# Patient Record
Sex: FEMALE | Race: WHITE | NOT HISPANIC OR LATINO | Employment: OTHER | URBAN - METROPOLITAN AREA
[De-identification: names, ages, dates, MRNs, and addresses within clinical notes are randomized per-mention and may not be internally consistent; named-entity substitution may affect disease eponyms.]

---

## 2017-04-28 ENCOUNTER — OFFICE VISIT (OUTPATIENT)
Dept: URGENT CARE | Facility: URGENT CARE | Age: 57
End: 2017-04-28
Payer: COMMERCIAL

## 2017-04-28 VITALS
WEIGHT: 290 LBS | BODY MASS INDEX: 45.52 KG/M2 | SYSTOLIC BLOOD PRESSURE: 141 MMHG | HEIGHT: 67 IN | TEMPERATURE: 98 F | DIASTOLIC BLOOD PRESSURE: 77 MMHG | HEART RATE: 78 BPM | OXYGEN SATURATION: 97 %

## 2017-04-28 DIAGNOSIS — R10.9 FLANK PAIN: ICD-10-CM

## 2017-04-28 DIAGNOSIS — M54.6 ACUTE RIGHT-SIDED THORACIC BACK PAIN: Primary | ICD-10-CM

## 2017-04-28 LAB
ALBUMIN UR-MCNC: NEGATIVE MG/DL
APPEARANCE UR: CLEAR
BILIRUB UR QL STRIP: NEGATIVE
COLOR UR AUTO: YELLOW
GLUCOSE UR STRIP-MCNC: NEGATIVE MG/DL
HGB UR QL STRIP: NEGATIVE
KETONES UR STRIP-MCNC: NEGATIVE MG/DL
LEUKOCYTE ESTERASE UR QL STRIP: ABNORMAL
NITRATE UR QL: NEGATIVE
PH UR STRIP: 5.5 PH (ref 5–7)
RBC #/AREA URNS AUTO: NORMAL /HPF (ref 0–2)
SP GR UR STRIP: <=1.005 (ref 1–1.03)
URN SPEC COLLECT METH UR: ABNORMAL
UROBILINOGEN UR STRIP-ACNC: 0.2 EU/DL (ref 0.2–1)
WBC #/AREA URNS AUTO: NORMAL /HPF (ref 0–2)

## 2017-04-28 PROCEDURE — 99213 OFFICE O/P EST LOW 20 MIN: CPT | Performed by: FAMILY MEDICINE

## 2017-04-28 PROCEDURE — 81001 URINALYSIS AUTO W/SCOPE: CPT | Performed by: FAMILY MEDICINE

## 2017-04-28 RX ORDER — CYCLOBENZAPRINE HCL 10 MG
10 TABLET ORAL 3 TIMES DAILY PRN
Qty: 15 TABLET | Refills: 0 | Status: SHIPPED | OUTPATIENT
Start: 2017-04-28 | End: 2019-07-30

## 2017-04-28 NOTE — MR AVS SNAPSHOT
After Visit Summary   4/28/2017    Azucena Guo    MRN: [id number]           Patient Information     Date Of Birth          1960        Visit Information        Provider Department      4/28/2017 7:30 PM Mar Hayes DO [facility]        Today's Diagnoses     Acute right-sided thoracic back pain    -  1    Flank pain          Care Instructions    Aleve 2 tablets (take with food) every 12 hours over the weekend.  If you take the muscle relaxant, do not drive or take at work as it can be sedating.   Heating pad, avoid picking up/carrying anything over the weekend.     Follow up early next week with your primary provider for a recheck.  Return to care sooner if any new or worsening symptoms develop.        Follow-ups after your visit        Who to contact     If you have questions or need follow up information about today's clinic visit or your schedule please contact [facility] directly at [phone].  Normal or non-critical lab and imaging results will be communicated to you by CallidusCloudhart, letter or phone within 4 business days after the clinic has received the results. If you do not hear from us within 7 days, please contact the clinic through GutCheckt or phone. If you have a critical or abnormal lab result, we will notify you by phone as soon as possible.  Submit refill requests through [employer] or call your pharmacy and they will forward the refill request to us. Please allow 3 business days for your refill to be completed.          Additional Information About Your Visit        CallidusCloudhart Information     [employer] gives you secure access to your electronic health record. If you see a primary care provider, you can also send messages to your care team and make appointments. If you have questions, please call your primary care clinic.  If you do not have a primary care provider, please call [phone] and they will assist you.        Care  "EveryWhere ID     This is your Care EveryWhere ID. This could be used by other organizations to access your [facility] medical records  [id number]        Your Vitals Were     Pulse Temperature Height Pulse Oximetry BMI (Body Mass Index)       78 98  F (36.7  C) (Tympanic) 5' 7\" (1.702 m) 97% 45.42 kg/m2        Blood Pressure from Last 3 Encounters:   04/28/17 141/77   09/16/16 128/84   11/06/15 132/70    Weight from Last 3 Encounters:   04/28/17 290 lb (131.5 kg)   09/16/16 299 lb 12.8 oz (136 kg)   11/06/15 283 lb (128.4 kg)              We Performed the Following     UA reflex to Microscopic     Urine Microscopic          Today's Medication Changes          These changes are accurate as of: 4/28/17  8:39 PM.  If you have any questions, ask your nurse or doctor.               Start taking these medicines.        Dose/Directions    cyclobenzaprine 10 MG tablet   Commonly known as:  FLEXERIL   Used for:  Acute right-sided thoracic back pain   Started by:  Mar Hayes DO        Dose:  10 mg   Take 1 tablet (10 mg) by mouth 3 times daily as needed for muscle spasms   Quantity:  15 tablet   Refills:  0            Where to get your medicines      These medications were sent to [facility] [address] [place] [address] AT [address] [address]    Hours:  24-hours Phone:  [phone]     cyclobenzaprine 10 MG tablet                Primary Care Provider Office Phone # Fax #    Codie Pina -[phone]       [facility] [address]  [place] [address]        Thank you!     Thank you for choosing [facility]  for your care. Our goal is always to provide you with excellent care. Hearing back from our patients is one way we can continue to improve our services. Please take a few minutes to complete the written survey that you may receive in the mail after your visit with us. " Thank you!             Your Updated Medication List - Protect others around you: Learn how to safely use, store and throw away your medicines at www.disposemymeds.org.          This list is accurate as of: 4/28/17  8:39 PM.  Always use your most recent med list.                   Brand Name Dispense Instructions for use    aspirin 81 MG tablet      Take 1 tablet by mouth daily.       blood glucose monitoring test strip    no brand specified    1 Box    by In Vitro route daily. One touch ultra 1-2 x daily as needed       CONTROL NORMAL Soln     1 Bottle    1 Application by In Vitro route as needed Give whatever brand is covered by her insurance now       cyclobenzaprine 10 MG tablet    FLEXERIL    15 tablet    Take 1 tablet (10 mg) by mouth 3 times daily as needed for muscle spasms       MARTINA-Q PO      Take  by mouth as needed.       glimepiride 4 MG tablet    AMARYL    180 tablet    TAKE TWO TABLETS BY MOUTH EVERY MORNING BEFORE BREAKFAST       lisinopril 2.5 MG tablet    PRINIVIL/Zestril    90 tablet    Take 1 tablet (2.5 mg) by mouth daily       metFORMIN 500 MG tablet    GLUCOPHAGE    450 tablet    TAKE 3 TABLETS MORNING AND TAKE 2 TABLETS EVERY EVENING       MULTI-VITAMIN PO      Take  by mouth.       * order for DME     200 each    Test strips for pt's glucometer, brand as covered by insurance Test bid and prn.  Rx number at [facility] is [id number]       * order for DME     1 each    Glucometer, brand as covered by insurance.       * order for DME     200 each    Test strips for pt's glucometer, brand as covered by insurance Test bid and prn.       * order for DME     200 each    Lancets bid and prn.  Fill per patient's insurance.       simvastatin 40 MG tablet    ZOCOR    90 tablet    Take 1 tablet (40 mg) by mouth At Bedtime       vitamin D 1000 UNITS capsule      Take 1 capsule by mouth daily.       * Notice:  This list has 4 medication(s) that are the same as other medications prescribed for you. Read  the directions carefully, and ask your doctor or other care provider to review them with you.

## 2017-04-29 NOTE — PATIENT INSTRUCTIONS
Aleve 2 tablets (take with food) every 12 hours over the weekend.  If you take the muscle relaxant, do not drive or take at work as it can be sedating.   Heating pad, avoid picking up/carrying anything over the weekend.     Follow up early next week with your primary provider for a recheck.  Return to care sooner if any new or worsening symptoms develop.

## 2017-04-29 NOTE — PROGRESS NOTES
"SUBJECTIVE:   Azucena Guo is a 56 year old female presenting with a chief complaint of right sided flank and back pain for the past several weeks.   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  She recalls feeding one and twisting to the right to  the other crying child when she had onset of pain under the right ribs/side and back.  Pain waxes and wanes, but worse this week.  Now some spasms.  Pain is nearly resolved on her weekends off, then returns as soon as she is back to work lifting and carrying the infants.  No fevers.  No joint pains, no vomiting or diarrhea.  No dysuria or symptoms of UTI.   H/o gallbladder surgery.  No other back/abdominal surgery.      ROS:  5-Point Review of Systems Negative-- Except as stated above.    OBJECTIVE  /77 (BP Location: Left arm, Patient Position: Chair, Cuff Size: Adult Large)  Pulse 78  Temp 98  F (36.7  C) (Tympanic)  Ht 5' 7\" (1.702 m)  Wt 290 lb (131.5 kg)  SpO2 97%  BMI 45.42 kg/m2  GENERAL:  Awake, alert and interactive. No acute distress.  HEAD:   NC/AT, EOMI, clear conjunctiva.    CHEST:  Lungs are clear, no rhonchi, wheezing or rales. Normal symmetric air entry throughout both lung fields.   HEART:  S1 and S2 normal, no murmurs, clicks, gallops or rubs. Regular rate and rhythm.  ABD:  Soft, not distended, bowel sounds normal throughout.  No TTP.  BACK: no TTP over midline spine. Generalized tenderness with palpation below scapula and flank.  No swelling, bruising or erythema noted.  Mild CVA TTP both sides.  SKIN:  No rashes noted to back/abdomen    Results for orders placed or performed in visit on 04/28/17   UA reflex to Microscopic   Result Value Ref Range    Color Urine Yellow     Appearance Urine Clear     Glucose Urine Negative NEG mg/dL    Bilirubin Urine Negative NEG    Ketones Urine Negative NEG mg/dL    Specific Gravity Urine <=1.005 1.003 - 1.035    Blood Urine Negative NEG    pH Urine 5.5 5.0 - 7.0 pH    Protein Albumin Urine " Negative NEG mg/dL    Urobilinogen Urine 0.2 0.2 - 1.0 EU/dL    Nitrite Urine Negative NEG    Leukocyte Esterase Urine Trace (A) NEG    Source Midstream Urine    Urine Microscopic   Result Value Ref Range    WBC Urine O - 2 0 - 2 /HPF    RBC Urine O - 2 0 - 2 /HPF       ASSESSMENT/PLAN    ICD-10-CM    1. Acute right-sided thoracic back pain M54.6 UA reflex to Microscopic     cyclobenzaprine (FLEXERIL) 10 MG tablet     Urine Microscopic   2. Flank pain R10.9 UA reflex to Microscopic      We discussed the expected course, medications, and symptomatic cares.      Patient Instructions   Aleve 2 tablets (take with food) every 12 hours over the weekend.  If you take the muscle relaxant, do not drive or take at work as it can be sedating.   Heating pad, avoid picking up/carrying anything over the weekend.     Follow up early next week with your primary provider for a recheck.  Return to care sooner if any new or worsening symptoms develop.

## 2017-04-29 NOTE — NURSING NOTE
"Chief Complaint   Patient presents with     Urgent Care     Back Pain     c/o back pain for 1 day       Initial /77 (BP Location: Left arm, Patient Position: Chair, Cuff Size: Adult Large)  Pulse 78  Temp 98  F (36.7  C) (Tympanic)  Ht 5' 7\" (1.702 m)  Wt 290 lb (131.5 kg)  SpO2 97%  BMI 45.42 kg/m2 Estimated body mass index is 45.42 kg/(m^2) as calculated from the following:    Height as of this encounter: 5' 7\" (1.702 m).    Weight as of this encounter: 290 lb (131.5 kg).  Medication Reconciliation: complete   Evita Novak MA    "

## 2017-08-10 ENCOUNTER — MYC MEDICAL ADVICE (OUTPATIENT)
Dept: FAMILY MEDICINE | Facility: CLINIC | Age: 57
End: 2017-08-10

## 2017-08-15 ENCOUNTER — TRANSFERRED RECORDS (OUTPATIENT)
Dept: HEALTH INFORMATION MANAGEMENT | Facility: CLINIC | Age: 57
End: 2017-08-15

## 2017-09-28 ENCOUNTER — OFFICE VISIT (OUTPATIENT)
Dept: FAMILY MEDICINE | Facility: CLINIC | Age: 57
End: 2017-09-28
Payer: COMMERCIAL

## 2017-09-28 ENCOUNTER — RADIANT APPOINTMENT (OUTPATIENT)
Dept: GENERAL RADIOLOGY | Facility: CLINIC | Age: 57
End: 2017-09-28
Attending: FAMILY MEDICINE
Payer: COMMERCIAL

## 2017-09-28 VITALS
DIASTOLIC BLOOD PRESSURE: 75 MMHG | TEMPERATURE: 97 F | WEIGHT: 269 LBS | HEIGHT: 67 IN | OXYGEN SATURATION: 97 % | HEART RATE: 72 BPM | RESPIRATION RATE: 18 BRPM | SYSTOLIC BLOOD PRESSURE: 114 MMHG | BODY MASS INDEX: 42.22 KG/M2

## 2017-09-28 DIAGNOSIS — Z00.00 ROUTINE GENERAL MEDICAL EXAMINATION AT A HEALTH CARE FACILITY: Primary | ICD-10-CM

## 2017-09-28 DIAGNOSIS — E78.5 HYPERLIPIDEMIA LDL GOAL <100: ICD-10-CM

## 2017-09-28 DIAGNOSIS — M79.644 PAIN OF FINGER OF RIGHT HAND: ICD-10-CM

## 2017-09-28 DIAGNOSIS — E11.8 CONTROLLED TYPE 2 DIABETES MELLITUS WITH COMPLICATION, WITHOUT LONG-TERM CURRENT USE OF INSULIN (H): ICD-10-CM

## 2017-09-28 DIAGNOSIS — Z23 NEED FOR PROPHYLACTIC VACCINATION AND INOCULATION AGAINST INFLUENZA: ICD-10-CM

## 2017-09-28 DIAGNOSIS — E78.2 MIXED HYPERLIPIDEMIA: ICD-10-CM

## 2017-09-28 LAB
ALBUMIN SERPL-MCNC: 3.8 G/DL (ref 3.4–5)
ALP SERPL-CCNC: 51 U/L (ref 40–150)
ALT SERPL W P-5'-P-CCNC: 26 U/L (ref 0–50)
ANION GAP SERPL CALCULATED.3IONS-SCNC: 9 MMOL/L (ref 3–14)
AST SERPL W P-5'-P-CCNC: 20 U/L (ref 0–45)
BILIRUB SERPL-MCNC: 0.6 MG/DL (ref 0.2–1.3)
BUN SERPL-MCNC: 13 MG/DL (ref 7–30)
CALCIUM SERPL-MCNC: 9.3 MG/DL (ref 8.5–10.1)
CHLORIDE SERPL-SCNC: 101 MMOL/L (ref 94–109)
CHOLEST SERPL-MCNC: 167 MG/DL
CO2 SERPL-SCNC: 25 MMOL/L (ref 20–32)
CREAT SERPL-MCNC: 0.4 MG/DL (ref 0.52–1.04)
CREAT UR-MCNC: 110 MG/DL
GFR SERPL CREATININE-BSD FRML MDRD: >90 ML/MIN/1.7M2
GLUCOSE SERPL-MCNC: 161 MG/DL (ref 70–99)
HBA1C MFR BLD: 6.5 % (ref 4.3–6)
HDLC SERPL-MCNC: 51 MG/DL
LDLC SERPL CALC-MCNC: 84 MG/DL
MICROALBUMIN UR-MCNC: 27 MG/L
MICROALBUMIN/CREAT UR: 24.36 MG/G CR (ref 0–25)
NONHDLC SERPL-MCNC: 116 MG/DL
POTASSIUM SERPL-SCNC: 3.9 MMOL/L (ref 3.4–5.3)
PROT SERPL-MCNC: 8.1 G/DL (ref 6.8–8.8)
SODIUM SERPL-SCNC: 135 MMOL/L (ref 133–144)
TRIGL SERPL-MCNC: 161 MG/DL
VIT B12 SERPL-MCNC: 433 PG/ML (ref 193–986)

## 2017-09-28 PROCEDURE — 80061 LIPID PANEL: CPT | Performed by: FAMILY MEDICINE

## 2017-09-28 PROCEDURE — 90686 IIV4 VACC NO PRSV 0.5 ML IM: CPT | Performed by: FAMILY MEDICINE

## 2017-09-28 PROCEDURE — 99207 C FOOT EXAM  NO CHARGE: CPT | Mod: 25 | Performed by: FAMILY MEDICINE

## 2017-09-28 PROCEDURE — 99213 OFFICE O/P EST LOW 20 MIN: CPT | Mod: 25 | Performed by: FAMILY MEDICINE

## 2017-09-28 PROCEDURE — 83036 HEMOGLOBIN GLYCOSYLATED A1C: CPT | Performed by: FAMILY MEDICINE

## 2017-09-28 PROCEDURE — 82607 VITAMIN B-12: CPT | Performed by: FAMILY MEDICINE

## 2017-09-28 PROCEDURE — 80053 COMPREHEN METABOLIC PANEL: CPT | Performed by: FAMILY MEDICINE

## 2017-09-28 PROCEDURE — 82043 UR ALBUMIN QUANTITATIVE: CPT | Performed by: FAMILY MEDICINE

## 2017-09-28 PROCEDURE — 99396 PREV VISIT EST AGE 40-64: CPT | Mod: 25 | Performed by: FAMILY MEDICINE

## 2017-09-28 PROCEDURE — 36415 COLL VENOUS BLD VENIPUNCTURE: CPT | Performed by: FAMILY MEDICINE

## 2017-09-28 PROCEDURE — 90471 IMMUNIZATION ADMIN: CPT | Performed by: FAMILY MEDICINE

## 2017-09-28 PROCEDURE — 73140 X-RAY EXAM OF FINGER(S): CPT | Mod: RT

## 2017-09-28 RX ORDER — LISINOPRIL 2.5 MG/1
2.5 TABLET ORAL DAILY
Qty: 90 TABLET | Refills: 3 | Status: SHIPPED | OUTPATIENT
Start: 2017-09-28 | End: 2018-11-12

## 2017-09-28 RX ORDER — SIMVASTATIN 40 MG
40 TABLET ORAL AT BEDTIME
Qty: 90 TABLET | Refills: 3 | Status: SHIPPED | OUTPATIENT
Start: 2017-09-28 | End: 2018-11-05

## 2017-09-28 RX ORDER — GLIMEPIRIDE 4 MG/1
TABLET ORAL
Qty: 180 TABLET | Refills: 3 | Status: SHIPPED | OUTPATIENT
Start: 2017-09-28 | End: 2018-11-12

## 2017-09-28 NOTE — MR AVS SNAPSHOT
After Visit Summary   9/28/2017    Azucena Guo    MRN: [id number]           Patient Information     Date Of Birth          1960        Visit Information        Provider Department      9/28/2017 7:20 AM Codie Pina MD [facility]        Today's Diagnoses     Routine general medical examination at a health care facility    -  1    Pain of finger of right hand        Mixed hyperlipidemia        Controlled type 2 diabetes mellitus with complication, without long-term current use of insulin (H)        Need for prophylactic vaccination and inoculation against influenza        Uncontrolled type 2 diabetes mellitus with complication, without long-term current use of insulin (H)        Hyperlipidemia LDL goal <100          Care Instructions      Preventive Health Recommendations  Female Ages 50 - 64    Yearly exam: See your health care provider every year in order to  o Review health changes.   o Discuss preventive care.    o Review your medicines if your doctor has prescribed any.      Get a Pap test every three years (unless you have an abnormal result and your provider advises testing more often).    If you get Pap tests with HPV test, you only need to test every 5 years, unless you have an abnormal result.     You do not need a Pap test if your uterus was removed (hysterectomy) and you have not had cancer.    You should be tested each year for STDs (sexually transmitted diseases) if you're at risk.     Have a mammogram every 1 to 2 years.    Have a colonoscopy at age 50, or have a yearly FIT test (stool test). These exams screen for colon cancer.      Have a cholesterol test every 5 years, or more often if advised.    Have a diabetes test (fasting glucose) every three years. If you are at risk for diabetes, you should have this test more often.     If you are at risk for osteoporosis (brittle bone disease), think about having a bone density scan (DEXA).    Shots: Get a  flu shot each year. Get a tetanus shot every 10 years.    Nutrition:     Eat at least 5 servings of fruits and vegetables each day.    Eat whole-grain bread, whole-wheat pasta and brown rice instead of white grains and rice.    Talk to your provider about Calcium and Vitamin D.     Lifestyle    Exercise at least 150 minutes a week (30 minutes a day, 5 days a week). This will help you control your weight and prevent disease.    Limit alcohol to one drink per day.    No smoking.     Wear sunscreen to prevent skin cancer.     See your dentist every six months for an exam and cleaning.    See your eye doctor every 1 to 2 years.            Follow-ups after your visit        Who to contact     If you have questions or need follow up information about today's clinic visit or your schedule please contact [facility] directly at [phone].  Normal or non-critical lab and imaging results will be communicated to you by [employer] Labshart, letter or phone within 4 business days after the clinic has received the results. If you do not hear from us within 7 days, please contact the clinic through Click4Ridet or phone. If you have a critical or abnormal lab result, we will notify you by phone as soon as possible.  Submit refill requests through [employer] or call your pharmacy and they will forward the refill request to us. Please allow 3 business days for your refill to be completed.          Additional Information About Your Visit        [employer] Software Information     [employer] gives you secure access to your electronic health record. If you see a primary care provider, you can also send messages to your care team and make appointments. If you have questions, please call your primary care clinic.  If you do not have a primary care provider, please call [phone] and they will assist you.        Care EveryWhere ID     This is your Care EveryWhere ID. This could be used by other organizations to access your [facility]  "records  [id number]        Your Vitals Were     Pulse Temperature Respirations Height Pulse Oximetry BMI (Body Mass Index)    72 97  F (36.1  C) (Tympanic) 18 5' 7\" (1.702 m) 97% 42.13 kg/m2       Blood Pressure from Last 3 Encounters:   09/28/17 114/75   04/28/17 141/77   09/16/16 128/84    Weight from Last 3 Encounters:   09/28/17 269 lb (122 kg)   04/28/17 290 lb (131.5 kg)   09/16/16 299 lb 12.8 oz (136 kg)              We Performed the Following     Albumin Random Urine Quantitative with Creat Ratio     Comprehensive metabolic panel     FLU VAC, SPLIT VIRUS IM > 3 YO (QUADRIVALENT) [45517]     FOOT EXAM     Hemoglobin A1c     Lipid Profile     OFFICE/OUTPT VISIT,MACY SUE III     Vaccine Administration, Initial [96257]     Vitamin B12          Where to get your medicines      These medications were sent to [facility] [address] [place] [address] AT [address] [address]    Hours:  24-hours Phone:  [phone]     glimepiride 4 MG tablet    lisinopril 2.5 MG tablet    metFORMIN 500 MG tablet    simvastatin 40 MG tablet          Primary Care Provider Office Phone # Fax #    Codie Pina [phone] [address] [place] [address]        Equal Access to Services     RANDELL JEAN AH: Hadii brenden ku hadasho Soomaali, waaxda luqadaha, qaybta kaalmada adeegyada, marilu martinez. So [facility] [phone].    ATENCIÓN: Si habla español, tiene a charles disposición servicios gratuitos de asistencia lingüística. Lljo al [phone].    We comply with applicable federal civil rights laws and [place] laws. We do not discriminate on the basis of race, color, national origin, age, disability sex, sexual orientation or gender identity.            Thank you!     Thank you for choosing [facility]  for your care. Our goal is always to provide you with excellent care. Hearing " back from our patients is one way we can continue to improve our services. Please take a few minutes to complete the written survey that you may receive in the mail after your visit with us. Thank you!             Your Updated Medication List - Protect others around you: Learn how to safely use, store and throw away your medicines at www.disposemymeds.org.          This list is accurate as of: 9/28/17  5:59 PM.  Always use your most recent med list.                   Brand Name Dispense Instructions for use Diagnosis    aspirin 81 MG tablet      Take 1 tablet by mouth daily.        blood glucose monitoring test strip    no brand specified    1 Box    by In Vitro route daily. One touch ultra 1-2 x daily as needed    Type 2 diabetes, HbA1c goal < 7% (H)       CONTROL NORMAL Soln     1 Bottle    1 Application by In Vitro route as needed Give whatever brand is covered by her insurance now    Type 2 diabetes, HbA1c goal < 7% (H)       cyclobenzaprine 10 MG tablet    FLEXERIL    15 tablet    Take 1 tablet (10 mg) by mouth 3 times daily as needed for muscle spasms    Acute right-sided thoracic back pain       MARTINA-Q PO      Take  by mouth as needed.        glimepiride 4 MG tablet    AMARYL    180 tablet    TAKE TWO TABLETS BY MOUTH EVERY MORNING BEFORE BREAKFAST    Controlled type 2 diabetes mellitus with complication, without long-term current use of insulin (H)       lisinopril 2.5 MG tablet    PRINIVIL/Zestril    90 tablet    Take 1 tablet (2.5 mg) by mouth daily    Uncontrolled type 2 diabetes mellitus with complication, without long-term current use of insulin (H)       metFORMIN 500 MG tablet    GLUCOPHAGE    450 tablet    TAKE 3 TABLETS MORNING AND TAKE 2 TABLETS EVERY EVENING    Controlled type 2 diabetes mellitus with complication, without long-term current use of insulin (H)       MULTI-VITAMIN PO      Take  by mouth.        * order for DME     200 each    Test strips for pt's glucometer, brand as covered by  insurance Test bid and prn.  Rx number at [facility] is [id number]    Type 2 diabetes, HbA1c goal < 7% (H)       * order for DME     1 each    Glucometer, brand as covered by insurance.    Type 2 diabetes, HbA1c goal < 7% (H)       * order for DME     200 each    Test strips for pt's glucometer, brand as covered by insurance Test bid and prn.    Type 2 diabetes, HbA1c goal < 7% (H)       * order for DME     200 each    Lancets bid and prn.  Fill per patient's insurance.    Type 2 diabetes, HbA1c goal < 7% (H)       simvastatin 40 MG tablet    ZOCOR    90 tablet    Take 1 tablet (40 mg) by mouth At Bedtime    Hyperlipidemia LDL goal <100       vitamin D 1000 UNITS capsule      Take 1 capsule by mouth daily.        * Notice:  This list has 4 medication(s) that are the same as other medications prescribed for you. Read the directions carefully, and ask your doctor or other care provider to review them with you.

## 2017-09-28 NOTE — PROGRESS NOTES
Injectable Influenza Immunization Documentation    1.  Is the person to be vaccinated sick today?   No    2. Does the person to be vaccinated have an allergy to a component   of the vaccine?   No    3. Has the person to be vaccinated ever had a serious reaction   to influenza vaccine in the past?   No    4. Has the person to be vaccinated ever had Guillain-Barré syndrome?   No    Form completed by patient.      Valentin Rangel MA

## 2017-09-28 NOTE — NURSING NOTE
"Chief Complaint   Patient presents with     Physical       Initial /75 (BP Location: Right arm, Patient Position: Sitting, Cuff Size: Adult Large)  Pulse 72  Temp 97  F (36.1  C) (Tympanic)  Resp 18  Ht 5' 7\" (1.702 m)  Wt 269 lb (122 kg)  SpO2 97%  BMI 42.13 kg/m2 Estimated body mass index is 42.13 kg/(m^2) as calculated from the following:    Height as of this encounter: 5' 7\" (1.702 m).    Weight as of this encounter: 269 lb (122 kg).  Medication Reconciliation: unable or not appropriate to perform         Valentin Rangel MA      "

## 2017-09-28 NOTE — PROGRESS NOTES
SUBJECTIVE:   CC: Azucena Guo is an 56 year old woman who presents for preventive health visit.     Physical   Annual:     Getting at least 3 servings of Calcium per day::  Yes    Bi-annual eye exam::  Yes    Dental care twice a year::  Yes    Sleep apnea or symptoms of sleep apnea::  None    Diet::  Other    Frequency of exercise::  2-3 days/week    Duration of exercise::  30-45 minutes    Taking medications regularly::  Yes    Medication side effects::  None    Additional concerns today::  YES (patches on both thigh and lower legs, right pinky swollen )    DM2:  The patient has not followed up for the past year.  She states that on metformin and glimepiride, she has seen her sugars decrease from 200s to mid 100s.  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  She will need new strips for her meter; has a new meter and will let me know which brand.      Skin lesions: dry brown patches to bilateral legs, seems to be getting more of them.  They are asymptomatic.     Right pinky finger swelling/pain: x 2years.  Two years ago, she fell head-first against a wall and rain barrel; she hurt her neck/shoulder and was mostly focused on that pain, but then she realized that she had also hurt her right pinky finger.  Ever since, it has been swollen, it cannot flex, and if it is bumped or moved, there is burning pain.  She mentioned this to a provider in the past who recommended an x-ray, but then that was never done.  She is right-handed, so has difficulty with many tasks, such as writing, holding a book, typing, gripping.       CV: DM2 as above was uncontrolled last year, will recheck all routine DM labs.  Patient on a statin and ACE-I.   Malignancy: pap up to date.  Due for mammogram.  Has colonoscopy q 5 yrs and is up to date.  Bone health: no risk factors  Immunizations: due for flu shot  Sexual health: postmenopausal, no symptoms of concern.  She is .  Depression screen: neg        Today's PHQ-2 Score:   PHQ-2 ( 1999 Pfizer) 9/28/2017   Q1: Little interest or pleasure in doing things 0   Q2: Feeling down, depressed or hopeless 0   PHQ-2 Score 0   Q1: Little interest or pleasure in doing things Not at all   Q2: Feeling down, depressed or hopeless Not at all   PHQ-2 Score 0       Abuse: Current or Past(Physical, Sexual or Emotional)- No  Do you feel safe in your environment - Yes    Social History   Substance Use Topics     Smoking status: Former Smoker     Quit date: 1/1/1991     Smokeless tobacco: Never Used     Alcohol use Yes      Comment: occasionally     The patient does not drink >3 drinks per day nor >7 drinks per week.    Reviewed orders with patient.  Reviewed health maintenance and updated orders accordingly - Yes  Patient Active Problem List   Diagnosis     Hyperlipidemia     Diabetes mellitus type 2, controlled, with complications (H)     BMI > 35     DDD (degenerative disc disease), cervical     Past Surgical History:   Procedure Laterality Date     CHOLECYSTECTOMY      1994       Social History   Substance Use Topics     Smoking status: Former Smoker     Quit date: 1/1/1991     Smokeless tobacco: Never Used     Alcohol use Yes      Comment: occasionally     Family History   Problem Relation Age of Onset     HEART DISEASE Mother      DIABETES Mother      CANCER Mother      limphoma     Obesity Mother      HEART DISEASE Father      Coronary Artery Disease Father      Hypertension Father      Hyperlipidemia Father      DIABETES Brother      DIABETES Brother      Obesity Brother      Breast Cancer Other      Obesity Sister      Obesity Sister          Current Outpatient Prescriptions   Medication Sig Dispense Refill     cyclobenzaprine (FLEXERIL) 10 MG tablet Take 1 tablet (10 mg) by mouth 3 times daily as needed for muscle spasms 15 tablet 0     glimepiride (AMARYL) 4 MG tablet TAKE TWO TABLETS BY MOUTH EVERY MORNING BEFORE BREAKFAST 180  tablet 3     lisinopril (PRINIVIL,ZESTRIL) 2.5 MG tablet Take 1 tablet (2.5 mg) by mouth daily 90 tablet 3     metFORMIN (GLUCOPHAGE) 500 MG tablet TAKE 3 TABLETS MORNING AND TAKE 2 TABLETS EVERY EVENING 450 tablet 3     simvastatin (ZOCOR) 40 MG tablet Take 1 tablet (40 mg) by mouth At Bedtime 90 tablet 3     Blood Glucose Calibration (CONTROL) NORMAL SOLN 1 Application by In Vitro route as needed Give whatever brand is covered by her insurance now 1 Bottle 11     Probiotic Product (MARTINA-Q PO) Take  by mouth as needed.       Multiple Vitamin (MULTI-VITAMIN PO) Take  by mouth.       glucose blood VI test strips strip by In Vitro route daily. One touch ultra 1-2 x daily as needed 1 Box 12     aspirin 81 MG tablet Take 1 tablet by mouth daily.       Cholecalciferol (VITAMIN D) 1000 UNIT capsule Take 1 capsule by mouth daily.       ORDER FOR DME Glucometer, brand as covered by insurance. (Patient not taking: Reported on 4/28/2017) 1 each 3     ORDER FOR DME Test strips for pt's glucometer, brand as covered by insurance Test bid and prn. (Patient not taking: Reported on 4/28/2017) 200 each 3     ORDER FOR DME Lancets bid and prn.  Fill per patient's insurance. (Patient not taking: Reported on 4/28/2017) 200 each 3     ORDER FOR DME Test strips for pt's glucometer, brand as covered by insurance Test bid and prn.   Rx number at [facility] is [phone] (Patient not taking: Reported on 4/28/2017) 200 each 4     Allergies   Allergen Reactions     Measles Mumps And Rubella Virus Vaccine Live      Redness       Pneumococcal Vaccine      Swollen node         Patient over age 50, mutual decision to screen reflected in health maintenance.      Pertinent mammograms are reviewed under the imaging tab.  History of abnormal Pap smear: NO - age 30-65 PAP every 5 years with negative HPV co-testing recommended    Reviewed and updated as needed this visit by clinical staffTobacco  Allergies  Meds  Med Hx  Surg Hx  Fam Hx  Soc  "Hx        Reviewed and updated as needed this visit by Provider            ROS:  C: NEGATIVE for fever, chills, change in weight  I: NEGATIVE for worrisome rashes, moles or lesions other than brown spots on legs  E: NEGATIVE for vision changes or irritation  ENT: NEGATIVE for ear, mouth and throat problems  R: NEGATIVE for significant cough or SOB  B: NEGATIVE for masses, tenderness or discharge  CV: NEGATIVE for chest pain, palpitations or peripheral edema  GI: NEGATIVE for nausea, abdominal pain, heartburn, or change in bowel habits  : NEGATIVE for unusual urinary or vaginal symptoms. No vaginal bleeding.  M: NEGATIVE for significant arthralgias or myalgia--other than as noted in HPI.  N: NEGATIVE for weakness, dizziness or paresthesias  P: NEGATIVE for changes in mood or affect      OBJECTIVE:   /75 (BP Location: Right arm, Patient Position: Sitting, Cuff Size: Adult Large)  Pulse 72  Temp 97  F (36.1  C) (Tympanic)  Resp 18  Ht 5' 7\" (1.702 m)  Wt 269 lb (122 kg)  SpO2 97%  BMI 42.13 kg/m2  EXAM:  GENERAL APPEARANCE: healthy, alert and no distress  EYES: Eyes grossly normal to inspection, PERRL and conjunctivae and sclerae normal  HENT: ear canals and TM's normal, nose and mouth without ulcers or lesions, oropharynx clear and oral mucous membranes moist  NECK: no adenopathy, no asymmetry, masses, or scars and thyroid normal to palpation  RESP: lungs clear to auscultation - no rales, rhonchi or wheezes  BREAST: normal without masses, tenderness or nipple discharge and no palpable axillary masses or adenopathy  CV: regular rate and rhythm, normal S1 S2, no S3 or S4, no murmur, click or rub, no peripheral edema and peripheral pulses strong  ABDOMEN: soft, nontender, no hepatosplenomegaly, no masses and bowel sounds normal  MS: Right 5th digit with swelling and tenderness mainly over the proximal phalange and the PIP joint; unable to flex at the PIP, some flexion at the MCP and DIP.  Otherwise no " "musculoskeletal defects are noted and gait is age appropriate without ataxia  SKIN: scattered cherry hemangiomas to trunk, a few light brown, scaly, \"stuck-on\" lesions to her anterior thighs c/w SK's no suspicious lesions or rashes  NEURO: Normal strength and tone, sensory exam grossly normal, mentation intact and speech normal.  Sensation to monofilament is present but more dull on the pads of her toes than in the arch of the feet or the dorsal aspects of the feet.    PSYCH: mentation appears normal and affect normal/bright    ASSESSMENT/PLAN:   1. Routine general medical examination at a health care facility  CV: checking routine DM, HL labs continue ASA, statin and ACE-I.  Malignancy: mammogram this year.  Pap due 2020. Colonoscopy due 2021.  Bone health: no risk factors  Immunizations: flu shot      2. Pain of finger of right hand  From trauma two years ago, likely had a fracture that did not heal properly and now perhaps some arthritis.  Will check plain films and send on to orthopedics.    - XR Finger Right G/E 2 Views; Future    3. Mixed hyperlipidemia    - Comprehensive metabolic panel  - Lipid Profile    4. Controlled type 2 diabetes mellitus with complication, without long-term current use of insulin (H)    - Hemoglobin A1c  - Comprehensive metabolic panel  - Albumin Random Urine Quantitative with Creat Ratio  - Vitamin B12  - Lipid Profile  - FOOT EXAM    5. Need for prophylactic vaccination and inoculation against influenza    - FLU VAC, SPLIT VIRUS IM > 3 YO (QUADRIVALENT) [50149]  - Vaccine Administration, Initial [99703]    COUNSELING:  Reviewed preventive health counseling, as reflected in patient instructions       Regular exercise       Healthy diet/nutrition       Vision screening         reports that she quit smoking about 26 years ago. She has never used smokeless tobacco.    Estimated body mass index is 42.13 kg/(m^2) as calculated from the following:    Height as of this encounter: 5' 7\" " (1.702 m).    Weight as of this encounter: 269 lb (122 kg).       Counseling Resources:  ATP IV Guidelines  Pooled Cohorts Equation Calculator  Breast Cancer Risk Calculator  FRAX Risk Assessment  ICSI Preventive Guidelines  Dietary Guidelines for Americans, 2010  USDA's MyPlate  ASA Prophylaxis  Lung CA Screening    Codie Pina MD  [facility]  Answers for HPI/ROS submitted by the patient on 9/28/2017   PHQ-2 Score: 0

## 2017-09-29 ENCOUNTER — MYC MEDICAL ADVICE (OUTPATIENT)
Dept: FAMILY MEDICINE | Facility: CLINIC | Age: 57
End: 2017-09-29

## 2017-09-29 DIAGNOSIS — E11.8 DIABETES MELLITUS TYPE 2, CONTROLLED, WITH COMPLICATIONS (H): Primary | ICD-10-CM

## 2017-12-26 ENCOUNTER — OFFICE VISIT (OUTPATIENT)
Dept: URGENT CARE | Facility: URGENT CARE | Age: 57
End: 2017-12-26
Payer: COMMERCIAL

## 2017-12-26 VITALS
HEIGHT: 67 IN | WEIGHT: 275 LBS | HEART RATE: 80 BPM | DIASTOLIC BLOOD PRESSURE: 84 MMHG | SYSTOLIC BLOOD PRESSURE: 135 MMHG | BODY MASS INDEX: 43.16 KG/M2 | TEMPERATURE: 97.4 F | OXYGEN SATURATION: 100 %

## 2017-12-26 DIAGNOSIS — J20.8 VIRAL BRONCHITIS: Primary | ICD-10-CM

## 2017-12-26 PROCEDURE — 99213 OFFICE O/P EST LOW 20 MIN: CPT | Performed by: INTERNAL MEDICINE

## 2017-12-26 ASSESSMENT — ENCOUNTER SYMPTOMS
WHEEZING: 1
HEADACHES: 1
SORE THROAT: 1
COUGH: 1

## 2017-12-26 NOTE — MR AVS SNAPSHOT
After Visit Summary   12/26/2017    Azucena Guo    MRN: [id number]           Patient Information     Date Of Birth          1960        Visit Information        Provider Department      12/26/2017 5:50 PM Kindra Marlow MD [facility]        Today's Diagnoses     Viral bronchitis    -  1      Care Instructions    Menthol cough drops  Honey  vicks  Fluids  Sleep  Robitussin with codeine    Call or return to clinic if symptoms worsen or fail to improve as anticipated.            Follow-ups after your visit        Who to contact     If you have questions or need follow up information about today's clinic visit or your schedule please contact [facility] directly at [phone].  Normal or non-critical lab and imaging results will be communicated to you by MyChart, letter or phone within 4 business days after the clinic has received the results. If you do not hear from us within 7 days, please contact the clinic through [employer] or phone. If you have a critical or abnormal lab result, we will notify you by phone as soon as possible.  Submit refill requests through [employer] or call your pharmacy and they will forward the refill request to us. Please allow 3 business days for your refill to be completed.          Additional Information About Your Visit        MyChart Information     [employer] gives you secure access to your electronic health record. If you see a primary care provider, you can also send messages to your care team and make appointments. If you have questions, please call your primary care clinic.  If you do not have a primary care provider, please call [phone] and they will assist you.        Care EveryWhere ID     This is your Care EveryWhere ID. This could be used by other organizations to access your [place] medical records  [id number]        Your Vitals Were     Pulse Temperature Height Pulse Oximetry BMI (Body Mass Index)  "      80 97.4  F (36.3  C) (Tympanic) 5' 7\" (1.702 m) 100% 43.07 kg/m2        Blood Pressure from Last 3 Encounters:   12/26/17 135/84   09/28/17 114/75   04/28/17 141/77    Weight from Last 3 Encounters:   12/26/17 275 lb (124.7 kg)   09/28/17 269 lb (122 kg)   04/28/17 290 lb (131.5 kg)              Today, you had the following     No orders found for display       Primary Care Provider Office Phone # Fax #    Codie Pina [phone]       [address] [place] [address]        Equal Access to Services     RANDELL JEAN : Tabby farro Sograce, waaxda luqadaha, qaybta kaalmada adeegyada, marilu grey . [facility] [phone].    ATENCIÓN: Si habla español, tiene a charles disposición servicios gratuitos de asistencia lingüística. [facility] [phone].    We comply with applicable federal civil rights laws and [place] laws. We do not discriminate on the basis of race, color, national origin, age, disability, sex, sexual orientation, or gender identity.            Thank you!     Thank you for choosing [facility]  for your care. Our goal is always to provide you with excellent care. Hearing back from our patients is one way we can continue to improve our services. Please take a few minutes to complete the written survey that you may receive in the mail after your visit with us. Thank you!             Your Updated Medication List - Protect others around you: Learn how to safely use, store and throw away your medicines at www.disposemymeds.org.          This list is accurate as of: 12/26/17  6:32 PM.  Always use your most recent med list.                   Brand Name Dispense Instructions for use Diagnosis    aspirin 81 MG tablet      Take 1 tablet by mouth daily.        * blood glucose monitoring test strip    no brand specified    1 Box    by In Vitro route daily. One touch ultra 1-2 x daily as needed    Type 2 diabetes, HbA1c " goal < 7% (H)       * blood glucose monitoring test strip    CAMI CONTOUR    100 strip    Use to test blood sugar 1-2  times daily or as directed.    Diabetes mellitus type 2, controlled, with complications (H)       CONTROL NORMAL Soln     1 Bottle    1 Application by In Vitro route as needed Give whatever brand is covered by her insurance now    Type 2 diabetes, HbA1c goal < 7% (H)       cyclobenzaprine 10 MG tablet    FLEXERIL    15 tablet    Take 1 tablet (10 mg) by mouth 3 times daily as needed for muscle spasms    Acute right-sided thoracic back pain       MARTINA-Q PO      Take  by mouth as needed.        glimepiride 4 MG tablet    AMARYL    180 tablet    TAKE TWO TABLETS BY MOUTH EVERY MORNING BEFORE BREAKFAST    Controlled type 2 diabetes mellitus with complication, without long-term current use of insulin (H)       lisinopril 2.5 MG tablet    PRINIVIL/Zestril    90 tablet    Take 1 tablet (2.5 mg) by mouth daily    Uncontrolled type 2 diabetes mellitus with complication, without long-term current use of insulin (H)       metFORMIN 500 MG tablet    GLUCOPHAGE    450 tablet    TAKE 3 TABLETS MORNING AND TAKE 2 TABLETS EVERY EVENING    Controlled type 2 diabetes mellitus with complication, without long-term current use of insulin (H)       MULTI-VITAMIN PO      Take  by mouth.        * order for DME     200 each    Test strips for pt's glucometer, brand as covered by insurance Test bid and prn.  Rx number at [facility] is [phone]    Type 2 diabetes, HbA1c goal < 7% (H)       * order for DME     1 each    Glucometer, brand as covered by insurance.    Type 2 diabetes, HbA1c goal < 7% (H)       * order for DME     200 each    Test strips for pt's glucometer, brand as covered by insurance Test bid and prn.    Type 2 diabetes, HbA1c goal < 7% (H)       * order for DME     200 each    Lancets bid and prn.  Fill per patient's insurance.    Type 2 diabetes, HbA1c goal < 7% (H)       simvastatin 40 MG tablet     ZOCOR    90 tablet    Take 1 tablet (40 mg) by mouth At Bedtime    Hyperlipidemia LDL goal <100       vitamin D 1000 UNITS capsule      Take 1 capsule by mouth daily.        * Notice:  This list has 6 medication(s) that are the same as other medications prescribed for you. Read the directions carefully, and ask your doctor or other care provider to review them with you.

## 2017-12-27 NOTE — PROGRESS NOTES
SUBJECTIVE:   Azucena Guo is a 57 year old female presenting with a chief complaint of   Chief Complaint   Patient presents with     Urgent Care     Pt in clinic to have eval for cough, headache, congestion, and eye pain.     Cough     Headache     Nasal Congestion     Eye Problem   .    Onset of symptoms was 5 day(s) ago.    Current and Associated symptoms: cough - non-productive, sore throat and headache  Treatment measures tried include Theraflu  muscinex.  Predisposing factors include ill contact: .  provider    Review of Systems   Constitutional: Positive for malaise/fatigue.   HENT: Positive for congestion and sore throat.    Eyes:        Eyes ache   Respiratory: Positive for cough and wheezing.         Worse ih morning, productive in morning, otherwise irritant  Nasal wheeze with laying down   Neurological: Positive for headaches.     Blood sugars - not checked past few days    Past Medical History:   Diagnosis Date     Arthritis      Diabetes mellitus (H)      Current Outpatient Prescriptions   Medication Sig Dispense Refill     blood glucose monitoring ([employer] CONTOUR) test strip Use to test blood sugar 1-2  times daily or as directed. 100 strip 3     glimepiride (AMARYL) 4 MG tablet TAKE TWO TABLETS BY MOUTH EVERY MORNING BEFORE BREAKFAST 180 tablet 3     lisinopril (PRINIVIL/ZESTRIL) 2.5 MG tablet Take 1 tablet (2.5 mg) by mouth daily 90 tablet 3     metFORMIN (GLUCOPHAGE) 500 MG tablet TAKE 3 TABLETS MORNING AND TAKE 2 TABLETS EVERY EVENING 450 tablet 3     simvastatin (ZOCOR) 40 MG tablet Take 1 tablet (40 mg) by mouth At Bedtime 90 tablet 3     cyclobenzaprine (FLEXERIL) 10 MG tablet Take 1 tablet (10 mg) by mouth 3 times daily as needed for muscle spasms 15 tablet 0     ORDER FOR DME Glucometer, brand as covered by insurance. 1 each 3     ORDER FOR DME Test strips for pt's glucometer, brand as covered by insurance Test bid and prn. 200 each 3     ORDER FOR DME Lancets bid and prn.  Fill per  "patient's insurance. 200 each 3     Blood Glucose Calibration (CONTROL) NORMAL SOLN 1 Application by In Vitro route as needed Give whatever brand is covered by her insurance now 1 Bottle 11     ORDER FOR DME Test strips for pt's glucometer, brand as covered by insurance Test bid and prn.   Rx number at [facility] is [phone] 200 each 4     Probiotic Product (MARTINA-Q PO) Take  by mouth as needed.       Multiple Vitamin (MULTI-VITAMIN PO) Take  by mouth.       glucose blood VI test strips strip by In Vitro route daily. One touch ultra 1-2 x daily as needed 1 Box 12     aspirin 81 MG tablet Take 1 tablet by mouth daily.       Cholecalciferol (VITAMIN D) 1000 UNIT capsule Take 1 capsule by mouth daily.       Social History   Substance Use Topics     Smoking status: Former Smoker     Quit date: 1/1/1991     Smokeless tobacco: Never Used     Alcohol use Yes      Comment: occasionally       OBJECTIVE  /84  Pulse 80  Temp 97.4  F (36.3  C) (Tympanic)  Ht 5' 7\" (1.702 m)  Wt 275 lb (124.7 kg)  SpO2 100%  BMI 43.07 kg/m2    Physical Exam   Constitutional: She is well-developed, well-nourished, and in no distress.   HENT:   Nose: Nose normal.   Mouth/Throat: Oropharynx is clear and moist. No oropharyngeal exudate.   tympanic membrane clear   Cardiovascular: Normal rate, regular rhythm and normal heart sounds.    Pulmonary/Chest: Effort normal and breath sounds normal.       Labs:  No results found for this or any previous visit (from the past 24 hour(s)).  ASSESSMENT:      ICD-10-CM    1. Viral bronchitis J20.8         Medical Decision Making:    Differential Diagnosis:  Sinusitis    Serious Comorbid Conditions:  Diabetes    PLAN:  Patient Instructions   Menthol cough drops  Honey  vicks  Fluids  Sleep  Robitussin with codeine        "

## 2017-12-27 NOTE — PATIENT INSTRUCTIONS
Menthol cough drops  Honey  vicks  Fluids  Sleep  Robitussin with codeine    Call or return to clinic if symptoms worsen or fail to improve as anticipated.

## 2017-12-27 NOTE — NURSING NOTE
"Chief Complaint   Patient presents with     Urgent Care     Pt in clinic to have eval for cough, headache, congestion, and eye pain.     Cough     Headache     Nasal Congestion     Eye Problem       Initial /84  Pulse 80  Temp 97.4  F (36.3  C) (Tympanic)  Ht 5' 7\" (1.702 m)  Wt 275 lb (124.7 kg)  SpO2 100%  BMI 43.07 kg/m2 Estimated body mass index is 43.07 kg/(m^2) as calculated from the following:    Height as of this encounter: 5' 7\" (1.702 m).    Weight as of this encounter: 275 lb (124.7 kg).  Medication Reconciliation: complete   Zena Rose/ MA    "

## 2017-12-29 ENCOUNTER — NURSE TRIAGE (OUTPATIENT)
Dept: NURSING | Facility: CLINIC | Age: 57
End: 2017-12-29

## 2017-12-29 ENCOUNTER — OFFICE VISIT (OUTPATIENT)
Dept: URGENT CARE | Facility: URGENT CARE | Age: 57
End: 2017-12-29
Payer: COMMERCIAL

## 2017-12-29 VITALS
TEMPERATURE: 99.4 F | OXYGEN SATURATION: 99 % | DIASTOLIC BLOOD PRESSURE: 70 MMHG | SYSTOLIC BLOOD PRESSURE: 112 MMHG | RESPIRATION RATE: 16 BRPM | BODY MASS INDEX: 43.16 KG/M2 | HEIGHT: 67 IN | HEART RATE: 74 BPM | WEIGHT: 275 LBS

## 2017-12-29 DIAGNOSIS — J06.9 VIRAL UPPER RESPIRATORY TRACT INFECTION: Primary | ICD-10-CM

## 2017-12-29 PROCEDURE — 99213 OFFICE O/P EST LOW 20 MIN: CPT | Performed by: PHYSICIAN ASSISTANT

## 2017-12-29 NOTE — NURSING NOTE
"Chief Complaint   Patient presents with     Urgent Care     URI     sick since last Friday, glands are very swollen, bad coughing. Was seen in  on Tuesday and told to come back if worse. Sleeping a lot but bad fatigue.        Initial /70  Pulse 74  Temp 99.4  F (37.4  C) (Oral)  Resp 16  Ht 5' 7\" (1.702 m)  Wt 275 lb (124.7 kg)  SpO2 99%  Breastfeeding? No  BMI 43.07 kg/m2 Estimated body mass index is 43.07 kg/(m^2) as calculated from the following:    Height as of this encounter: 5' 7\" (1.702 m).    Weight as of this encounter: 275 lb (124.7 kg).  Medication Reconciliation: complete  "

## 2017-12-29 NOTE — PROGRESS NOTES
SUBJECTIVE:  Azucena Guo is a 57 year old female who presents to the clinic today with a chief complaint of inflamed lymph nodes following visit to  for URI earlier this week.  Symptoms present 1 week.  Still mild presentation without fever, aches.    Her cough is described as productive of clear sputum.      Patient denies significant throat pain, drooling, difficulty opening mouth.      Past Medical History:   Diagnosis Date     Arthritis      Diabetes mellitus (H)        Current Outpatient Prescriptions   Medication Sig Dispense Refill     blood glucose monitoring (CAMI CONTOUR) test strip Use to test blood sugar 1-2  times daily or as directed. 100 strip 3     glimepiride (AMARYL) 4 MG tablet TAKE TWO TABLETS BY MOUTH EVERY MORNING BEFORE BREAKFAST 180 tablet 3     lisinopril (PRINIVIL/ZESTRIL) 2.5 MG tablet Take 1 tablet (2.5 mg) by mouth daily 90 tablet 3     metFORMIN (GLUCOPHAGE) 500 MG tablet TAKE 3 TABLETS MORNING AND TAKE 2 TABLETS EVERY EVENING 450 tablet 3     simvastatin (ZOCOR) 40 MG tablet Take 1 tablet (40 mg) by mouth At Bedtime 90 tablet 3     cyclobenzaprine (FLEXERIL) 10 MG tablet Take 1 tablet (10 mg) by mouth 3 times daily as needed for muscle spasms 15 tablet 0     ORDER FOR DME Glucometer, brand as covered by insurance. 1 each 3     ORDER FOR DME Test strips for pt's glucometer, brand as covered by insurance Test bid and prn. 200 each 3     ORDER FOR DME Lancets bid and prn.  Fill per patient's insurance. 200 each 3     Blood Glucose Calibration (CONTROL) NORMAL SOLN 1 Application by In Vitro route as needed Give whatever brand is covered by her insurance now 1 Bottle 11     ORDER FOR DME Test strips for pt's glucometer, brand as covered by insurance Test bid and prn.   Rx number at [facility] is [id number] 200 each 4     Probiotic Product (MARTINA-Q PO) Take  by mouth as needed.       Multiple Vitamin (MULTI-VITAMIN PO) Take  by mouth.       glucose blood VI test strips strip by In  "Vitro route daily. One touch ultra 1-2 x daily as needed 1 Box 12     aspirin 81 MG tablet Take 1 tablet by mouth daily.       Cholecalciferol (VITAMIN D) 1000 UNIT capsule Take 1 capsule by mouth daily.         Social History   Substance Use Topics     Smoking status: Former Smoker     Quit date: 1/1/1991     Smokeless tobacco: Never Used     Alcohol use Yes      Comment: occasionally       ROS  Review of systems negative except as stated above.    OBJECTIVE:  /70  Pulse 74  Temp 99.4  F (37.4  C) (Oral)  Resp 16  Ht 5' 7\" (1.702 m)  Wt 275 lb (124.7 kg)  SpO2 99%  Breastfeeding? No  BMI 43.07 kg/m2  GENERAL APPEARANCE: healthy, alert and no distress  EYES: EOMI,  PERRL, conjunctiva clear  HENT: TMs bulging, translucent.  ear canals normal.  Nose and mouth without ulcers, erythema or lesions  NECK: bilateral submandibular lymphadenopathy  RESP: lungs clear to auscultation - no rales, rhonchi or wheezes  CV: regular rates and rhythm  ABDOMEN:  soft, nontender, no HSM or masses and bowel sounds normal    ASSESSMENT:    (J06.9,  B97.89) Viral upper respiratory tract infection  (primary encounter diagnosis)  Comment: mild presentation, no worsening of symptoms  Plan:   Patient Instructions         With distilled water 2-3x per day for a minimum 2-3 days  Mucinex, increased fluids, humidifier at night, steaming in the day  Cough drops and hot saltwater gargles as needed    Adult Self-Care for Colds  Colds are caused by viruses. They can't be cured with antibiotics. However, you can ease symptoms and support your body's efforts to heal itself.  No matter which symptoms you have, be sure to:    Drink plenty of fluids (water or clear soup)    Stop smoking and drinking alcohol    Get plenty of rest    Understand a fever    Take your temperature several times a day. If your fever is 100.4 F (38.0 C) for more than a day, call your healthcare provider.    Relax, lie down. Go to bed if you want. Just get off your " feet and rest. Also, drink plenty of fluids to avoid dehydration.    Take acetaminophen or a nonsteroidal anti-inflammatory agent (NSAID), such as ibuprofen.  Treat a troubled nose kindly    Breathe steam or heated humidified air to open blocked nasal passages.  a hot shower or use a vaporizer. Be careful not to get burned by the steam.    Saline nasal sprays and decongestant tablets help open a stuffy nose. Antihistamines can also help, but they can cause side effects such as drowsiness and drying of the eyes, nose, and mouth.  Soothe a sore throat and cough    Gargle every 2 hours with 1/4 teaspoon of salt dissolved in 1/2 cup of warm water. Suck on throat lozenges and cough drops to moisten your throat.    Cough medicines are available but it is unclear how well they actually work.    Take acetaminophen or an NSAID, such as ibuprofen, to ease throat pain  Ease digestive problems    Put fluids back into your body. Take frequent sips of clear liquids such as water or broth. Avoid drinks that have a lot of sugar in them, such as juices and sodas. These can make diarrhea worse. Older children and adults can drink sports drinks.    As your appetite returns, you can resume your normal diet. Ask your healthcare provider if there are any foods you should avoid.  When to seek medical care  When you first notice symptoms, ask your healthcare provider if antiviral medicines are appropriate. Antibiotics should not be taken for colds or flu. Also, call your healthcare provider if you have any of the following symptoms or if you aren't feeling better after 7 days:    Shortness of breath    Pain or pressure in the chest or belly (abdomen)    Worsening symptoms, especially after a period of improvement    Fever of 100.4 F  (38.0 C) or higher, or fever that doesn't go down with medicine    Sudden dizziness or confusion    Severe or continued vomiting    Signs of dehydration, including extreme thirst, dark urine,  infrequent urination, dry mouth    Spotted, red, or very sore throat   Date Last Reviewed: 12/1/2016 2000-2017 [employer]. [address] [place] [address], [place] [address]. All rights reserved. This information is not intended as a substitute for professional medical care. Always follow your healthcare professional's instructions.

## 2017-12-29 NOTE — MR AVS SNAPSHOT
After Visit Summary   12/29/2017    Azucena Guo    MRN: [id number]           Patient Information     Date Of Birth          1960        Visit Information        Provider Department      12/29/2017 5:05 PM Sujit Bolton PA-C [facility]        Care Instructions        With distilled water 2-3x per day for a minimum 2-3 days  Mucinex, increased fluids, humidifier at night, steaming in the day  Cough drops and hot saltwater gargles as needed    Adult Self-Care for Colds  Colds are caused by viruses. They can't be cured with antibiotics. However, you can ease symptoms and support your body's efforts to heal itself.  No matter which symptoms you have, be sure to:    Drink plenty of fluids (water or clear soup)    Stop smoking and drinking alcohol    Get plenty of rest    Understand a fever    Take your temperature several times a day. If your fever is 100.4 F (38.0 C) for more than a day, call your healthcare provider.    Relax, lie down. Go to bed if you want. Just get off your feet and rest. Also, drink plenty of fluids to avoid dehydration.    Take acetaminophen or a nonsteroidal anti-inflammatory agent (NSAID), such as ibuprofen.  Treat a troubled nose kindly    Breathe steam or heated humidified air to open blocked nasal passages.  a hot shower or use a vaporizer. Be careful not to get burned by the steam.    Saline nasal sprays and decongestant tablets help open a stuffy nose. Antihistamines can also help, but they can cause side effects such as drowsiness and drying of the eyes, nose, and mouth.  Soothe a sore throat and cough    Gargle every 2 hours with 1/4 teaspoon of salt dissolved in 1/2 cup of warm water. Suck on throat lozenges and cough drops to moisten your throat.    Cough medicines are available but it is unclear how well they actually work.    Take acetaminophen or an NSAID, such as ibuprofen, to ease throat pain  Ease digestive  problems    Put fluids back into your body. Take frequent sips of clear liquids such as water or broth. Avoid drinks that have a lot of sugar in them, such as juices and sodas. These can make diarrhea worse. Older children and adults can drink sports drinks.    As your appetite returns, you can resume your normal diet. Ask your healthcare provider if there are any foods you should avoid.  When to seek medical care  When you first notice symptoms, ask your healthcare provider if antiviral medicines are appropriate. Antibiotics should not be taken for colds or flu. Also, call your healthcare provider if you have any of the following symptoms or if you aren't feeling better after 7 days:    Shortness of breath    Pain or pressure in the chest or belly (abdomen)    Worsening symptoms, especially after a period of improvement    Fever of 100.4 F  (38.0 C) or higher, or fever that doesn't go down with medicine    Sudden dizziness or confusion    Severe or continued vomiting    Signs of dehydration, including extreme thirst, dark urine, infrequent urination, dry mouth    Spotted, red, or very sore throat   Date Last Reviewed: 12/1/2016 2000-2017 [employer]. [address] [place] [address]. All rights reserved. This information is not intended as a substitute for professional medical care. Always follow your healthcare professional's instructions.              Follow-ups after your visit        Who to contact     If you have questions or need follow up information about today's clinic visit or your schedule please contact [facility] directly at [phone].  Normal or non-critical lab and imaging results will be communicated to you by MyChart, letter or phone within 4 business days after the clinic has received the results. If you do not hear from us within 7 days, please contact the clinic through MyChart or phone. If you have a critical or abnormal lab result, we will  "notify you by phone as soon as possible.  Submit refill requests through [employer] or call your pharmacy and they will forward the refill request to us. Please allow 3 business days for your refill to be completed.          Additional Information About Your Visit        [employer] Information     [employer] gives you secure access to your electronic health record. If you see a primary care provider, you can also send messages to your care team and make appointments. If you have questions, please call your primary care clinic.  If you do not have a primary care provider, please call [phone] and they will assist you.        Care EveryWhere ID     This is your Care EveryWhere ID. This could be used by other organizations to access your [facility] medical records  [id number]        Your Vitals Were     Pulse Temperature Respirations Height Pulse Oximetry Breastfeeding?    74 99.4  F (37.4  C) (Oral) 16 5' 7\" (1.702 m) 99% No    BMI (Body Mass Index)                   43.07 kg/m2            Blood Pressure from Last 3 Encounters:   12/29/17 112/70   12/26/17 135/84   09/28/17 114/75    Weight from Last 3 Encounters:   12/29/17 275 lb (124.7 kg)   12/26/17 275 lb (124.7 kg)   09/28/17 269 lb (122 kg)              Today, you had the following     No orders found for display       Primary Care Provider Office Phone # Fax #    Codie Pina [phone]       [address] [place] [address]        Equal Access to Services     [facility] AH: Hadii aad ku hadasho Soomaali, waaxda luqadaha, qaybta kaalmada adeegyada, waxay anh grey . So [facility] [phone].    ATENCIÓN: Si habla español, tiene a charles disposición servicios gratuitos de asistencia lingüística. Llame al [phone].    We comply with applicable federal civil rights laws and [place] laws. We do not discriminate on the basis of race, color, national origin, age, disability, sex, sexual orientation, or gender " identity.            Thank you!     Thank you for choosing [facility]  for your care. Our goal is always to provide you with excellent care. Hearing back from our patients is one way we can continue to improve our services. Please take a few minutes to complete the written survey that you may receive in the mail after your visit with us. Thank you!             Your Updated Medication List - Protect others around you: Learn how to safely use, store and throw away your medicines at www.disposemymeds.org.          This list is accurate as of: 12/29/17  5:26 PM.  Always use your most recent med list.                   Brand Name Dispense Instructions for use Diagnosis    aspirin 81 MG tablet      Take 1 tablet by mouth daily.        * blood glucose monitoring test strip    no brand specified    1 Box    by In Vitro route daily. One touch ultra 1-2 x daily as needed    Type 2 diabetes, HbA1c goal < 7% (H)       * blood glucose monitoring test strip    CAMI CONTOUR    100 strip    Use to test blood sugar 1-2  times daily or as directed.    Diabetes mellitus type 2, controlled, with complications (H)       CONTROL NORMAL Soln     1 Bottle    1 Application by In Vitro route as needed Give whatever brand is covered by her insurance now    Type 2 diabetes, HbA1c goal < 7% (H)       cyclobenzaprine 10 MG tablet    FLEXERIL    15 tablet    Take 1 tablet (10 mg) by mouth 3 times daily as needed for muscle spasms    Acute right-sided thoracic back pain       MARTINA-Q PO      Take  by mouth as needed.        glimepiride 4 MG tablet    AMARYL    180 tablet    TAKE TWO TABLETS BY MOUTH EVERY MORNING BEFORE BREAKFAST    Controlled type 2 diabetes mellitus with complication, without long-term current use of insulin (H)       lisinopril 2.5 MG tablet    PRINIVIL/Zestril    90 tablet    Take 1 tablet (2.5 mg) by mouth daily    Uncontrolled type 2 diabetes mellitus with complication, without long-term current use  of insulin (H)       metFORMIN 500 MG tablet    GLUCOPHAGE    450 tablet    TAKE 3 TABLETS MORNING AND TAKE 2 TABLETS EVERY EVENING    Controlled type 2 diabetes mellitus with complication, without long-term current use of insulin (H)       MULTI-VITAMIN PO      Take  by mouth.        * order for DME     200 each    Test strips for pt's glucometer, brand as covered by insurance Test bid and prn.  Rx number at [facility] is [id number]    Type 2 diabetes, HbA1c goal < 7% (H)       * order for DME     1 each    Glucometer, brand as covered by insurance.    Type 2 diabetes, HbA1c goal < 7% (H)       * order for DME     200 each    Test strips for pt's glucometer, brand as covered by insurance Test bid and prn.    Type 2 diabetes, HbA1c goal < 7% (H)       * order for DME     200 each    Lancets bid and prn.  Fill per patient's insurance.    Type 2 diabetes, HbA1c goal < 7% (H)       simvastatin 40 MG tablet    ZOCOR    90 tablet    Take 1 tablet (40 mg) by mouth At Bedtime    Hyperlipidemia LDL goal <100       vitamin D 1000 UNITS capsule      Take 1 capsule by mouth daily.        * Notice:  This list has 6 medication(s) that are the same as other medications prescribed for you. Read the directions carefully, and ask your doctor or other care provider to review them with you.

## 2017-12-29 NOTE — TELEPHONE ENCOUNTER
See in urgent care 2 days ago/ determined to be viral/ as of last night abrupt onset of gold ball sized neck lymph nodes on both sides/they are tender to touch but no fever/ cannot sleep on her side because of this/ sent  for an appointment     Ben Catherine RN [phone]  Reason for Disposition    Rapid increase in size of node over several hours    Additional Information    Negative: Severe difficulty breathing (e.g., struggling for each breath, speaks in single words)    Negative: Known hernia is main concern (i.e., soft lump or swelling in the groin that goes away when you push on it)    Negative: Swelling of scrotum is main symptom    Negative: Swelling of face is main symptom    Negative: [1] Swollen node is in the neck AND [2] < 1 inch (2.5 cm) in size AND [3] sore throat is main symptom    Negative: [1] Node is in the neck AND [2] causes difficulty breathing    Negative: [1] Node is in the neck AND [2] can't swallow fluids    Negative: Fever > 103 F (39.4 C)    Negative: [1] Lump or swelling in groin AND [2] pulsating (like heartbeat)    Negative: Patient sounds very sick or weak to the triager    Negative: [1] Single large node AND [2] size > 1 inch (2.5 cm) AND [3] fever    Negative: [1] Overlying skin is red AND [2] fever    Negative: [1] Single large node AND [2] size > 1 inch (2.5 cm) AND [3] no fever    Protocols used: LYMPH NODES SWOLLEN-ADULT-

## 2017-12-29 NOTE — PATIENT INSTRUCTIONS
With distilled water 2-3x per day for a minimum 2-3 days  Mucinex, increased fluids, humidifier at night, steaming in the day  Cough drops and hot saltwater gargles as needed    Adult Self-Care for Colds  Colds are caused by viruses. They can't be cured with antibiotics. However, you can ease symptoms and support your body's efforts to heal itself.  No matter which symptoms you have, be sure to:    Drink plenty of fluids (water or clear soup)    Stop smoking and drinking alcohol    Get plenty of rest    Understand a fever    Take your temperature several times a day. If your fever is 100.4 F (38.0 C) for more than a day, call your healthcare provider.    Relax, lie down. Go to bed if you want. Just get off your feet and rest. Also, drink plenty of fluids to avoid dehydration.    Take acetaminophen or a nonsteroidal anti-inflammatory agent (NSAID), such as ibuprofen.  Treat a troubled nose kindly    Breathe steam or heated humidified air to open blocked nasal passages.  a hot shower or use a vaporizer. Be careful not to get burned by the steam.    Saline nasal sprays and decongestant tablets help open a stuffy nose. Antihistamines can also help, but they can cause side effects such as drowsiness and drying of the eyes, nose, and mouth.  Soothe a sore throat and cough    Gargle every 2 hours with 1/4 teaspoon of salt dissolved in 1/2 cup of warm water. Suck on throat lozenges and cough drops to moisten your throat.    Cough medicines are available but it is unclear how well they actually work.    Take acetaminophen or an NSAID, such as ibuprofen, to ease throat pain  Ease digestive problems    Put fluids back into your body. Take frequent sips of clear liquids such as water or broth. Avoid drinks that have a lot of sugar in them, such as juices and sodas. These can make diarrhea worse. Older children and adults can drink sports drinks.    As your appetite returns, you can resume your normal diet. Ask your  healthcare provider if there are any foods you should avoid.  When to seek medical care  When you first notice symptoms, ask your healthcare provider if antiviral medicines are appropriate. Antibiotics should not be taken for colds or flu. Also, call your healthcare provider if you have any of the following symptoms or if you aren't feeling better after 7 days:    Shortness of breath    Pain or pressure in the chest or belly (abdomen)    Worsening symptoms, especially after a period of improvement    Fever of 100.4 F  (38.0 C) or higher, or fever that doesn't go down with medicine    Sudden dizziness or confusion    Severe or continued vomiting    Signs of dehydration, including extreme thirst, dark urine, infrequent urination, dry mouth    Spotted, red, or very sore throat   Date Last Reviewed: 12/1/2016 2000-2017 [employer]. [address] [place] [address], [place] [address]. All rights reserved. This information is not intended as a substitute for professional medical care. Always follow your healthcare professional's instructions.

## 2018-08-10 ENCOUNTER — OFFICE VISIT (OUTPATIENT)
Dept: URGENT CARE | Facility: URGENT CARE | Age: 58
End: 2018-08-10
Payer: COMMERCIAL

## 2018-08-10 VITALS
HEART RATE: 60 BPM | DIASTOLIC BLOOD PRESSURE: 80 MMHG | RESPIRATION RATE: 15 BRPM | TEMPERATURE: 98.1 F | SYSTOLIC BLOOD PRESSURE: 128 MMHG

## 2018-08-10 DIAGNOSIS — H10.31 ACUTE BACTERIAL CONJUNCTIVITIS OF RIGHT EYE: Primary | ICD-10-CM

## 2018-08-10 PROCEDURE — 99213 OFFICE O/P EST LOW 20 MIN: CPT | Performed by: PHYSICIAN ASSISTANT

## 2018-08-10 RX ORDER — POLYMYXIN B SULFATE AND TRIMETHOPRIM 1; 10000 MG/ML; [USP'U]/ML
1 SOLUTION OPHTHALMIC EVERY 4 HOURS
Qty: 3 ML | Refills: 0 | Status: SHIPPED | OUTPATIENT
Start: 2018-08-10 | End: 2018-08-17

## 2018-08-10 NOTE — MR AVS SNAPSHOT
After Visit Summary   8/10/2018    Azucena Guo    MRN: [id number]           Patient Information     Date Of Birth          1960        Visit Information        Provider Department      8/10/2018 5:50 PM Debby Nicholas PA-C [facility] [place]        Today's Diagnoses     Acute bacterial conjunctivitis of right eye    -  1       Follow-ups after your visit        Who to contact     If you have questions or need follow up information about today's clinic visit or your schedule please contact [facility] directly at [phone].  Normal or non-critical lab and imaging results will be communicated to you by K[employer], letter or phone within 4 business days after the clinic has received the results. If you do not hear from us within 7 days, please contact the clinic through [employer] or phone. If you have a critical or abnormal lab result, we will notify you by phone as soon as possible.  Submit refill requests through [employer] or call your pharmacy and they will forward the refill request to us. Please allow 3 business days for your refill to be completed.          Additional Information About Your Visit        MyChart Information     [employer] gives you secure access to your electronic health record. If you see a primary care provider, you can also send messages to your care team and make appointments. If you have questions, please call your primary care clinic.  If you do not have a primary care provider, please call [phone] and they will assist you.        Care EveryWhere ID     This is your Care EveryWhere ID. This could be used by other organizations to access your [place] medical records  [id number]        Your Vitals Were     Pulse Temperature Respirations Breastfeeding?          60 98.1  F (36.7  C) (Oral) 15 No         Blood Pressure from Last 3 Encounters:   08/10/18 128/80   12/29/17 112/70   12/26/17 135/84    Weight from Last 3 Encounters:    12/29/17 275 lb (124.7 kg)   12/26/17 275 lb (124.7 kg)   09/28/17 269 lb (122 kg)              Today, you had the following     No orders found for display         Today's Medication Changes          These changes are accurate as of 8/10/18  7:04 PM.  If you have any questions, ask your nurse or doctor.               Start taking these medicines.        Dose/Directions    trimethoprim-polymyxin b ophthalmic solution   Commonly known as:  POLYTRIM   Used for:  Acute bacterial conjunctivitis of right eye   Started by:  Debby Nicholas PA-C        Dose:  1 drop   Place 1 drop into the right eye every 4 hours for 7 days - every 4 waking hours   Quantity:  3 mL   Refills:  0            Where to get your medicines      These medications were sent to [facility] [address] [place] [id number]    Hours:  24-hours Phone:  [phone]     trimethoprim-polymyxin b ophthalmic solution                Primary Care Provider Office Phone # Fax #    Codie Pina [phone] [address] [place] [address]        Equal Access to Services     RANDELL JEAN AH: Hadii brenden ku hadasho Soomaali, waaxda luqadaha, qaybta kaalmada adeegyada, waxay jgin hayflorinn patrice martinez. So [facility] [phone].    ATENCIÓN: Si habla español, tiene a charles disposición servicios gratuitos de asistencia lingüística. Llame al [phone].    We comply with applicable federal civil rights laws and [place] laws. We do not discriminate on the basis of race, color, national origin, age, disability, sex, sexual orientation, or gender identity.            Thank you!     Thank you for choosing [facility]  for your care. Our goal is always to provide you with excellent care. Hearing back from our patients is one way we can continue to improve our services. Please take a few minutes to complete  the written survey that you may receive in the mail after your visit with us. Thank you!             Your Updated Medication List - Protect others around you: Learn how to safely use, store and throw away your medicines at www.disposemymeds.org.          This list is accurate as of 8/10/18  7:04 PM.  Always use your most recent med list.                   Brand Name Dispense Instructions for use Diagnosis    aspirin 81 MG tablet      Take 1 tablet by mouth daily.        * blood glucose monitoring test strip    no brand specified    1 Box    by In Vitro route daily. One touch ultra 1-2 x daily as needed    Type 2 diabetes, HbA1c goal < 7% (H)       * blood glucose monitoring test strip    CAMI CONTOUR    100 strip    Use to test blood sugar 1-2  times daily or as directed.    Diabetes mellitus type 2, controlled, with complications (H)       CONTROL Normal Soln     1 Bottle    1 Application by In Vitro route as needed Give whatever brand is covered by her insurance now    Type 2 diabetes, HbA1c goal < 7% (H)       cyclobenzaprine 10 MG tablet    FLEXERIL    15 tablet    Take 1 tablet (10 mg) by mouth 3 times daily as needed for muscle spasms    Acute right-sided thoracic back pain       MARTINA-Q PO      Take  by mouth as needed.        glimepiride 4 MG tablet    AMARYL    180 tablet    TAKE TWO TABLETS BY MOUTH EVERY MORNING BEFORE BREAKFAST    Controlled type 2 diabetes mellitus with complication, without long-term current use of insulin (H)       lisinopril 2.5 MG tablet    PRINIVIL/Zestril    90 tablet    Take 1 tablet (2.5 mg) by mouth daily    Uncontrolled type 2 diabetes mellitus with complication, without long-term current use of insulin (H)       metFORMIN 500 MG tablet    GLUCOPHAGE    450 tablet    TAKE 3 TABLETS MORNING AND TAKE 2 TABLETS EVERY EVENING    Controlled type 2 diabetes mellitus with complication, without long-term current use of insulin (H)       MULTI-VITAMIN PO      Take  by mouth.         order for DME     200 each    Test strips for pt's glucometer, brand as covered by insurance Test bid and prn.  Rx number at [facility] is [phone]    Type 2 diabetes, HbA1c goal < 7% (H)       order for DME     1 each    Glucometer, brand as covered by insurance.    Type 2 diabetes, HbA1c goal < 7% (H)       order for DME     200 each    Test strips for pt's glucometer, brand as covered by insurance Test bid and prn.    Type 2 diabetes, HbA1c goal < 7% (H)       order for DME     200 each    Lancets bid and prn.  Fill per patient's insurance.    Type 2 diabetes, HbA1c goal < 7% (H)       simvastatin 40 MG tablet    ZOCOR    90 tablet    Take 1 tablet (40 mg) by mouth At Bedtime    Hyperlipidemia LDL goal <100       trimethoprim-polymyxin b ophthalmic solution    POLYTRIM    3 mL    Place 1 drop into the right eye every 4 hours for 7 days - every 4 waking hours    Acute bacterial conjunctivitis of right eye       vitamin D 1000 units capsule      Take 1 capsule by mouth daily.        * Notice:  This list has 2 medication(s) that are the same as other medications prescribed for you. Read the directions carefully, and ask your doctor or other care provider to review them with you.

## 2018-08-10 NOTE — PROGRESS NOTES
"HPI:  Azucena is a 56 yo female who presents for right eye irritation and \"goopy\" discharge x yesterday.  Discharge has continued throughout the day.  The discharge is clearish but thick.  Reports some visual blurriness due to discharge in her eye.  Denies foreign body sensation or photosensitivity. Denies cold sx or sore throat.  No fever.  Patient wears glasses but no contacts.  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      ROS:  See HPI      PE:  Vitals & nursing notes reviewed. /80  Pulse 60  Temp 98.1  F (36.7  C) (Oral)  Resp 15  Breastfeeding? No  Constitutional:  Alert, well nourished, well-developed, NAD  Head:  Atraumatic, normocephalic  Eyes:  Perrla, EOMI, Rt conjunctiva:  Mild erythema with mild scleral injection.  There is viscous clear & white discharge appreciated   LT conjunctiva:  Pink   Sclera:  Anicteric  Ears:  Canals clear BL, TM pearly BL  Throat:  No erythema, exudates, or edema to postoropharynx    ASSESSMENT:  (H10.31) Acute bacterial conjunctivitis of right eye  (primary encounter diagnosis)  Plan: trimethoprim-polymyxin b (POLYTRIM) ophthalmic         solution    Warm compress.  Do not wear contacts until resolved. Contagious - wash hands frequently.   F/U with PCP if sx persist or worsen.          "

## 2018-11-05 ENCOUNTER — TELEPHONE (OUTPATIENT)
Dept: FAMILY MEDICINE | Facility: CLINIC | Age: 58
End: 2018-11-05

## 2018-11-05 DIAGNOSIS — E78.5 HYPERLIPIDEMIA LDL GOAL <100: ICD-10-CM

## 2018-11-06 RX ORDER — SIMVASTATIN 40 MG
TABLET ORAL
Qty: 30 TABLET | Refills: 0 | Status: SHIPPED | OUTPATIENT
Start: 2018-11-06 | End: 2018-11-12

## 2018-11-06 NOTE — TELEPHONE ENCOUNTER
"Requested Prescriptions   Pending Prescriptions Disp Refills     simvastatin (ZOCOR) 40 MG tablet [Pharmacy Med Name: SIMVASTATIN 40MG TABLETS]  Last Written Prescription Date:  9/28/2017  Last Fill Quantity: 90 tablet,  # refills: 3   Last Office Visit: 9/28/2017   Future Office Visit:      90 tablet 0     Sig: TAKE 1 TABLET(40 MG) BY MOUTH AT BEDTIME    Statins Protocol Failed    11/5/2018  5:12 PM       Failed - LDL on file in past 12 months    Recent Labs   Lab Test  09/28/17   0800   LDL  84          Passed - No abnormal creatine kinase in past 12 months    No lab results found.          Passed - Recent (12 mo) or future (30 days) visit within the authorizing provider's specialty    Patient had office visit in the last 12 months or has a visit in the next 30 days with authorizing provider or within the authorizing provider's specialty.  See \"Patient Info\" tab in inbasket, or \"Choose Columns\" in Meds & Orders section of the refill encounter.           Passed - Patient is age 18 or older       Passed - No active pregnancy on record       Passed - No positive pregnancy test in past 12 months          "

## 2018-11-06 NOTE — TELEPHONE ENCOUNTER
Medication is being filled for 1 time refill only due to:  Patient needs labs FLP. Patient needs to be seen because it has been more than one year since last visit.     Signed Prescriptions:                        Disp   Refills    simvastatin (ZOCOR) 40 MG tablet           30 tab*0        Sig: TAKE 1 TABLET(40 MG) BY MOUTH AT BEDTIME  Authorizing Provider: KATHYA GARDNER  Ordering User: HELIO BAI      Routing to  Reception - please advise due for annual office visit with fasting labs - one month given    Thank you,  Helio Bai RN

## 2018-11-07 NOTE — TELEPHONE ENCOUNTER
Pt returned clinic phone call.   She is scheduled for future OV for physical and labs.    Informed of 1 month refill.      Kaylah ROA     [facility]

## 2018-11-07 NOTE — TELEPHONE ENCOUNTER
LM to return clinic phone call. Patient is due for annual visit with provider for future refills.         Kaylah ROA     [facility]

## 2018-11-12 ENCOUNTER — OFFICE VISIT (OUTPATIENT)
Dept: FAMILY MEDICINE | Facility: CLINIC | Age: 58
End: 2018-11-12
Payer: COMMERCIAL

## 2018-11-12 VITALS
WEIGHT: 293 LBS | HEART RATE: 84 BPM | SYSTOLIC BLOOD PRESSURE: 133 MMHG | TEMPERATURE: 98.1 F | RESPIRATION RATE: 18 BRPM | BODY MASS INDEX: 46.05 KG/M2 | DIASTOLIC BLOOD PRESSURE: 81 MMHG | OXYGEN SATURATION: 95 %

## 2018-11-12 DIAGNOSIS — Z00.00 WELL ADULT EXAM: Primary | ICD-10-CM

## 2018-11-12 DIAGNOSIS — E11.8 CONTROLLED TYPE 2 DIABETES MELLITUS WITH COMPLICATION, WITHOUT LONG-TERM CURRENT USE OF INSULIN (H): ICD-10-CM

## 2018-11-12 DIAGNOSIS — Z23 NEED FOR PROPHYLACTIC VACCINATION AND INOCULATION AGAINST INFLUENZA: ICD-10-CM

## 2018-11-12 DIAGNOSIS — E11.65 UNCONTROLLED TYPE 2 DIABETES MELLITUS WITH HYPERGLYCEMIA (H): ICD-10-CM

## 2018-11-12 DIAGNOSIS — E78.2 MIXED HYPERLIPIDEMIA: ICD-10-CM

## 2018-11-12 DIAGNOSIS — E78.5 HYPERLIPIDEMIA LDL GOAL <100: ICD-10-CM

## 2018-11-12 LAB
ALBUMIN SERPL-MCNC: 3.7 G/DL (ref 3.4–5)
ALP SERPL-CCNC: 54 U/L (ref 40–150)
ALT SERPL W P-5'-P-CCNC: 35 U/L (ref 0–50)
ANION GAP SERPL CALCULATED.3IONS-SCNC: 11 MMOL/L (ref 3–14)
AST SERPL W P-5'-P-CCNC: 26 U/L (ref 0–45)
BILIRUB SERPL-MCNC: 0.3 MG/DL (ref 0.2–1.3)
BUN SERPL-MCNC: 8 MG/DL (ref 7–30)
CALCIUM SERPL-MCNC: 9.2 MG/DL (ref 8.5–10.1)
CHLORIDE SERPL-SCNC: 98 MMOL/L (ref 94–109)
CHOLEST SERPL-MCNC: 177 MG/DL
CO2 SERPL-SCNC: 23 MMOL/L (ref 20–32)
CREAT SERPL-MCNC: 0.38 MG/DL (ref 0.52–1.04)
ERYTHROCYTE [DISTWIDTH] IN BLOOD BY AUTOMATED COUNT: 12.8 % (ref 10–15)
GFR SERPL CREATININE-BSD FRML MDRD: >90 ML/MIN/1.7M2
GLUCOSE SERPL-MCNC: 264 MG/DL (ref 70–99)
HBA1C MFR BLD: 9.6 % (ref 0–5.6)
HCT VFR BLD AUTO: 39.1 % (ref 35–47)
HDLC SERPL-MCNC: 40 MG/DL
HGB BLD-MCNC: 13.1 G/DL (ref 11.7–15.7)
LDLC SERPL CALC-MCNC: 74 MG/DL
MCH RBC QN AUTO: 30.3 PG (ref 26.5–33)
MCHC RBC AUTO-ENTMCNC: 33.5 G/DL (ref 31.5–36.5)
MCV RBC AUTO: 90 FL (ref 78–100)
NONHDLC SERPL-MCNC: 137 MG/DL
PLATELET # BLD AUTO: 276 10E9/L (ref 150–450)
POTASSIUM SERPL-SCNC: 4 MMOL/L (ref 3.4–5.3)
PROT SERPL-MCNC: 8 G/DL (ref 6.8–8.8)
RBC # BLD AUTO: 4.33 10E12/L (ref 3.8–5.2)
SODIUM SERPL-SCNC: 132 MMOL/L (ref 133–144)
TRIGL SERPL-MCNC: 315 MG/DL
TSH SERPL DL<=0.005 MIU/L-ACNC: 0.75 MU/L (ref 0.4–4)
WBC # BLD AUTO: 5.3 10E9/L (ref 4–11)

## 2018-11-12 PROCEDURE — 99396 PREV VISIT EST AGE 40-64: CPT | Mod: 25 | Performed by: FAMILY MEDICINE

## 2018-11-12 PROCEDURE — 36415 COLL VENOUS BLD VENIPUNCTURE: CPT | Performed by: FAMILY MEDICINE

## 2018-11-12 PROCEDURE — 85027 COMPLETE CBC AUTOMATED: CPT | Performed by: FAMILY MEDICINE

## 2018-11-12 PROCEDURE — 80061 LIPID PANEL: CPT | Performed by: FAMILY MEDICINE

## 2018-11-12 PROCEDURE — 82043 UR ALBUMIN QUANTITATIVE: CPT | Performed by: FAMILY MEDICINE

## 2018-11-12 PROCEDURE — 84443 ASSAY THYROID STIM HORMONE: CPT | Performed by: FAMILY MEDICINE

## 2018-11-12 PROCEDURE — 90682 RIV4 VACC RECOMBINANT DNA IM: CPT | Performed by: FAMILY MEDICINE

## 2018-11-12 PROCEDURE — 83036 HEMOGLOBIN GLYCOSYLATED A1C: CPT | Performed by: FAMILY MEDICINE

## 2018-11-12 PROCEDURE — 90471 IMMUNIZATION ADMIN: CPT | Performed by: FAMILY MEDICINE

## 2018-11-12 PROCEDURE — 80053 COMPREHEN METABOLIC PANEL: CPT | Performed by: FAMILY MEDICINE

## 2018-11-12 RX ORDER — LISINOPRIL 2.5 MG/1
2.5 TABLET ORAL DAILY
Qty: 90 TABLET | Refills: 0 | Status: SHIPPED | OUTPATIENT
Start: 2018-11-12 | End: 2020-03-16

## 2018-11-12 RX ORDER — GLIMEPIRIDE 4 MG/1
TABLET ORAL
Qty: 180 TABLET | Refills: 0 | Status: SHIPPED | OUTPATIENT
Start: 2018-11-12 | End: 2019-12-06

## 2018-11-12 RX ORDER — SIMVASTATIN 40 MG
TABLET ORAL
Qty: 90 TABLET | Refills: 0 | Status: SHIPPED | OUTPATIENT
Start: 2018-11-12 | End: 2019-02-27

## 2018-11-12 ASSESSMENT — ENCOUNTER SYMPTOMS
DIZZINESS: 0
COUGH: 0
DIARRHEA: 0
HEMATOCHEZIA: 0
CHILLS: 0
HEMATURIA: 0
CONSTIPATION: 0
EYE PAIN: 0
ABDOMINAL PAIN: 0

## 2018-11-12 NOTE — MR AVS SNAPSHOT
After Visit Summary   11/12/2018    Azucena Guo    MRN: [id number]           Patient Information     Date Of Birth          1960        Visit Information        Provider Department      11/12/2018 8:20 AM Codie Pina MD [facility]        Today's Diagnoses     Well adult exam    -  1    Controlled type 2 diabetes mellitus with complication, without long-term current use of insulin (H)        Mixed hyperlipidemia        Need for prophylactic vaccination and inoculation against influenza        Uncontrolled type 2 diabetes mellitus with hyperglycemia (H)        Uncontrolled type 2 diabetes mellitus with complication, without long-term current use of insulin (H)        Hyperlipidemia LDL goal <100          Care Instructions      Preventive Health Recommendations  Female Ages 50 - 64    Yearly exam: See your health care provider every year in order to  o Review health changes.   o Discuss preventive care.    o Review your medicines if your doctor has prescribed any.      Get a Pap test every three years (unless you have an abnormal result and your provider advises testing more often).    If you get Pap tests with HPV test, you only need to test every 5 years, unless you have an abnormal result.     You do not need a Pap test if your uterus was removed (hysterectomy) and you have not had cancer.    You should be tested each year for STDs (sexually transmitted diseases) if you're at risk.     Have a mammogram every 1 to 2 years.    Have a colonoscopy at age 50, or have a yearly FIT test (stool test). These exams screen for colon cancer.      Have a cholesterol test every 5 years, or more often if advised.    Have a diabetes test (fasting glucose) every three years. If you are at risk for diabetes, you should have this test more often.     If you are at risk for osteoporosis (brittle bone disease), think about having a bone density scan (DEXA).    Shots: Get a flu shot each  year. Get a tetanus shot every 10 years.    Nutrition:     Eat at least 5 servings of fruits and vegetables each day.    Eat whole-grain bread, whole-wheat pasta and brown rice instead of white grains and rice.    Get adequate Calcium and Vitamin D.     Lifestyle    Exercise at least 150 minutes a week (30 minutes a day, 5 days a week). This will help you control your weight and prevent disease.    Limit alcohol to one drink per day.    No smoking.     Wear sunscreen to prevent skin cancer.     See your dentist every six months for an exam and cleaning.    See your eye doctor every 1 to 2 years.            Follow-ups after your visit        Who to contact     If you have questions or need follow up information about today's clinic visit or your schedule please contact [facility] directly at [phone].  Normal or non-critical lab and imaging results will be communicated to you by MyChart, letter or phone within 4 business days after the clinic has received the results. If you do not hear from us within 7 days, please contact the clinic through [employer] or phone. If you have a critical or abnormal lab result, we will notify you by phone as soon as possible.  Submit refill requests through [employer] or call your pharmacy and they will forward the refill request to us. Please allow 3 business days for your refill to be completed.          Additional Information About Your Visit        [employer] Information     [employer] gives you secure access to your electronic health record. If you see a primary care provider, you can also send messages to your care team and make appointments. If you have questions, please call your primary care clinic.  If you do not have a primary care provider, please call [phone] and they will assist you.        Care EveryWhere ID     This is your Care EveryWhere ID. This could be used by other organizations to access your [place] medical records  [id number]        Your  Vitals Were     Pulse Temperature Respirations Pulse Oximetry BMI (Body Mass Index)       84 98.1  F (36.7  C) (Oral) 18 95% 46.05 kg/m2        Blood Pressure from Last 3 Encounters:   11/12/18 133/81   08/10/18 128/80   12/29/17 112/70    Weight from Last 3 Encounters:   11/12/18 294 lb (133.4 kg)   12/29/17 275 lb (124.7 kg)   12/26/17 275 lb (124.7 kg)              We Performed the Following     Albumin Random Urine Quantitative with Creat Ratio     CBC with platelets     Comprehensive metabolic panel     FLU VACCINE, (RIV4) RECOMBINANT HA  , IM (FluBlok, egg free) [45307]- >18 YRS ([facility] recommended  50-64 YRS)     Hemoglobin A1c     Lipid Profile     TSH with free T4 reflex     Vaccine Administration, Initial [40092]          Where to get your medicines      These medications were sent to [facility] [address] [place] [address] AT [address] [address]     Phone:  [phone]     glimepiride 4 MG tablet    lisinopril 2.5 MG tablet    metFORMIN 500 MG tablet    simvastatin 40 MG tablet          Primary Care Provider Office Phone # Fax #    Codie Pina [phone] [address] [place] [address]        Equal Access to Services     RANDELL JEAN AH: Hadii brenden ku hadasho Soomaali, waaxda luqadaha, qaybta kaalmada adeegyada, marilu grey . So [facility] [phone].    ATENCIÓN: Si habla español, tiene a charles disposición servicios gratuitos de asistencia lingüística. Inocencioame al [phone].    We comply with applicable federal civil rights laws and [place] laws. We do not discriminate on the basis of race, color, national origin, age, disability, sex, sexual orientation, or gender identity.            Thank you!     Thank you for choosing [facility]  for your care. Our goal is always to provide you with excellent care. Hearing back from our patients is one way  we can continue to improve our services. Please take a few minutes to complete the written survey that you may receive in the mail after your visit with us. Thank you!             Your Updated Medication List - Protect others around you: Learn how to safely use, store and throw away your medicines at www.disposemymeds.org.          This list is accurate as of 11/12/18  5:21 PM.  Always use your most recent med list.                   Brand Name Dispense Instructions for use Diagnosis    aspirin 81 MG tablet      Take 1 tablet by mouth daily.        * blood glucose monitoring test strip    no brand specified    1 Box    by In Vitro route daily. One touch ultra 1-2 x daily as needed    Type 2 diabetes, HbA1c goal < 7% (H)       * blood glucose monitoring test strip    CAMI CONTOUR    100 strip    Use to test blood sugar 1-2  times daily or as directed.    Diabetes mellitus type 2, controlled, with complications (H)       CONTROL Normal Soln     1 Bottle    1 Application by In Vitro route as needed Give whatever brand is covered by her insurance now    Type 2 diabetes, HbA1c goal < 7% (H)       cyclobenzaprine 10 MG tablet    FLEXERIL    15 tablet    Take 1 tablet (10 mg) by mouth 3 times daily as needed for muscle spasms    Acute right-sided thoracic back pain       MARTINA-Q PO      Take  by mouth as needed.        glimepiride 4 MG tablet    AMARYL    180 tablet    TAKE TWO TABLETS BY MOUTH EVERY MORNING BEFORE BREAKFAST    Controlled type 2 diabetes mellitus with complication, without long-term current use of insulin (H)       lisinopril 2.5 MG tablet    PRINIVIL/Zestril    90 tablet    Take 1 tablet (2.5 mg) by mouth daily    Uncontrolled type 2 diabetes mellitus with complication, without long-term current use of insulin (H)       metFORMIN 500 MG tablet    GLUCOPHAGE    450 tablet    TAKE 3 TABLETS MORNING AND TAKE 2 TABLETS EVERY EVENING    Controlled type 2 diabetes mellitus with complication, without long-term  current use of insulin (H)       MULTI-VITAMIN PO      Take  by mouth.        order for DME     200 each    Test strips for pt's glucometer, brand as covered by insurance Test bid and prn.  Rx number at [facility] is [phone]    Type 2 diabetes, HbA1c goal < 7% (H)       order for DME     1 each    Glucometer, brand as covered by insurance.    Type 2 diabetes, HbA1c goal < 7% (H)       order for DME     200 each    Test strips for pt's glucometer, brand as covered by insurance Test bid and prn.    Type 2 diabetes, HbA1c goal < 7% (H)       order for DME     200 each    Lancets bid and prn.  Fill per patient's insurance.    Type 2 diabetes, HbA1c goal < 7% (H)       simvastatin 40 MG tablet    ZOCOR    90 tablet    TAKE 1 TABLET(40 MG) BY MOUTH AT BEDTIME    Hyperlipidemia LDL goal <100       vitamin D 1000 units capsule      Take 1 capsule by mouth daily.        * Notice:  This list has 2 medication(s) that are the same as other medications prescribed for you. Read the directions carefully, and ask your doctor or other care provider to review them with you.

## 2018-11-12 NOTE — NURSING NOTE
Prior to injection verified patient identity using patient's name and date of birth.  Due to injection administration, patient instructed to remain in clinic for 15 minutes  afterwards, and to report any adverse reaction to me immediately. Vaccine information supplied.      Injectable Influenza Immunization Documentation    1.  Is the person to be vaccinated sick today?   No    2. Does the person to be vaccinated have an allergy to a component   of the vaccine?   No  Egg Allergy Algorithm Link    3. Has the person to be vaccinated ever had a serious reaction   to influenza vaccine in the past?   No    4. Has the person to be vaccinated ever had Guillain-Barré syndrome?   No    Form completed by Marco Santos

## 2018-11-12 NOTE — PROGRESS NOTES
"   SUBJECTIVE:   CC: Azucena Guo is an 57 year old woman who presents for preventive health visit.     Physical   Annual:     Getting at least 3 servings of Calcium per day:  Yes    Bi-annual eye exam:  Yes    Dental care twice a year:  Yes    Sleep apnea or symptoms of sleep apnea:  None    Diet:  Diabetic    Frequency of exercise:  None    Taking medications regularly:  Yes    Medication side effects:  None    Additional concerns today:  Yes (tongue problem, ridge in left pinky finger nail)    CV: patient with h/o DM2, which was well controlled last year, however, she notes a lot of stressful events this year, including her son being in the ICU with alcohol withdrawal and  struggling with death of his father.  She has been \"stress eating.\"  She notes her blood sugar this morning was above 200.  She is not minding her diet or exercise at this point and recognizes she needs to pay closer attention to her own health.   Malignancy: pap/hpv up to date.  Mammogram due, colonoscopy due 2021.  Bone health: lack of exercise, poor diet  Immunizations: flu shot today.  Shingrix due.  Sexual health: postmenopausal.  No vaginal symptoms.  No LMP recorded. Patient is postmenopausal.   Depression: see below.    Has noticed furrows in tongue and would like to be checked for anemia.     Has noticed a horizontal line in her left pinky fingernail; it is asymptomatic.  She had it earlier this year, and then it grew out, but now has come back and is growing out again.      Today's PHQ-2 Score:   PHQ-2 ( 1999 Pfizer) 11/12/2018   Q1: Little interest or pleasure in doing things 0   Q2: Feeling down, depressed or hopeless 0   PHQ-2 Score 0   Q1: Little interest or pleasure in doing things Not at all   Q2: Feeling down, depressed or hopeless Not at all   PHQ-2 Score 0     Abuse: Current or Past(Physical, Sexual or Emotional)- No  Do you feel safe in your environment - Yes    Social History   Substance Use Topics     Smoking " status: Former Smoker     Quit date: 1/1/1991     Smokeless tobacco: Never Used     Alcohol use Yes      Comment: occasionally     Alcohol Use 11/12/2018   If you drink alcohol do you typically have greater than 3 drinks per day OR greater than 7 drinks per week? No   No flowsheet data found.    Reviewed orders with patient.  Reviewed health maintenance and updated orders accordingly - Yes  BP Readings from Last 3 Encounters:   11/12/18 133/81   08/10/18 128/80   12/29/17 112/70    Wt Readings from Last 3 Encounters:   11/12/18 294 lb (133.4 kg)   12/29/17 275 lb (124.7 kg)   12/26/17 275 lb (124.7 kg)                  Patient Active Problem List   Diagnosis     Hyperlipidemia     Diabetes mellitus type 2, controlled, with complications (H)     BMI > 35     DDD (degenerative disc disease), cervical     Past Surgical History:   Procedure Laterality Date     CHOLECYSTECTOMY      1994       Social History   Substance Use Topics     Smoking status: Former Smoker     Quit date: 1/1/1991     Smokeless tobacco: Never Used     Alcohol use Yes      Comment: occasionally     Family History   Problem Relation Age of Onset     HEART DISEASE Mother      Diabetes Mother      Cancer Mother      limphoma     Obesity Mother      HEART DISEASE Father      Coronary Artery Disease Father      Hypertension Father      Hyperlipidemia Father      Diabetes Brother      Diabetes Brother      Obesity Brother      Breast Cancer Other      Obesity Sister      Obesity Sister          Current Outpatient Prescriptions   Medication Sig Dispense Refill     aspirin 81 MG tablet Take 1 tablet by mouth daily.       Blood Glucose Calibration (CONTROL) NORMAL SOLN 1 Application by In Vitro route as needed Give whatever brand is covered by her insurance now 1 Bottle 11     blood glucose monitoring (CAMI CONTOUR) test strip Use to test blood sugar 1-2  times daily or as directed. 100 strip 3     Cholecalciferol (VITAMIN D) 1000 UNIT capsule Take 1  capsule by mouth daily.       glimepiride (AMARYL) 4 MG tablet TAKE TWO TABLETS BY MOUTH EVERY MORNING BEFORE BREAKFAST 180 tablet 3     glucose blood VI test strips strip by In Vitro route daily. One touch ultra 1-2 x daily as needed 1 Box 12     lisinopril (PRINIVIL/ZESTRIL) 2.5 MG tablet Take 1 tablet (2.5 mg) by mouth daily 90 tablet 3     metFORMIN (GLUCOPHAGE) 500 MG tablet TAKE 3 TABLETS MORNING AND TAKE 2 TABLETS EVERY EVENING 450 tablet 3     Multiple Vitamin (MULTI-VITAMIN PO) Take  by mouth.       ORDER FOR DME Glucometer, brand as covered by insurance. 1 each 3     ORDER FOR DME Test strips for pt's glucometer, brand as covered by insurance Test bid and prn. 200 each 3     ORDER FOR DME Lancets bid and prn.  Fill per patient's insurance. 200 each 3     ORDER FOR DME Test strips for pt's glucometer, brand as covered by insurance Test bid and prn.   Rx number at [facility] is [id number] 200 each 4     Probiotic Product (MARTINA-Q PO) Take  by mouth as needed.       simvastatin (ZOCOR) 40 MG tablet TAKE 1 TABLET(40 MG) BY MOUTH AT BEDTIME 30 tablet 0     cyclobenzaprine (FLEXERIL) 10 MG tablet Take 1 tablet (10 mg) by mouth 3 times daily as needed for muscle spasms (Patient not taking: Reported on 8/10/2018) 15 tablet 0     Allergies   Allergen Reactions     Measles Mumps And Rubella Virus Vaccine Live      Redness       Pneumococcal Vaccine      Swollen node     Recent Labs   Lab Test  11/12/18   0917  09/28/17   0800  09/16/16   0912   11/07/14   0842   A1C  9.6*  6.5*  8.7*   < >  8.7*   LDL  74  84  77   < >  77   HDL  40*  51  44*   < >  44*   TRIG  315*  161*  234*   < >  251*   ALT  35  26   --    --   48   CR  0.38*  0.40*   --    < >  0.70   GFRESTIMATED  >90  >90   --    < >  88   GFRESTBLACK  >90  >90   --    < >  >90   POTASSIUM  4.0  3.9   --    < >  4.1   TSH  0.75   --   0.70   --    --     < > = values in this interval not displayed.        Patient under age 50, mutual decision reflected in  health maintenance.      Pertinent mammograms are reviewed under the imaging tab.  History of abnormal Pap smear: NO - age 30-65 PAP every 5 years with negative HPV co-testing recommended  PAP / HPV Latest Ref Rng & Units 7/24/2015   PAP - NIL   HPV 16 DNA NEG Negative   HPV 18 DNA NEG Negative   OTHER HR HPV NEG Negative     Reviewed and updated as needed this visit by clinical staff  Tobacco  Allergies  Meds  Med Hx  Surg Hx  Fam Hx  Soc Hx        Reviewed and updated as needed this visit by Provider            Review of Systems   Constitutional: Negative for chills.   HENT: Negative for congestion and ear pain.    Eyes: Negative for pain.   Respiratory: Negative for cough.    Cardiovascular: Negative for chest pain.   Gastrointestinal: Negative for abdominal pain, constipation, diarrhea and hematochezia.   Genitourinary: Negative for hematuria.   Neurological: Negative for dizziness.     CONSTITUTIONAL: NEGATIVE for fever, chills, change in weight  INTEGUMENTARU/SKIN: NEGATIVE for worrisome rashes, moles or lesions  EYES: NEGATIVE for vision changes or irritation  ENT: NEGATIVE for ear, mouth and throat problems  RESP: NEGATIVE for significant cough or SOB  BREAST: NEGATIVE for masses, tenderness or discharge  CV: NEGATIVE for chest pain, palpitations or peripheral edema  GI: NEGATIVE for nausea, abdominal pain, heartburn, or change in bowel habits  : NEGATIVE for unusual urinary or vaginal symptoms. Periods are regular.  MUSCULOSKELETAL: NEGATIVE for significant arthralgias or myalgia  NEURO: NEGATIVE for weakness, dizziness or paresthesias  PSYCHIATRIC: NEGATIVE for changes in mood or affect     OBJECTIVE:   /88 (BP Location: Left arm, Patient Position: Sitting, Cuff Size: Adult Large)  Pulse 84  Temp 98.1  F (36.7  C) (Oral)  Resp 18  Wt 294 lb (133.4 kg)  SpO2 95%  BMI 46.05 kg/m2  Physical Exam  GENERAL APPEARANCE: morbidly obese, alert and no distress  EYES: Eyes grossly normal to  inspection, PERRL and conjunctivae and sclerae normal  HENT: ear canals and TM's normal, nose and mouth without ulcers or lesions, oropharynx clear and oral mucous membranes moist, geographic tongue.  NECK: no adenopathy, no asymmetry, masses, or scars and thyroid normal to palpation  RESP: lungs clear to auscultation - no rales, rhonchi or wheezes  BREAST: normal without dominant masses, tenderness or nipple discharge and no palpable axillary masses or adenopathy  CV: regular rate and rhythm, normal S1 S2, no S3 or S4, no murmur, click or rub, no peripheral edema and peripheral pulses strong  ABDOMEN: soft, nontender, no hepatosplenomegaly, no masses and bowel sounds normal  MS: no musculoskeletal defects are noted and gait is age appropriate without ataxia  SKIN: no suspicious lesions or rashes.  There is a horizontal ridge in her left 5th fingernail; no associated pigment.    NEURO: Normal strength and tone, sensory exam grossly normal, mentation intact and speech normal  PSYCH: mentation appears normal and affect normal/bright        ASSESSMENT/PLAN:   1. Well adult exam  CV: see below for diabetes;  Her BP was mildly elevated today but better on recheck, and her weight is up about 20lbs from last year.   Malignancy: recommended mammogram this year.  Bone health; recommended increased activity  Immunizations: flu shot today.  Recommended checking insurance on Shingrix.      2. Controlled type 2 diabetes mellitus with complication, without long-term current use of insulin (H)  Her A1c today is uncontrolled.  I will ask her to return for a f/u diabetes visit.  She is on ACE-I, statin, ASA, has had pneumovax, tdap up to date, flu shot today.    - Hemoglobin A1c  - TSH with free T4 reflex  - Lipid Profile  - CBC with platelets  - Comprehensive metabolic panel  - Albumin Random Urine Quantitative with Creat Ratio    3. Mixed hyperlipidemia    - Lipid Profile    4. Need for prophylactic vaccination and inoculation  "against influenza    - FLU VACCINE, (RIV4) RECOMBINANT HA  , IM (FluBlok, egg free) [22414]- >18 YRS ([facility] recommended  50-64 YRS)  - Vaccine Administration, Initial [94173]    5. Uncontrolled type 2 diabetes mellitus with hyperglycemia (H)        COUNSELING:  Reviewed preventive health counseling, as reflected in patient instructions    BP Readings from Last 1 Encounters:   11/12/18 142/88     Estimated body mass index is 46.05 kg/(m^2) as calculated from the following:    Height as of 12/29/17: 5' 7\" (1.702 m).    Weight as of this encounter: 294 lb (133.4 kg).           reports that she quit smoking about 27 years ago. She has never used smokeless tobacco.      Counseling Resources:  ATP IV Guidelines  Pooled Cohorts Equation Calculator  Breast Cancer Risk Calculator  FRAX Risk Assessment  ICSI Preventive Guidelines  Dietary Guidelines for Americans, 2010  USDA's MyPlate  ASA Prophylaxis  Lung CA Screening    Codie Pina MD  [facility]  Answers for HPI/ROS submitted by the patient on 11/12/2018   PHQ-2 Score: 0    "

## 2018-11-13 LAB
CREAT UR-MCNC: 32 MG/DL
MICROALBUMIN UR-MCNC: 10 MG/L
MICROALBUMIN/CREAT UR: 32.09 MG/G CR (ref 0–25)

## 2018-12-02 DIAGNOSIS — E78.5 HYPERLIPIDEMIA LDL GOAL <100: ICD-10-CM

## 2018-12-02 NOTE — TELEPHONE ENCOUNTER
"Requested Prescriptions   Pending Prescriptions Disp Refills     simvastatin (ZOCOR) 40 MG tablet [Pharmacy Med Name: SIMVASTATIN 40MG TABLETS]    Last Written Prescription Date:  11/12/2018  Last Fill Quantity: 90 tablet     ,  # refills: 0   Last Office Visit: 11/12/2018   Future Office Visit:      30 tablet 0     Sig: TAKE 1 TABLET BY MOUTH EVERY NIGHT AT BEDTIME    Statins Protocol Passed    12/2/2018  5:11 PM       Passed - LDL on file in past 12 months    Recent Labs   Lab Test  11/12/18   0917   LDL  74          Passed - No abnormal creatine kinase in past 12 months    No lab results found.          Passed - Recent (12 mo) or future (30 days) visit within the authorizing provider's specialty    Patient had office visit in the last 12 months or has a visit in the next 30 days with authorizing provider or within the authorizing provider's specialty.  See \"Patient Info\" tab in inbasket, or \"Choose Columns\" in Meds & Orders section of the refill encounter.           Passed - Patient is age 18 or older       Passed - No active pregnancy on record       Passed - No positive pregnancy test in past 12 months          "

## 2018-12-04 ENCOUNTER — MYC MEDICAL ADVICE (OUTPATIENT)
Dept: FAMILY MEDICINE | Facility: CLINIC | Age: 58
End: 2018-12-04

## 2018-12-04 DIAGNOSIS — E11.65 TYPE 2 DIABETES MELLITUS WITH HYPERGLYCEMIA, WITHOUT LONG-TERM CURRENT USE OF INSULIN (H): Primary | ICD-10-CM

## 2018-12-04 RX ORDER — SIMVASTATIN 40 MG
TABLET ORAL
Qty: 0.01 TABLET | Refills: 0 | OUTPATIENT
Start: 2018-12-04

## 2018-12-04 NOTE — TELEPHONE ENCOUNTER
Phone call from patient     Patient is agreeable to MTM visit - appt. scheduled for 12/12/18    Karin Sadler Registered Nurse   [facility] and [facility]

## 2018-12-04 NOTE — TELEPHONE ENCOUNTER
Referral done for MTM, would ideally like her to see Haseeb, if insurance covers.  My understanding is that MTM will check first, but I'm not totally sure .    Otherwise have her come in for a visit with me, please.

## 2018-12-04 NOTE — TELEPHONE ENCOUNTER
Refused Prescriptions:                       Disp   Refills    simvastatin (ZOCOR) 40 MG tablet [Pharmacy*0.01 t*0        Sig: TAKE 1 TABLET BY MOUTH EVERY NIGHT AT BEDTIME  Refused By: HELIO BAI  Reason for Refusal: Patient has requested refill too soon      Closing encounter - no further actions needed at this time    Helio Bai RN

## 2018-12-04 NOTE — TELEPHONE ENCOUNTER
Dr. Codie Pina  Please review home glucose readings - started Januvia on 11/28/18       I began the new medication on 11/28. My am blood sugars.   11/26 - 244 11/27 - 274 11/28 - 241 11/29 - 251 11/30 - 252 12/1 - 240 12/2 - 248   ThankMegan Registered Nurse   [facility] and [facility]

## 2018-12-04 NOTE — TELEPHONE ENCOUNTER
Message left for patient to return call to clinic and ask to speak to available triage nurse     TRIAGE - see message from Dr. Pina below - recommend MTM visit, order placed     Karin Sadler Registered Nurse   [facility] and [facility]

## 2018-12-12 DIAGNOSIS — E11.8 CONTROLLED TYPE 2 DIABETES MELLITUS WITH COMPLICATION, WITHOUT LONG-TERM CURRENT USE OF INSULIN (H): ICD-10-CM

## 2018-12-13 NOTE — TELEPHONE ENCOUNTER
"Requested Prescriptions   Pending Prescriptions Disp Refills     glimepiride (AMARYL) 4 MG tablet [Pharmacy Med Name: GLIMEPIRIDE 4MG TABLETS]  Last Written Prescription Date:  11-12-18  Last Fill Quantity: 180 tab,  # refills: 0   Last office visit: 11/12/2018 with prescribing provider:  Codie Pina   Future Office Visit:    180 tablet 0     Sig: TAKE 2 TABLETS BY MOUTH EVERY MORNING BEFORE BREAKFAST    Sulfonylurea Agents Failed - 12/12/2018 10:41 AM       Failed - Patient has a recent creatinine (normal) within the past 12 mos.    Recent Labs   Lab Test 11/12/18 0917   CR 0.38*          Passed - Blood pressure less than 140/90 in past 6 months    BP Readings from Last 3 Encounters:   11/12/18 133/81   08/10/18 128/80   12/29/17 112/70          Passed - Patient has documented LDL within the past 12 mos.    Recent Labs   Lab Test 11/12/18 0917   LDL 74          Passed - Patient has had a Microalbumin in the past 12 mos.    Recent Labs   Lab Test 11/12/18 0913  07/20/12  1317   [id number]  --   --  5.9   [id number]  --   --  10.5   MICROL 10   < >  --    UMALCR 32.09*   < >  --     < > = values in this interval not displayed.          Passed - Patient has documented A1c within the specified period of time.    If HgbA1C is 8 or greater, it needs to be on file within the past 3 months.  If less than 8, must be on file within the past 6 months.     Recent Labs   Lab Test 11/12/18 0917   A1C 9.6*          Passed - Patient is age 18 or older       Passed - No active pregnancy on record       Passed - Patient has not had a positive pregnancy test within the past 12 mos.       Passed - Recent (6 mo) or future (30 days) visit within the authorizing provider's specialty    Patient had office visit in the last 6 months or has a visit in the next 30 days with authorizing provider or within the authorizing provider's specialty.  See \"Patient Info\" tab in inbasket, or \"Choose Columns\" in Meds & Orders section of the refill " "encounter.         ____________________________________          lisinopril (PRINIVIL/ZESTRIL) 2.5 MG tablet [Pharmacy Med Name: LISINOPRIL 2.5MG TABLETS]  Last Written Prescription Date:  11-12-18  Last Fill Quantity: 90 tab,  # refills: 0   Last office visit: 11/12/2018 with prescribing provider:  Codie Pina    Future Office Visit:    90 tablet 0     Sig: TAKE 1 TABLET(2.5 MG) BY MOUTH DAILY    ACE Inhibitors (Including Combos) Protocol Failed - 12/12/2018 10:41 AM       Failed - Normal serum creatinine on file in past 12 months    Recent Labs   Lab Test 11/12/18 0917   CR 0.38*          Passed - Blood pressure under 140/90 in past 12 months    BP Readings from Last 3 Encounters:   11/12/18 133/81   08/10/18 128/80   12/29/17 112/70          Passed - Recent (12 mo) or future (30 days) visit within the authorizing provider's specialty    Patient had office visit in the last 12 months or has a visit in the next 30 days with authorizing provider or within the authorizing provider's specialty.  See \"Patient Info\" tab in inbasket, or \"Choose Columns\" in Meds & Orders section of the refill encounter.           Passed - Patient is age 18 or older       Passed - No active pregnancy on record       Passed - Normal serum potassium on file in past 12 months    Recent Labs   Lab Test 11/12/18 0917   POTASSIUM 4.0          Passed - No positive pregnancy test in past 12 months          "

## 2018-12-14 RX ORDER — GLIMEPIRIDE 4 MG/1
TABLET ORAL
Qty: 120 TABLET | Refills: 0 | Status: SHIPPED | OUTPATIENT
Start: 2018-12-14 | End: 2019-05-14

## 2018-12-14 RX ORDER — LISINOPRIL 2.5 MG/1
TABLET ORAL
Qty: 60 TABLET | Refills: 0 | Status: SHIPPED | OUTPATIENT
Start: 2018-12-14 | End: 2019-05-14

## 2018-12-14 NOTE — TELEPHONE ENCOUNTER
LOV: 11/12/2018    Prescription approved per [facility] Refill Protocol.    Patient will need updated A1C in Feb 2019  Thanks! Sarita Patton RN

## 2019-01-19 DIAGNOSIS — E11.8 DIABETES MELLITUS TYPE 2, CONTROLLED, WITH COMPLICATIONS (H): ICD-10-CM

## 2019-01-19 NOTE — TELEPHONE ENCOUNTER
"Requested Prescriptions   Pending Prescriptions Disp Refills     CONTOUR NEXT TEST test strip [Pharmacy Med Name: CONTOUR NEXT TEST [id number]]  Last Written Prescription Date:  9/29/2017  Last Fill Quantity: 100 strips,  # refills: 3   Last office visit: 11/12/2018 with prescribing provider:  Silvana   Future Office Visit:     100 strip 0     Sig: USE TO TEST BLOOD SUGAR 1-2 TIMES DAILY OR AS DIRECTED    Diabetic Supplies Protocol Passed - 1/19/2019 10:38 AM       Passed - Medication is active on med list       Passed - Patient is 18 years of age or older       Passed - Recent (6 mo) or future (30 days) visit within the authorizing provider's specialty    Patient had office visit in the last 6 months or has a visit in the next 30 days with authorizing provider.  See \"Patient Info\" tab in inbasket, or \"Choose Columns\" in Meds & Orders section of the refill encounter.              "

## 2019-01-21 NOTE — TELEPHONE ENCOUNTER
Prescription approved per [facility] Refill Protocol.  Codie Guerrero RN  [facility]

## 2019-02-18 DIAGNOSIS — E11.65 UNCONTROLLED TYPE 2 DIABETES MELLITUS WITH HYPERGLYCEMIA (H): ICD-10-CM

## 2019-02-18 NOTE — LETTER
[address] [address]  Phone: [phone]    02/21/19    Azucena Guo  [address]  [facility] [address]      To whom it may concern:       We refilled your JANUVIA. For additional refills please schedule a non-fasting lab only appointment either [facility] or call us at [phone]. Thanks and have a great day!      Your Care Team at [facility] (formerly [facility])

## 2019-02-20 NOTE — TELEPHONE ENCOUNTER
"Requested Prescriptions   Pending Prescriptions Disp Refills     JANUVIA 100 MG tablet [Pharmacy Med Name: JANUVIA 100MG TABLETS]  Last Written Prescription Date:  11-27-18  Last Fill Quantity: 90 tab,  # refills: 0   Last office visit: 11/12/2018 with prescribing provider:  Codie Pina     Future Office Visit:    90 tablet 0     Sig: TAKE 1 TABLET(100 MG) BY MOUTH DAILY    DPP4 Inhibitors Protocol Failed - 2/18/2019  1:23 PM       Failed - HgbA1C in past 3 or 6 months    If HgbA1C is 8 or greater, it needs to be on file within the past 3 months.  If less than 8, must be on file within the past 6 months.     Recent Labs   Lab Test 11/12/18 0917   A1C 9.6*          Failed - Normal serum creatinine in past 12 months    Recent Labs   Lab Test 11/12/18 0917   CR 0.38*          Passed - Blood pressure less than 140/90 in past 6 months    BP Readings from Last 3 Encounters:   11/12/18 133/81   08/10/18 128/80   12/29/17 112/70          Passed - LDL on file in past 12 months    Recent Labs   Lab Test 11/12/18 0917   LDL 74          Passed - Microalbumin on file in past 12 months    Recent Labs   Lab Test 11/12/18  0913  07/20/12  1317   [id number]  --   --  5.9   [id number]  --   --  10.5   MICROL 10   < >  --    UMALCR 32.09*   < >  --     < > = values in this interval not displayed.            Passed - Medication is active on med list       Passed - Patient is age 18 or older       Passed - No active pregnancy on record       Passed - No positive pregnancy test in past 12 months       Passed - Recent (6 mo) or future (30 days) visit within the authorizing provider's specialty    Patient had office visit in the last 6 months or has a visit in the next 30 days with authorizing provider.  See \"Patient Info\" tab in inbasket, or \"Choose Columns\" in Meds & Orders section of the refill encounter.             "

## 2019-02-21 RX ORDER — SITAGLIPTIN 100 MG/1
TABLET, FILM COATED ORAL
Qty: 90 TABLET | Refills: 0 | Status: SHIPPED | OUTPATIENT
Start: 2019-02-21 | End: 2019-06-21

## 2019-02-21 NOTE — TELEPHONE ENCOUNTER
LOV: 11/12/2018            Please call patient--lab only needed for A1C!!    Prescription approved per [facility] Refill Protocol.  Thanks! Sarita Patton RN

## 2019-02-21 NOTE — TELEPHONE ENCOUNTER
Left message notifying patient that rx was approved & that she is due for a non-fasting lab appt.    Karine Albarran

## 2019-02-27 DIAGNOSIS — E78.5 HYPERLIPIDEMIA LDL GOAL <100: ICD-10-CM

## 2019-02-27 NOTE — TELEPHONE ENCOUNTER
"Requested Prescriptions   Pending Prescriptions Disp Refills     simvastatin (ZOCOR) 40 MG tablet [Pharmacy Med Name: SIMVASTATIN 40MG TABLETS]      Last Written Prescription Date:  11/12/2018  Last Fill Quantity: 90 tablet    ,  # refills: 0   Last Office Visit: 11/12/2018   Future Office Visit:      90 tablet 0     Sig: TAKE 1 TABLET(40 MG) BY MOUTH AT BEDTIME    Statins Protocol Passed - 2/27/2019  9:00 AM       Passed - LDL on file in past 12 months    Recent Labs   Lab Test 11/12/18  0917   LDL 74          Passed - No abnormal creatine kinase in past 12 months    No lab results found.          Passed - Recent (12 mo) or future (30 days) visit within the authorizing provider's specialty    Patient had office visit in the last 12 months or has a visit in the next 30 days with authorizing provider or within the authorizing provider's specialty.  See \"Patient Info\" tab in inbasket, or \"Choose Columns\" in Meds & Orders section of the refill encounter.           Passed - Medication is active on med list       Passed - Patient is age 18 or older       Passed - No active pregnancy on record       Passed - No positive pregnancy test in past 12 months                "

## 2019-03-01 RX ORDER — SIMVASTATIN 40 MG
TABLET ORAL
Qty: 90 TABLET | Refills: 1 | Status: SHIPPED | OUTPATIENT
Start: 2019-03-01 | End: 2019-08-14

## 2019-03-13 DIAGNOSIS — E11.8 DIABETES MELLITUS TYPE 2, CONTROLLED, WITH COMPLICATIONS (H): ICD-10-CM

## 2019-03-13 NOTE — TELEPHONE ENCOUNTER
"Requested Prescriptions   Pending Prescriptions Disp Refills     CONTOUR NEXT TEST test strip [Pharmacy Med Name: CONTOUR NEXT TEST [id number]] 100 strip 0     Sig: USE TO TEST BLOOD SUGAR 1-2 TIMES DAILY OR AS DIRECTED    Diabetic Supplies Protocol Passed - 3/13/2019  5:07 PM       Passed - Medication is active on med list       Passed - Patient is 18 years of age or older       Passed - Recent (6 mo) or future (30 days) visit within the authorizing provider's specialty    Patient had office visit in the last 6 months or has a visit in the next 30 days with authorizing provider.  See \"Patient Info\" tab in inbasket, or \"Choose Columns\" in Meds & Orders section of the refill encounter.            Prescription approved per [facility] Refill Protocol.    Signed Prescriptions:                        Disp   Refills    CONTOUR NEXT TEST test strip               200 st*0        Sig: USE TO TEST BLOOD SUGAR 1-2 TIMES DAILY OR AS           DIRECTED  Authorizing Provider: KATHYA GARDNER  Ordering User: HELIO BAI      Closing encounter - no further actions needed at this time    Helio Bai RN    "

## 2019-03-29 ENCOUNTER — TELEPHONE (OUTPATIENT)
Dept: FAMILY MEDICINE | Facility: CLINIC | Age: 59
End: 2019-03-29

## 2019-03-29 NOTE — TELEPHONE ENCOUNTER
Patient due for diabetic follow up with Haseeb. Rozina message sent to patient.       Kaylah ROA     [facility]

## 2019-03-31 ENCOUNTER — OFFICE VISIT (OUTPATIENT)
Dept: URGENT CARE | Facility: URGENT CARE | Age: 59
End: 2019-03-31
Payer: COMMERCIAL

## 2019-03-31 VITALS
BODY MASS INDEX: 46.2 KG/M2 | DIASTOLIC BLOOD PRESSURE: 82 MMHG | OXYGEN SATURATION: 97 % | HEART RATE: 82 BPM | SYSTOLIC BLOOD PRESSURE: 158 MMHG | TEMPERATURE: 98.4 F | WEIGHT: 293 LBS

## 2019-03-31 DIAGNOSIS — J01.00 SUBACUTE MAXILLARY SINUSITIS: Primary | ICD-10-CM

## 2019-03-31 PROCEDURE — 99213 OFFICE O/P EST LOW 20 MIN: CPT | Performed by: FAMILY MEDICINE

## 2019-03-31 RX ORDER — AMOXICILLIN 500 MG/1
500 CAPSULE ORAL 3 TIMES DAILY
Qty: 30 CAPSULE | Refills: 0 | Status: SHIPPED | OUTPATIENT
Start: 2019-03-31 | End: 2019-07-30

## 2019-03-31 RX ORDER — AMOXICILLIN 500 MG/1
500 CAPSULE ORAL 2 TIMES DAILY
Qty: 30 CAPSULE | Refills: 0 | Status: SHIPPED | OUTPATIENT
Start: 2019-03-31 | End: 2019-07-30

## 2019-03-31 NOTE — PROGRESS NOTES
Subjective: Patient does , lots of sick kids, a week and a half ago felt like she was coming down with a cold and it seemed like a regular cold until the last 4 days when she got much more congested mainly on the right side with pain in the sinuses, upper teeth, right ear.  Feeling a lot sicker.  She has had sinus infections before, last one was about 2 years ago.    Objective: ENT with classic pain over the right maxillary sinuses.  Right TM looks fine.  Throat is normal.  Neck is normal.    Assessment and plan: Sinusitis right maxillary sinus.  Amoxicillin for 10 days.

## 2019-04-01 ENCOUNTER — TRANSFERRED RECORDS (OUTPATIENT)
Dept: HEALTH INFORMATION MANAGEMENT | Facility: CLINIC | Age: 59
End: 2019-04-01

## 2019-04-16 DIAGNOSIS — E11.8 CONTROLLED TYPE 2 DIABETES MELLITUS WITH COMPLICATION, WITHOUT LONG-TERM CURRENT USE OF INSULIN (H): ICD-10-CM

## 2019-04-16 NOTE — TELEPHONE ENCOUNTER
Requested Prescriptions   Pending Prescriptions Disp Refills     metFORMIN (GLUCOPHAGE) 500 MG tablet [Pharmacy Med Name: METFORMIN 500MG TABLETS]  Last Written Prescription Date:  11/12/2018  Last Fill Quantity: 450 tablet,  # refills: 0   Last Office Visit: 11/12/2018 Silvana  Future Office Visit:    Next 5 appointments (look out 90 days)    Apr 29, 2019  7:20 AM CDT  [facility] [address] [address] [phone]          450 tablet 0     Sig: TAKE 3 TABLETS BY MOUTH IN THE MORNING AND TAKE 2 TABLETS EVERY EVENING       Biguanide Agents Failed - 4/16/2019 11:10 AM        Failed - Blood pressure less than 140/90 in past 6 months     BP Readings from Last 3 Encounters:   03/31/19 158/82   11/12/18 133/81   08/10/18 128/80                 Failed - Patient has documented A1c within the specified period of time.     If HgbA1C is 8 or greater, it needs to be on file within the past 3 months.  If less than 8, must be on file within the past 6 months.     Recent Labs   Lab Test 11/12/18 0917   A1C 9.6*             Passed - Patient has documented LDL within the past 12 mos.     Recent Labs   Lab Test 11/12/18 0917   LDL 74             Passed - Patient has had a Microalbumin in the past 15 mos.     Recent Labs   Lab Test 11/12/18 0913 07/20/12  1317   [id number]  --   --  5.9   [id number]  --   --  10.5   MICROL 10   < >  --    UMALCR 32.09*   < >  --     < > = values in this interval not displayed.             Passed - Patient is age 10 or older        Passed - Patient's CR is NOT>1.4 OR Patient's EGFR is NOT<45 within past 12 mos.     Recent Labs   Lab Test 11/12/18 0917   GFRESTIMATED >90   GFRESTBLACK >90       Recent Labs   Lab Test 11/12/18 0917   CR 0.38*             Passed - Patient does NOT have a diagnosis of CHF.        Passed - Medication is active on med list        Passed - Patient is not pregnant        " Passed - Patient has not had a positive pregnancy test within the past 12 mos.         Passed - Recent (6 mo) or future (30 days) visit within the authorizing provider's specialty     Patient had office visit in the last 6 months or has a visit in the next 30 days with authorizing provider or within the authorizing provider's specialty.  See \"Patient Info\" tab in inbasket, or \"Choose Columns\" in Meds & Orders section of the refill encounter.            "

## 2019-04-18 NOTE — TELEPHONE ENCOUNTER
She stopped communicating her blood sugars via [employer] and cancelled her MTM appt.  Please ask her to re-schedule that, or else a visit with me.  Thanks.

## 2019-04-23 ENCOUNTER — TRANSFERRED RECORDS (OUTPATIENT)
Dept: HEALTH INFORMATION MANAGEMENT | Facility: CLINIC | Age: 59
End: 2019-04-23

## 2019-05-01 ENCOUNTER — OFFICE VISIT (OUTPATIENT)
Dept: FAMILY MEDICINE | Facility: CLINIC | Age: 59
End: 2019-05-01
Payer: COMMERCIAL

## 2019-05-01 VITALS
SYSTOLIC BLOOD PRESSURE: 141 MMHG | TEMPERATURE: 98.2 F | DIASTOLIC BLOOD PRESSURE: 78 MMHG | WEIGHT: 293 LBS | RESPIRATION RATE: 18 BRPM | HEART RATE: 77 BPM | BODY MASS INDEX: 45.99 KG/M2 | OXYGEN SATURATION: 96 % | HEIGHT: 67 IN

## 2019-05-01 DIAGNOSIS — E11.8 CONTROLLED DIABETES MELLITUS TYPE 2 WITH COMPLICATIONS, UNSPECIFIED WHETHER LONG TERM INSULIN USE (H): Primary | ICD-10-CM

## 2019-05-01 LAB — HBA1C MFR BLD: 9 % (ref 0–5.6)

## 2019-05-01 PROCEDURE — 99213 OFFICE O/P EST LOW 20 MIN: CPT | Performed by: FAMILY MEDICINE

## 2019-05-01 PROCEDURE — 83036 HEMOGLOBIN GLYCOSYLATED A1C: CPT | Performed by: FAMILY MEDICINE

## 2019-05-01 PROCEDURE — 99207 C FOOT EXAM  NO CHARGE: CPT | Performed by: FAMILY MEDICINE

## 2019-05-01 PROCEDURE — 36415 COLL VENOUS BLD VENIPUNCTURE: CPT | Performed by: FAMILY MEDICINE

## 2019-05-01 ASSESSMENT — MIFFLIN-ST. JEOR: SCORE: 1950.17

## 2019-05-01 NOTE — PROGRESS NOTES
"  SUBJECTIVE:   Azucena Guo is a 58 year old female who presents to clinic today for the following   health issues:    Diabetes Follow-up    Patient is checking blood sugars: once daily.  Results are as follows:         am - mid 200    Diabetic concerns: None     Symptoms of hypoglycemia (low blood sugar): none     Paresthesias (numbness or burning in feet) or sores: Yes right foot      Date of last diabetic eye exam: last week     BP Readings from Last 2 Encounters:   03/31/19 158/82   11/12/18 133/81     Hemoglobin A1C (%)   Date Value   11/12/2018 9.6 (H)   09/28/2017 6.5 (H)     LDL Cholesterol Calculated (mg/dL)   Date Value   11/12/2018 74   09/28/2017 84       Diabetes Management Resources    Amount of exercise or physical activity: walk almost everyday with toddler     Problems taking medications regularly: No    Medication side effects: none    Diet: regular (no restrictions)    The patient states that she would like to have one month to work on her diet and exercise.  Recently, her diet has been \"terrible,\" and she hasn't been exercising.  She notes a right knee injury which was keeping her from exercising much, but that is getting better.  Her blood sugars are always in the mid-200 range.  She states that the januvia did not do anything.  She strongly feels that her BP and glucose will improve with better lifestyle, and she now feels ready to pursue this.  Had eye exam last week--cataracts.         Additional history: as documented    Reviewed  and updated as needed this visit by clinical staff  Tobacco  Allergies  Meds  Med Hx  Surg Hx  Fam Hx  Soc Hx        Reviewed and updated as needed this visit by Provider         Patient Active Problem List   Diagnosis     Hyperlipidemia     Diabetes mellitus type 2, controlled, with complications (H)     BMI > 35     DDD (degenerative disc disease), cervical     Past Surgical History:   Procedure Laterality Date     CHOLECYSTECTOMY      1994       Social " History     Tobacco Use     Smoking status: Former Smoker     Last attempt to quit: 1991     Years since quittin.3     Smokeless tobacco: Never Used   Substance Use Topics     Alcohol use: Yes     Comment: occasionally     Family History   Problem Relation Age of Onset     Heart Disease Mother      Diabetes Mother      Cancer Mother         limphoma     Obesity Mother      Heart Disease Father      Coronary Artery Disease Father      Hypertension Father      Hyperlipidemia Father      Diabetes Brother      Diabetes Brother      Obesity Brother      Breast Cancer Other      Obesity Sister      Obesity Sister          Current Outpatient Medications   Medication Sig Dispense Refill     aspirin 81 MG tablet Take 1 tablet by mouth daily.       Blood Glucose Calibration (CONTROL) NORMAL SOLN 1 Application by In Vitro route as needed Give whatever brand is covered by her insurance now 1 Bottle 11     Cholecalciferol (VITAMIN D) 1000 UNIT capsule Take 1 capsule by mouth daily.       CONTOUR NEXT TEST test strip USE TO TEST BLOOD SUGAR 1-2 TIMES DAILY OR AS DIRECTED 200 strip 0     glimepiride (AMARYL) 4 MG tablet TAKE 2 TABLETS BY MOUTH EVERY MORNING BEFORE BREAKFAST 120 tablet 0     glimepiride (AMARYL) 4 MG tablet TAKE TWO TABLETS BY MOUTH EVERY MORNING BEFORE BREAKFAST 180 tablet 0     glucose blood VI test strips strip by In Vitro route daily. One touch ultra 1-2 x daily as needed 1 Box 12     JANUVIA 100 MG tablet TAKE 1 TABLET(100 MG) BY MOUTH DAILY 90 tablet 0     lisinopril (PRINIVIL/ZESTRIL) 2.5 MG tablet TAKE 1 TABLET(2.5 MG) BY MOUTH DAILY 60 tablet 0     lisinopril (PRINIVIL/ZESTRIL) 2.5 MG tablet Take 1 tablet (2.5 mg) by mouth daily 90 tablet 0     metFORMIN (GLUCOPHAGE) 500 MG tablet TAKE 3 TABLETS BY MOUTH IN THE MORNING AND TAKE 2 TABLETS EVERY EVENING 450 tablet 0     Multiple Vitamin (MULTI-VITAMIN PO) Take  by mouth.       ORDER FOR DME Glucometer, brand as covered by insurance. 1 each 3      "ORDER FOR DME Test strips for pt's glucometer, brand as covered by insurance Test bid and prn. 200 each 3     ORDER FOR DME Lancets bid and prn.  Fill per patient's insurance. 200 each 3     ORDER FOR DME Test strips for pt's glucometer, brand as covered by insurance Test bid and prn.   Rx number at [facility] is [phone] 200 each 4     Probiotic Product (MARTINA-Q PO) Take  by mouth as needed.       simvastatin (ZOCOR) 40 MG tablet TAKE 1 TABLET(40 MG) BY MOUTH AT BEDTIME 90 tablet 1     amoxicillin (AMOXIL) 500 MG capsule Take 1 capsule (500 mg) by mouth 2 times daily (Patient not taking: Reported on 5/1/2019) 30 capsule 0     amoxicillin (AMOXIL) 500 MG capsule Take 1 capsule (500 mg) by mouth 3 times daily Please discharge the earlier rx sent with bid sig (Patient not taking: Reported on 5/1/2019) 30 capsule 0     cyclobenzaprine (FLEXERIL) 10 MG tablet Take 1 tablet (10 mg) by mouth 3 times daily as needed for muscle spasms (Patient not taking: Reported on 5/1/2019) 15 tablet 0     Allergies   Allergen Reactions     Measles Mumps And Rubella Virus Vaccine Live      Redness       Pneumococcal Vaccine      Swollen node       ROS:  Constitutional, HEENT, cardiovascular, pulmonary, gi and gu systems are negative, except as otherwise noted.    OBJECTIVE:     /78 (BP Location: Left arm, Patient Position: Sitting, Cuff Size: Adult Large)   Pulse 77   Temp 98.2  F (36.8  C) (Oral)   Resp 18   Ht 1.708 m (5' 7.25\")   Wt 133.4 kg (294 lb)   SpO2 96%   BMI 45.71 kg/m    Body mass index is 45.71 kg/m .  GENERAL APPEARANCE: healthy, alert and no distress  EYES: Eyes grossly normal to inspection, PERRL and conjunctivae and sclerae normal  RESP: lungs clear to auscultation - no rales, rhonchi or wheezes  CV: regular rates and rhythm, normal S1 S2, no S3 or S4 and no murmur, click or rub  DIABETIC FOOT EXAM: normal DP and PT pulses, no trophic changes or ulcerative lesions and normal sensory exam  PSYCH: " mentation appears normal and affect normal/bright        ASSESSMENT/PLAN:             1. Controlled diabetes mellitus type 2 with complications, unspecified whether long term insulin use (H)  Uncontrolled.  She would like to have one month to work on things; if blood sugars are not improving, then she agrees she will come back for a visit with me or MTM.  I talked with her a bit about GLP-1 agonists and about CGM.  In one month, she will send a mychart note with her updates.    - Hemoglobin A1c  - FOOT EXAM        Codie Pina MD  [facility]

## 2019-05-03 ENCOUNTER — MYC MEDICAL ADVICE (OUTPATIENT)
Dept: FAMILY MEDICINE | Facility: CLINIC | Age: 59
End: 2019-05-03

## 2019-05-14 DIAGNOSIS — E11.8 CONTROLLED TYPE 2 DIABETES MELLITUS WITH COMPLICATION, WITHOUT LONG-TERM CURRENT USE OF INSULIN (H): ICD-10-CM

## 2019-05-14 NOTE — TELEPHONE ENCOUNTER
"Requested Prescriptions   Pending Prescriptions Disp Refills     glimepiride (AMARYL) 4 MG tablet [Pharmacy Med Name: GLIMEPIRIDE 4MG TABLETS]  Last Written Prescription Date:  12-14-18  Last Fill Quantity: 120 tab,  # refills: 0   Last office visit: 5/1/2019 with prescribing provider:  Codie Pina    Future Office Visit:    120 tablet 0     Sig: TAKE 2 TABLETS BY MOUTH EVERY MORNING BEFORE BREAKFAST       Sulfonylurea Agents Failed - 5/14/2019  9:40 AM        Failed - Blood pressure less than 140/90 in past 6 months     BP Readings from Last 3 Encounters:   05/01/19 141/78   03/31/19 158/82   11/12/18 133/81           Failed - Patient has a recent creatinine (normal) within the past 12 mos.     Recent Labs   Lab Test 11/12/18  0917   CR 0.38*           Passed - Patient has documented LDL within the past 12 mos.     Recent Labs   Lab Test 11/12/18  0917   LDL 74           Passed - Patient has had a Microalbumin in the past 15 mos.     Recent Labs   Lab Test 11/12/18  0913  07/20/12  1317   [id number]  --   --  5.9   [id number]  --   --  10.5   MICROL 10   < >  --    UMALCR 32.09*   < >  --     < > = values in this interval not displayed.           Passed - Patient has documented A1c within the specified period of time.     If HgbA1C is 8 or greater, it needs to be on file within the past 3 months.  If less than 8, must be on file within the past 6 months.     Recent Labs   Lab Test 05/01/19  0811   A1C 9.0*           Passed - Medication is active on med list        Passed - Patient is age 18 or older        Passed - No active pregnancy on record        Passed - Patient has not had a positive pregnancy test within the past 12 mos.        Passed - Recent (6 mo) or future (30 days) visit within the authorizing provider's specialty     Patient had office visit in the last 6 months or has a visit in the next 30 days with authorizing provider or within the authorizing provider's specialty.  See \"Patient Info\" tab in " "inbasket, or \"Choose Columns\" in Meds & Orders section of the refill encounter.         ____________________________________         lisinopril (PRINIVIL/ZESTRIL) 2.5 MG tablet [Pharmacy Med Name: LISINOPRIL 2.5MG TABLETS]  Last Written Prescription Date:  12-14-18  Last Fill Quantity: 60 tab,  # refills: 0   Last office visit: 5/1/2019 with prescribing provider:  Codie Pina    Future Office Visit:    60 tablet 0     Sig: TAKE 1 TABLET(2.5 MG) BY MOUTH DAILY       ACE Inhibitors (Including Combos) Protocol Failed - 5/14/2019  9:40 AM        Failed - Blood pressure under 140/90 in past 12 months     BP Readings from Last 3 Encounters:   05/01/19 141/78   03/31/19 158/82   11/12/18 133/81           Failed - Normal serum creatinine on file in past 12 months     Recent Labs   Lab Test 11/12/18  0917   CR 0.38*           Passed - Recent (12 mo) or future (30 days) visit within the authorizing provider's specialty     Patient had office visit in the last 12 months or has a visit in the next 30 days with authorizing provider or within the authorizing provider's specialty.  See \"Patient Info\" tab in inbasket, or \"Choose Columns\" in Meds & Orders section of the refill encounter.            Passed - Medication is active on med list        Passed - Patient is age 18 or older        Passed - No active pregnancy on record        Passed - Normal serum potassium on file in past 12 months     Recent Labs   Lab Test 11/12/18  0917   POTASSIUM 4.0           Passed - No positive pregnancy test within past 12 months         "

## 2019-05-15 RX ORDER — GLIMEPIRIDE 4 MG/1
TABLET ORAL
Qty: 120 TABLET | Refills: 0 | Status: SHIPPED | OUTPATIENT
Start: 2019-05-15 | End: 2019-07-18

## 2019-05-15 RX ORDER — LISINOPRIL 2.5 MG/1
TABLET ORAL
Qty: 60 TABLET | Refills: 0 | Status: SHIPPED | OUTPATIENT
Start: 2019-05-15 | End: 2019-07-18

## 2019-06-21 DIAGNOSIS — E11.65 UNCONTROLLED TYPE 2 DIABETES MELLITUS WITH HYPERGLYCEMIA (H): ICD-10-CM

## 2019-06-21 DIAGNOSIS — E11.8 DIABETES MELLITUS TYPE 2, CONTROLLED, WITH COMPLICATIONS (H): ICD-10-CM

## 2019-06-21 NOTE — TELEPHONE ENCOUNTER
"Requested Prescriptions   Pending Prescriptions Disp Refills     CONTOUR NEXT TEST test strip [Pharmacy Med Name: CONTOUR NEXT TEST [id number]] 200 strip 0     Sig: USE TO TEST BLOOD SUGAR 1 TO 2 TIMES DAILY OR AS DIRECTED      Last Written Prescription Date:  3/13/2019  Last Fill Quantity: 200 strip     ,  # refills: 0   Last Office Visit: 5/1/2019   Future Office Visit:            Diabetic Supplies Protocol Passed - 6/21/2019  7:35 AM        Passed - Medication is active on med list        Passed - Patient is 18 years of age or older        Passed - Recent (6 mo) or future (30 days) visit within the authorizing provider's specialty     Patient had office visit in the last 6 months or has a visit in the next 30 days with authorizing provider.  See \"Patient Info\" tab in inbasket, or \"Choose Columns\" in Meds & Orders section of the refill encounter.          JANUVIA 100 MG tablet [Pharmacy Med Name: JANUVIA 100MG TABLETS] 90 tablet 0     Sig: TAKE 1 TABLET(100 MG) BY MOUTH DAILY      Last Written Prescription Date:  2/21/2019  Last Fill Quantity: 90 tablet    ,  # refills: 0   Last Office Visit: 5/1/2019   Future Office Visit:            DPP4 Inhibitors Protocol Failed - 6/21/2019  7:35 AM        Failed - Blood pressure less than 140/90 in past 6 months     BP Readings from Last 3 Encounters:   05/01/19 141/78   03/31/19 158/82   11/12/18 133/81           Failed - Normal serum creatinine in past 12 months     Recent Labs   Lab Test 11/12/18 0917   CR 0.38*           Passed - LDL on file in past 12 months     Recent Labs   Lab Test 11/12/18 0917   LDL 74           Passed - Microalbumin on file in past 12 months     Recent Labs   Lab Test 11/12/18 0913  07/20/12  1317   [id number]  --   --  5.9   [id number]  --   --  10.5   MICROL 10   < >  --    UMALCR 32.09*   < >  --     < > = values in this interval not displayed.           Passed - HgbA1C in past 3 or 6 months     If HgbA1C is 8 or greater, it needs to be on file " "within the past 3 months.  If less than 8, must be on file within the past 6 months.     Recent Labs   Lab Test 05/01/19  0811   A1C 9.0*           Passed - Medication is active on med list        Passed - Patient is age 18 or older        Passed - No active pregnancy on record        Passed - No positive pregnancy test in past 12 months        Passed - Recent (6 mo) or future (30 days) visit within the authorizing provider's specialty     Patient had office visit in the last 6 months or has a visit in the next 30 days with authorizing provider.  See \"Patient Info\" tab in inbasket, or \"Choose Columns\" in Meds & Orders section of the refill encounter.            "

## 2019-06-23 RX ORDER — SITAGLIPTIN 100 MG/1
TABLET, FILM COATED ORAL
Qty: 30 TABLET | Refills: 0 | Status: SHIPPED | OUTPATIENT
Start: 2019-06-23 | End: 2019-07-15

## 2019-06-24 NOTE — TELEPHONE ENCOUNTER
1. Controlled diabetes mellitus type 2 with complications, unspecified whether long term insulin use (H)  Uncontrolled.  She would like to have one month to work on things; if blood sugars are not improving, then she agrees she will come back for a visit with me or MTM.  I talked with her a bit about GLP-1 agonists and about CGM.  In one month, she will send a [employer] note with her updates.    - Hemoglobin A1c  - FOOT EXAM    Medication is being filled for 1 time refill only due to:  patient needs to update provider on diabetes please     Signed Prescriptions:                        Disp   Refills    CONTOUR NEXT TEST test strip               200 st*0        Sig: USE TO TEST BLOOD SUGAR 1 TO 2 TIMES DAILY OR AS           DIRECTED  Authorizing Provider: KATHYA GARDNER  Ordering User: HELIO BAIUVIA 100 MG tablet                      30 tab*0        Sig: TAKE 1 TABLET(100 MG) BY MOUTH DAILY  Authorizing Provider: KATHYA GARDNER  Ordering User: HELIO BAI       Reception - please ask patient to update provider on her diabetes - one month refill

## 2019-06-25 NOTE — TELEPHONE ENCOUNTER
Patient due for diabetes follow up. [employer] message sent to patient.   Patient informed that a medication refill was sent to their pharmacy.         Kaylah ROA     [facility]

## 2019-07-15 DIAGNOSIS — E11.8 CONTROLLED TYPE 2 DIABETES MELLITUS WITH COMPLICATION, WITHOUT LONG-TERM CURRENT USE OF INSULIN (H): ICD-10-CM

## 2019-07-15 DIAGNOSIS — E11.65 UNCONTROLLED TYPE 2 DIABETES MELLITUS WITH HYPERGLYCEMIA (H): ICD-10-CM

## 2019-07-15 NOTE — LETTER
[address] [address]  Phone: [phone]    07/16/19    Azucena Guo  [address]  [facility] [address]      To whom it may concern:     In order to ensure we are providing the best quality care, we have reviewed your chart and see that you are due for a diabetic follow up appointment. Since Dr. Pina is leaving [place], you will have to establish care with a new provider for future refills.    We have also sent your medications to your pharmacy. For future medication refills, please contact your primary care clinic to schedule the above appointment. This can be requested via [employer] or by calling the clinic at [phone].    We greatly appreciate the opportunity to serve you. Thank you for trusting us with your health care.      Sincerely,      Your care team at [facility]

## 2019-07-16 RX ORDER — SITAGLIPTIN 100 MG/1
TABLET, FILM COATED ORAL
Qty: 30 TABLET | Refills: 0 | Status: SHIPPED | OUTPATIENT
Start: 2019-07-16 | End: 2019-08-14

## 2019-07-16 NOTE — TELEPHONE ENCOUNTER
LM advising patient that rx was approved and that she should schedule an appt with a new provider since PCP is leaving [place]. Due for a diabetic follow-up. Letter sent.    Karine Albarran

## 2019-07-16 NOTE — TELEPHONE ENCOUNTER
"Requested Prescriptions   Pending Prescriptions Disp Refills     metFORMIN (GLUCOPHAGE) 500 MG tablet [Pharmacy Med Name: METFORMIN 500MG TABLETS] 450 tablet 0     Sig: TAKE 3 TABLETS BY MOUTH IN THE MORNING AND TAKE 2 TABLETS EVERY EVENING       Biguanide Agents Failed - 7/15/2019  5:07 AM        Failed - Blood pressure less than 140/90 in past 6 months     BP Readings from Last 3 Encounters:   05/01/19 141/78   03/31/19 158/82   11/12/18 133/81                 Passed - Patient has documented LDL within the past 12 mos.     Recent Labs   Lab Test 11/12/18  0917   LDL 74             Passed - Patient has had a Microalbumin in the past 15 mos.     Recent Labs   Lab Test 11/12/18 0913  07/20/12  1317   [id number]  --   --  5.9   [id number]  --   --  10.5   MICROL 10   < >  --    UMALCR 32.09*   < >  --     < > = values in this interval not displayed.             Passed - Patient is age 10 or older        Passed - Patient has documented A1c within the specified period of time.     If HgbA1C is 8 or greater, it needs to be on file within the past 3 months.  If less than 8, must be on file within the past 6 months.     Recent Labs   Lab Test 05/01/19  0811   A1C 9.0*             Passed - Patient's CR is NOT>1.4 OR Patient's EGFR is NOT<45 within past 12 mos.     Recent Labs   Lab Test 11/12/18 0917   GFRESTIMATED >90   GFRESTBLACK >90       Recent Labs   Lab Test 11/12/18 0917   CR 0.38*             Passed - Patient does NOT have a diagnosis of CHF.        Passed - Medication is active on med list        Passed - Patient is not pregnant        Passed - Patient has not had a positive pregnancy test within the past 12 mos.         Passed - Recent (6 mo) or future (30 days) visit within the authorizing provider's specialty     Patient had office visit in the last 6 months or has a visit in the next 30 days with authorizing provider or within the authorizing provider's specialty.  See \"Patient Info\" tab in inbasket, or \"Choose " "Columns\" in Meds & Orders section of the refill encounter.            JANUVIA 100 MG tablet [Pharmacy Med Name: MEGANUVIA 100MG TABLETS] 30 tablet 0     Sig: TAKE 1 TABLET BY MOUTH DAILY       DPP4 Inhibitors Protocol Failed - 7/15/2019  5:07 AM        Failed - Blood pressure less than 140/90 in past 6 months     BP Readings from Last 3 Encounters:   05/01/19 141/78   03/31/19 158/82   11/12/18 133/81                 Failed - Normal serum creatinine in past 12 months     Recent Labs   Lab Test 11/12/18  0917   CR 0.38*             Passed - LDL on file in past 12 months     Recent Labs   Lab Test 11/12/18  0917   LDL 74             Passed - Microalbumin on file in past 12 months     Recent Labs   Lab Test 11/12/18  0913  07/20/12  1317   [id number]  --   --  5.9   [id number]  --   --  10.5   MICROL 10   < >  --    UMALCR 32.09*   < >  --     < > = values in this interval not displayed.             Passed - HgbA1C in past 3 or 6 months     If HgbA1C is 8 or greater, it needs to be on file within the past 3 months.  If less than 8, must be on file within the past 6 months.     Recent Labs   Lab Test 05/01/19  0811   A1C 9.0*             Passed - Medication is active on med list        Passed - Patient is age 18 or older        Passed - No active pregnancy on record        Passed - No positive pregnancy test in past 12 months        Passed - Recent (6 mo) or future (30 days) visit within the authorizing provider's specialty     Patient had office visit in the last 6 months or has a visit in the next 30 days with authorizing provider.  See \"Patient Info\" tab in inbasket, or \"Choose Columns\" in Meds & Orders section of the refill encounter.            Medication is being filled for 1 time refill only due to:  Patient needs to be seen because due for diabetes f/u.     Signed Prescriptions:                        Disp   Refills    metFORMIN (GLUCOPHAGE) 500 MG tablet       150 ta*0        Sig: TAKE 3 TABLETS BY MOUTH IN THE MORNING " AND TAKE 2           TABLETS EVERY EVENING  Authorizing Provider: KATHYA GARDNER  Ordering User: HELIO BAI    JANUVIA 100 MG tablet                      30 tab*0        Sig: TAKE 1 TABLET BY MOUTH DAILY  Authorizing Provider: KATHYA GARDNER  Ordering User: HELIO BAI Reception - one month due for diabetes f/u office visit please

## 2019-07-18 DIAGNOSIS — E11.8 CONTROLLED TYPE 2 DIABETES MELLITUS WITH COMPLICATION, WITHOUT LONG-TERM CURRENT USE OF INSULIN (H): ICD-10-CM

## 2019-07-18 NOTE — TELEPHONE ENCOUNTER
"Requested Prescriptions   Pending Prescriptions Disp Refills     lisinopril (PRINIVIL/ZESTRIL) 2.5 MG tablet [Pharmacy Med Name: LISINOPRIL 2.5MG TABLETS]  Last Written Prescription Date:  5-15-19  Last Fill Quantity: 60 tab,  # refills: 0   Last office visit: 5/1/2019 with prescribing provider:  Codie Pina   Future Office Visit:   Next 5 appointments (look out 90 days)    Jul 30, 2019  7:40 AM CDT  Office Visit with Yesenia Canales MD  [facility] ([facility]) [address] [place] [id number] [phone]       60 tablet 0     Sig: TAKE 1 TABLET(2.5 MG) BY MOUTH DAILY       ACE Inhibitors (Including Combos) Protocol Failed - 7/18/2019  9:48 AM        Failed - Blood pressure under 140/90 in past 12 months     BP Readings from Last 3 Encounters:   05/01/19 141/78   03/31/19 158/82   11/12/18 133/81           Failed - Normal serum creatinine on file in past 12 months     Recent Labs   Lab Test 11/12/18  0917   CR 0.38*           Passed - Recent (12 mo) or future (30 days) visit within the authorizing provider's specialty     Patient had office visit in the last 12 months or has a visit in the next 30 days with authorizing provider or within the authorizing provider's specialty.  See \"Patient Info\" tab in inbasket, or \"Choose Columns\" in Meds & Orders section of the refill encounter.            Passed - Medication is active on med list        Passed - Patient is age 18 or older        Passed - No active pregnancy on record        Passed - Normal serum potassium on file in past 12 months     Recent Labs   Lab Test 11/12/18  0917   POTASSIUM 4.0           Passed - No positive pregnancy test within past 12 months     ____________________________________         glimepiride (AMARYL) 4 MG tablet [Pharmacy Med Name: GLIMEPIRIDE 4MG TABLETS]  Last Written Prescription Date:  5-15-19  Last Fill Quantity: 120 tab,  # refills: 0   Last office visit: " "5/1/2019 with prescribing provider:  Codie Pina    Future Office Visit:   Next 5 appointments (look out 90 days)    Jul 30, 2019  7:40 AM CDT  Office Visit with Yesenia Canales MD  [facility] ([facility]) [address] [facility] [id number]  [phone]       120 tablet 0     Sig: TAKE 2 TABLETS BY MOUTH EVERY MORNING BEFORE BREAKFAST       Sulfonylurea Agents Failed - 7/18/2019  9:48 AM        Failed - Blood pressure less than 140/90 in past 6 months     BP Readings from Last 3 Encounters:   05/01/19 141/78   03/31/19 158/82   11/12/18 133/81           Failed - Patient has a recent creatinine (normal) within the past 12 mos.     Recent Labs   Lab Test 11/12/18  0917   CR 0.38*           Passed - Patient has documented LDL within the past 12 mos.     Recent Labs   Lab Test 11/12/18  0917   LDL 74           Passed - Patient has had a Microalbumin in the past 15 mos.     Recent Labs   Lab Test 11/12/18  0913  07/20/12  1317   [id number]  --   --  5.9   [id number]  --   --  10.5   MICROL 10   < >  --    UMALCR 32.09*   < >  --     < > = values in this interval not displayed.           Passed - Patient has documented A1c within the specified period of time.     If HgbA1C is 8 or greater, it needs to be on file within the past 3 months.  If less than 8, must be on file within the past 6 months.     Recent Labs   Lab Test 05/01/19  0811   A1C 9.0*           Passed - Medication is active on med list        Passed - Patient is age 18 or older        Passed - No active pregnancy on record        Passed - Patient has not had a positive pregnancy test within the past 12 mos.        Passed - Recent (6 mo) or future (30 days) visit within the authorizing provider's specialty     Patient had office visit in the last 6 months or has a visit in the next 30 days with authorizing provider or within the authorizing provider's specialty.  See \"Patient Info\" tab in " "inbasket, or \"Choose Columns\" in Meds & Orders section of the refill encounter.             "

## 2019-07-19 RX ORDER — LISINOPRIL 2.5 MG/1
TABLET ORAL
Qty: 60 TABLET | Refills: 0 | Status: SHIPPED | OUTPATIENT
Start: 2019-07-19 | End: 2019-07-30

## 2019-07-19 RX ORDER — GLIMEPIRIDE 4 MG/1
TABLET ORAL
Qty: 120 TABLET | Refills: 0 | Status: SHIPPED | OUTPATIENT
Start: 2019-07-19 | End: 2019-07-30

## 2019-07-19 NOTE — TELEPHONE ENCOUNTER
HP Reception can you call and help her set up appt for Diabetes check?  Thank you.  Alivia Espino RN

## 2019-07-19 NOTE — TELEPHONE ENCOUNTER
From her [employer] message 6/2, it sounded like things were going better with her blood sugars. She should come in for an OV and labs in August, is my suggestion.  Thanks.

## 2019-07-19 NOTE — TELEPHONE ENCOUNTER
Routing refill request to provider for review/approval because:  BP, should patient f/u in office visit for diabetes?      Controlled diabetes mellitus type 2 with complications, unspecified whether long term insulin use (H)  Uncontrolled.  She would like to have one month to work on things; if blood sugars are not improving, then she agrees she will come back for a visit with me or MTM.  I talked with her a bit about GLP-1 agonists and about CGM.  In one month, she will send a mychart note with her updates.    - Hemoglobin A1c  - FOOT EXAM

## 2019-07-26 ENCOUNTER — HOSPITAL ENCOUNTER (OUTPATIENT)
Dept: MAMMOGRAPHY | Facility: CLINIC | Age: 59
Discharge: HOME | End: 2019-07-26
Attending: FAMILY MEDICINE | Admitting: FAMILY MEDICINE
Payer: COMMERCIAL

## 2019-07-26 DIAGNOSIS — Z80.3 FAMILY HISTORY OF BREAST CANCER: ICD-10-CM

## 2019-07-26 DIAGNOSIS — Z12.31 SCREENING MAMMOGRAM, ENCOUNTER FOR: ICD-10-CM

## 2019-07-26 PROCEDURE — 77063 BREAST TOMOSYNTHESIS BI: CPT

## 2019-07-30 ENCOUNTER — OFFICE VISIT (OUTPATIENT)
Dept: FAMILY MEDICINE | Facility: CLINIC | Age: 59
End: 2019-07-30
Payer: COMMERCIAL

## 2019-07-30 VITALS
DIASTOLIC BLOOD PRESSURE: 83 MMHG | SYSTOLIC BLOOD PRESSURE: 132 MMHG | HEART RATE: 76 BPM | TEMPERATURE: 97.8 F | BODY MASS INDEX: 45.2 KG/M2 | HEIGHT: 67 IN | WEIGHT: 288 LBS | RESPIRATION RATE: 16 BRPM

## 2019-07-30 DIAGNOSIS — I10 BENIGN ESSENTIAL HYPERTENSION: ICD-10-CM

## 2019-07-30 DIAGNOSIS — E78.2 MIXED HYPERLIPIDEMIA: ICD-10-CM

## 2019-07-30 DIAGNOSIS — E11.9 TYPE 2 DIABETES MELLITUS WITHOUT COMPLICATION, WITHOUT LONG-TERM CURRENT USE OF INSULIN (H): Primary | ICD-10-CM

## 2019-07-30 LAB
ALBUMIN SERPL-MCNC: 3.7 G/DL (ref 3.4–5)
ALP SERPL-CCNC: 58 U/L (ref 40–150)
ALT SERPL W P-5'-P-CCNC: 28 U/L (ref 0–50)
ANION GAP SERPL CALCULATED.3IONS-SCNC: 10 MMOL/L (ref 3–14)
AST SERPL W P-5'-P-CCNC: 16 U/L (ref 0–45)
BILIRUB SERPL-MCNC: 0.3 MG/DL (ref 0.2–1.3)
BUN SERPL-MCNC: 8 MG/DL (ref 7–30)
CALCIUM SERPL-MCNC: 9.1 MG/DL (ref 8.5–10.1)
CHLORIDE SERPL-SCNC: 105 MMOL/L (ref 94–109)
CO2 SERPL-SCNC: 21 MMOL/L (ref 20–32)
CREAT SERPL-MCNC: 0.37 MG/DL (ref 0.52–1.04)
GFR SERPL CREATININE-BSD FRML MDRD: >90 ML/MIN/{1.73_M2}
GLUCOSE SERPL-MCNC: 203 MG/DL (ref 70–99)
HBA1C MFR BLD: 7.2 % (ref 0–5.6)
POTASSIUM SERPL-SCNC: 4.5 MMOL/L (ref 3.4–5.3)
PROT SERPL-MCNC: 8.4 G/DL (ref 6.8–8.8)
SODIUM SERPL-SCNC: 136 MMOL/L (ref 133–144)

## 2019-07-30 PROCEDURE — 80053 COMPREHEN METABOLIC PANEL: CPT | Performed by: FAMILY MEDICINE

## 2019-07-30 PROCEDURE — 36415 COLL VENOUS BLD VENIPUNCTURE: CPT | Performed by: FAMILY MEDICINE

## 2019-07-30 PROCEDURE — 83036 HEMOGLOBIN GLYCOSYLATED A1C: CPT | Performed by: FAMILY MEDICINE

## 2019-07-30 PROCEDURE — 99214 OFFICE O/P EST MOD 30 MIN: CPT | Performed by: FAMILY MEDICINE

## 2019-07-30 RX ORDER — FLASH GLUCOSE SENSOR
1 KIT MISCELLANEOUS
Qty: 2 EACH | Refills: 11 | Status: SHIPPED | OUTPATIENT
Start: 2019-07-30 | End: 2019-12-06

## 2019-07-30 RX ORDER — FLASH GLUCOSE SCANNING READER
1 EACH MISCELLANEOUS 4 TIMES DAILY
Qty: 1 DEVICE | Refills: 0 | Status: SHIPPED | OUTPATIENT
Start: 2019-07-30 | End: 2019-12-06

## 2019-07-30 RX ORDER — ASCORBIC ACID 500 MG
TABLET ORAL
COMMUNITY
End: 2021-06-09

## 2019-07-30 ASSESSMENT — MIFFLIN-ST. JEOR: SCORE: 1918.99

## 2019-07-30 NOTE — PROGRESS NOTES
Subjective     Azucena Guo is a 58 year old female who presents to clinic today for the following health issues:    HPI     Here to establish care with me today.    Presents for follow up of her Type 2 diabetes.  Last A1c 9% in May.  Really worked on diet in May and states majority of sugars have been in the 100s.  Then summer started and diet has not been as diligent.  Only checks AM sugars.    Has never seen MTM.  Is open to this if sugars are not improving.    Hx of hyperlipidemia.  On Zocor 40 mg.    Hx of HTN- on lisinopril 2.5 mg once daily.    Works as [employer]/in-home .  Lives with cat.    Patient Active Problem List   Diagnosis     Hyperlipidemia     Diabetes mellitus type 2, controlled, with complications (H)     BMI > 35     DDD (degenerative disc disease), cervical     Past Surgical History:   Procedure Laterality Date     CHOLECYSTECTOMY             Social History     Tobacco Use     Smoking status: Former Smoker     Last attempt to quit: 1991     Years since quittin.5     Smokeless tobacco: Never Used   Substance Use Topics     Alcohol use: Yes     Comment: occasionally     Family History   Problem Relation Age of Onset     Heart Disease Mother      Diabetes Mother      Cancer Mother         limphoma     Obesity Mother      Heart Disease Father      Coronary Artery Disease Father      Hypertension Father      Hyperlipidemia Father      Diabetes Brother      Diabetes Brother      Obesity Brother      Breast Cancer Other      Obesity Sister      Obesity Sister          Current Outpatient Medications   Medication Sig Dispense Refill     aspirin 81 MG tablet Take 1 tablet by mouth daily.       Cholecalciferol (VITAMIN D) 1000 UNIT capsule Take 1 capsule by mouth daily.       glimepiride (AMARYL) 4 MG tablet TAKE TWO TABLETS BY MOUTH EVERY MORNING BEFORE BREAKFAST 180 tablet 0     JANUVIA 100 MG tablet TAKE 1 TABLET BY MOUTH DAILY 30 tablet 0     lisinopril (PRINIVIL/ZESTRIL) 2.5 MG  "tablet Take 1 tablet (2.5 mg) by mouth daily 90 tablet 0     metFORMIN (GLUCOPHAGE) 500 MG tablet TAKE 3 TABLETS BY MOUTH IN THE MORNING AND TAKE 2 TABLETS EVERY EVENING 150 tablet 0     Multiple Vitamin (MULTI-VITAMIN PO) Take  by mouth.       Probiotic Product (MARTINA-Q PO) Take  by mouth as needed.       simvastatin (ZOCOR) 40 MG tablet TAKE 1 TABLET(40 MG) BY MOUTH AT BEDTIME 90 tablet 1     CONTOUR NEXT TEST test strip USE TO TEST BLOOD SUGAR 1 TO 2 TIMES DAILY OR AS DIRECTED 200 strip 0     Omega-3 Fatty Acids (SM FISH OIL) 1000 MG CAPS        Allergies   Allergen Reactions     Measles Mumps And Rubella Virus Vaccine Live      Redness       Pneumococcal Vaccine      Swollen node     Recent Labs   Lab Test 05/01/19  0811 11/12/18  0917 09/28/17  0800 09/16/16  0912  11/07/14  0842   A1C 9.0* 9.6* 6.5* 8.7*   < > 8.7*   LDL  --  74 84 77   < > 77   HDL  --  40* 51 44*   < > 44*   TRIG  --  315* 161* 234*   < > 251*   ALT  --  35 26  --   --  48   CR  --  0.38* 0.40*  --    < > 0.70   GFRESTIMATED  --  >90 >90  --    < > 88   GFRESTBLACK  --  >90 >90  --    < > >90   POTASSIUM  --  4.0 3.9  --    < > 4.1   TSH  --  0.75  --  0.70  --   --     < > = values in this interval not displayed.      BP Readings from Last 3 Encounters:   07/30/19 132/83   05/01/19 141/78   03/31/19 158/82    Wt Readings from Last 3 Encounters:   07/30/19 130.6 kg (288 lb)   05/01/19 133.4 kg (294 lb)   03/31/19 133.8 kg (295 lb)                    Reviewed and updated as needed this visit by Provider         Review of Systems   ROS COMP: Constitutional, HEENT, cardiovascular, pulmonary, GI, , musculoskeletal, neuro, skin, endocrine and psych systems are negative, except as otherwise noted.      Objective    /83   Pulse 76   Temp 97.8  F (36.6  C) (Oral)   Resp 16   Ht 1.702 m (5' 7\")   Wt 130.6 kg (288 lb)   LMP  (LMP Unknown)   Breastfeeding? No   BMI 45.11 kg/m    Body mass index is 45.11 kg/m .  Physical Exam   GENERAL: " healthy, alert and no distress  EYES: Eyes grossly normal to inspection, PERRL and conjunctivae and sclerae normal  NECK: no adenopathy, no asymmetry, masses, or scars and thyroid normal to palpation  RESP: lungs clear to auscultation - no rales, rhonchi or wheezes  CV: regular rate and rhythm, normal S1 S2, no S3 or S4, no murmur, click or rub, no peripheral edema and peripheral pulses strong  ABDOMEN: soft, nontender, no hepatosplenomegaly, no masses and bowel sounds normal  MS: no gross musculoskeletal defects noted, no edema  SKIN: no suspicious lesions or rashes  PSYCH: mentation appears normal, affect normal/bright            Assessment & Plan     1. Type 2 diabetes mellitus without complication, without long-term current use of insulin (H)    - Comprehensive metabolic panel  - Hemoglobin A1c  - Continuous Blood Gluc  (FREESTYLE CARLOS 14 DAY READER) ROXANA; 1 Device 4 times daily  Dispense: 1 Device; Refill: 0  - Continuous Blood Gluc Sensor (FREESTYLE CARLOS 14 DAY SENSOR) MISC; 1 Device every 14 days  Dispense: 2 each; Refill: 11    A1c today.  Is interested in the continuous blood glucose monitor which may help to improve dietary choices.  Follow-up 3 months for recheck with MTM referral prn.    2. Mixed hyperlipidemia    Due for lipids at next visit.  On statin with goal LDL < 100.    3. Benign essential hypertension    Stable on current ACE.    Yesenia Canales MD  [facility]

## 2019-07-30 NOTE — Clinical Note
Please abstract the following data from this visit with this patient into the appropriate field in Epic:Eye exam with ophthalmology on this date: 4/2019- [facility] [employer]

## 2019-08-14 DIAGNOSIS — E11.65 UNCONTROLLED TYPE 2 DIABETES MELLITUS WITH HYPERGLYCEMIA (H): ICD-10-CM

## 2019-08-14 DIAGNOSIS — E78.5 HYPERLIPIDEMIA LDL GOAL <100: ICD-10-CM

## 2019-08-14 DIAGNOSIS — E11.8 CONTROLLED TYPE 2 DIABETES MELLITUS WITH COMPLICATION, WITHOUT LONG-TERM CURRENT USE OF INSULIN (H): ICD-10-CM

## 2019-08-14 RX ORDER — SITAGLIPTIN 100 MG/1
TABLET, FILM COATED ORAL
Qty: 90 TABLET | Refills: 0 | Status: SHIPPED | OUTPATIENT
Start: 2019-08-14 | End: 2019-11-12

## 2019-08-14 RX ORDER — SIMVASTATIN 40 MG
TABLET ORAL
Qty: 90 TABLET | Refills: 0 | Status: SHIPPED | OUTPATIENT
Start: 2019-08-14 | End: 2019-11-11

## 2019-08-14 NOTE — TELEPHONE ENCOUNTER
"Requested Prescriptions   Pending Prescriptions Disp Refills     simvastatin (ZOCOR) 40 MG tablet [Pharmacy Med Name: SIMVASTATIN 40MG TABLETS]  Last Written Prescription Date:  3-1-19  Last Fill Quantity: 90 tab,  # refills: 1   Last office visit: 7/30/2019 with prescribing provider:  Codie Pina Office Visit:    90 tablet 0     Sig: TAKE 1 TABLET(40 MG) BY MOUTH AT BEDTIME       Statins Protocol Passed - 8/14/2019  5:08 AM        Passed - LDL on file in past 12 months     Recent Labs   Lab Test 11/12/18  0917   LDL 74           Passed - No abnormal creatine kinase in past 12 months     No lab results found.         Passed - Recent (12 mo) or future (30 days) visit within the authorizing provider's specialty     Patient had office visit in the last 12 months or has a visit in the next 30 days with authorizing provider or within the authorizing provider's specialty.  See \"Patient Info\" tab in inbasket, or \"Choose Columns\" in Meds & Orders section of the refill encounter.          Passed - Medication is active on med list        Passed - Patient is age 18 or older        Passed - No active pregnancy on record        Passed - No positive pregnancy test in past 12 months     ____________________________________         JANUVIA 100 MG tablet [Pharmacy Med Name: JANUVIA 100MG TABLETS]  Last Written Prescription Date:  7-16-19  Last Fill Quantity: 30 tab,  # refills: 0   Last office visit: 7/30/2019 with prescribing provider:  Codie Pina    Future Office Visit:    30 tablet 0     Sig: TAKE 1 TABLET BY MOUTH DAILY       DPP4 Inhibitors Protocol Failed - 8/14/2019  5:08 AM        Failed - Normal serum creatinine in past 12 months     Recent Labs   Lab Test 07/30/19  0757   CR 0.37*           Passed - Blood pressure less than 140/90 in past 6 months     BP Readings from Last 3 Encounters:   07/30/19 132/83   05/01/19 141/78   03/31/19 158/82           Passed - LDL on file in past 12 months     Recent " "Labs   Lab Test 11/12/18 0917   LDL 74           Passed - Microalbumin on file in past 12 months     Recent Labs   Lab Test 11/12/18 0913 07/20/12  1317   [id number]  --   --  5.9   [id number]  --   --  10.5   MICROL 10   < >  --    UMALCR 32.09*   < >  --     < > = values in this interval not displayed.           Passed - HgbA1C in past 3 or 6 months     If HgbA1C is 8 or greater, it needs to be on file within the past 3 months.  If less than 8, must be on file within the past 6 months.     Recent Labs   Lab Test 07/30/19  0757   A1C 7.2*           Passed - Medication is active on med list        Passed - Patient is age 18 or older        Passed - No active pregnancy on record        Passed - No positive pregnancy test in past 12 months        Passed - Recent (6 mo) or future (30 days) visit within the authorizing provider's specialty     Patient had office visit in the last 6 months or has a visit in the next 30 days with authorizing provider.  See \"Patient Info\" tab in inbasket, or \"Choose Columns\" in Meds & Orders section of the refill encounter.      ____________________________________         metFORMIN (GLUCOPHAGE) 500 MG tablet [Pharmacy Med Name: METFORMIN 500MG TABLETS]  Last Written Prescription Date:  7-16-19  Last Fill Quantity: 150 tab,  # refills: 0   Last office visit: 7/30/2019 with prescribing provider:  Codie Pina    Future Office Visit:    150 tablet 0     Sig: TAKE 3 TABLETS BY MOUTH IN THE MORNING AND TAKE 2 TABLETS IN THE EVENING       Biguanide Agents Passed - 8/14/2019  5:08 AM        Passed - Blood pressure less than 140/90 in past 6 months     BP Readings from Last 3 Encounters:   07/30/19 132/83   05/01/19 141/78   03/31/19 158/82           Passed - Patient has documented LDL within the past 12 mos.     Recent Labs   Lab Test 11/12/18 0917   LDL 74           Passed - Patient has had a Microalbumin in the past 15 mos.     Recent Labs   Lab Test 11/12/18 0913 07/20/12  1317   [id number]  " "--   --  5.9   [id number]  --   --  10.5   MICROL 10   < >  --    UMALCR 32.09*   < >  --     < > = values in this interval not displayed.           Passed - Patient is age 10 or older        Passed - Patient has documented A1c within the specified period of time.     If HgbA1C is 8 or greater, it needs to be on file within the past 3 months.  If less than 8, must be on file within the past 6 months.     Recent Labs   Lab Test 07/30/19 0757   A1C 7.2*           Passed - Patient's CR is NOT>1.4 OR Patient's EGFR is NOT<45 within past 12 mos.     Recent Labs   Lab Test 07/30/19 0757   GFRESTIMATED >90   GFRESTBLACK >90     Recent Labs   Lab Test 07/30/19 0757   CR 0.37*           Passed - Patient does NOT have a diagnosis of CHF.        Passed - Medication is active on med list        Passed - Patient is not pregnant        Passed - Patient has not had a positive pregnancy test within the past 12 mos.         Passed - Recent (6 mo) or future (30 days) visit within the authorizing provider's specialty     Patient had office visit in the last 6 months or has a visit in the next 30 days with authorizing provider or within the authorizing provider's specialty.  See \"Patient Info\" tab in inbasket, or \"Choose Columns\" in Meds & Orders section of the refill encounter.              "

## 2019-08-14 NOTE — TELEPHONE ENCOUNTER
Parker advised 3 month f/u MTM    Routing refill request to provider for review/approval because:  Last provider signing was Silvana - can Klingberg or PABLO sign

## 2019-09-03 ENCOUNTER — OFFICE VISIT (OUTPATIENT)
Dept: URGENT CARE | Facility: URGENT CARE | Age: 59
End: 2019-09-03
Payer: COMMERCIAL

## 2019-09-03 VITALS
SYSTOLIC BLOOD PRESSURE: 142 MMHG | HEART RATE: 101 BPM | BODY MASS INDEX: 45.11 KG/M2 | WEIGHT: 288 LBS | DIASTOLIC BLOOD PRESSURE: 79 MMHG | TEMPERATURE: 102.5 F | OXYGEN SATURATION: 95 %

## 2019-09-03 DIAGNOSIS — R10.32 LLQ ABDOMINAL PAIN: ICD-10-CM

## 2019-09-03 DIAGNOSIS — R30.0 DYSURIA: Primary | ICD-10-CM

## 2019-09-03 LAB
ALBUMIN UR-MCNC: 100 MG/DL
APPEARANCE UR: CLEAR
BASOPHILS # BLD AUTO: 0 10E9/L (ref 0–0.2)
BASOPHILS NFR BLD AUTO: 0.2 %
BILIRUB UR QL STRIP: NEGATIVE
COLOR UR AUTO: ABNORMAL
DIFFERENTIAL METHOD BLD: NORMAL
EOSINOPHIL # BLD AUTO: 0.1 10E9/L (ref 0–0.7)
EOSINOPHIL NFR BLD AUTO: 1.1 %
ERYTHROCYTE [DISTWIDTH] IN BLOOD BY AUTOMATED COUNT: 13.1 % (ref 10–15)
GLUCOSE UR STRIP-MCNC: NEGATIVE MG/DL
HCT VFR BLD AUTO: 36.7 % (ref 35–47)
HGB BLD-MCNC: 12.2 G/DL (ref 11.7–15.7)
HGB UR QL STRIP: ABNORMAL
KETONES UR STRIP-MCNC: 15 MG/DL
LEUKOCYTE ESTERASE UR QL STRIP: ABNORMAL
LYMPHOCYTES # BLD AUTO: 1.2 10E9/L (ref 0.8–5.3)
LYMPHOCYTES NFR BLD AUTO: 14.8 %
MCH RBC QN AUTO: 30.3 PG (ref 26.5–33)
MCHC RBC AUTO-ENTMCNC: 33.2 G/DL (ref 31.5–36.5)
MCV RBC AUTO: 91 FL (ref 78–100)
MONOCYTES # BLD AUTO: 0.5 10E9/L (ref 0–1.3)
MONOCYTES NFR BLD AUTO: 5.7 %
NEUTROPHILS # BLD AUTO: 6.6 10E9/L (ref 1.6–8.3)
NEUTROPHILS NFR BLD AUTO: 78.2 %
NITRATE UR QL: NEGATIVE
PH UR STRIP: 6 PH (ref 5–7)
PLATELET # BLD AUTO: 238 10E9/L (ref 150–450)
RBC # BLD AUTO: 4.03 10E12/L (ref 3.8–5.2)
RBC #/AREA URNS AUTO: ABNORMAL /HPF
SOURCE: ABNORMAL
SP GR UR STRIP: 1.01 (ref 1–1.03)
TRANS CELLS #/AREA URNS HPF: ABNORMAL /HPF
UROBILINOGEN UR STRIP-ACNC: 0.2 EU/DL (ref 0.2–1)
WBC # BLD AUTO: 8.4 10E9/L (ref 4–11)
WBC #/AREA URNS AUTO: ABNORMAL /HPF

## 2019-09-03 PROCEDURE — 36415 COLL VENOUS BLD VENIPUNCTURE: CPT | Performed by: FAMILY MEDICINE

## 2019-09-03 PROCEDURE — 81001 URINALYSIS AUTO W/SCOPE: CPT | Performed by: PREVENTIVE MEDICINE

## 2019-09-03 PROCEDURE — 87088 URINE BACTERIA CULTURE: CPT | Performed by: FAMILY MEDICINE

## 2019-09-03 PROCEDURE — 85025 COMPLETE CBC W/AUTO DIFF WBC: CPT | Performed by: FAMILY MEDICINE

## 2019-09-03 PROCEDURE — 99214 OFFICE O/P EST MOD 30 MIN: CPT | Performed by: FAMILY MEDICINE

## 2019-09-03 PROCEDURE — 87186 SC STD MICRODIL/AGAR DIL: CPT | Performed by: FAMILY MEDICINE

## 2019-09-03 PROCEDURE — 87086 URINE CULTURE/COLONY COUNT: CPT | Performed by: FAMILY MEDICINE

## 2019-09-03 RX ORDER — SULFAMETHOXAZOLE/TRIMETHOPRIM 800-160 MG
1 TABLET ORAL 2 TIMES DAILY
Qty: 14 TABLET | Refills: 0 | Status: SHIPPED | OUTPATIENT
Start: 2019-09-03 | End: 2019-12-06

## 2019-09-03 NOTE — PROGRESS NOTES
SUBJECTIVE:   Azucena Guo is a 58 year old female who  presents today for a possible UTI. Symptoms of urgency and frequency have been going on for 5day(s).  Hematuria - unsure, urine more dark.  gradual onset and worseningand moderate.  Endorsed  fever, chills, nausea.  Endorsed left sided abdominal pain with radiation, started 2-3 days but has improved, had BM - yesterday and was normal.  No history of vaginal discharge, no concerns for STD. This patient does not have a history of urinary tract infections.     No history of kidney stones.  Denies prior history of diverticulitis.  Still has appendix.  Had gallbladder removed already.    No family history of diverticulitis.      Past Medical History:   Diagnosis Date     Arthritis      Diabetes mellitus (H)      Current Outpatient Medications   Medication Sig Dispense Refill     aspirin 81 MG tablet Take 1 tablet by mouth daily.       Cholecalciferol (VITAMIN D) 1000 UNIT capsule Take 1 capsule by mouth daily.       Continuous Blood Gluc  (FREESTYLE CARLOS 14 DAY READER) ROXANA 1 Device 4 times daily 1 Device 0     Continuous Blood Gluc Sensor (FREESTYLE CARLOS 14 DAY SENSOR) MISC 1 Device every 14 days 2 each 11     CONTOUR NEXT TEST test strip USE TO TEST BLOOD SUGAR 1 TO 2 TIMES DAILY OR AS DIRECTED 200 strip 0     glimepiride (AMARYL) 4 MG tablet TAKE TWO TABLETS BY MOUTH EVERY MORNING BEFORE BREAKFAST 180 tablet 0     JANUVIA 100 MG tablet TAKE 1 TABLET BY MOUTH DAILY 90 tablet 0     lisinopril (PRINIVIL/ZESTRIL) 2.5 MG tablet Take 1 tablet (2.5 mg) by mouth daily 90 tablet 0     metFORMIN (GLUCOPHAGE) 500 MG tablet TAKE 3 TABLETS BY MOUTH IN THE MORNING AND TAKE 2 TABLETS IN THE EVENING 450 tablet 0     Multiple Vitamin (MULTI-VITAMIN PO) Take  by mouth.       Omega-3 Fatty Acids (SM FISH OIL) 1000 MG CAPS        Probiotic Product (MARTINA-Q PO) Take  by mouth as needed.       simvastatin (ZOCOR) 40 MG tablet TAKE 1 TABLET(40 MG) BY MOUTH AT BEDTIME 90  tablet 0     Social History     Tobacco Use     Smoking status: Former Smoker     Last attempt to quit: 1991     Years since quittin.6     Smokeless tobacco: Never Used   Substance Use Topics     Alcohol use: Yes     Comment: occasionally       ROS:   Review of systems negative except as stated above.    OBJECTIVE:  BP (!) 142/79   Pulse 101   Temp 102.5  F (39.2  C) (Oral)   Wt 130.6 kg (288 lb)   LMP  (LMP Unknown)   SpO2 95%   Breastfeeding? No   BMI 45.11 kg/m    GENERAL APPEARANCE: healthy, alert and no distress  RESP: lungs clear to auscultation - no rales, rhonchi or wheezes  CV: regular rates and rhythm, normal S1 S2, no murmur noted  ABDOMEN:  soft, mild tenderness on LLQ, bowel sounds normal, no rebound or guarding  BACK: No CVA tenderness  SKIN: no suspicious lesions or rashes  PSYCH: mentation appears normal and affect normal/bright    Results for orders placed or performed in visit on 19   *UA reflex to Microscopic and Culture ([place] and [facility] [place] (except [place] and [place])   Result Value Ref Range    Color Urine Zena     Appearance Urine Clear     Glucose Urine Negative NEG^Negative mg/dL    Bilirubin Urine Negative NEG^Negative    Ketones Urine 15 (A) NEG^Negative mg/dL    Specific Gravity Urine 1.015 1.003 - 1.035    Blood Urine Moderate (A) NEG^Negative    pH Urine 6.0 5.0 - 7.0 pH    Protein Albumin Urine 100 (A) NEG^Negative mg/dL    Urobilinogen Urine 0.2 0.2 - 1.0 EU/dL    Nitrite Urine Negative NEG^Negative    Leukocyte Esterase Urine Trace (A) NEG^Negative    Source Midstream Urine    CBC with platelets and differential   Result Value Ref Range    WBC 8.4 4.0 - 11.0 10e9/L    RBC Count 4.03 3.8 - 5.2 10e12/L    Hemoglobin 12.2 11.7 - 15.7 g/dL    Hematocrit 36.7 35.0 - 47.0 %    MCV 91 78 - 100 fl    MCH 30.3 26.5 - 33.0 pg    MCHC 33.2 31.5 - 36.5 g/dL    RDW 13.1 10.0 - 15.0 %    Platelet Count 238 150 - 450 10e9/L    % Neutrophils 78.2 %    % Lymphocytes  14.8 %    % Monocytes 5.7 %    % Eosinophils 1.1 %    % Basophils 0.2 %    Absolute Neutrophil 6.6 1.6 - 8.3 10e9/L    Absolute Lymphocytes 1.2 0.8 - 5.3 10e9/L    Absolute Monocytes 0.5 0.0 - 1.3 10e9/L    Absolute Eosinophils 0.1 0.0 - 0.7 10e9/L    Absolute Basophils 0.0 0.0 - 0.2 10e9/L    Diff Method Automated Method    Urine Microscopic   Result Value Ref Range    WBC Urine 10-25 (A) OTO5^0 - 5 /HPF    RBC Urine O - 2 OTO2^O - 2 /HPF    Transitional Epi Moderate (A) FEW^Few /HPF       ASSESSMENT/PLAN:   (R30.0) Dysuria  (primary encounter diagnosis)  Comment: UTI  Plan: *UA reflex to Microscopic and Culture ([place]         and [facility] (except [place] and         [place]), Urine Microscopic, Urine Culture         Aerobic Bacterial,         sulfamethoxazole-trimethoprim (BACTRIM         DS/SEPTRA DS) 800-160 MG tablet            (R10.32) LLQ abdominal pain  Plan: CBC with platelets and differential            Reviewed initial labs with patient.  Empiric treatment for presumptive UTI/early pyelonephritis with RX Bactrim DS BID X7 days.  Will follow up on urine culture and adjust antibiotic if needed.  Drink plenty of fluids.  Prevention and treatment of UTI's discussed.Signs and symptoms of pyelonephritis mentioned.  Discussed abdominal pain, reassured that CBC is normal, to ER if any acute worsening of abdominal pain.    Follow up with primary provider if no improvement of symptoms within 1 week    Keegan Martinez MD, MD  September 3, 2019 7:25 PM

## 2019-09-03 NOTE — PATIENT INSTRUCTIONS
"Take full course of antibiotic for bladder infection/early kidney infection.    We will contact you if any changes are needed once the urine culture is finalized.      Patient Education     Bladder Infection, Female (Adult)    Urine is normally doesn't have any bacteria in it. But bacteria can get into the urinary tract from the skin around the rectum. Or they can travel in the blood from elsewhere in the body. Once they are in your urinary tract, they can cause infection in the urethra (urethritis), the bladder (cystitis), or the kidneys (pyelonephritis).  The most common place for an infection is in the bladder. This is called a bladder infection. This is one of the most common infections in women. Most bladder infections are easily treated. They are not serious unless the infection spreads to the kidney.  The phrases \"bladder infection,\" \"UTI,\" and \"cystitis\" are often used to describe the same thing. But they are not always the same. Cystitis is an inflammation of the bladder. The most common cause of cystitis is an infection.  Symptoms  The infection causes inflammation in the urethra and bladder. This causes many of the symptoms. The most common symptoms of a bladder infection are:    Pain or burning when urinating    Having to urinate more often than usual    Urgent need to urinate    Only a small amount of urine comes out    Blood in urine    Abdominal discomfort. This is usually in the lower abdomen above the pubic bone.    Cloudy urine    Strong- or bad-smelling urine    Unable to urinate (urinary retention)    Unable to hold urine in (urinary incontinence)    Fever    Loss of appetite    Confusion (in older adults)  Causes  Bladder infections are not contagious. You can't get one from someone else, from a toilet seat, or from sharing a bath.  The most common cause of bladder infections is bacteria from the bowels. The bacteria get onto the skin around the opening of the urethra. From there, they can get " into the urine and travel up to the bladder, causing inflammation and infection. This usually happens because of:    Wiping improperly after urinating. Always wipe from front to back.    Bowel incontinence    Pregnancy    Procedures such as having a catheter inserted    Older age    Not emptying your bladder. This can allow bacteria a chance to grow in your urine.    Dehydration    Constipation    Sex    Use of a diaphragm for birth control   Treatment  Bladder infections are diagnosed by a urine test. They are treated with antibiotics and usually clear up quickly without complications. Treatment helps prevent a more serious kidney infection.  Medicines  Medicines can help in the treatment of a bladder infection:    Take antibiotics until they are used up, even if you feel better. It is important to finish them to make sure the infection has cleared.    You can use acetaminophen or ibuprofen for pain, fever, or discomfort, unless another medicine was prescribed. If you have chronic liver or kidney disease, talk with your healthcare provider before using these medicines. Also talk with your provider if you've ever had a stomach ulcer or gastrointestinal bleeding, or are taking blood-thinner medicines.    If you are given phenazopydridine to reduce burning with urination, it will cause your urine to become a bright orange color. This can stain clothing.  Care and prevention  These self-care steps can help prevent future infections:    Drink plenty of fluids to prevent dehydration and flush out your bladder. Do this unless you must restrict fluids for other health reasons, or your doctor told you not to.    Proper cleaning after going to the bathroom is important. Wipe from front to back after using the toilet to prevent the spread of bacteria.    Urinate more often. Don't try to hold urine in for a long time.    Wear loose-fitting clothes and cotton underwear. Avoid tight-fitting pants.    Improve your diet and  prevent constipation. Eat more fresh fruit and vegetables, and fiber, and less junk and fatty foods.    Avoid sex until your symptoms are gone.    Avoid caffeine, alcohol, and spicy foods. These can irritate your bladder.    Urinate right after intercourse to flush out your bladder.    If you use birth control pills and have frequent bladder infections, discuss it with your doctor.  Follow-up care  Call your healthcare provider if all symptoms are not gone after 3 days of treatment. This is especially important if you have repeat infections.  If a culture was done, you will be told if your treatment needs to be changed. If directed, you can call to find out the results.  If X-rays were done, you will be told if the results will affect your treatment.  Call 911  Call 911 if any of the following occur:    Trouble breathing    Hard to wake up or confusion    Fainting or loss of consciousness    Rapid heart rate  When to seek medical advice  Call your healthcare provider right away if any of these occur:    Fever of 100.4 F (38.0 C) or higher, or as directed by your healthcare provider    Symptoms are not better by the third day of treatment    Back or belly (abdominal) pain that gets worse    Repeated vomiting, or unable to keep medicine down    Weakness or dizziness    Vaginal discharge    Pain, redness, or swelling in the outer vaginal area (labia)  Date Last Reviewed: 10/1/2016    [id number] [employer]. [address] [place] [address], [place] [address]. All rights reserved. This information is not intended as a substitute for professional medical care. Always follow your healthcare professional's instructions.

## 2019-09-07 ENCOUNTER — OFFICE VISIT (OUTPATIENT)
Dept: URGENT CARE | Facility: URGENT CARE | Age: 59
End: 2019-09-07
Payer: COMMERCIAL

## 2019-09-07 VITALS
SYSTOLIC BLOOD PRESSURE: 132 MMHG | OXYGEN SATURATION: 96 % | HEART RATE: 84 BPM | DIASTOLIC BLOOD PRESSURE: 66 MMHG | TEMPERATURE: 98.9 F

## 2019-09-07 DIAGNOSIS — Z88.1 DRUG ALLERGY, ANTIBIOTIC: Primary | ICD-10-CM

## 2019-09-07 LAB
BACTERIA SPEC CULT: ABNORMAL
SPECIMEN SOURCE: ABNORMAL

## 2019-09-07 PROCEDURE — 99213 OFFICE O/P EST LOW 20 MIN: CPT | Performed by: NURSE PRACTITIONER

## 2019-09-07 RX ORDER — CETIRIZINE HYDROCHLORIDE 10 MG/1
10 TABLET ORAL DAILY
Qty: 10 TABLET | Refills: 0 | COMMUNITY
Start: 2019-09-07 | End: 2019-12-06

## 2019-09-07 ASSESSMENT — ENCOUNTER SYMPTOMS
VOMITING: 0
CARDIOVASCULAR NEGATIVE: 1
HEMATURIA: 0
RESPIRATORY NEGATIVE: 1
CONSTITUTIONAL NEGATIVE: 1
DYSURIA: 0
HEADACHES: 1
DIZZINESS: 1
FREQUENCY: 0
NAUSEA: 0

## 2019-09-08 NOTE — PATIENT INSTRUCTIONS
Patient Education     Reaction to Medicine (Other Type)  You are having a reaction to a medicine you have taken. This may not be the same as an allergic reaction. It is an undesired, unfavorable reaction, or a side effect of a medicine. This can cause a variety of symptoms, including:    Dizziness or headache    Rash    Flushing or hot sensation    Nausea, vomiting, or stomach pain    Diarrhea or constipation    Trouble breathing    High or low blood pressure  A reaction can be an upset stomach from something like aspirin or ibuprofen, feeling faint after taking a blood pressure medicine, feeling anxious, and many other things. Symptoms of a medicine reaction can range from very mild to very severe.  In most cases, the reaction goes away within 1 to 12 hours. But it will probably occur again if you take this same medicine. Your healthcare provider will advise you whether to change how much, when, or how often you take this medicine. He or she may also advise you to discontinue this medicine or switch to another one.   Home care    Another medicine may be recommended to reduce your symptoms until the medicine s effect wears off. Follow your healthcare provider s advice.    When the medicine s effect has worn off, there should be no further problem as long as you don't take the same medicine again.     Ask your healthcare provider if you should also avoid similar medicines. Write down the information so you will remember it.    Make certain the medicine reaction is documented in your medical record.  Follow-up care  Follow up with your healthcare provider, or as advised if your symptoms are not better within 24 hours.  When to seek medical advice  Call your healthcare provider right away if any of these occur.    New symptoms that concern you    Worsening of your current symptoms, including rash or facial swelling    Symptoms that are not relieved by the treatment advised    Fever of 100.4 F (38 C) or higher, or as  advised by your healthcare provider  Call 911  Call 911 if any of these occur:    Trouble breathing or swallowing, or wheezing    Hoarse voice or trouble speaking    Confusion    Extreme drowsiness or trouble awakening    Fainting or loss of consciousness    Rapid heart rate or slow heart rate    Very low or very high blood pressure    Vomiting blood, or large amounts of blood in stool    Seizure  Date Last Reviewed: 4/1/2017 2000-2018 [employer]. [address] [place] [address], [place] [address]. All rights reserved. This information is not intended as a substitute for professional medical care. Always follow your healthcare professional's instructions.

## 2019-09-08 NOTE — PROGRESS NOTES
HPI  Azucena Guo 58 year old presents to urgent care with hives on her abdomen.  She reports that she was started on Septra 5 days ago for a urinary tract infection.  She took her last pill this morning though she was scheduled to do a full 7-day course.  The hives are itchy and she is quite uncomfortable though she states that her urinary symptoms have fully resolved.  She has not attempted anything to do for the allergic reaction.    Past Medical History:   Diagnosis Date     Arthritis      Diabetes mellitus (H)       Patient Active Problem List   Diagnosis     Hyperlipidemia     Diabetes mellitus type 2, controlled, with complications (H)     BMI > 35     DDD (degenerative disc disease), cervical      Past Surgical History:   Procedure Laterality Date     CHOLECYSTECTOMY      1994      Allergies   Allergen Reactions     Measles Mumps And Rubella Virus Vaccine Live      Redness       Pneumococcal Vaccine      Swollen node     Sulfa Drugs Hives     Current Outpatient Medications   Medication     aspirin 81 MG tablet     cetirizine (ZYRTEC) 10 MG tablet     Cholecalciferol (VITAMIN D) 1000 UNIT capsule     Continuous Blood Gluc  (FREESTYLE CARLOS 14 DAY READER) ROXANA     Continuous Blood Gluc Sensor (FREESTYLE CARLOS 14 DAY SENSOR) MISC     CONTOUR NEXT TEST test strip     diphenhydrAMINE (BENADRYL) 25 MG tablet     glimepiride (AMARYL) 4 MG tablet     JANUVIA 100 MG tablet     lisinopril (PRINIVIL/ZESTRIL) 2.5 MG tablet     metFORMIN (GLUCOPHAGE) 500 MG tablet     Multiple Vitamin (MULTI-VITAMIN PO)     Omega-3 Fatty Acids (SM FISH OIL) 1000 MG CAPS     Probiotic Product (MARTINA-Q PO)     ranitidine (ZANTAC) 150 MG tablet     simvastatin (ZOCOR) 40 MG tablet     sulfamethoxazole-trimethoprim (BACTRIM DS/SEPTRA DS) 800-160 MG tablet     No current facility-administered medications for this visit.          Review of Systems   Constitutional: Negative.    HENT:        Herpetic lesions around the mouth    Respiratory: Negative.    Cardiovascular: Negative.    Gastrointestinal: Negative for nausea and vomiting.   Genitourinary: Negative.  Negative for dysuria, frequency, hematuria and urgency.   Skin: Positive for itching and rash.   Neurological: Positive for dizziness and headaches.         Physical Exam   Constitutional: She is oriented to person, place, and time. She appears well-developed and well-nourished. No distress.   /66 (BP Location: Right arm, Patient Position: Sitting, Cuff Size: Adult Large)   Pulse 84   Temp 98.9  F (37.2  C) (Oral)   LMP  (LMP Unknown)   SpO2 96%      HENT:   Head: Normocephalic.       Mouth/Throat: Uvula is midline, oropharynx is clear and moist and mucous membranes are normal. No posterior oropharyngeal edema.   Cardiovascular: Normal rate.   Pulmonary/Chest: Effort normal. No stridor. She has no wheezes.   Musculoskeletal: Normal range of motion.   Neurological: She is alert and oriented to person, place, and time.   Skin: Rash noted.   Large groupings of the hives are noted over the abdomen.  There are none on the extremities back neck or face.   Nursing note and vitals reviewed.    Assessment:  1. Drug allergy, antibiotic        Plan:  Orders Placed This Encounter     diphenhydrAMINE (BENADRYL) 25 MG tablet     cetirizine (ZYRTEC) 10 MG tablet     ranitidine (ZANTAC) 150 MG tablet     STOP SEPTRA. DO NOT EVER TAKE THIS MEDICATION AGAIN SUBSEQUENT REACTIONS CAN BE WORSE   Instructions regarding self-care of patient/child with an allergic reaction to drugs reviewed.   If urinary symptoms return you may need another antibiotic   Written instructions provided in after visit summary and reviewed.  Patient instructed to see primary care provider for new or persistent symptoms.   Red flag symptoms reviewed and patient has been instructed to seek emergent care  Please contact pharmacy for medication questions.  Patient instructed to take medications as directed on package.       Karine Avina, DNP, APRN, CNP

## 2019-09-09 ENCOUNTER — TELEPHONE (OUTPATIENT)
Dept: URGENT CARE | Facility: URGENT CARE | Age: 59
End: 2019-09-09

## 2019-09-09 NOTE — TELEPHONE ENCOUNTER
Left message for pt to call back re:    Notes recorded by Mar Lee PA-C on 9/8/2019 at 11:32 AM CDT  Please call patient and ensure her urinary tract symptoms have resolved, as she had an allergic reaction the Septra and couldn't complete the full course. She will need a new antibiotic if still having symptoms as culture grew E. Coli.    Thanks,  GERA Russell,rusty

## 2019-09-16 DIAGNOSIS — E11.8 CONTROLLED TYPE 2 DIABETES MELLITUS WITH COMPLICATION, WITHOUT LONG-TERM CURRENT USE OF INSULIN (H): ICD-10-CM

## 2019-09-16 RX ORDER — GLIMEPIRIDE 4 MG/1
TABLET ORAL
Qty: 180 TABLET | Refills: 0 | Status: SHIPPED | OUTPATIENT
Start: 2019-09-16 | End: 2020-01-02

## 2019-09-16 RX ORDER — LISINOPRIL 2.5 MG/1
TABLET ORAL
Qty: 180 TABLET | Refills: 0 | Status: SHIPPED | OUTPATIENT
Start: 2019-09-16 | End: 2019-12-06

## 2019-09-17 NOTE — TELEPHONE ENCOUNTER
"Requested Prescriptions   Pending Prescriptions Disp Refills     glimepiride (AMARYL) 4 MG tablet [Pharmacy Med Name: GLIMEPIRIDE 4MG TABLETS] 120 tablet 0     Sig: TAKE 2 TABLETS BY MOUTH EVERY MORNING BEFORE BREAKFAST       Sulfonylurea Agents Failed - 9/16/2019 12:09 PM        Failed - Patient has a recent creatinine (normal) within the past 12 mos.     Recent Labs   Lab Test 07/30/19  0757   CR 0.37*             Passed - Blood pressure less than 140/90 in past 6 months     BP Readings from Last 3 Encounters:   09/07/19 132/66   09/03/19 (!) 142/79   07/30/19 132/83                 Passed - Patient has documented LDL within the past 12 mos.     Recent Labs   Lab Test 11/12/18  0917   LDL 74             Passed - Patient has had a Microalbumin in the past 15 mos.     Recent Labs   Lab Test 11/12/18  0913  07/20/12  1317   [id number]  --   --  5.9   [id number]  --   --  10.5   MICROL 10   < >  --    UMALCR 32.09*   < >  --     < > = values in this interval not displayed.             Passed - Patient has documented A1c within the specified period of time.     If HgbA1C is 8 or greater, it needs to be on file within the past 3 months.  If less than 8, must be on file within the past 6 months.     Recent Labs   Lab Test 07/30/19  0757   A1C 7.2*             Passed - Medication is active on med list        Passed - Patient is age 18 or older        Passed - No active pregnancy on record        Passed - Patient has not had a positive pregnancy test within the past 12 mos.        Passed - Recent (6 mo) or future (30 days) visit within the authorizing provider's specialty     Patient had office visit in the last 6 months or has a visit in the next 30 days with authorizing provider or within the authorizing provider's specialty.  See \"Patient Info\" tab in inbasket, or \"Choose Columns\" in Meds & Orders section of the refill encounter.            lisinopril (PRINIVIL/ZESTRIL) 2.5 MG tablet [Pharmacy Med Name: LISINOPRIL 2.5MG " "TABLETS] 60 tablet 0     Sig: TAKE 1 TABLET(2.5 MG) BY MOUTH DAILY       ACE Inhibitors (Including Combos) Protocol Failed - 9/16/2019 12:09 PM        Failed - Normal serum creatinine on file in past 12 months     Recent Labs   Lab Test 07/30/19  0757   CR 0.37*             Passed - Blood pressure under 140/90 in past 12 months     BP Readings from Last 3 Encounters:   09/07/19 132/66   09/03/19 (!) 142/79   07/30/19 132/83     Provider advised HTN stable            Passed - Recent (12 mo) or future (30 days) visit within the authorizing provider's specialty     Patient had office visit in the last 12 months or has a visit in the next 30 days with authorizing provider or within the authorizing provider's specialty.  See \"Patient Info\" tab in inbasket, or \"Choose Columns\" in Meds & Orders section of the refill encounter.              Passed - Medication is active on med list        Passed - Patient is age 18 or older        Passed - No active pregnancy on record        Passed - Normal serum potassium on file in past 12 months     Recent Labs   Lab Test 07/30/19  0757   POTASSIUM 4.5             Passed - No positive pregnancy test within past 12 months        Last office visit advised 3 month f/u    Signed Prescriptions:                        Disp   Refills    glimepiride (AMARYL) 4 MG tablet           180 ta*0        Sig: TAKE 2 TABLETS BY MOUTH EVERY MORNING BEFORE           BREAKFAST  Authorizing Provider: KATHYA GARDNER  Ordering User: HELIO BAI    lisinopril (PRINIVIL/ZESTRIL) 2.5 MG [id number] ta*0        Sig: TAKE 1 TABLET(2.5 MG) BY MOUTH DAILY  Authorizing Provider: KATHYA GARDNER  Ordering User: HELIO BAI      "

## 2019-09-28 ENCOUNTER — HEALTH MAINTENANCE LETTER (OUTPATIENT)
Age: 59
End: 2019-09-28

## 2019-10-01 DIAGNOSIS — E11.8 DIABETES MELLITUS TYPE 2, CONTROLLED, WITH COMPLICATIONS (H): ICD-10-CM

## 2019-10-01 NOTE — TELEPHONE ENCOUNTER
"Requested Prescriptions   Pending Prescriptions Disp Refills     CONTOUR NEXT TEST test strip [Pharmacy Med Name: CONTOUR NEXT TEST [id number]]  Last Written Prescription Date:  6/23/2019  Last Fill Quantity: 200 strip,  # refills: 0   Last Office Visit: 7/30/2019 Silvana  Future Office Visit:      200 strip 0     Sig: USE TO TEST BLOOD SUGAR 1 TO 2 TIMES DAILY OR AS DIRECTED       Diabetic Supplies Protocol Passed - 10/1/2019 12:55 PM        Passed - Medication is active on med list        Passed - Patient is 18 years of age or older        Passed - Recent (6 mo) or future (30 days) visit within the authorizing provider's specialty     Patient had office visit in the last 6 months or has a visit in the next 30 days with authorizing provider.  See \"Patient Info\" tab in inbasket, or \"Choose Columns\" in Meds & Orders section of the refill encounter.            "

## 2019-11-08 DIAGNOSIS — E78.5 HYPERLIPIDEMIA LDL GOAL <100: ICD-10-CM

## 2019-11-08 NOTE — TELEPHONE ENCOUNTER
"Requested Prescriptions   Pending Prescriptions Disp Refills     simvastatin (ZOCOR) 40 MG tablet  Last Written Prescription Date:  8-14-19  Last Fill Quantity: 90 tab,  # refills: 0   Last office visit: 7/30/2019 with prescribing provider:  KRIS ROBERTS    Future Office Visit:   90 tablet 0     Sig: Take 1 tablet (40 mg) by mouth At Bedtime       Statins Protocol Passed - 11/8/2019  2:11 PM        Passed - LDL on file in past 12 months     Recent Labs   Lab Test 11/12/18  0917   LDL 74           Passed - No abnormal creatine kinase in past 12 months     No lab results found.         Passed - Recent (12 mo) or future (30 days) visit within the authorizing provider's specialty     Patient has had an office visit with the authorizing provider or a provider within the authorizing providers department within the previous 12 mos or has a future within next 30 days. See \"Patient Info\" tab in inbasket, or \"Choose Columns\" in Meds & Orders section of the refill encounter.          Passed - Medication is active on med list        Passed - Patient is age 18 or older        Passed - No active pregnancy on record        Passed - No positive pregnancy test in past 12 months         "

## 2019-11-11 RX ORDER — SIMVASTATIN 40 MG
40 TABLET ORAL AT BEDTIME
Qty: 90 TABLET | Refills: 0 | Status: SHIPPED | OUTPATIENT
Start: 2019-11-11 | End: 2020-02-11

## 2019-11-11 NOTE — TELEPHONE ENCOUNTER
Per 7/30/2019 visit was to follow up in 3 months and is over due for fasting labs. Please call and schedule and send back and I can ok enough until appt.  thanks

## 2019-11-12 DIAGNOSIS — E11.65 UNCONTROLLED TYPE 2 DIABETES MELLITUS WITH HYPERGLYCEMIA (H): ICD-10-CM

## 2019-11-12 DIAGNOSIS — E11.8 CONTROLLED TYPE 2 DIABETES MELLITUS WITH COMPLICATION, WITHOUT LONG-TERM CURRENT USE OF INSULIN (H): ICD-10-CM

## 2019-11-13 NOTE — TELEPHONE ENCOUNTER
Requested Prescriptions   Pending Prescriptions Disp Refills     metFORMIN (GLUCOPHAGE) 500 MG tablet  Last Written Prescription Date:  8/14/19  Last Fill Quantity: 450,  # refills: 0   Last office visit: 7/30/2019 with prescribing provider:     Future Office Visit:   Next 5 appointments (look out 90 days)    Dec 06, 2019  8:20 AM CST  Office Visit with Yesenia Canales MD  [facility] [address] [place] [id number] [phone]        450 tablet 0     Sig: TAKE 3 TABLETS BY MOUTH IN THE MORNING AND TAKE 2 TABLETS IN THE EVENING       Biguanide Agents Passed - 11/12/2019  8:47 PM        Passed - Blood pressure less than 140/90 in past 6 months     BP Readings from Last 3 Encounters:   09/07/19 132/66   09/03/19 (!) 142/79   07/30/19 132/83           Passed - Patient has documented LDL within the past 12 mos.     Recent Labs   Lab Test 11/12/18  0917   LDL 74           Passed - Patient has had a Microalbumin in the past 15 mos.     Recent Labs   Lab Test 11/12/18  0913  07/20/12  1317   [id number]  --   --  5.9   [id number]  --   --  10.5   MICROL 10   < >  --    UMALCR 32.09*   < >  --     < > = values in this interval not displayed.           Passed - Patient is age 10 or older        Passed - Patient has documented A1c within the specified period of time.     If HgbA1C is 8 or greater, it needs to be on file within the past 3 months.  If less than 8, must be on file within the past 6 months.     Recent Labs   Lab Test 07/30/19  0757   A1C 7.2*             Passed - Patient's CR is NOT>1.4 OR Patient's EGFR is NOT<45 within past 12 mos.     Recent Labs   Lab Test 07/30/19 0757   GFRESTIMATED >90   GFRESTBLACK >90     Recent Labs   Lab Test 07/30/19 0757   CR 0.37*           Passed - Patient does NOT have a diagnosis of CHF.        Passed - Medication is active on med list        Passed - Patient is not pregnant        Passed - Patient has  "not had a positive pregnancy test within the past 12 mos.         Passed - Recent (6 mo) or future (30 days) visit within the authorizing provider's specialty     Patient had office visit in the last 6 months or has a visit in the next 30 days with authorizing provider or within the authorizing provider's specialty.  See \"Patient Info\" tab in inbasket, or \"Choose Columns\" in Meds & Orders section of the refill encounter.            Requested Prescriptions   Pending Prescriptions Disp Refills                                       Passed - Patient has documented LDL within the past 12 mos.                          Passed - Patient is age 10 or older        Passed - Patient has documented A1c within the specified period of time.                                                                       sitagliptin (JANUVIA) 100 MG tablet  Last Written Prescription Date:  8/14/19  Last Fill Quantity: 90,  # refills: 0   Last office visit: 7/30/2019 with prescribing provider:     Future Office Visit:   Next 5 appointments (look out 90 days)    Dec 06, 2019  8:20 AM CST  [facility] [address] [place] [id number] [phone]        90 tablet 0     Sig: Take 1 tablet (100 mg) by mouth daily       DPP4 Inhibitors Protocol Failed - 11/12/2019  8:49 PM        Failed - Normal serum creatinine in past 12 months     Recent Labs   Lab Test 07/30/19  0757   CR 0.37*           Passed - Blood pressure less than 140/90 in past 6 months     BP Readings from Last 3 Encounters:   09/07/19 132/66   09/03/19 (!) 142/79   07/30/19 132/83           Passed - LDL on file in past 12 months     Recent Labs   Lab Test 11/12/18  0917   LDL 74           Passed - Microalbumin on file in past 12 months     Recent Labs   Lab Test 11/12/18  0913  07/20/12  1317   [id number]  --   --  5.9   [id number]  --   --  10.5   MICROL 10   < >  --  " "  UMALCR 32.09*   < >  --     < > = values in this interval not displayed.           Passed - HgbA1C in past 3 or 6 months     If HgbA1C is 8 or greater, it needs to be on file within the past 3 months.  If less than 8, must be on file within the past 6 months.     Recent Labs   Lab Test 07/30/19  0757   A1C 7.2*           Passed - Medication is active on med list        Passed - Patient is age 18 or older        Passed - No active pregnancy on record        Passed - No positive pregnancy test in past 12 months        Passed - Recent (6 mo) or future (30 days) visit within the authorizing provider's specialty     Patient had office visit in the last 6 months or has a visit in the next 30 days with authorizing provider.  See \"Patient Info\" tab in inbasket, or \"Choose Columns\" in Meds & Orders section of the refill encounter.                "

## 2019-11-14 NOTE — TELEPHONE ENCOUNTER
Due for LDL and creatinine  Pt has appt 12/6  Refilled for 30 days only.     Yanira Adrian, RN, BSN

## 2019-11-14 NOTE — TELEPHONE ENCOUNTER
Office visit 7/30/19 - Return in about 3 months (around 10/30/2019) for med check, Lab Work.     Routing refill request to provider for review/approval because:  Due for diabetes f/u - previous rx's signed by Silvana - can you sign initial rx please?      [facility] reception - one month refill due for diabetes f/u office visit please - can we clarify on PCP

## 2019-11-14 NOTE — TELEPHONE ENCOUNTER
Patient already had an appt scheduled with Dr. Canales on 12/6.   Pt states she will be out of her meds in a week or so- mostly Januvia she is concerned about.   Routing for possible bridge.   PCP is now Dr. Canales in chart.       Kaylah ROA     [facility]

## 2019-12-06 ENCOUNTER — OFFICE VISIT (OUTPATIENT)
Dept: FAMILY MEDICINE | Facility: CLINIC | Age: 59
End: 2019-12-06
Payer: COMMERCIAL

## 2019-12-06 VITALS
DIASTOLIC BLOOD PRESSURE: 80 MMHG | OXYGEN SATURATION: 96 % | SYSTOLIC BLOOD PRESSURE: 132 MMHG | HEART RATE: 88 BPM | BODY MASS INDEX: 45.73 KG/M2 | TEMPERATURE: 98.5 F | RESPIRATION RATE: 18 BRPM | WEIGHT: 292 LBS

## 2019-12-06 DIAGNOSIS — E11.9 TYPE 2 DIABETES MELLITUS WITHOUT COMPLICATION, WITHOUT LONG-TERM CURRENT USE OF INSULIN (H): Primary | ICD-10-CM

## 2019-12-06 DIAGNOSIS — I10 BENIGN ESSENTIAL HYPERTENSION: ICD-10-CM

## 2019-12-06 DIAGNOSIS — E78.2 MIXED HYPERLIPIDEMIA: ICD-10-CM

## 2019-12-06 LAB
ALBUMIN SERPL-MCNC: 3.5 G/DL (ref 3.4–5)
ALP SERPL-CCNC: 52 U/L (ref 40–150)
ALT SERPL W P-5'-P-CCNC: 33 U/L (ref 0–50)
ANION GAP SERPL CALCULATED.3IONS-SCNC: 8 MMOL/L (ref 3–14)
AST SERPL W P-5'-P-CCNC: 19 U/L (ref 0–45)
BILIRUB SERPL-MCNC: 0.5 MG/DL (ref 0.2–1.3)
BUN SERPL-MCNC: 11 MG/DL (ref 7–30)
CALCIUM SERPL-MCNC: 9 MG/DL (ref 8.5–10.1)
CHLORIDE SERPL-SCNC: 102 MMOL/L (ref 94–109)
CHOLEST SERPL-MCNC: 170 MG/DL
CO2 SERPL-SCNC: 24 MMOL/L (ref 20–32)
CREAT SERPL-MCNC: 0.36 MG/DL (ref 0.52–1.04)
GFR SERPL CREATININE-BSD FRML MDRD: >90 ML/MIN/{1.73_M2}
GLUCOSE SERPL-MCNC: 227 MG/DL (ref 70–99)
HBA1C MFR BLD: 7.9 % (ref 0–5.6)
HDLC SERPL-MCNC: 42 MG/DL
LDLC SERPL CALC-MCNC: 82 MG/DL
NONHDLC SERPL-MCNC: 128 MG/DL
POTASSIUM SERPL-SCNC: 4.1 MMOL/L (ref 3.4–5.3)
PROT SERPL-MCNC: 7.9 G/DL (ref 6.8–8.8)
SODIUM SERPL-SCNC: 134 MMOL/L (ref 133–144)
TRIGL SERPL-MCNC: 229 MG/DL

## 2019-12-06 PROCEDURE — 80061 LIPID PANEL: CPT | Performed by: FAMILY MEDICINE

## 2019-12-06 PROCEDURE — 83036 HEMOGLOBIN GLYCOSYLATED A1C: CPT | Performed by: FAMILY MEDICINE

## 2019-12-06 PROCEDURE — 36415 COLL VENOUS BLD VENIPUNCTURE: CPT | Performed by: FAMILY MEDICINE

## 2019-12-06 PROCEDURE — 99214 OFFICE O/P EST MOD 30 MIN: CPT | Performed by: FAMILY MEDICINE

## 2019-12-06 PROCEDURE — 80053 COMPREHEN METABOLIC PANEL: CPT | Performed by: FAMILY MEDICINE

## 2019-12-06 NOTE — PROGRESS NOTES
Subjective     Azucena Guo is a 59 year old female who presents to clinic today for the following health issues:    HPI   Diabetes Follow-up    How often are you checking your blood sugar? One time daily  What time of day are you checking your blood sugars (select all that apply)?  Before meals  Have you had any blood sugars above 200?  Yes   Have you had any blood sugars below 70?  No    What symptoms do you notice when your blood sugar is low?  None    What concerns do you have today about your diabetes? None     Do you have any of these symptoms? (Select all that apply)  Numbness in feet - Across the top of toes     Have you had a diabetic eye exam in the last 12 months? Yes- Date of last eye exam: 09/2019     Recent illness in September for UTI-- allergic rxn to Bactrim with drug rash.  Did not feel well-- has not been checking sugars as often recently.    Here for recheck of lipids this well.    Blood pressure has remained under good control.  No CP or HA.    BP Readings from Last 2 Encounters:   12/06/19 132/80   09/07/19 132/66     Hemoglobin A1C (%)   Date Value   07/30/2019 7.2 (H)   05/01/2019 9.0 (H)     LDL Cholesterol Calculated (mg/dL)   Date Value   11/12/2018 74   09/28/2017 84       Diabetes Management Resources      How many servings of fruits and vegetables do you eat daily?  4 or more    On average, how many sweetened beverages do you drink each day (Examples: soda, juice, sweet tea, etc.  Do NOT count diet or artificially sweetened beverages)?   0    How many days per week do you miss taking your medication? 0        Patient Active Problem List   Diagnosis     Hyperlipidemia     Diabetes mellitus type 2, controlled, with complications (H)     BMI > 35     DDD (degenerative disc disease), cervical     Past Surgical History:   Procedure Laterality Date     CHOLECYSTECTOMY      1994       Social History     Tobacco Use     Smoking status: Former Smoker     Last attempt to quit: 1/1/1991      Years since quittin.9     Smokeless tobacco: Never Used   Substance Use Topics     Alcohol use: Yes     Comment: occasionally     Family History   Problem Relation Age of Onset     Heart Disease Mother      Diabetes Mother      Cancer Mother         limphoma     Obesity Mother      Heart Disease Father      Coronary Artery Disease Father      Hypertension Father      Hyperlipidemia Father      Diabetes Brother      Diabetes Brother      Obesity Brother      Breast Cancer Other      Obesity Sister      Obesity Sister          Current Outpatient Medications   Medication Sig Dispense Refill     aspirin 81 MG tablet Take 1 tablet by mouth daily.       Cholecalciferol (VITAMIN D) 1000 UNIT capsule Take 1 capsule by mouth daily.       glimepiride (AMARYL) 4 MG tablet TAKE 2 TABLETS BY MOUTH EVERY MORNING BEFORE BREAKFAST 180 tablet 0     lisinopril (PRINIVIL/ZESTRIL) 2.5 MG tablet Take 1 tablet (2.5 mg) by mouth daily 90 tablet 0     metFORMIN (GLUCOPHAGE) 500 MG tablet TAKE 3 TABLETS BY MOUTH IN THE MORNING AND TAKE 2 TABLETS IN THE EVENING 150 tablet 0     Multiple Vitamin (MULTI-VITAMIN PO) Take  by mouth.       Omega-3 Fatty Acids (SM FISH OIL) 1000 MG CAPS        Probiotic Product (MARTINA-Q PO) Take  by mouth as needed.       sitagliptin (JANUVIA) 100 MG tablet Take 1 tablet (100 mg) by mouth daily 30 tablet 0     cetirizine (ZYRTEC) 10 MG tablet Take 1 tablet (10 mg) by mouth daily 10 tablet 0     Continuous Blood Gluc  (FREESTYLE CARLOS 14 DAY READER) ROXANA 1 Device 4 times daily (Patient not taking: Reported on 2019) 1 Device 0     Continuous Blood Gluc Sensor (FREESTYLE CARLOS 14 DAY SENSOR) MISC 1 Device every 14 days (Patient not taking: Reported on 2019) 2 each 11     CONTOUR NEXT TEST test strip USE TO TEST BLOOD SUGAR 1 TO 2 TIMES DAILY OR AS DIRECTED (Patient not taking: Reported on 2019) 200 strip 2     diphenhydrAMINE (BENADRYL) 25 MG tablet Take 2 tablets (50 mg) by mouth every 6  hours as needed 10 tablet 0     glimepiride (AMARYL) 4 MG tablet TAKE TWO TABLETS BY MOUTH EVERY MORNING BEFORE BREAKFAST (Patient not taking: Reported on 12/6/2019) 180 tablet 0     lisinopril (PRINIVIL/ZESTRIL) 2.5 MG tablet TAKE 1 TABLET(2.5 MG) BY MOUTH DAILY (Patient not taking: Reported on 12/6/2019) 180 tablet 0     ranitidine (ZANTAC) 150 MG tablet Take 1 tablet (150 mg) by mouth 2 times daily 10 tablet 0     simvastatin (ZOCOR) 40 MG tablet Take 1 tablet (40 mg) by mouth At Bedtime (Patient not taking: Reported on 12/6/2019) 90 tablet 0     Allergies   Allergen Reactions     Sulfa Drugs Hives     Measles Mumps And Rubella Virus Vaccine Live      Redness       Pneumococcal Vaccine      Swollen node     Recent Labs   Lab Test 07/30/19  0757 05/01/19  0811 11/12/18  0917 09/28/17  0800 09/16/16  0912   A1C 7.2* 9.0* 9.6* 6.5* 8.7*   LDL  --   --  74 84 77   HDL  --   --  40* 51 44*   TRIG  --   --  315* 161* 234*   ALT 28  --  35 26  --    CR 0.37*  --  0.38* 0.40*  --    GFRESTIMATED >90  --  >90 >90  --    GFRESTBLACK >90  --  >90 >90  --    POTASSIUM 4.5  --  4.0 3.9  --    TSH  --   --  0.75  --  0.70      BP Readings from Last 3 Encounters:   12/06/19 132/80   09/07/19 132/66   09/03/19 (!) 142/79    Wt Readings from Last 3 Encounters:   12/06/19 132.5 kg (292 lb)   09/03/19 130.6 kg (288 lb)   07/30/19 130.6 kg (288 lb)                    Reviewed and updated as needed this visit by Provider         Review of Systems   ROS COMP: Constitutional, HEENT, cardiovascular, pulmonary, gi and gu systems are negative, except as otherwise noted.      Objective    /80   Pulse 88   Temp 98.5  F (36.9  C) (Tympanic)   Resp 18   Wt 132.5 kg (292 lb)   LMP  (LMP Unknown)   SpO2 96%   BMI 45.73 kg/m    Body mass index is 45.73 kg/m .  Physical Exam   GENERAL: healthy, alert and no distress  EYES: Eyes grossly normal to inspection, PERRL and conjunctivae and sclerae normal  NECK: no adenopathy, no asymmetry,  masses, or scars and thyroid normal to palpation  RESP: lungs clear to auscultation - no rales, rhonchi or wheezes  CV: regular rate and rhythm, normal S1 S2, no S3 or S4, no murmur, click or rub, no peripheral edema and peripheral pulses strong  ABDOMEN: soft, nontender, no hepatosplenomegaly, no masses and bowel sounds normal  MS: no gross musculoskeletal defects noted, no edema  SKIN: no suspicious lesions or rashes  NEURO: Normal strength and tone, mentation intact and speech normal  PSYCH: mentation appears normal, affect normal/bright    Diagnostic Test Results:  Labs reviewed in Epic    A1c 7.9    Assessment & Plan     1. Type 2 diabetes mellitus without complication, without long-term current use of insulin (H)    - Hemoglobin A1c  - JUST IN CASE  - Comprehensive metabolic panel    Slight increase with last A1c 7.2 in September-- but with recent illness no change in regimen.  Continues with improved diet and exercise and weight management.  Follow-up 3 months.    2. Mixed hyperlipidemia    - Lipid panel reflex to direct LDL Fasting  - Comprehensive metabolic panel    Recheck lipids today- on Zocor.  Goal LDL < 100.    3. Benign essential hypertension    Stable on current regimen.       Yesenia Canales MD  [facility]

## 2019-12-15 ENCOUNTER — OFFICE VISIT (OUTPATIENT)
Dept: URGENT CARE | Facility: URGENT CARE | Age: 59
End: 2019-12-15
Payer: COMMERCIAL

## 2019-12-15 VITALS
SYSTOLIC BLOOD PRESSURE: 110 MMHG | HEART RATE: 70 BPM | WEIGHT: 290 LBS | BODY MASS INDEX: 45.52 KG/M2 | RESPIRATION RATE: 14 BRPM | HEIGHT: 67 IN | TEMPERATURE: 98.1 F | DIASTOLIC BLOOD PRESSURE: 66 MMHG

## 2019-12-15 DIAGNOSIS — Z23 NEED FOR PROPHYLACTIC VACCINATION AND INOCULATION AGAINST INFLUENZA: ICD-10-CM

## 2019-12-15 DIAGNOSIS — H10.9 BACTERIAL CONJUNCTIVITIS OF LEFT EYE: Primary | ICD-10-CM

## 2019-12-15 PROCEDURE — 90471 IMMUNIZATION ADMIN: CPT | Performed by: FAMILY MEDICINE

## 2019-12-15 PROCEDURE — 90682 RIV4 VACC RECOMBINANT DNA IM: CPT | Performed by: FAMILY MEDICINE

## 2019-12-15 PROCEDURE — 99213 OFFICE O/P EST LOW 20 MIN: CPT | Mod: 25 | Performed by: FAMILY MEDICINE

## 2019-12-15 RX ORDER — TOBRAMYCIN 3 MG/ML
1 SOLUTION/ DROPS OPHTHALMIC 3 TIMES DAILY
Qty: 5 ML | Refills: 0 | Status: SHIPPED | OUTPATIENT
Start: 2019-12-15 | End: 2019-12-22

## 2019-12-15 ASSESSMENT — MIFFLIN-ST. JEOR: SCORE: 1923.06

## 2019-12-15 NOTE — PROGRESS NOTES
"SUBJECTIVE:  Chief Complaint:   Chief Complaint   Patient presents with     Urgent Care     Eye Problem     left eye matted shut this morning, was cleaning yesterday, redness and swelling.      History of Present Illness:  Azucena Guo is a 59 year old female who presents complaining of moderate left eye discharge, mattering, redness, eyelid swelling for 1 day(s).  Felt more gritty  Onset/timing: sudden, worsening.    Associated Signs and Symptoms: none  Treatment measures tried include: flushed with water, artifical tears  Contact wearer : No     Patient would like flu vaccination, no prior problems.    Past Medical History:   Diagnosis Date     Arthritis      Benign essential hypertension 12/6/2019     Diabetes mellitus (H)      Current Outpatient Medications   Medication Sig Dispense Refill     aspirin 81 MG tablet Take 1 tablet by mouth daily.       Cholecalciferol (VITAMIN D) 1000 UNIT capsule Take 1 capsule by mouth daily.       glimepiride (AMARYL) 4 MG tablet TAKE 2 TABLETS BY MOUTH EVERY MORNING BEFORE BREAKFAST 180 tablet 0     JANUVIA 100 MG tablet TAKE 1 TABLET BY MOUTH EVERY DAY 90 tablet 3     lisinopril (PRINIVIL/ZESTRIL) 2.5 MG tablet Take 1 tablet (2.5 mg) by mouth daily 90 tablet 0     metFORMIN (GLUCOPHAGE) 500 MG tablet TAKE 3 TABLETS BY MOUTH EVERY MORNING AND 2 TABLETS EVERY EVENING 450 tablet 3     Multiple Vitamin (MULTI-VITAMIN PO) Take  by mouth.       Omega-3 Fatty Acids (SM FISH OIL) 1000 MG CAPS        Probiotic Product (MARTINA-Q PO) Take  by mouth as needed.       simvastatin (ZOCOR) 40 MG tablet Take 1 tablet (40 mg) by mouth At Bedtime (Patient not taking: Reported on 12/6/2019) 90 tablet 0        ROS:  Review of systems negative except as stated above.    OBJECTIVE:  /66   Pulse 70   Temp 98.1  F (36.7  C) (Oral)   Resp 14   Ht 1.702 m (5' 7\")   Wt 131.5 kg (290 lb)   LMP  (LMP Unknown)   BMI 45.42 kg/m    General: no acute distress  Eye exam: right eye normal lid, " conjunctiva, cornea, pupil , left eye abnormal findings: conjunctivitis with erythema, discharge and matting noted, no corneal abrasion on fluorescein dye  Psych: alert, affect bright    ASSESSMENT/PLAN:  (H10.9) Bacterial conjunctivitis of left eye  (primary encounter diagnosis)  Plan: tobramycin (TOBREX) 0.3 % ophthalmic solution            (Z23) Need for prophylactic vaccination and inoculation against influenza  Plan: INFLUENZA QUAD, RECOMBINANT, P-FREE (RIV4)         (FLUBLOCK) [59637], Vaccine Administration,         Initial [50141]            Proparacaine eye drop use with improvement of symptoms.  Fluorescein dye with no corneal abrasion.  reassurance given, reviewed that left eye symptoms most likely due to bacterial etiology, RX tobrex given, good hand washing.    Follow up with primary provider or eye clinic for eye recheck if no improvement in 1 week    Keegan Martinez MD, MD  December 15, 2019 11:59 AM

## 2019-12-15 NOTE — PATIENT INSTRUCTIONS
Patient Education     Bacterial Conjunctivitis    You have an infection in the membranes covering the white part of the eye. This part of the eye is called the conjunctiva. The infection is called conjunctivitis. The most common symptoms of conjunctivitis include a thick, pus-like discharge from the eye, swollen eyelids, redness, eyelids sticking together upon awakening, and a gritty or scratchy feeling in the eye. Your infection was caused by bacteria. It may be treated with medicine. With treatment, the infection takes about 7 to 10 days to resolve.  Home care    Use prescribed antibiotic eye drops or ointment as directed to treat the infection.    Apply a warm compress (towel soaked in warm water) to the affected eye 3 to 4 times a day. Do this just before applying medicine to the eye.    Use a warm, wet cloth to wipe away crusting of the eyelids in the morning. This is caused by mucus drainage during the night. You may also use saline irrigating solution or artificial tears to rinse away mucus in the eye. Do not put a patch over the eye.    Wash your hands before and after touching the infected eye. This is to prevent spreading the infection to the other eye, and to other people. Don't share your towels or washcloths with others.    You may use acetaminophen or ibuprofen to control pain, unless another medicine was prescribed. (Note: If you have chronic liver or kidney disease or have ever had a stomach ulcer or gastrointestinal bleeding, talk with your doctor before using these medicines.)    Don't wear contact lenses until your eyes have healed and all symptoms are gone.  Follow-up care  Follow up with your healthcare provider, or as advised.  When to seek medical advice  Call your healthcare provider right away if any of these occur:    Worsening vision    Increasing pain in the eye    Increasing swelling or redness of the eyelid    Redness spreading around the eye  Date Last Reviewed: 7/1/2017 2000-2018  [employer]. [address] [place] [address], [place] [address]. All rights reserved. This information is not intended as a substitute for professional medical care. Always follow your healthcare professional's instructions.

## 2019-12-31 DIAGNOSIS — E11.8 CONTROLLED TYPE 2 DIABETES MELLITUS WITH COMPLICATION, WITHOUT LONG-TERM CURRENT USE OF INSULIN (H): ICD-10-CM

## 2019-12-31 NOTE — TELEPHONE ENCOUNTER
"Requested Prescriptions   Pending Prescriptions Disp Refills     glimepiride (AMARYL) 4 MG tablet [Pharmacy Med Name: GLIMEPIRIDE 4MG TABLETS]  Last Written Prescription Date:  9-16-19  Last Fill Quantity: 180 tab,  # refills: 0   Last office visit: 12/6/2019 with prescribing provider:  KRIS ROBERTS    Future Office Visit:   180 tablet 0     Sig: TAKE 2 TABLETS BY MOUTH EVERY MORNING BEFORE BREAKFAST       Sulfonylurea Agents Failed - 12/31/2019 12:15 PM        Failed - Patient has a recent creatinine (normal) within the past 12 mos.     Recent Labs   Lab Test 12/06/19  0836   CR 0.36*           Passed - Blood pressure less than 140/90 in past 6 months     BP Readings from Last 3 Encounters:   12/15/19 110/66   12/06/19 132/80   09/07/19 132/66           Passed - Patient has documented LDL within the past 12 mos.     Recent Labs   Lab Test 12/06/19  0836   LDL 82           Passed - Patient has had a Microalbumin in the past 15 mos.     Recent Labs   Lab Test 11/12/18  0913  07/20/12  1317   [id number]  --   --  5.9   [id number]  --   --  10.5   MICROL 10   < >  --    UMALCR 32.09*   < >  --     < > = values in this interval not displayed.           Passed - Patient has documented A1c within the specified period of time.     If HgbA1C is 8 or greater, it needs to be on file within the past 3 months.  If less than 8, must be on file within the past 6 months.     Recent Labs   Lab Test 12/06/19  0836   A1C 7.9*           Passed - Medication is active on med list        Passed - Patient is age 18 or older        Passed - No active pregnancy on record        Passed - Patient has not had a positive pregnancy test within the past 12 mos.        Passed - Recent (6 mo) or future (30 days) visit within the authorizing provider's specialty     Patient had office visit in the last 6 months or has a visit in the next 30 days with authorizing provider or within the authorizing provider's specialty.  See \"Patient Info\" tab in " "inbasket, or \"Choose Columns\" in Meds & Orders section of the refill encounter.             "

## 2020-01-02 RX ORDER — GLIMEPIRIDE 4 MG/1
TABLET ORAL
Qty: 180 TABLET | Refills: 0 | Status: SHIPPED | OUTPATIENT
Start: 2020-01-02 | End: 2020-03-16

## 2020-02-11 DIAGNOSIS — E78.5 HYPERLIPIDEMIA LDL GOAL <100: ICD-10-CM

## 2020-02-11 RX ORDER — SIMVASTATIN 40 MG
40 TABLET ORAL AT BEDTIME
Qty: 90 TABLET | Refills: 3 | Status: SHIPPED | OUTPATIENT
Start: 2020-02-11 | End: 2021-02-05

## 2020-02-11 NOTE — TELEPHONE ENCOUNTER
"Requested Prescriptions   Pending Prescriptions Disp Refills     simvastatin (ZOCOR) 40 MG tablet  Last Written Prescription Date:  11-11-19  Last Fill Quantity: 90 tab,  # refills: 0   Last office visit: 12/6/2019 with prescribing provider:  KRIS ROBERTS    Future Office Visit:   90 tablet 0     Sig: Take 1 tablet (40 mg) by mouth At Bedtime       Statins Protocol Passed - 2/11/2020 10:43 AM        Passed - LDL on file in past 12 months     Recent Labs   Lab Test 12/06/19  0836   LDL 82           Passed - No abnormal creatine kinase in past 12 months     No lab results found.         Passed - Recent (12 mo) or future (30 days) visit within the authorizing provider's specialty     Patient has had an office visit with the authorizing provider or a provider within the authorizing providers department within the previous 12 mos or has a future within next 30 days. See \"Patient Info\" tab in inbasket, or \"Choose Columns\" in Meds & Orders section of the refill encounter.          Passed - Medication is active on med list        Passed - Patient is age 18 or older        Passed - No active pregnancy on record        Passed - No positive pregnancy test in past 12 months         "

## 2020-02-12 NOTE — TELEPHONE ENCOUNTER
Signed Prescriptions:                        Disp   Refills    simvastatin (ZOCOR) 40 MG tablet           90 tab*3        Sig: Take 1 tablet (40 mg) by mouth At Bedtime  Authorizing Provider: KRIS ROBERTS  Ordering User: HELIO BAI

## 2020-03-13 DIAGNOSIS — E11.8 CONTROLLED TYPE 2 DIABETES MELLITUS WITH COMPLICATION, WITHOUT LONG-TERM CURRENT USE OF INSULIN (H): ICD-10-CM

## 2020-03-13 NOTE — TELEPHONE ENCOUNTER
"Requested Prescriptions   Pending Prescriptions Disp Refills     lisinopril (ZESTRIL) 2.5 MG tablet [Pharmacy Med Name: LISINOPRIL 2.5MG TABLETS]  Last Written Prescription Date:  11/12/18  Last Fill Quantity: 90,  # refills: 0   Last office visit: 12/6/2019 with prescribing provider:  Parker   Future Office Visit:     180 tablet      Sig: TAKE 1 TABLET BY MOUTH EVERY DAY       ACE Inhibitors (Including Combos) Protocol Failed - 3/13/2020  5:28 AM        Failed - Normal serum creatinine on file in past 12 months     Recent Labs   Lab Test 12/06/19  0836   CR 0.36*       Ok to refill medication if creatinine is low          Passed - Blood pressure under 140/90 in past 12 months     BP Readings from Last 3 Encounters:   12/15/19 110/66   12/06/19 132/80   09/07/19 132/66                 Passed - Recent (12 mo) or future (30 days) visit within the authorizing provider's specialty     Patient has had an office visit with the authorizing provider or a provider within the authorizing providers department within the previous 12 mos or has a future within next 30 days. See \"Patient Info\" tab in inbasket, or \"Choose Columns\" in Meds & Orders section of the refill encounter.              Passed - Medication is active on med list        Passed - Patient is age 18 or older        Passed - No active pregnancy on record        Passed - Normal serum potassium on file in past 12 months     Recent Labs   Lab Test 12/06/19  0836   POTASSIUM 4.1             Passed - No positive pregnancy test within past 12 months             "

## 2020-03-13 NOTE — TELEPHONE ENCOUNTER
Requested Prescriptions   Pending Prescriptions Disp Refills     glimepiride (AMARYL) 4 MG tablet [Pharmacy Med Name: GLIMEPIRIDE 4MG TABLETS]  Last Written Prescription Date:  1/2/2020  Last Fill Quantity: 180,  # refills: 0   Last office visit: 12/6/2019 with prescribing provider:  Parker   Future Office Visit:     180 tablet 0     Sig: TAKE 2 TABLETS BY MOUTH EVERY MORNING BEFORE BREAKFAST       Sulfonylurea Agents Failed - 3/13/2020  5:28 AM        Failed - Patient has had a Microalbumin in the past 15 mos.     Recent Labs   Lab Test 11/12/18  0913  07/20/12  1317   [id number]  --   --  5.9   [id number]  --   --  10.5   MICROL 10   < >  --    UMALCR 32.09*   < >  --     < > = values in this interval not displayed.             Failed - Patient has a recent creatinine (normal) within the past 12 mos.     Recent Labs   Lab Test 12/06/19  0836   CR 0.36*       Ok to refill medication if creatinine is low          Passed - Blood pressure less than 140/90 in past 6 months     BP Readings from Last 3 Encounters:   12/15/19 110/66   12/06/19 132/80   09/07/19 132/66                 Passed - Patient has documented LDL within the past 12 mos.     Recent Labs   Lab Test 12/06/19  0836   LDL 82             Passed - Patient has documented A1c within the specified period of time.     If HgbA1C is 8 or greater, it needs to be on file within the past 3 months.  If less than 8, must be on file within the past 6 months.     Recent Labs   Lab Test 12/06/19  0836   A1C 7.9*             Passed - Medication is active on med list        Passed - Patient is age 18 or older        Passed - No active pregnancy on record        Passed - Patient has not had a positive pregnancy test within the past 12 mos.        Passed - Recent (6 mo) or future (30 days) visit within the authorizing provider's specialty     Patient had office visit in the last 6 months or has a visit in the next 30 days with authorizing provider or within the authorizing  "provider's specialty.  See \"Patient Info\" tab in inbasket, or \"Choose Columns\" in Meds & Orders section of the refill encounter.                 "

## 2020-03-15 ENCOUNTER — HEALTH MAINTENANCE LETTER (OUTPATIENT)
Age: 60
End: 2020-03-15

## 2020-03-16 RX ORDER — GLIMEPIRIDE 4 MG/1
TABLET ORAL
Qty: 180 TABLET | Refills: 0 | Status: SHIPPED | OUTPATIENT
Start: 2020-03-16 | End: 2020-07-09

## 2020-03-16 RX ORDER — LISINOPRIL 2.5 MG/1
TABLET ORAL
Qty: 90 TABLET | Refills: 0 | Status: SHIPPED | OUTPATIENT
Start: 2020-03-16 | End: 2020-07-09

## 2020-03-17 NOTE — TELEPHONE ENCOUNTER
Medication is being filled for 1 time refill only due to:  Patient needs to be seen because needs recheck.

## 2020-03-17 NOTE — TELEPHONE ENCOUNTER
Medication is being filled for 1 time refill only due to:  Patient needs to be seen because need recheck.   Please schedule-  Return in about 3 months (around 3/6/2020) for Lab Work, follow up chronic conditions.

## 2020-03-19 NOTE — TELEPHONE ENCOUNTER
Patient informed that a medication refill was sent to their pharmacy. Writer advised patient to call back and schedule an appt in the future & was advised of virtual visit if we are not scheduling appointments.    Karine PEDRAZA     [facility]

## 2020-07-06 DIAGNOSIS — E11.8 CONTROLLED TYPE 2 DIABETES MELLITUS WITH COMPLICATION, WITHOUT LONG-TERM CURRENT USE OF INSULIN (H): ICD-10-CM

## 2020-07-09 RX ORDER — GLIMEPIRIDE 4 MG/1
TABLET ORAL
Qty: 180 TABLET | Refills: 0 | Status: SHIPPED | OUTPATIENT
Start: 2020-07-09 | End: 2020-07-23

## 2020-07-09 RX ORDER — LISINOPRIL 2.5 MG/1
TABLET ORAL
Qty: 90 TABLET | Refills: 0 | Status: SHIPPED | OUTPATIENT
Start: 2020-07-09 | End: 2020-09-11

## 2020-07-09 NOTE — TELEPHONE ENCOUNTER
Prescription approved per [facility] Refill Protocol.  July Potts RN

## 2020-07-23 ENCOUNTER — OFFICE VISIT (OUTPATIENT)
Dept: FAMILY MEDICINE | Facility: CLINIC | Age: 60
End: 2020-07-23
Payer: COMMERCIAL

## 2020-07-23 VITALS
WEIGHT: 230 LBS | TEMPERATURE: 98.1 F | DIASTOLIC BLOOD PRESSURE: 68 MMHG | SYSTOLIC BLOOD PRESSURE: 120 MMHG | HEART RATE: 60 BPM | OXYGEN SATURATION: 98 % | RESPIRATION RATE: 18 BRPM | BODY MASS INDEX: 36.02 KG/M2

## 2020-07-23 DIAGNOSIS — E11.9 TYPE 2 DIABETES MELLITUS WITHOUT COMPLICATION, WITHOUT LONG-TERM CURRENT USE OF INSULIN (H): ICD-10-CM

## 2020-07-23 DIAGNOSIS — I10 BENIGN ESSENTIAL HYPERTENSION: Primary | ICD-10-CM

## 2020-07-23 DIAGNOSIS — I83.813 VARICOSE VEINS OF BOTH LOWER EXTREMITIES WITH PAIN: ICD-10-CM

## 2020-07-23 DIAGNOSIS — E78.2 MIXED HYPERLIPIDEMIA: ICD-10-CM

## 2020-07-23 LAB
ALBUMIN SERPL-MCNC: 3.7 G/DL (ref 3.4–5)
ALP SERPL-CCNC: 50 U/L (ref 40–150)
ALT SERPL W P-5'-P-CCNC: 24 U/L (ref 0–50)
ANION GAP SERPL CALCULATED.3IONS-SCNC: 6 MMOL/L (ref 3–14)
AST SERPL W P-5'-P-CCNC: 20 U/L (ref 0–45)
BILIRUB SERPL-MCNC: 0.7 MG/DL (ref 0.2–1.3)
BUN SERPL-MCNC: 7 MG/DL (ref 7–30)
CALCIUM SERPL-MCNC: 9.1 MG/DL (ref 8.5–10.1)
CHLORIDE SERPL-SCNC: 103 MMOL/L (ref 94–109)
CO2 SERPL-SCNC: 26 MMOL/L (ref 20–32)
CREAT SERPL-MCNC: 0.42 MG/DL (ref 0.52–1.04)
CREAT UR-MCNC: 18 MG/DL
GFR SERPL CREATININE-BSD FRML MDRD: >90 ML/MIN/{1.73_M2}
GLUCOSE SERPL-MCNC: 122 MG/DL (ref 70–99)
HBA1C MFR BLD: 5.3 % (ref 0–5.6)
MICROALBUMIN UR-MCNC: <5 MG/L
MICROALBUMIN/CREAT UR: NORMAL MG/G CR (ref 0–25)
POTASSIUM SERPL-SCNC: 3.9 MMOL/L (ref 3.4–5.3)
PROT SERPL-MCNC: 8 G/DL (ref 6.8–8.8)
SODIUM SERPL-SCNC: 135 MMOL/L (ref 133–144)

## 2020-07-23 PROCEDURE — 36415 COLL VENOUS BLD VENIPUNCTURE: CPT | Performed by: FAMILY MEDICINE

## 2020-07-23 PROCEDURE — 99214 OFFICE O/P EST MOD 30 MIN: CPT | Performed by: FAMILY MEDICINE

## 2020-07-23 PROCEDURE — 82043 UR ALBUMIN QUANTITATIVE: CPT | Performed by: FAMILY MEDICINE

## 2020-07-23 PROCEDURE — 80053 COMPREHEN METABOLIC PANEL: CPT | Performed by: FAMILY MEDICINE

## 2020-07-23 PROCEDURE — 83036 HEMOGLOBIN GLYCOSYLATED A1C: CPT | Performed by: FAMILY MEDICINE

## 2020-07-23 NOTE — PROGRESS NOTES
Subjective     Azucena Guo is a 59 year old female who presents to clinic today for the following health issues:    HPI   Presents today to follow up her chronic medical conditions.  She started Noom and is monitoring her foods and caloric intake and continues to lose weight.  She is down 60 pounds in past year.    She stopped Januvia herself in past months.  She has recently noticed some lows.  Due for A1c today.    Blood pressure remains well controlled on current ACE-I.    On statin.  Lipids checked  with LDL < 100.    Has had increased RLE pain and swelling of multiple varicosities.  Her LLE is not as affected with much fewer varicose veins.  She has never tried compression stockings.  Open to trying this for relief.      Patient Active Problem List   Diagnosis     Hyperlipidemia     Type 2 diabetes mellitus without complication, without long-term current use of insulin (H)     BMI > 35     DDD (degenerative disc disease), cervical     Benign essential hypertension     Past Surgical History:   Procedure Laterality Date     CHOLECYSTECTOMY             Social History     Tobacco Use     Smoking status: Former Smoker     Last attempt to quit: 1991     Years since quittin.5     Smokeless tobacco: Never Used   Substance Use Topics     Alcohol use: Yes     Comment: occasionally     Family History   Problem Relation Age of Onset     Heart Disease Mother      Diabetes Mother      Cancer Mother         limphoma     Obesity Mother      Heart Disease Father      Coronary Artery Disease Father      Hypertension Father      Hyperlipidemia Father      Diabetes Brother      Diabetes Brother      Obesity Brother      Breast Cancer Other      Obesity Sister      Obesity Sister          Current Outpatient Medications   Medication Sig Dispense Refill     Cholecalciferol (VITAMIN D) 1000 UNIT capsule Take 1 capsule by mouth daily.       glimepiride (AMARYL) 4 MG tablet TAKE 2 TABLETS BY MOUTH EVERY MORNING  BEFORE BREAKFAST 180 tablet 0     lisinopril (ZESTRIL) 2.5 MG tablet TAKE 1 TABLET BY MOUTH EVERY DAY 90 tablet 0     metFORMIN (GLUCOPHAGE) 500 MG tablet TAKE 3 TABLETS BY MOUTH EVERY MORNING AND 2 TABLETS EVERY EVENING 450 tablet 3     Multiple Vitamin (MULTI-VITAMIN PO) Take  by mouth.       Probiotic Product (MARTINA-Q PO) Take  by mouth as needed.       simvastatin (ZOCOR) 40 MG tablet Take 1 tablet (40 mg) by mouth At Bedtime 90 tablet 3     aspirin 81 MG tablet Take 1 tablet by mouth daily.       JANUVIA 100 MG tablet TAKE 1 TABLET BY MOUTH EVERY DAY (Patient not taking: Reported on 7/23/2020) 90 tablet 3     Omega-3 Fatty Acids (SM FISH OIL) 1000 MG CAPS        Allergies   Allergen Reactions     Sulfa Drugs Hives     Measles Mumps And Rubella Virus Vaccine Live      Redness       Pneumococcal Vaccine      Swollen node     Recent Labs   Lab Test 12/06/19  0836 07/30/19  0757 05/01/19  0811 11/12/18  0917 09/28/17  0800 09/16/16  0912   A1C 7.9* 7.2* 9.0* 9.6* 6.5* 8.7*   LDL 82  --   --  74 84 77   HDL 42*  --   --  40* 51 44*   TRIG 229*  --   --  315* 161* 234*   ALT 33 28  --  35 26  --    CR 0.36* 0.37*  --  0.38* 0.40*  --    GFRESTIMATED >90 >90  --  >90 >90  --    GFRESTBLACK >90 >90  --  >90 >90  --    POTASSIUM 4.1 4.5  --  4.0 3.9  --    TSH  --   --   --  0.75  --  0.70      BP Readings from Last 3 Encounters:   07/23/20 120/68   12/15/19 110/66   12/06/19 132/80    Wt Readings from Last 3 Encounters:   07/23/20 104.3 kg (230 lb)   12/15/19 131.5 kg (290 lb)   12/06/19 132.5 kg (292 lb)                    Reviewed and updated as needed this visit by Provider         Review of Systems   Constitutional, HEENT, cardiovascular, pulmonary, GI, , musculoskeletal, neuro, skin, endocrine and psych systems are negative, except as otherwise noted.      Objective    LMP  (LMP Unknown)   There is no height or weight on file to calculate BMI.   /68 (BP Location: Left arm, Patient Position: Sitting, Cuff  Size: Adult Large)   Pulse 60   Temp 98.1  F (36.7  C) (Oral)   Resp 18   Wt 104.3 kg (230 lb)   LMP  (LMP Unknown)   SpO2 98%   BMI 36.02 kg/m    Physical Exam   GENERAL: healthy, alert and no distress  EYES: Eyes grossly normal to inspection, PERRL and conjunctivae and sclerae normal  NECK: no adenopathy, no asymmetry, masses, or scars and thyroid normal to palpation  RESP: lungs clear to auscultation - no rales, rhonchi or wheezes  CV: regular rate and rhythm, normal S1 S2, no S3 or S4, no murmur, click or rub, no peripheral edema and peripheral pulses strong  ABDOMEN: soft, nontender, no hepatosplenomegaly, no masses and bowel sounds normal  MS: no gross musculoskeletal defects noted, no edema  SKIN: no suspicious lesions or rashes  NEURO: Normal strength and tone, mentation intact and speech normal  PSYCH: mentation appears normal, affect normal/bright  Diabetic foot exam: normal DP and PT pulses, no trophic changes or ulcerative lesions and normal sensory exam    Diagnostic Test Results:  Labs reviewed in Epic  Results for orders placed or performed in visit on 07/23/20 (from the past 24 hour(s))   HEMOGLOBIN A1C   Result Value Ref Range    Hemoglobin A1C 5.3 0 - 5.6 %           Assessment & Plan     1. Type 2 diabetes mellitus without complication, without long-term current use of insulin (H)    - Albumin Random Urine Quantitative with Creat Ratio  - HEMOGLOBIN A1C  - Comprehensive metabolic panel  - Comprehensive metabolic panel; Future  - Hemoglobin A1c; Future    Excellent control in past months!  May discontinue glimepiride today and reduce Metformin to 1000 mg bid.  Continue using NOOM.  Applauded for efforts!!!!!!!!  Recheck A1c in 3 months.    2. Benign essential hypertension    - Albumin Random Urine Quantitative with Creat Ratio  - HEMOGLOBIN A1C  - Comprehensive metabolic panel    Stable on ACE-I.    3. Varicose veins of both lower extremities with pain    - order for DME; Equipment being  ordered: thigh high compression stockings 20-30mmHg  Dispense: 2 Device; Refill: 1    Recommend thigh high compression stockings for treatment.  IF still painful- will refer to vascular to discuss surgical options prn.    4. Mixed hyperlipidemia    - Lipid panel reflex to direct LDL Fasting; Future    Recheck labs in 2 months- If continues to have such great control of A1c- will allow for trial off statin and see if needed after next labs in October.    Yesenia Canales MD  [facility] [place]

## 2020-08-12 ENCOUNTER — TRANSFERRED RECORDS (OUTPATIENT)
Dept: HEALTH INFORMATION MANAGEMENT | Facility: CLINIC | Age: 60
End: 2020-08-12

## 2020-09-09 DIAGNOSIS — E11.8 CONTROLLED TYPE 2 DIABETES MELLITUS WITH COMPLICATION, WITHOUT LONG-TERM CURRENT USE OF INSULIN (H): ICD-10-CM

## 2020-09-11 RX ORDER — GLIMEPIRIDE 4 MG/1
TABLET ORAL
Qty: 180 TABLET | Refills: 0 | OUTPATIENT
Start: 2020-09-11

## 2020-11-24 ENCOUNTER — OFFICE VISIT (OUTPATIENT)
Dept: FAMILY MEDICINE | Facility: CLINIC | Age: 60
End: 2020-11-24
Payer: COMMERCIAL

## 2020-11-24 VITALS
RESPIRATION RATE: 16 BRPM | WEIGHT: 219.8 LBS | TEMPERATURE: 97.1 F | DIASTOLIC BLOOD PRESSURE: 72 MMHG | SYSTOLIC BLOOD PRESSURE: 136 MMHG | HEART RATE: 74 BPM | BODY MASS INDEX: 34.43 KG/M2 | OXYGEN SATURATION: 99 %

## 2020-11-24 DIAGNOSIS — E78.2 MIXED HYPERLIPIDEMIA: ICD-10-CM

## 2020-11-24 DIAGNOSIS — E11.9 TYPE 2 DIABETES MELLITUS WITHOUT COMPLICATION, WITHOUT LONG-TERM CURRENT USE OF INSULIN (H): ICD-10-CM

## 2020-11-24 DIAGNOSIS — Z23 NEED FOR PROPHYLACTIC VACCINATION AND INOCULATION AGAINST INFLUENZA: Primary | ICD-10-CM

## 2020-11-24 DIAGNOSIS — I10 BENIGN ESSENTIAL HYPERTENSION: ICD-10-CM

## 2020-11-24 LAB
ALBUMIN SERPL-MCNC: 3.7 G/DL (ref 3.4–5)
ALP SERPL-CCNC: 47 U/L (ref 40–150)
ALT SERPL W P-5'-P-CCNC: 18 U/L (ref 0–50)
ANION GAP SERPL CALCULATED.3IONS-SCNC: 6 MMOL/L (ref 3–14)
AST SERPL W P-5'-P-CCNC: 16 U/L (ref 0–45)
BILIRUB SERPL-MCNC: 0.5 MG/DL (ref 0.2–1.3)
BUN SERPL-MCNC: 7 MG/DL (ref 7–30)
CALCIUM SERPL-MCNC: 9.3 MG/DL (ref 8.5–10.1)
CHLORIDE SERPL-SCNC: 105 MMOL/L (ref 94–109)
CHOLEST SERPL-MCNC: 153 MG/DL
CO2 SERPL-SCNC: 26 MMOL/L (ref 20–32)
CREAT SERPL-MCNC: 0.44 MG/DL (ref 0.52–1.04)
GFR SERPL CREATININE-BSD FRML MDRD: >90 ML/MIN/{1.73_M2}
GLUCOSE SERPL-MCNC: 124 MG/DL (ref 70–99)
HBA1C MFR BLD: 5.5 % (ref 0–5.6)
HDLC SERPL-MCNC: 50 MG/DL
LDLC SERPL CALC-MCNC: 78 MG/DL
NONHDLC SERPL-MCNC: 103 MG/DL
POTASSIUM SERPL-SCNC: 3.9 MMOL/L (ref 3.4–5.3)
PROT SERPL-MCNC: 7.8 G/DL (ref 6.8–8.8)
SODIUM SERPL-SCNC: 137 MMOL/L (ref 133–144)
TRIGL SERPL-MCNC: 127 MG/DL

## 2020-11-24 PROCEDURE — 80061 LIPID PANEL: CPT | Performed by: FAMILY MEDICINE

## 2020-11-24 PROCEDURE — 90471 IMMUNIZATION ADMIN: CPT | Performed by: FAMILY MEDICINE

## 2020-11-24 PROCEDURE — 36415 COLL VENOUS BLD VENIPUNCTURE: CPT | Performed by: FAMILY MEDICINE

## 2020-11-24 PROCEDURE — 80053 COMPREHEN METABOLIC PANEL: CPT | Performed by: FAMILY MEDICINE

## 2020-11-24 PROCEDURE — 99214 OFFICE O/P EST MOD 30 MIN: CPT | Mod: 25 | Performed by: FAMILY MEDICINE

## 2020-11-24 PROCEDURE — 83036 HEMOGLOBIN GLYCOSYLATED A1C: CPT | Performed by: FAMILY MEDICINE

## 2020-11-24 PROCEDURE — 90682 RIV4 VACC RECOMBINANT DNA IM: CPT | Performed by: FAMILY MEDICINE

## 2020-11-24 NOTE — PROGRESS NOTES
Subjective     Azucena Guo is a 59 year old female who presents to clinic today for the following health issues:    HPI         Diabetes Follow-up    How often are you checking your blood sugar? One time daily  What time of day are you checking your blood sugars (select all that apply)?  Before meals  Have you had any blood sugars above 200?  No  Have you had any blood sugars below 70?  No    What symptoms do you notice when your blood sugar is low?  None    What concerns do you have today about your diabetes? None     Do you have any of these symptoms? (Select all that apply)  No numbness or tingling in feet.  No redness, sores or blisters on feet.  No complaints of excessive thirst.  No reports of blurry vision.  No significant changes to weight.    Continues to work at diet and exercise.  AM sugars at 120 range.  Feels well.    Presents today to follow up her chronic medical conditions.  She did Noom x 8 months and is monitoring her foods and caloric intake and continues to lose weight.  She is down 60+ pounds in past year.      Recent eye exam 8-2020.    She stopped Januvia herself in past months.  Due for A1c today.     Blood pressure remains well controlled on current ACE-I.     On statin.  Lipids checked  with LDL < 100.    Social History  .      BP Readings from Last 2 Encounters:   11/24/20 136/72   07/23/20 120/68     Hemoglobin A1C (%)   Date Value   07/23/2020 5.3   12/06/2019 7.9 (H)     LDL Cholesterol Calculated (mg/dL)   Date Value   12/06/2019 82   11/12/2018 74                 How many servings of fruits and vegetables do you eat daily?  2-3    On average, how many sweetened beverages do you drink each day (Examples: soda, juice, sweet tea, etc.  Do NOT count diet or artificially sweetened beverages)?   1    How many days per week do you exercise enough to make your heart beat faster? 3 or less    How many minutes a day do you exercise enough to make your heart beat faster? 9 or  less    How many days per week do you miss taking your medication? 0        Review of Systems   Constitutional, HEENT, cardiovascular, pulmonary, GI, , musculoskeletal, neuro, skin, endocrine and psych systems are negative, except as otherwise noted.      Objective    /72 (BP Location: Right arm, Patient Position: Sitting, Cuff Size: Adult Regular)   Pulse 74   Temp 97.1  F (36.2  C) (Tympanic)   Resp 16   Wt 99.7 kg (219 lb 12.8 oz)   LMP  (LMP Unknown)   SpO2 99%   BMI 34.43 kg/m    Body mass index is 34.43 kg/m .  Physical Exam   GENERAL: healthy, alert and no distress  EYES: Eyes grossly normal to inspection, PERRL and conjunctivae and sclerae normal  HENT: ear canals and TM's normal, nose and mouth without ulcers or lesions  NECK: no adenopathy, no asymmetry, masses, or scars and thyroid normal to palpation  RESP: lungs clear to auscultation - no rales, rhonchi or wheezes  CV: regular rate and rhythm, normal S1 S2, no S3 or S4, no murmur, click or rub, no peripheral edema and peripheral pulses strong  ABDOMEN: soft, nontender, no hepatosplenomegaly, no masses and bowel sounds normal  MS: no gross musculoskeletal defects noted, no edema  SKIN: no suspicious lesions or rashes  NEURO: Normal strength and tone, mentation intact and speech normal  PSYCH: mentation appears normal, affect normal/bright            Assessment & Plan     Type 2 diabetes mellitus without complication, without long-term current use of insulin (H)    - Hemoglobin A1c  - Comprehensive metabolic panel    A1c today 5.5%!!!  Continue with excellent lifestyle modifications.  Follow-up 3-4 months for recheck.    Benign essential hypertension    Stable on current regimen.  CMP today.    Mixed hyperlipidemia    - Lipid panel reflex to direct LDL Fasting    On statin.  Lipids today- goal LDL < 100.    Need for prophylactic vaccination and inoculation against influenza    - INFLUENZA QUAD, RECOMBINANT, P-FREE (RIV4) (FLUBLOCK)  [76640]  - ADMIN 1st VACCINE    Yesenia Canales MD  [facility]

## 2020-11-24 NOTE — PATIENT INSTRUCTIONS
Lifestyle Medicine Resources  Documentaries to get you started:  Chris[place]  The Gamechangers  What the Health  CODE BLUE    Apps to TRY:  LoseIt! Jay to help with accountability to track the food you eat daily  NOOM Jay for positive support throughout the day as you try to stay on track with healthy choices  MyFitnessPal for food tracking  Lifesum Jay teaches how diet integrates with sleep and exercise to maintain healthy lifestyle  Fooducate Jay for nutrition info  DoFasting Jay to track intermittent fasting  LifeFasting Jay to track intermittent fasting    Websites to browse:  http://[email or web address]/site/plant-based-nutrition/  https://nutritionstVenuemobes.org/  [email or web address]    Books to explore:  How Not to Die: Discover the Foods Scientifically Proven to Prevent and Reverse Disease by Stephane Blanc MD  How Not to Diet: The Groundbreaking Science of Healthy, Permanent Weight Loss by Stephane Blanc MD  Fiber Fueled by Lorne Wallace MD  Undo It! How Simple Lifestyle Changes Can Revers Most Chronic Disease by Glenn Portillo MD  The Lifestyle Medicine Handbook by Josie Rolon  The [place] Study by ORACIO Escalante  The Omnivore's Dilemma by Stephane Carrasco  Why We Sleep by Tj Casas, PhD  The Spectrum by Glenn Portillo MD  Super Immunity by Noel Hudson MD  Kick Diabetes Essentials: The Diet and Lifestyle Guide by Jackie Bush  The Obesity Code by Rome Argueta MD- great discussion of how intermittent fasting works  The Longevity Diet by Kwadwo Zhou  Food Rules: An Eater's Manual by Stephane Carrasco  Cooked: A Natural History of Transformation by Stephane Carrasco  Food Politics by Cynthia Khan    Feel free to follow my Facebook page called Plant Based MD--I will be adding pertinent articles and info regularly in the weeks and months ahead!    And two great quotes I will end with:     He that takes medicine and neglects diet, wastes the time of his doctor.  Ancient Chinese Proverb   The doctor of  the future will no longer treat the human frame with drugs, but rather will cure and prevent disease with nutrition.  Vignesh Larry    I look forward to partnering with you as we all strive to live a healthy lifestyle and be well!  Cheers!  [email or web address]

## 2020-12-10 RX ORDER — LISINOPRIL 2.5 MG/1
TABLET ORAL
Qty: 90 TABLET | Refills: 0 | Status: SHIPPED | OUTPATIENT
Start: 2020-12-10 | End: 2021-03-10

## 2020-12-11 NOTE — TELEPHONE ENCOUNTER
Last Comprehensive Metabolic Panel:  Sodium   Date Value Ref Range Status   11/24/2020 137 133 - 144 mmol/L Final     Potassium   Date Value Ref Range Status   11/24/2020 3.9 3.4 - 5.3 mmol/L Final     Chloride   Date Value Ref Range Status   11/24/2020 105 94 - 109 mmol/L Final     Carbon Dioxide   Date Value Ref Range Status   11/24/2020 26 20 - 32 mmol/L Final     Anion Gap   Date Value Ref Range Status   11/24/2020 6 3 - 14 mmol/L Final     Glucose   Date Value Ref Range Status   11/24/2020 124 (H) 70 - 99 mg/dL Final     Comment:     Fasting specimen     Urea Nitrogen   Date Value Ref Range Status   11/24/2020 7 7 - 30 mg/dL Final     Creatinine   Date Value Ref Range Status   11/24/2020 0.44 (L) 0.52 - 1.04 mg/dL Final     GFR Estimate   Date Value Ref Range Status   11/24/2020 >90 >60 mL/min/[1.73_m2] Final     Comment:     Non  GFR Calc  Starting 12/18/2018, serum creatinine based estimated GFR (eGFR) will be   calculated using the Chronic Kidney Disease Epidemiology Collaboration   (CKD-EPI) equation.       Calcium   Date Value Ref Range Status   11/24/2020 9.3 8.5 - 10.1 mg/dL Final

## 2021-01-10 ENCOUNTER — HEALTH MAINTENANCE LETTER (OUTPATIENT)
Age: 61
End: 2021-01-10

## 2021-02-05 DIAGNOSIS — E78.5 HYPERLIPIDEMIA LDL GOAL <100: ICD-10-CM

## 2021-02-05 RX ORDER — SIMVASTATIN 40 MG
40 TABLET ORAL AT BEDTIME
Qty: 90 TABLET | Refills: 0 | Status: SHIPPED | OUTPATIENT
Start: 2021-02-05 | End: 2021-05-05

## 2021-03-10 RX ORDER — LISINOPRIL 2.5 MG/1
2.5 TABLET ORAL DAILY
Qty: 90 TABLET | Refills: 0 | Status: SHIPPED | OUTPATIENT
Start: 2021-03-10 | End: 2021-06-08

## 2021-03-10 NOTE — TELEPHONE ENCOUNTER
1 refill approved needs follow up visit. Left message on VM to call and schedule a visit.    Jami Bolton RN

## 2021-03-25 ENCOUNTER — IMMUNIZATION (OUTPATIENT)
Dept: NURSING | Facility: CLINIC | Age: 61
End: 2021-03-25
Payer: COMMERCIAL

## 2021-03-25 PROCEDURE — 91300 PR COVID VAC PFIZER DIL RECON 30 MCG/0.3 ML IM: CPT

## 2021-03-25 PROCEDURE — 0001A PR COVID VAC PFIZER DIL RECON 30 MCG/0.3 ML IM: CPT

## 2021-04-15 ENCOUNTER — IMMUNIZATION (OUTPATIENT)
Dept: NURSING | Facility: CLINIC | Age: 61
End: 2021-04-15
Attending: INTERNAL MEDICINE
Payer: COMMERCIAL

## 2021-04-15 PROCEDURE — 91300 PR COVID VAC PFIZER DIL RECON 30 MCG/0.3 ML IM: CPT

## 2021-04-15 PROCEDURE — 0002A PR COVID VAC PFIZER DIL RECON 30 MCG/0.3 ML IM: CPT

## 2021-05-08 ENCOUNTER — HEALTH MAINTENANCE LETTER (OUTPATIENT)
Age: 61
End: 2021-05-08

## 2021-06-08 RX ORDER — LISINOPRIL 2.5 MG/1
2.5 TABLET ORAL DAILY
Qty: 90 TABLET | Refills: 1 | Status: SHIPPED | OUTPATIENT
Start: 2021-06-08 | End: 2021-12-03

## 2021-06-08 NOTE — TELEPHONE ENCOUNTER
---Prescription approved per [facility].       Josie Gold RN BSN     [facility]          --Last visit:  11/24/2020     --Future Visit:   Next 5 appointments (look out 90 days)    Jun 09, 2021  7:00 AM  Office Visit with Maggi Elkins MD  [facility] ([facility] ) [address] [place] [id number] [phone]        BP Readings from Last 6 Encounters:   11/24/20 136/72   07/23/20 120/68   12/15/19 110/66   12/06/19 132/80   09/07/19 132/66   09/03/19 (!) 142/79

## 2021-06-09 ENCOUNTER — OFFICE VISIT (OUTPATIENT)
Dept: FAMILY MEDICINE | Facility: CLINIC | Age: 61
End: 2021-06-09
Payer: COMMERCIAL

## 2021-06-09 VITALS
TEMPERATURE: 97.4 F | HEIGHT: 68 IN | OXYGEN SATURATION: 98 % | HEART RATE: 61 BPM | BODY MASS INDEX: 34.1 KG/M2 | WEIGHT: 225 LBS | DIASTOLIC BLOOD PRESSURE: 67 MMHG | SYSTOLIC BLOOD PRESSURE: 115 MMHG | RESPIRATION RATE: 16 BRPM

## 2021-06-09 DIAGNOSIS — M50.30 DDD (DEGENERATIVE DISC DISEASE), CERVICAL: ICD-10-CM

## 2021-06-09 DIAGNOSIS — D12.6 TUBULAR ADENOMA OF COLON: ICD-10-CM

## 2021-06-09 DIAGNOSIS — E11.9 TYPE 2 DIABETES MELLITUS WITHOUT COMPLICATION, WITHOUT LONG-TERM CURRENT USE OF INSULIN (H): Primary | ICD-10-CM

## 2021-06-09 DIAGNOSIS — Z23 NEED FOR SHINGLES VACCINE: ICD-10-CM

## 2021-06-09 DIAGNOSIS — I83.813 VARICOSE VEINS OF BOTH LOWER EXTREMITIES WITH PAIN: ICD-10-CM

## 2021-06-09 DIAGNOSIS — Z71.89 ADVANCED DIRECTIVES, COUNSELING/DISCUSSION: ICD-10-CM

## 2021-06-09 DIAGNOSIS — E78.2 MIXED HYPERLIPIDEMIA: ICD-10-CM

## 2021-06-09 DIAGNOSIS — L65.9 HAIR THINNING: ICD-10-CM

## 2021-06-09 DIAGNOSIS — Z00.00 HEALTHCARE MAINTENANCE: ICD-10-CM

## 2021-06-09 DIAGNOSIS — Z11.4 SCREENING FOR HIV (HUMAN IMMUNODEFICIENCY VIRUS): ICD-10-CM

## 2021-06-09 DIAGNOSIS — I10 BENIGN ESSENTIAL HYPERTENSION: ICD-10-CM

## 2021-06-09 DIAGNOSIS — Z86.0100 HISTORY OF COLONIC POLYPS: ICD-10-CM

## 2021-06-09 DIAGNOSIS — E66.01 MORBID OBESITY (H): ICD-10-CM

## 2021-06-09 DIAGNOSIS — Z12.31 VISIT FOR SCREENING MAMMOGRAM: ICD-10-CM

## 2021-06-09 LAB
ALBUMIN SERPL-MCNC: 3.9 G/DL (ref 3.4–5)
ALP SERPL-CCNC: 43 U/L (ref 40–150)
ALT SERPL W P-5'-P-CCNC: 21 U/L (ref 0–50)
ANION GAP SERPL CALCULATED.3IONS-SCNC: 11 MMOL/L (ref 3–14)
AST SERPL W P-5'-P-CCNC: 17 U/L (ref 0–45)
BASOPHILS # BLD AUTO: 0 10E9/L (ref 0–0.2)
BASOPHILS NFR BLD AUTO: 0.7 %
BILIRUB SERPL-MCNC: 0.6 MG/DL (ref 0.2–1.3)
BUN SERPL-MCNC: 7 MG/DL (ref 7–30)
CALCIUM SERPL-MCNC: 9.1 MG/DL (ref 8.5–10.1)
CHLORIDE SERPL-SCNC: 103 MMOL/L (ref 94–109)
CHOLEST SERPL-MCNC: 146 MG/DL
CO2 SERPL-SCNC: 23 MMOL/L (ref 20–32)
CREAT SERPL-MCNC: 0.41 MG/DL (ref 0.52–1.04)
CREAT UR-MCNC: 38 MG/DL
DIFFERENTIAL METHOD BLD: NORMAL
EOSINOPHIL # BLD AUTO: 0 10E9/L (ref 0–0.7)
EOSINOPHIL NFR BLD AUTO: 1 %
ERYTHROCYTE [DISTWIDTH] IN BLOOD BY AUTOMATED COUNT: 12.6 % (ref 10–15)
GFR SERPL CREATININE-BSD FRML MDRD: >90 ML/MIN/{1.73_M2}
GLUCOSE SERPL-MCNC: 113 MG/DL (ref 70–99)
HBA1C MFR BLD: 5.5 % (ref 0–5.6)
HCT VFR BLD AUTO: 37.3 % (ref 35–47)
HDLC SERPL-MCNC: 59 MG/DL
HGB BLD-MCNC: 12.9 G/DL (ref 11.7–15.7)
HIV 1+2 AB+HIV1 P24 AG SERPL QL IA: NONREACTIVE
LDLC SERPL CALC-MCNC: 71 MG/DL
LYMPHOCYTES # BLD AUTO: 1.9 10E9/L (ref 0.8–5.3)
LYMPHOCYTES NFR BLD AUTO: 47.7 %
MCH RBC QN AUTO: 30.5 PG (ref 26.5–33)
MCHC RBC AUTO-ENTMCNC: 34.6 G/DL (ref 31.5–36.5)
MCV RBC AUTO: 88 FL (ref 78–100)
MICROALBUMIN UR-MCNC: <5 MG/L
MICROALBUMIN/CREAT UR: NORMAL MG/G CR (ref 0–25)
MONOCYTES # BLD AUTO: 0.3 10E9/L (ref 0–1.3)
MONOCYTES NFR BLD AUTO: 7.7 %
NEUTROPHILS # BLD AUTO: 1.7 10E9/L (ref 1.6–8.3)
NEUTROPHILS NFR BLD AUTO: 42.9 %
NONHDLC SERPL-MCNC: 87 MG/DL
PLATELET # BLD AUTO: 267 10E9/L (ref 150–450)
POTASSIUM SERPL-SCNC: 3.9 MMOL/L (ref 3.4–5.3)
PROT SERPL-MCNC: 7.8 G/DL (ref 6.8–8.8)
RBC # BLD AUTO: 4.23 10E12/L (ref 3.8–5.2)
SODIUM SERPL-SCNC: 137 MMOL/L (ref 133–144)
TRIGL SERPL-MCNC: 82 MG/DL
TSH SERPL DL<=0.005 MIU/L-ACNC: 0.66 MU/L (ref 0.4–4)
WBC # BLD AUTO: 4.1 10E9/L (ref 4–11)

## 2021-06-09 PROCEDURE — 82043 UR ALBUMIN QUANTITATIVE: CPT | Performed by: FAMILY MEDICINE

## 2021-06-09 PROCEDURE — 83036 HEMOGLOBIN GLYCOSYLATED A1C: CPT | Performed by: FAMILY MEDICINE

## 2021-06-09 PROCEDURE — 80050 GENERAL HEALTH PANEL: CPT | Performed by: FAMILY MEDICINE

## 2021-06-09 PROCEDURE — 99207 PR FOOT EXAM NO CHARGE: CPT | Performed by: FAMILY MEDICINE

## 2021-06-09 PROCEDURE — 80061 LIPID PANEL: CPT | Performed by: FAMILY MEDICINE

## 2021-06-09 PROCEDURE — 87389 HIV-1 AG W/HIV-1&-2 AB AG IA: CPT | Performed by: FAMILY MEDICINE

## 2021-06-09 PROCEDURE — 99214 OFFICE O/P EST MOD 30 MIN: CPT | Performed by: FAMILY MEDICINE

## 2021-06-09 PROCEDURE — 36415 COLL VENOUS BLD VENIPUNCTURE: CPT | Performed by: FAMILY MEDICINE

## 2021-06-09 ASSESSMENT — MIFFLIN-ST. JEOR: SCORE: 1631.15

## 2021-06-09 NOTE — PROGRESS NOTES
Assessment & Plan     Type 2 diabetes mellitus without complication, without long-term current use of insulin (H)  Here to establish care and get a DM check  DM well controlled on metformin. Taken off Januvia and glimepiride previously. Also on statin and ACE inhibitor. Is going to schedule her DM eye check. Recommend DM eye check yearly. No longer on asa but encouraged to resume. GI side effects explained. Needs a refill on metformin. Refill sent in for metformin 500 mg 2 bid. . Labs today and will make further recommendations  Dental 6 months. Foot exam done today and check self nightly. Continue lisinopril & statin.    HLD on simvastatin 40 mg,  no side effects. Continue same. Was recently filled  HTN stable on lisinopril 2.5 mg. No side effects.  Continue same. Notes recently filled.  BMI > 34-35: Down from BMI of 45  & 290 lbs in 2019 to 225 lbs and BMI 34 in 2021. Congratulated on weight loss.to continue with diet, exercise and lifestyle changes.     Cervical DDD, discovered incidentally in 2008. Ct showed moderate degenerative disc disease at C5-C6 with broad-based  disc protrusion centrally and on the right significant for impingement upon the exiting right C6 nerve rootlet and for impression upon the right aspect of cervical cord at this level. Lesser degenerative changes at C6-C7 and to a lesser extent C4-C5. There was at C6-C7 appearance of moderately prominent central  disc protrusion. May have symptoms with some neck movements but discomfort not enough to warrant meds or surgical intervention. Reports no radiculopathy, myelopathy or neuropathy. To monitor.     Encouraged to use compression socks for leg achiness secondary to varicose veins right more than left in the heat  If not better can refer to vascular    Hx of colonic polyp a 2 mm sigmoid polyp that was a tubular adenoma removed on colonoscopy in 2011 at [facility]  & colonoscopy in 2016 at [place] [facility] reported as normal and  due again in 2021, GI referral given for a Colonoscopy due this year given hx of prior polyps    Hair has been thinning. No bald spots. Hair thinning/ loss can be due to age, genetics, diabetes, stress, meds etc. May try biotin if want to ( can affect thyroid). To resume a multivitamin. Will see what labs show. Can refer to dermatology if no answer and remains concerned.    Health care maintenance reviewed  Has advance directives at home and will bring us a copy  Discussed  labs and a screening mammogram  & getting shingles vaccine: Shingrex, a series of 2 shots 2 to 6 months apart that can cause couple days of flu like symptoms but is expensive so to check with insurance and get at pharmacy if cheaper there    Will return for a physical and pap another time as apt only made for diabetes today. Advised at apt to return in 6 months for a physical and pap but after the visit reviewed chart and see physical and pap have been over due since 2020 due to the pandemic so will have a nurse contact patient if desires to come in sooner for that.    - Albumin Random Urine Quantitative with Creat Ratio  - CBC with platelets differential  - Comprehensive metabolic panel  - Hemoglobin A1c  - Lipid panel reflex to direct LDL Fasting  - TSH with free T4 reflex  - HIV Antigen Antibody Combo  - FOOT EXAM  - metFORMIN (GLUCOPHAGE) 500 MG tablet; TAKE 2 TABLETS BY MOUTH TWICE DAILY    Mixed hyperlipidemia  HLD on simvastatin 40 mg,  no side effects. Continue same. Was recently filled  - Lipid panel reflex to direct LDL Fasting  - TSH with free T4 reflex    Benign essential hypertension  HTN stable on lisinopril 2.5 mg. No side effects.  Continue same. Notes recently filled.  - Albumin Random Urine Quantitative with Creat Ratio  - Comprehensive metabolic panel  - TSH with free T4 reflex    BMI > 35  BMI > 34-35: Down from BMI of 45  & 290 lbs in 2019 to 225 lbs and BMI 34 in 2021. Congratulated on weight loss.to continue with diet,  exercise and lifestyle changes.   - TSH with free T4 reflex    DDD (degenerative disc disease), cervical  Cervical DDD, discovered incidentally in 2008. Ct showed moderate degenerative disc disease at C5-C6 with broad-based  disc protrusion centrally and on the right significant for impingement upon the exiting right C6 nerve rootlet and for impression upon the right aspect of cervical cord at this level. Lesser degenerative changes at C6-C7 and to a lesser extent C4-C5. There was at C6-C7 appearance of moderately prominent central  disc protrusion. May have symptoms with some neck movements but discomfort not enough to warrant meds or surgical intervention. Reports no radiculopathy, myelopathy or neuropathy. To monitor.  - CBC with platelets differential    Varicose veins of both lower extremities with pain  Encouraged to use compression socks for leg achiness secondary to varicose veins right more than left in the heat  If not better can refer to vascular    History of colonic polyps  Tubular adenoma of colon  Hx of colonic polyp a 2 mm sigmoid polyp that was a tubular adenoma removed on colonoscopy in 2011 at [facility]  & colonoscopy in 2016 at [place] [facility] reported as normal and due again in 2021, GI referral given for a Colonoscopy due this year given hx of prior polyps  - GASTROENTEROLOGY ADULT REF PROCEDURE ONLY; Future    Hair thinning  Hair has been thinning. No bald spots. Hair thinning/ loss can be due to age, genetics, diabetes, stress, meds etc. May try biotin if want to ( can affect thyroid). To resume a multivitamin. Will see what labs show. Can refer to dermatology if no answer and remains concerned.    Healthcare maintenance  - REVIEW OF HEALTH MAINTENANCE PROTOCOL ORDERS    Advanced directives, counseling/discussion  Has advance directives at home and will bring us a copy    Need for shingles vaccine  Discussed Shingrex, a series of 2 shots 2 to 6 months apart that can cause  "couple days of flu like symptoms but is expensive so to check with insurance and get at pharmacy if cheaper there    Screening for HIV (human immunodeficiency virus)  - HIV Antigen Antibody Combo    Visit for screening mammogram  - MA Screening Digital Bilateral; Future    Review of the result(s) of each unique test - HBA1c  Ordering of each unique test  Prescription drug management  30 minutes spent on the date of the encounter doing chart review, history and exam, documentation and further activities per the note     BMI:   Estimated body mass index is 34.72 kg/m  as calculated from the following:    Height as of this encounter: 1.715 m (5' 7.5\").    Weight as of this encounter: 102.1 kg (225 lb).   Weight management plan: Discussed healthy diet and exercise guidelines    Work on weight loss  Regular exercise  See Patient Instructions    Return in about 6 months (around 12/9/2021) for Follow up, with me, Routine preventive, in person.    Maggi Elkins MD  [facility]    Ayah Guzman is a 60 year old who presents for the following health issues     HPI   Diabetes Follow-up  How often are you checking your blood sugar? One time daily  What time of day are you checking your blood sugars (select all that apply)?  Before meals  Have you had any blood sugars above 200?  No  Have you had any blood sugars below 70?  No    What symptoms do you notice when your blood sugar is low?  None and Not applicable    What concerns do you have today about your diabetes? None     Do you have any of these symptoms? (Select all that apply)  No numbness or tingling in feet.  No redness, sores or blisters on feet.  No complaints of excessive thirst.  No reports of blurry vision.  No significant changes to weight.  BP Readings from Last 2 Encounters:   06/09/21 115/67   11/24/20 136/72     Hemoglobin A1C (%)   Date Value   11/24/2020 5.5   07/23/2020 5.3     LDL Cholesterol Calculated (mg/dL)   Date Value "   11/24/2020 78   12/06/2019 82   How many servings of fruits and vegetables do you eat daily?  4 or more    On average, how many sweetened beverages do you drink each day (Examples: soda, juice, sweet tea, etc.  Do NOT count diet or artificially sweetened beverages)?   0    How many days per week do you exercise enough to make your heart beat faster? 5    How many minutes a day do you exercise enough to make your heart beat faster? 20 - 29    How many days per week do you miss taking your medication? 0    BMI >34 ( down from BMI of 45  & 290 lbs in 2019 to 225 lbs and BMI 34 in 2021), with DM on ASA, metformin, HLD on simvastatin, HTN on lisinopril, hx of cervical DDD & radiculopathy in the past, on vit d, MV, & probiotics, hx of varicose veins, on compression socks, prior yanci, hx of colonic polyp 2 mm sigmoid  that was a tubular adenoma noted on colonoscopy in 2011 at [facility]  & colonoscopy in 2016 at [place] [facility] reported as normal and due again in 2021, hx of postmenopausal bleeding x 2 in the past S/p EMB & u/S that was reported normal, no symptoms since 2006, allergic to sulfa, with side effect of redness from MMR and swollen LN from pneumococcal vaccine, previously under care of PCP Dr Canales,  Seen by PCP nov 2020 for DM when noted HBA1c was 5.5, taken off glimiperide and Januvia and continued on metformin and given the flu shot.    Here to establish care and get a DM check. New to this provider.    DM well controlled on metformin. Taken off Januvia and glimepiride previously. Also on statin and ACE inhibitor. Is going to schedule her DM eye check. No longer on asa. Needs a refill on metformin. Checks sugars at home. No difference in values since came off those other meds  HLD on simvastatin, no side effects.   HTN stable on lisinopril. No side effects.   BMI > 34-35: Down from BMI of 45  & 290 lbs in 2019 to 225 lbs and BMI 34 in 2021.    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 No radiation down arms, no tingling or numbness., not dropping things. Last time had Ct C spine was in 2008. Ct showed moderate degenerative disc disease at C5-C6 with broad-based  disc protrusion centrally and on the right at this level which may be   significant for impingement upon the exiting right C6 nerve rootlet and for impression upon the right aspect of cervical cord at this level. Lesser degenerative changes at C6-C7 and to a lesser extent C4-C5. There is at C6-C7 appearance of moderately prominent central  disc protrusion. Artifact from shoulder soft tissues degrades image quality at this level    When weather gets hot her leg ache. Noted was told before had varicose veins & advised to use compression socks but never tried them. Symptoms only seem to occur in 90 degree weather and then may feel too hot to wear compression socks but will give it a try.     Hx of colonic polyps. Due for a scope this year.     Hair has been thinning. No bald spots. Wonders if from age, her diabetes or from meds or something else. wondering about supplements.    Health care maintenance reviewed  Has advance directives at home and will bring us a copy  Discussed Shingrex vaccine, labs and a screening mammogram  Will return for a physical and pap another time as apt only made for diabetes today.     Other than a mild headache she had yesterday now resolved she feels well and reports No fever or chills, no headache or dizziness, no double or blurry vision, no facial pain, earache, sore throat, runny nose, post nasal drip, no trouble hearing, smelling, tasting or swallowing, no cough, no chest pain, trouble breathing or palpitations, No abdominal pain, heart burn, reflux, nausea or vomiting or diarrhea or constipation, no blood in stools or black stools, no weight loss  "or night sweats. No dysuria, hematuria, frequency, urgency, hesitancy, incontinence, No pelvic complaints. No leg swelling or joint pain. No rash.    Review of Systems   Constitutional, HEENT, cardiovascular, pulmonary, GI, , musculoskeletal, neuro, skin, endocrine and psych systems are negative, except as otherwise noted.      Objective    /67   Pulse 61   Temp 97.4  F (36.3  C) (Tympanic)   Resp 16   Ht 1.715 m (5' 7.5\")   Wt 102.1 kg (225 lb)   LMP  (LMP Unknown)   SpO2 98%   Breastfeeding No   BMI 34.72 kg/m    Body mass index is 34.72 kg/m .  Physical Exam   GENERAL: healthy, alert, no distress and obese  EYES: Eyes grossly normal to inspection, PERRL and conjunctivae and sclerae normal  HENT: ear canals and TM's normal, nose and mouth without ulcers or lesions  NECK: no adenopathy, no asymmetry, masses, or scars and thyroid normal to palpation  RESP: lungs clear to auscultation - no rales, rhonchi or wheezes  CV: regular rate and rhythm, normal S1 S2, no S3 or S4, no murmur, click or rub, no peripheral edema and peripheral pulses strong  ABDOMEN: soft, non tender, no hepatosplenomegaly, no masses and bowel sounds normal  MS: no gross musculoskeletal defects noted, no edema, normal muscle tone, normal range of motion, no cyanosis, clubbing, or edema, has varicose veins bilaterally, peripheral pulses normal and gait normal, no ataxia  SKIN: no suspicious lesions or rashes  NEURO: Normal strength and tone, mentation intact and speech normal  PSYCH: mentation appears normal, affect normal/bright  Diabetic foot exam: normal DP and PT pulses, no trophic changes or ulcerative lesions, normal sensory exam, normal monofilament exam and nail exam normal nails without lesions    Results for orders placed or performed in visit on 06/09/21   Albumin Random Urine Quantitative with Creat Ratio     Status: None   Result Value Ref Range    Creatinine Urine 38 mg/dL    Albumin Urine mg/L <5 mg/L    Albumin " Urine mg/g Cr Unable to calculate due to low value 0 - 25 mg/g Cr   CBC with platelets differential     Status: None   Result Value Ref Range    WBC 4.1 4.0 - 11.0 10e9/L    RBC Count 4.23 3.8 - 5.2 10e12/L    Hemoglobin 12.9 11.7 - 15.7 g/dL    Hematocrit 37.3 35.0 - 47.0 %    MCV 88 78 - 100 fl    MCH 30.5 26.5 - 33.0 pg    MCHC 34.6 31.5 - 36.5 g/dL    RDW 12.6 10.0 - 15.0 %    Platelet Count 267 150 - 450 10e9/L    Diff Method Automated Method     % Neutrophils 42.9 %    % Lymphocytes 47.7 %    % Monocytes 7.7 %    % Eosinophils 1.0 %    % Basophils 0.7 %    Absolute Neutrophil 1.7 1.6 - 8.3 10e9/L    Absolute Lymphocytes 1.9 0.8 - 5.3 10e9/L    Absolute Monocytes 0.3 0.0 - 1.3 10e9/L    Absolute Eosinophils 0.0 0.0 - 0.7 10e9/L    Absolute Basophils 0.0 0.0 - 0.2 10e9/L   Comprehensive metabolic panel     Status: Abnormal   Result Value Ref Range    Sodium 137 133 - 144 mmol/L    Potassium 3.9 3.4 - 5.3 mmol/L    Chloride 103 94 - 109 mmol/L    Carbon Dioxide 23 20 - 32 mmol/L    Anion Gap 11 3 - 14 mmol/L    Glucose 113 (H) 70 - 99 mg/dL    Urea Nitrogen 7 7 - 30 mg/dL    Creatinine 0.41 (L) 0.52 - 1.04 mg/dL    GFR Estimate >90 >60 mL/min/[1.73_m2]    GFR Estimate If Black >90 >60 mL/min/[1.73_m2]    Calcium 9.1 8.5 - 10.1 mg/dL    Bilirubin Total 0.6 0.2 - 1.3 mg/dL    Albumin 3.9 3.4 - 5.0 g/dL    Protein Total 7.8 6.8 - 8.8 g/dL    Alkaline Phosphatase 43 40 - 150 U/L    ALT 21 0 - 50 U/L    AST 17 0 - 45 U/L   Hemoglobin A1c     Status: None   Result Value Ref Range    Hemoglobin A1C 5.5 0 - 5.6 %   Lipid panel reflex to direct LDL Fasting     Status: None   Result Value Ref Range    Cholesterol 146 <200 mg/dL    Triglycerides 82 <150 mg/dL    HDL Cholesterol 59 >49 mg/dL    LDL Cholesterol Calculated 71 <100 mg/dL    Non HDL Cholesterol 87 <130 mg/dL   TSH with free T4 reflex     Status: None   Result Value Ref Range    TSH 0.66 0.40 - 4.00 mU/L   HIV Antigen Antibody Combo     Status: None   Result  Value Ref Range    HIV Antigen Antibody Combo Nonreactive NR^Nonreactive

## 2021-06-09 NOTE — PATIENT INSTRUCTIONS
Diabetes check today   Labs today and will make further recommendations  Dm eye check yearly   Dental 6 months  Foot exam done today and check self nightly  Health care maintenace reviewed  Bring in copy of advance directives  Consider shingrex: shingles vaccines (series of 2 shots 2 to 6 months apart that can cause couple days of flu like symptoms but is expensive so check with insurance and get at pharmacy where cheaper).  Continue lisinopril and asa  Refilled metformin  Colonoscopy due this year given hx of prior polyps  Monitor for neck worsening  Use compression socks for leg achiness secondary to varicose veins right more than left in the heat  If not better can refer to vascular  Resume multivitamin daily   May try bioitn if want to ( can affect thyroid)   Will see what labs show   Hair thinning/ los scan be due to age, genetics, diabetes, stress, meds etc  Can refer to dermatology if no answer and remain concerned  Follow up in 6 months for a preventive physical & PAP

## 2021-06-09 NOTE — RESULT ENCOUNTER NOTE
Marcy Guo,  Some of your results came back and are within acceptable limits. -Microalbumin (urine protein) test is normal.  ADVISE: rechecking this annually.. If you have any further concerns please do not hesitate to contact us by message, phone or making an appointment.  Have a good day   Dr Severo QUINTERO

## 2021-06-09 NOTE — Clinical Note
Advised at apt to return in 6 months for a physical and pap but after the visit reviewed chart and see physical and pap have been over due since 2020 due to the pandemic so would she like to come in sooner for that.

## 2021-06-09 NOTE — RESULT ENCOUNTER NOTE
Marcy Guo,  Some of your results came back and are within acceptable limits. -Normal red blood cell (hgb) levels, normal white blood cell count and normal platelet levels.. If you have any further concerns please do not hesitate to contact us by message, phone or making an appointment.  Have a good day   Dr Severo QUINTERO

## 2021-06-10 NOTE — RESULT ENCOUNTER NOTE
Marcy Ms. Guo,  Your results came back and are within acceptable limits. -Cholesterol levels are at your goal levels.  ADVISE: continuing your medication, a regular exercise program with at least 150 minutes of aerobic exercise per week, and eating a low saturated fat/low carbohydrate diet.  Also, you should recheck this fasting cholesterol panel in 12 months.  -Liver and gallbladder tests (ALT,AST, Alk phos,bilirubin) are normal.  -Kidney function (GFR) is normal.  -Sodium is normal.  -Potassium is normal.  -Calcium is normal.  -Glucose is elevated due to your diabetes.  -A1C (test of diabetes control the last 2-3 months) is at your goal. Please continue with your current plan. Also, you should make an appointment to see me and recheck your A1C test in 6 months.   -TSH (thyroid stimulating hormone) level is normal which indicates normal thyroid function.  -HIV test is normal.   If you have any further concerns please do not hesitate to contact us by message, phone or making an appointment.  Have a good day   Dr Severo QUINTERO

## 2021-06-12 PROBLEM — D12.6 TUBULAR ADENOMA OF COLON: Status: ACTIVE | Noted: 2021-06-09

## 2021-06-14 ENCOUNTER — TELEPHONE (OUTPATIENT)
Dept: FAMILY MEDICINE | Facility: CLINIC | Age: 61
End: 2021-06-14

## 2021-06-14 NOTE — TELEPHONE ENCOUNTER
Advised at apt to return in 6 months for a physical and pap but after the visit reviewed chart and see physical and pap have been over due since 2020 due to the pandemic so would she like to come in sooner for that.     Reviewed this message with pt. She will make the physical /pap appt when she is able. Appreciated the f/u.  AKSHAT Ohara

## 2021-06-21 ENCOUNTER — OFFICE VISIT (OUTPATIENT)
Dept: URGENT CARE | Facility: URGENT CARE | Age: 61
End: 2021-06-21
Payer: COMMERCIAL

## 2021-06-21 ENCOUNTER — ANCILLARY PROCEDURE (OUTPATIENT)
Dept: GENERAL RADIOLOGY | Facility: CLINIC | Age: 61
End: 2021-06-21
Attending: PHYSICIAN ASSISTANT
Payer: COMMERCIAL

## 2021-06-21 VITALS
WEIGHT: 225 LBS | TEMPERATURE: 97.4 F | DIASTOLIC BLOOD PRESSURE: 73 MMHG | BODY MASS INDEX: 34.72 KG/M2 | OXYGEN SATURATION: 97 % | SYSTOLIC BLOOD PRESSURE: 143 MMHG | HEART RATE: 69 BPM

## 2021-06-21 DIAGNOSIS — S16.1XXA STRAIN OF NECK MUSCLE, INITIAL ENCOUNTER: Primary | ICD-10-CM

## 2021-06-21 DIAGNOSIS — M47.812 OSTEOARTHRITIS OF CERVICAL SPINE, UNSPECIFIED SPINAL OSTEOARTHRITIS COMPLICATION STATUS: ICD-10-CM

## 2021-06-21 PROCEDURE — 99213 OFFICE O/P EST LOW 20 MIN: CPT | Performed by: PHYSICIAN ASSISTANT

## 2021-06-21 PROCEDURE — 72040 X-RAY EXAM NECK SPINE 2-3 VW: CPT | Performed by: RADIOLOGY

## 2021-06-21 ASSESSMENT — ENCOUNTER SYMPTOMS
MYALGIAS: 1
FEVER: 0
NECK PAIN: 1

## 2021-06-22 NOTE — PATIENT INSTRUCTIONS
Patient Education     Understanding Cervical Strain    There are 7 bones (vertebrae) in the neck that are part of the spine. These are called the cervical spine. Cervical strain is a medical term for neck pain. The neck has several layers of muscles. These are connected with tendons to the cervical spine and other bones. Neck pain is often the result of injury to these muscles and tendons.   Causes of cervical strain  Different types of stress on the neck can damage muscles and tendons (soft tissues) and cause cervical strain. Cervical tissues can be damaged by:     The neck being forced past its normal range of motion, such as in a car accident or sports injury    Constant, low-level stress, such as from poor posture or a poorly set-up workspace  Symptoms of cervical strain  These may include:    Neck pain or stiffness    Pain in the shoulders or upper back    Muscle spasms    Headache, often starting at the base of the neck    Irritability, trouble concentrating, or sleeplessness    Numbness in the arm or hand    Tingling or weakness in the arm  Treatment for cervical strain  This problem often gets better on its own. Treatments aim to reduce pain and inflammation and increase the range of motion of the neck. Possible treatments include:     Over-the-counter or prescription medicine. These help relieve pain and inflammation.    Muscle relaxant can help with muscle spasms.    Stretching exercises to decrease neck stiffness.    Massage to decrease neck stiffness.    Cold or heat pack. These help reduce pain and swelling.  Call 911  Call 911 right away if you have any of these:     Face drooping or numbness    Numbness or weakness, especially in the arms or on one side    Slurred speech or difficulty speaking    Blurred vision  When to call your healthcare provider  Call your healthcare provider right away if you have any of these:    Fever of 100.4 F (38 C) or higher, or as directed by your  provider    Chills    Pain or stiffness that gets worse    Symptoms that don t get better, or get worse    Numbness, tingling, weakness or shooting pains into the arms or legs    New symptoms  Chi last reviewed this educational content on 6/1/2019 2000-2021 [employer]. All rights reserved. This information is not intended as a substitute for professional medical care. Always follow your healthcare professional's instructions.

## 2021-06-22 NOTE — PROGRESS NOTES
SUBJECTIVE:   Azucena Guo is a 60 year old female presenting with a chief complaint of   Chief Complaint   Patient presents with     Urgent Care     Neck Pain     c/o neck pain for 2 days, no injury       She is an established patient of [facility].  Patient presents with complaints of left side neck pain x 2 days.  No trauma.  Noticed during the day and progressed.  No pain down arms at all - hx of C6 pinched nerve.      Treatment:  Cool and warm compresses.  Taking about 5 ibuprofen daily.      Review of Systems   Constitutional: Negative for fever.   Musculoskeletal: Positive for myalgias and neck pain.   All other systems reviewed and are negative.      Past Medical History:   Diagnosis Date     Arthritis      Benign essential hypertension 2019     Diabetes mellitus (H)      Family History   Problem Relation Age of Onset     Heart Disease Mother      Diabetes Mother              Cancer Mother         limphoma     Obesity Mother      Coronary Artery Disease Mother      Heart Disease Father      Coronary Artery Disease Father      Hypertension Father      Hyperlipidemia Father      Diabetes Brother      Diabetes Brother              Obesity Brother      Breast Cancer Other      Obesity Sister      Diabetes Sister      Obesity Sister      Depression Sister      Depression Sister      Substance Abuse Son      Current Outpatient Medications   Medication Sig Dispense Refill     aspirin 81 MG tablet Take 1 tablet by mouth daily.       Cholecalciferol (VITAMIN D) 1000 UNIT capsule Take 1 capsule by mouth daily.       lisinopril (ZESTRIL) 2.5 MG tablet Take 1 tablet (2.5 mg) by mouth daily 90 tablet 1     metFORMIN (GLUCOPHAGE) 500 MG tablet TAKE 2 TABLETS BY MOUTH TWICE DAILY 180 tablet 0     Multiple Vitamin (MULTI-VITAMIN PO) Take  by mouth.       order for DME Equipment being ordered: thigh high compression stockings 20-30mmHg 2 Device 1     Probiotic Product (MARTINA-Q PO) Take  by mouth as  needed.       simvastatin (ZOCOR) 40 MG tablet TAKE 1 TABLET(40 MG) BY MOUTH AT BEDTIME 90 tablet 0     tiZANidine (ZANAFLEX) 4 MG tablet Take 1 tablet (4 mg) by mouth 3 times daily as needed for muscle spasms 30 tablet 0     Social History     Tobacco Use     Smoking status: Former Smoker     Packs/day: 0.00     Years: 0.00     Pack years: 0.00     Quit date: 1991     Years since quittin.4     Smokeless tobacco: Never Used   Substance Use Topics     Alcohol use: Yes     Comment: occasionally       OBJECTIVE  BP (!) 143/73   Pulse 69   Temp 97.4  F (36.3  C) (Tympanic)   Wt 102.1 kg (225 lb)   LMP  (LMP Unknown)   SpO2 97%   BMI 34.72 kg/m      Physical Exam  Vitals signs and nursing note reviewed.   Constitutional:       General: She is in acute distress.      Appearance: Normal appearance. She is obese.   Eyes:      Extraocular Movements: Extraocular movements intact.      Conjunctiva/sclera: Conjunctivae normal.   Cardiovascular:      Rate and Rhythm: Normal rate.   Musculoskeletal:      Comments: Cervical ROM significantly reduced with extension and left lateral flexion.  Strength in UE normal.  Tenderness to palpation of left side of neck.  No bony spine tenderness. Full ROM at shoulders.     Skin:     General: Skin is warm and dry.      Capillary Refill: Capillary refill takes less than 2 seconds.      Comments: Neck skin normal.   Neurological:      General: No focal deficit present.      Mental Status: She is alert.   Psychiatric:         Mood and Affect: Mood normal.         Labs:  No results found for this or any previous visit (from the past 24 hour(s)).    X-Ray was done - [facility]    2008 CT of cervical spine:  1. No evidence of fracture or subluxation.   2. Degenerative disc disease most notable at C5-C6 with lesser changes   at C6-C7 and significant disc protrusion at C5-C6 and C6-C7 as   described above.      ASSESSMENT:      ICD-10-CM    1. Strain of neck muscle, initial encounter   S16.1XXA XR Cervical Spine 2/3 Views     Orthopedic  Referral     tiZANidine (ZANAFLEX) 4 MG tablet   2. Osteoarthritis of cervical spine, unspecified spinal osteoarthritis complication status  M47.812         Medical Decision Making:    Differential Diagnosis:  Cervical strain, djd    Serious Comorbid Conditions:  Adult:  None    PLAN:  Rx for zanaflex, Ortho referral.  Recommended ibuprofen 800 TID.  Drink extra water.  Patient education.      Followup:    If not improving or if condition worsens, follow up with your Primary Care Provider, If not improving or if conditions worsens over the next 12-24 hours, go to the Emergency Department    Patient Instructions     Patient Education     Understanding Cervical Strain    There are 7 bones (vertebrae) in the neck that are part of the spine. These are called the cervical spine. Cervical strain is a medical term for neck pain. The neck has several layers of muscles. These are connected with tendons to the cervical spine and other bones. Neck pain is often the result of injury to these muscles and tendons.   Causes of cervical strain  Different types of stress on the neck can damage muscles and tendons (soft tissues) and cause cervical strain. Cervical tissues can be damaged by:     The neck being forced past its normal range of motion, such as in a car accident or sports injury    Constant, low-level stress, such as from poor posture or a poorly set-up workspace  Symptoms of cervical strain  These may include:    Neck pain or stiffness    Pain in the shoulders or upper back    Muscle spasms    Headache, often starting at the base of the neck    Irritability, trouble concentrating, or sleeplessness    Numbness in the arm or hand    Tingling or weakness in the arm  Treatment for cervical strain  This problem often gets better on its own. Treatments aim to reduce pain and inflammation and increase the range of motion of the neck. Possible treatments include:      Over-the-counter or prescription medicine. These help relieve pain and inflammation.    Muscle relaxant can help with muscle spasms.    Stretching exercises to decrease neck stiffness.    Massage to decrease neck stiffness.    Cold or heat pack. These help reduce pain and swelling.  Call 911  Call 911 right away if you have any of these:     Face drooping or numbness    Numbness or weakness, especially in the arms or on one side    Slurred speech or difficulty speaking    Blurred vision  When to call your healthcare provider  Call your healthcare provider right away if you have any of these:    Fever of 100.4 F (38 C) or higher, or as directed by your provider    Chills    Pain or stiffness that gets worse    Symptoms that don t get better, or get worse    Numbness, tingling, weakness or shooting pains into the arms or legs    New symptoms  Chi last reviewed this educational content on 6/1/2019 2000-2021 [employer]. All rights reserved. This information is not intended as a substitute for professional medical care. Always follow your healthcare professional's instructions.

## 2021-07-07 ENCOUNTER — TELEPHONE (OUTPATIENT)
Dept: GASTROENTEROLOGY | Facility: CLINIC | Age: 61
End: 2021-07-07

## 2021-07-07 DIAGNOSIS — Z11.59 ENCOUNTER FOR SCREENING FOR OTHER VIRAL DISEASES: Primary | ICD-10-CM

## 2021-07-07 NOTE — TELEPHONE ENCOUNTER
Screening Questions  1. Are you active on mychart? Yes    2. What insurance is in the chart? [facility]    2.  Ordering/Referring Provider: Maggi Elkins MD in  FAMILY PRAC/IMPEDS    3. BMI 35.2    4. Are you on daily home oxygen? no    5. Do you have a history of difficult airway? no    6. Have you had a heart, lung, or liver transplant? no    7. Are you currently on dialysis? no    8. Have you had a stroke or Transient ischemic atttack (TIA) within 6 months? no    9. In the past 6 months, have you had any heart related issues including cardiomyopathy or heart attack?         If yes, did it require cardiac stenting or other implantable device?no    10. Do you have any implantable devices in your body (pacemaker, defib, LVAD)? no    11. Do you take nitroglycerin? If yes, how often? no    12. Are you currently taking any blood thinners?no, low dose aspirin     13. Are you a diabetic? Yes, type 2 controlled with metformin    14. (Females) Are you currently pregnant? no  If yes, how many weeks?    15. Have you had a procedure in the past that was difficult to tolerate with conscious sedation? Any allergies to Fentanyl or Versed no    16. Are you taking any scheduled prescription narcotics more than once daily? no    17. Do you have any chemical dependencies such as alcohol, street drugs, or methadone? no    18. Do you have any history of post-traumatic stress syndrome or mental health issues? no    19. Do you transfer independently? yes    20.  Do you have any issues with constipation? no    21. Preferred Pharmacy for Pre Prescription On chart    Scheduling Details    Procedure Scheduled: Colonoscopy  Provider/Surgeon: Chandan  Date of Procedure: 7/21/21  Location: [facility]  Caller (Please ask for phone number if not scheduled by patient): Megan      Sedation Type: CS  Conscious Sedation- Needs  for 6 hours after the procedure  MAC/General-Needs  for 24 hours after procedure    Pre-op Required  at [facility], [facility], and OR for MAC sedation:   (if yes advise patient they will need a pre-op prior to procedure)      Is patient on blood thinners? -no (If yes- inform patient to follow up with PCP or provider for follow up instructions)     Informed patient they will need an adult  yes  Cannot take any type of public or medical transportation alone    Informed Patient of COVID Test Requirement yes    Confirmed Nurse will call to complete assessment yes    Additional comments: n/a

## 2021-07-08 ENCOUNTER — TELEPHONE (OUTPATIENT)
Dept: GASTROENTEROLOGY | Facility: CLINIC | Age: 61
End: 2021-07-08

## 2021-07-08 DIAGNOSIS — Z86.0100 HISTORY OF COLONIC POLYPS: Primary | ICD-10-CM

## 2021-07-08 RX ORDER — BISACODYL 5 MG
TABLET, DELAYED RELEASE (ENTERIC COATED) ORAL
Qty: 4 TABLET | Refills: 0 | Status: SHIPPED | OUTPATIENT
Start: 2021-07-08 | End: 2022-03-13

## 2021-07-08 NOTE — TELEPHONE ENCOUNTER
Is patient taking blood thinners/antiplatelet medications? No    Heart Disease? No    Lung Disease? No    Diabetic? Yes    Kidney Disease? No    Electronic Implantable Devices? No    Writer reviewed pre-assessment questions with patient prior to upcoming colonoscopy on 7.21.2021.  COVID test scheduled for 7.17.2021.    Reviewed golytely prep instructions with patient.  Noting no nuts, seeds, or popcorn 7 days prior to procedure.     Golytely prep sent to [facility] #[id number] [place] [id number] AT [address].  Prep instructions sent via [facility].    Designated  policy reviewed with patient.     Patient verbalized understanding.  No further questions or concerns.    Almaz Odell RN

## 2021-07-08 NOTE — TELEPHONE ENCOUNTER
Indication for Procedure: hx adenomatous polyps    Referring Provider: Maggi Elkins MD    Arrival Time: 0730    Almaz Odell RN

## 2021-07-17 ENCOUNTER — LAB (OUTPATIENT)
Dept: LAB | Facility: CLINIC | Age: 61
End: 2021-07-17
Attending: SURGERY

## 2021-07-17 DIAGNOSIS — Z11.59 ENCOUNTER FOR SCREENING FOR OTHER VIRAL DISEASES: ICD-10-CM

## 2021-07-17 LAB — SARS-COV-2 RNA RESP QL NAA+PROBE: NEGATIVE

## 2021-07-17 PROCEDURE — U0003 INFECTIOUS AGENT DETECTION BY NUCLEIC ACID (DNA OR RNA); SEVERE ACUTE RESPIRATORY SYNDROME CORONAVIRUS 2 (SARS-COV-2) (CORONAVIRUS DISEASE [COVID-19]), AMPLIFIED PROBE TECHNIQUE, MAKING USE OF HIGH THROUGHPUT TECHNOLOGIES AS DESCRIBED BY CMS-2020-01-R: HCPCS | Mod: 90 | Performed by: PATHOLOGY

## 2021-07-17 PROCEDURE — U0005 INFEC AGEN DETEC AMPLI PROBE: HCPCS | Mod: 90 | Performed by: PATHOLOGY

## 2021-07-21 ENCOUNTER — DOCUMENTATION ONLY (OUTPATIENT)
Dept: GASTROENTEROLOGY | Facility: OUTPATIENT CENTER | Age: 61
End: 2021-07-21
Attending: FAMILY MEDICINE
Payer: COMMERCIAL

## 2021-07-21 ENCOUNTER — TRANSFERRED RECORDS (OUTPATIENT)
Dept: HEALTH INFORMATION MANAGEMENT | Facility: CLINIC | Age: 61
End: 2021-07-21

## 2021-07-21 DIAGNOSIS — Z86.0100 HISTORY OF COLONIC POLYPS: ICD-10-CM

## 2021-07-21 DIAGNOSIS — D12.6 TUBULAR ADENOMA OF COLON: ICD-10-CM

## 2021-07-22 ENCOUNTER — MYC MEDICAL ADVICE (OUTPATIENT)
Dept: FAMILY MEDICINE | Facility: CLINIC | Age: 61
End: 2021-07-22

## 2021-08-03 DIAGNOSIS — E78.5 HYPERLIPIDEMIA LDL GOAL <100: ICD-10-CM

## 2021-08-05 RX ORDER — SIMVASTATIN 40 MG
TABLET ORAL
Qty: 90 TABLET | Refills: 1 | Status: SHIPPED | OUTPATIENT
Start: 2021-08-05 | End: 2022-02-01

## 2021-09-02 ENCOUNTER — MYC MEDICAL ADVICE (OUTPATIENT)
Dept: FAMILY MEDICINE | Facility: CLINIC | Age: 61
End: 2021-09-02

## 2021-09-02 DIAGNOSIS — E11.9 TYPE 2 DIABETES MELLITUS WITHOUT COMPLICATION, WITHOUT LONG-TERM CURRENT USE OF INSULIN (H): Primary | ICD-10-CM

## 2021-09-08 NOTE — TELEPHONE ENCOUNTER
Routing refill request to provider for review/approval because:  Drug not active on patient's medication list      Ordered per patient report of testing once daily.  Valerie Hernandez RN  [facility]

## 2021-10-21 DIAGNOSIS — E11.9 TYPE 2 DIABETES MELLITUS WITHOUT COMPLICATION, WITHOUT LONG-TERM CURRENT USE OF INSULIN (H): ICD-10-CM

## 2021-10-22 NOTE — TELEPHONE ENCOUNTER
Biguanide Agents [id number]/21/2021 10:22 AM   Patient is age 10 or older Protocol Details    Patient has documented A1c within the specified period of time.     Patient's CR is NOT>1.4 OR Patient's EGFR is NOT<45 within past 12 mos.     Patient does NOT have a diagnosis of CHF.     Medication is active on med list     Patient is not pregnant     Patient has not had a positive pregnancy test within the past 12 mos.      Recent (6 mo) or future (30 days) visit within the authorizing provider's specialty

## 2021-10-23 ENCOUNTER — HEALTH MAINTENANCE LETTER (OUTPATIENT)
Age: 61
End: 2021-10-23

## 2021-12-03 RX ORDER — LISINOPRIL 2.5 MG/1
TABLET ORAL
Qty: 90 TABLET | Refills: 0 | Status: SHIPPED | OUTPATIENT
Start: 2021-12-03 | End: 2022-03-03

## 2021-12-03 NOTE — TELEPHONE ENCOUNTER
Routing refill request to provider for review/approval because:  Labs not current:     Blood pressure under 140/90 in past 12 months    Normal serum creatinine on file in past 12 months     BP Readings from Last 3 Encounters:   06/21/21 (!) 143/73   06/09/21 115/67   11/24/20 136/72     Creatinine   Date Value Ref Range Status   06/09/2021 0.41 (L) 0.52 - 1.04 mg/dL Final     Last Written Prescription Date:  6/8/21  Last Fill Quantity: 90,  # refills: 1   Last office visit: 6/9/2021 with prescribing provider:  Severo Youssef Office Visit:      Cristiana Hudson RN  [facility]

## 2021-12-08 ENCOUNTER — HOSPITAL ENCOUNTER (OUTPATIENT)
Dept: MAMMOGRAPHY | Facility: CLINIC | Age: 61
Discharge: HOME | End: 2021-12-08
Attending: FAMILY MEDICINE | Admitting: FAMILY MEDICINE
Payer: COMMERCIAL

## 2021-12-08 DIAGNOSIS — Z12.31 VISIT FOR SCREENING MAMMOGRAM: ICD-10-CM

## 2021-12-08 PROCEDURE — 77063 BREAST TOMOSYNTHESIS BI: CPT

## 2021-12-18 ENCOUNTER — HEALTH MAINTENANCE LETTER (OUTPATIENT)
Age: 61
End: 2021-12-18

## 2022-01-30 DIAGNOSIS — E78.5 HYPERLIPIDEMIA LDL GOAL <100: ICD-10-CM

## 2022-02-01 RX ORDER — SIMVASTATIN 40 MG
TABLET ORAL
Qty: 90 TABLET | Refills: 0 | Status: SHIPPED | OUTPATIENT
Start: 2022-02-01 | End: 2022-03-14

## 2022-02-01 NOTE — TELEPHONE ENCOUNTER
1 refill approved has a visit 3/14/2022.    Statins Protocol Passed 01/30/2022 05:51 AM   Protocol Details  LDL on file in past 12 months    No abnormal creatine kinase in past 12 months    Recent (12 mo) or future (30 days) visit within the authorizing provider's specialty    Medication is active on med list    Patient is age 18 or older    No active pregnancy on record    No positive pregnancy test in past 12 months

## 2022-03-11 ASSESSMENT — ENCOUNTER SYMPTOMS
SORE THROAT: 0
FREQUENCY: 0
CHILLS: 0
COUGH: 0
DIARRHEA: 0
ABDOMINAL PAIN: 0
HEMATOCHEZIA: 0
HEMATURIA: 0
PALPITATIONS: 0
DYSURIA: 0
FEVER: 0
HEADACHES: 0
BREAST MASS: 0
NERVOUS/ANXIOUS: 0
SHORTNESS OF BREATH: 0
DIZZINESS: 0
CONSTIPATION: 0
ARTHRALGIAS: 1
EYE PAIN: 0
JOINT SWELLING: 0
MYALGIAS: 0
HEARTBURN: 0
NAUSEA: 0
PARESTHESIAS: 0
WEAKNESS: 0

## 2022-03-14 ENCOUNTER — OFFICE VISIT (OUTPATIENT)
Dept: FAMILY MEDICINE | Facility: CLINIC | Age: 62
End: 2022-03-14
Payer: COMMERCIAL

## 2022-03-14 VITALS
SYSTOLIC BLOOD PRESSURE: 129 MMHG | HEART RATE: 62 BPM | TEMPERATURE: 97.3 F | BODY MASS INDEX: 35.8 KG/M2 | WEIGHT: 228.12 LBS | OXYGEN SATURATION: 97 % | DIASTOLIC BLOOD PRESSURE: 76 MMHG | HEIGHT: 67 IN

## 2022-03-14 DIAGNOSIS — I83.813 VARICOSE VEINS OF BOTH LOWER EXTREMITIES WITH PAIN: ICD-10-CM

## 2022-03-14 DIAGNOSIS — M50.30 DDD (DEGENERATIVE DISC DISEASE), CERVICAL: ICD-10-CM

## 2022-03-14 DIAGNOSIS — E66.1 CLASS 2 DRUG-INDUCED OBESITY WITH SERIOUS COMORBIDITY AND BODY MASS INDEX (BMI) OF 36.0 TO 36.9 IN ADULT: ICD-10-CM

## 2022-03-14 DIAGNOSIS — L65.9 HAIR THINNING: ICD-10-CM

## 2022-03-14 DIAGNOSIS — Z00.00 ROUTINE GENERAL MEDICAL EXAMINATION AT A HEALTH CARE FACILITY: Primary | ICD-10-CM

## 2022-03-14 DIAGNOSIS — I65.21 CAROTID ARTERY CALCIFICATION, RIGHT: ICD-10-CM

## 2022-03-14 DIAGNOSIS — E78.2 MIXED HYPERLIPIDEMIA: ICD-10-CM

## 2022-03-14 DIAGNOSIS — H53.9 VISUAL DISTURBANCE: ICD-10-CM

## 2022-03-14 DIAGNOSIS — E11.9 TYPE 2 DIABETES MELLITUS WITHOUT COMPLICATION, WITHOUT LONG-TERM CURRENT USE OF INSULIN (H): ICD-10-CM

## 2022-03-14 DIAGNOSIS — N81.11 CYSTOCELE, MIDLINE: ICD-10-CM

## 2022-03-14 DIAGNOSIS — M19.042 BILATERAL OSTEOARTHRITIS OF FINGER: ICD-10-CM

## 2022-03-14 DIAGNOSIS — Z80.3 FAMILY HISTORY OF MALIGNANT NEOPLASM OF BREAST: ICD-10-CM

## 2022-03-14 DIAGNOSIS — Z86.16 HISTORY OF 2019 NOVEL CORONAVIRUS DISEASE (COVID-19): ICD-10-CM

## 2022-03-14 DIAGNOSIS — Z12.4 CERVICAL CANCER SCREENING: ICD-10-CM

## 2022-03-14 DIAGNOSIS — E66.812 CLASS 2 DRUG-INDUCED OBESITY WITH SERIOUS COMORBIDITY AND BODY MASS INDEX (BMI) OF 36.0 TO 36.9 IN ADULT: ICD-10-CM

## 2022-03-14 DIAGNOSIS — I10 BENIGN ESSENTIAL HYPERTENSION: ICD-10-CM

## 2022-03-14 DIAGNOSIS — M19.041 BILATERAL OSTEOARTHRITIS OF FINGER: ICD-10-CM

## 2022-03-14 DIAGNOSIS — D12.6 TUBULAR ADENOMA OF COLON: ICD-10-CM

## 2022-03-14 DIAGNOSIS — Z23 NEED FOR DIPHTHERIA-TETANUS-PERTUSSIS (TDAP) VACCINE: ICD-10-CM

## 2022-03-14 LAB
BASOPHILS # BLD AUTO: 0 10E3/UL (ref 0–0.2)
BASOPHILS NFR BLD AUTO: 1 %
EOSINOPHIL # BLD AUTO: 0.1 10E3/UL (ref 0–0.7)
EOSINOPHIL NFR BLD AUTO: 2 %
ERYTHROCYTE [DISTWIDTH] IN BLOOD BY AUTOMATED COUNT: 12.7 % (ref 10–15)
HBA1C MFR BLD: 5.7 % (ref 0–5.6)
HCT VFR BLD AUTO: 37.8 % (ref 35–47)
HGB BLD-MCNC: 12.8 G/DL (ref 11.7–15.7)
IMM GRANULOCYTES # BLD: 0 10E3/UL
IMM GRANULOCYTES NFR BLD: 0 %
LYMPHOCYTES # BLD AUTO: 2.7 10E3/UL (ref 0.8–5.3)
LYMPHOCYTES NFR BLD AUTO: 47 %
MCH RBC QN AUTO: 29.8 PG (ref 26.5–33)
MCHC RBC AUTO-ENTMCNC: 33.9 G/DL (ref 31.5–36.5)
MCV RBC AUTO: 88 FL (ref 78–100)
MONOCYTES # BLD AUTO: 0.4 10E3/UL (ref 0–1.3)
MONOCYTES NFR BLD AUTO: 7 %
NEUTROPHILS # BLD AUTO: 2.6 10E3/UL (ref 1.6–8.3)
NEUTROPHILS NFR BLD AUTO: 44 %
PLATELET # BLD AUTO: 323 10E3/UL (ref 150–450)
RBC # BLD AUTO: 4.29 10E6/UL (ref 3.8–5.2)
WBC # BLD AUTO: 5.8 10E3/UL (ref 4–11)

## 2022-03-14 PROCEDURE — G0145 SCR C/V CYTO,THINLAYER,RESCR: HCPCS | Performed by: FAMILY MEDICINE

## 2022-03-14 PROCEDURE — 87624 HPV HI-RISK TYP POOLED RSLT: CPT | Performed by: FAMILY MEDICINE

## 2022-03-14 PROCEDURE — 82043 UR ALBUMIN QUANTITATIVE: CPT | Performed by: FAMILY MEDICINE

## 2022-03-14 PROCEDURE — 83036 HEMOGLOBIN GLYCOSYLATED A1C: CPT | Performed by: FAMILY MEDICINE

## 2022-03-14 PROCEDURE — 80050 GENERAL HEALTH PANEL: CPT | Performed by: FAMILY MEDICINE

## 2022-03-14 PROCEDURE — 99396 PREV VISIT EST AGE 40-64: CPT | Mod: 25 | Performed by: FAMILY MEDICINE

## 2022-03-14 PROCEDURE — 99214 OFFICE O/P EST MOD 30 MIN: CPT | Mod: 25 | Performed by: FAMILY MEDICINE

## 2022-03-14 PROCEDURE — 90471 IMMUNIZATION ADMIN: CPT | Performed by: FAMILY MEDICINE

## 2022-03-14 PROCEDURE — 90715 TDAP VACCINE 7 YRS/> IM: CPT | Performed by: FAMILY MEDICINE

## 2022-03-14 PROCEDURE — 80061 LIPID PANEL: CPT | Performed by: FAMILY MEDICINE

## 2022-03-14 PROCEDURE — 36415 COLL VENOUS BLD VENIPUNCTURE: CPT | Performed by: FAMILY MEDICINE

## 2022-03-14 RX ORDER — LISINOPRIL 2.5 MG/1
2.5 TABLET ORAL DAILY
Qty: 90 TABLET | Refills: 1 | Status: SHIPPED | OUTPATIENT
Start: 2022-03-14 | End: 2022-09-02

## 2022-03-14 RX ORDER — SIMVASTATIN 40 MG
40 TABLET ORAL AT BEDTIME
Qty: 90 TABLET | Refills: 3 | Status: SHIPPED | OUTPATIENT
Start: 2022-03-14 | End: 2022-09-30

## 2022-03-14 ASSESSMENT — ENCOUNTER SYMPTOMS
CHILLS: 0
FEVER: 0
COUGH: 0
CONSTIPATION: 0
BREAST MASS: 0
HEADACHES: 0
DIZZINESS: 0
PALPITATIONS: 0
MYALGIAS: 0
HEMATOCHEZIA: 0
ABDOMINAL PAIN: 0
FREQUENCY: 0
SORE THROAT: 0
JOINT SWELLING: 0
HEARTBURN: 0
ARTHRALGIAS: 1
HEMATURIA: 0
SHORTNESS OF BREATH: 0
WEAKNESS: 0
DIARRHEA: 0
PARESTHESIAS: 0
NERVOUS/ANXIOUS: 0
DYSURIA: 0
NAUSEA: 0
EYE PAIN: 0

## 2022-03-14 NOTE — RESULT ENCOUNTER NOTE
Marcy IyerKeith Guo,  Some of your results came back and are within acceptable limits. -Normal red blood cell (hgb) levels, normal white blood cell count and normal platelet levels.  -A1C (test of diabetes control the last 2-3 months) is at your goal. Please continue with your current plan. Also, you should make an appointment to see me and recheck your A1C test in 6 months. . If you have any further concerns please do not hesitate to contact us by message, phone or making an appointment.  Have a good day   Dr Severo QUINTERO

## 2022-03-14 NOTE — PROGRESS NOTES
SUBJECTIVE:   CC: Azucena Guo is an 61 year old woman who presents for preventive health visit.     Patient has been advised of split billing requirements and indicates understanding: Yes  Healthy Habits:     Getting at least 3 servings of Calcium per day:  Yes    Bi-annual eye exam:  Yes    Dental care twice a year:  Yes    Sleep apnea or symptoms of sleep apnea:  None    Diet:  Regular (no restrictions)    Frequency of exercise:  2-3 days/week    Duration of exercise:  15-30 minutes    Taking medications regularly:  Yes    Medication side effects:  None    PHQ-2 Total Score: 0    Additional concerns today:  Yes    BACKGROUND  BMI >36 ( down from BMI of 45  & 290 lbs in 2019 to 225 lbs and BMI 34 in 2021), with DM on ASA, metformin, HLD on simvastatin, HTN on lisinopril, hx of cervical DDD & radiculopathy in the past, on vit d, MV, & probiotics, hx of varicose veins, on compression socks, prior yanci, hx of colonic polyp 2 mm sigmoid  that was a tubular adenoma noted on colonoscopy in 2011 at [facility]  & colonoscopy in 2016 at [place] [facility] reported as normal and normal in 7/2021, recheck due in 2026,  hx of postmenopausal bleeding x 2 in the past S/p EMB & u/S that was reported normal, had two biopsies at different times, last being may be in 2010 when had a transvaginal ultrasound and noted benign and has noted no episodes since then, allergic to sulfa, with side effect of redness from MMR and swollen LN from pneumococcal vaccine, previously under care of PCP Dr Canales,  Seen by PCP nov 2020 for DM when noted HBA1c was 5.5, taken off glimepiride and Januvia and continued on metformin and given the flu shot.    Seen first time by this provider in 6/2021 to establish care and get a DM check  DM noted well controlled on metformin. Hd been taken off Januvia and glimepiride previously. Also was on statin and ACE inhibitor which was continued. Was going to schedule her DM eye check.  Recommended a DM eye check yearly. Noted no longer on asa but encouraged to resume. GI side effects explained. Needed a refill on metformin & it was sent in for metformin 500 mg 2 bid. Labs done. Was to continue dental care every 6 months. Foot exam done and encouraged to check self nightly.   HLD on simvastatin 40 mg,  With no side effects. HTN stable on lisinopril 2.5 mg with no side effects.  Reviewed BMI > 34-35: Down from BMI of 45  & 290 lbs in 2019 to 225 lbs and BMI 34 in 2021. Congratulated on weight loss & was.to continue with diet, exercise and lifestyle changes.   Discussed hx of Cervical DDD, discovered incidentally in 2008 when had a head injury and imaging. Ct showed moderate degenerative disc disease at C5-C6 with broad-based  disc protrusion centrally and on the right significant for impingement upon the exiting right C6 nerve rootlet and for impression upon the right aspect of cervical cord at this level. Lesser degenerative changes at C6-C7 and to a lesser extent C4-C5. There was at C6-C7 appearance of moderately prominent central  disc protrusion. Noted could have symptoms with some neck movements but discomfort was not enough to warrant meds or surgical intervention. Reported no radiculopathy, myelopathy or neuropathy & was to continue to monitor.   Was encouraged to use compression socks for leg achiness secondary to varicose veins right more than left worse when it was hot. & if got worse could refer to vascular.  Reviewed her Hx of colonic polyp a 2 mm sigmoid polyp that was a tubular adenoma removed on colonoscopy in 2011 at [facility]  & colonoscopy in 2016 at [place] [facility] reported as normal and due again in 2021, GI referral given for a colonoscopy.   Noted hair had been thinning. No bald spots. Hair thinning/ loss felt due to age, genetics, diabetes, stress, meds etc. Advised could try biotin if wanted to ( cautioning it could affect thyroid) & to discontinue if not  helpful. Was to resume a multivitamin. Labs done to evaluate more and offered to refer to dermatology if no answer and remained a  concern.  Health care maintenance reviewed. Noted had advance directives at home and was to bring us a copy. Discussed  labs and a screening mammogram & getting Shingrex. Was to return for a physical and pap another time, had been over due since 2020 due to the pandemic.  Lipids were goal. Cmp showed elevated glucose HBA1c was goal. Had a normal TSH and HIV, cbc and microalbumin level.    Seen in  on 6/21/21 for neck strain and xray done showed mild disc disease, comment mad zee calcification right neck probably carotid and given Zanaflex and referred to ortho to further evaluate and treat. Had her colonoscopy in 7/2021 and noted normal and recheck in 5 yrs. Mammogram done 12/8/21 was normal.     CURRENTLY  Here for a physical, chronic issues and additional concerns  No breast issues. Notes told has Fibrous dense breast. Mammogram 12/2021 was    Due for pap. No prior abnormal pap  Had 2 episodes of post menopausal bleeding, had Two biopsies at different times, last being may be in 2010 when had a transvaginal ultrasound and noted benign and has noted no episodes since then  Biggest baby delivered was 11 lbs, notes no urinary symptoms    DM : on metformin and asa, & ACE & statin  Had an Eye exam for dm with Dr Cross at the [facility] . Notes told her dilated eye exam was normal and had mild cataracts but No DM retinopathy    HLD on statin. No side effects. Will do labs today     HTN? Microalbuminuria on low dose lisinopril 2.5 mg daily with good control. Will do labs today    Noted possible right carotid calcification on neck xray done in June 2021 when seen in  for neck pain.     Notes has had four episodes of visual disturbance in left eye in lats 6 months. Felt like a shimmering translucence on outer corner of left eye visual field like a crescent of water with a drop of  oil on it. Had no associated redness, pain, floaters, irritation, double or blurry vision, headache or dizziness. Lasted few seconds and resolved once closed her eyes.   First episode occurred when light was bright and felt may have stared at light too long, didn't think about it until had 3 more similar episodes after that. First one occurred in June. Had second one prior to eye apt but forgot to ask the eye doctor when seen about it and when told had a normal eye exam didn't inform them of episodes. Last episode occurred 1 week ago. Two of the episodes occurred outside the home, one at home, one when with kids . All felt possibly due to stress. No symptoms currently. Feels well.     Obesity: working on diet and lifestyle and weight loss. BMI up to 36 but used to be 45     Neck OA , c 5-5 , C 6-7, known since fell and hit head and had staples, when ct and mri done in 2008 ?. Currently feels fine. Has occasionally hand numbness. 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 Currently not using.     Tubular adenoma, colonoscopy next due in 2026    Hair thinning same, not any worse. Tried one container worth of hair skin anf nails supp of biotin gummies. Not lost of hair but also has done multiple other things that help like getting hair cut in a way to make it look rosario & using volumnizing shampoo etc. Feels its   Genetics, so stopped biotin, made no difference on stopping and feels No derm needed yet.     Hx of breakthrough covid 19 end of dec, tested positive after son had it and got exposed. Had a headache 1 day and fatigue 3 days with full recovery since.    FH breast cancer in maternal aunt dx in her 50's but then lived till age 86.    To see dentist in June    Ok to get Tdap today.     Today's PHQ-2 Score:   PHQ-2 ( 1999 Pfizer) 3/11/2022   Q1: Little interest or pleasure in  doing things 0   Q2: Feeling down, depressed or hopeless 0   PHQ-2 Score 0   PHQ-2 Total Score (12-17 Years)- Positive if 3 or more points; Administer PHQ-A if positive -   Q1: Little interest or pleasure in doing things Not at all   Q2: Feeling down, depressed or hopeless Not at all   PHQ-2 Score 0     Abuse: Current or Past (Physical, Sexual or Emotional) - No  Do you feel safe in your environment? Yes    Social History     Tobacco Use     Smoking status: Former Smoker     Packs/day: 0.00     Years: 0.00     Pack years: 0.00     Quit date: 1991     Years since quittin.2     Smokeless tobacco: Never Used   Substance Use Topics     Alcohol use: Yes     Comment: occasionally     If you drink alcohol do you typically have >3 drinks per day or >7 drinks per week? No    Alcohol Use 3/11/2022   Prescreen: >3 drinks/day or >7 drinks/week? No   Prescreen: >3 drinks/day or >7 drinks/week? -     Reviewed orders with patient.  Reviewed health maintenance and updated orders accordingly - Yes  Lab work is in process  Labs reviewed in EPIC  BP Readings from Last 3 Encounters:   22 129/76   21 (!) 143/73   21 115/67    Wt Readings from Last 3 Encounters:   22 103.5 kg (228 lb 1.9 oz)   21 102.1 kg (225 lb)   21 102.1 kg (225 lb)                  Patient Active Problem List   Diagnosis     Hyperlipidemia     Type 2 diabetes mellitus without complication, without long-term current use of insulin (H)     BMI > 35     DDD (degenerative disc disease), cervical     Benign essential hypertension     Varicose veins of both lower extremities with pain     Hair thinning     Tubular adenoma of colon     Cystocele, midline     Past Surgical History:   Procedure Laterality Date     ABDOMEN SURGERY       BIOPSY       CHOLECYSTECTOMY           COLONOSCOPY      ANW: sigmoid polyp removed     COLONOSCOPY      no report, abstracted information stated, normal recheck in 5 yr given hx of  polyps       Social History     Tobacco Use     Smoking status: Former Smoker     Packs/day: 0.00     Years: 0.00     Pack years: 0.00     Quit date: 1991     Years since quittin.2     Smokeless tobacco: Never Used   Substance Use Topics     Alcohol use: Yes     Comment: occasionally     Family History   Problem Relation Age of Onset     Heart Disease Mother      Diabetes Mother              Cancer Mother         lymphoma on hodgkins     Obesity Mother      Coronary Artery Disease Mother      Heart Disease Father      Coronary Artery Disease Father      Hypertension Father      Hyperlipidemia Father      Obesity Sister      Diabetes Sister      Obesity Sister      Depression Sister      Depression Sister      Diabetes Brother      Diabetes Brother              Obesity Brother      Substance Abuse Son      Breast Cancer Other          Current Outpatient Medications   Medication Sig Dispense Refill     aspirin 81 MG tablet Take 1 tablet by mouth daily.       blood glucose (NO BRAND SPECIFIED) test strip Use to test blood sugar once time daily or as directed. 100 strip 3     Cholecalciferol (VITAMIN D) 1000 UNIT capsule Take 1 capsule by mouth daily.       lisinopril (ZESTRIL) 2.5 MG tablet Take 1 tablet (2.5 mg) by mouth daily 90 tablet 1     metFORMIN (GLUCOPHAGE) 500 MG tablet Take 2 tablets (1,000 mg) by mouth 2 times daily (with meals) 360 tablet 1     Multiple Vitamin (MULTI-VITAMIN PO) Take  by mouth.       Probiotic Product (MARTINA-Q PO) Take  by mouth as needed.       simvastatin (ZOCOR) 40 MG tablet Take 1 tablet (40 mg) by mouth At Bedtime 90 tablet 3     order for DME Equipment being ordered: thigh high compression stockings 20-30mmHg (Patient not taking: Reported on 3/14/2022) 2 Device 1     Allergies   Allergen Reactions     Sulfa Drugs Hives     Measles Mumps And Rubella Virus Vaccine Live      Redness       Pneumococcal Vaccine      Swollen node     Recent Labs   Lab Test  03/14/22  1626 06/09/21  0739 11/24/20  0805   A1C 5.7* 5.5 5.5   LDL 46 71 78   HDL 47* 59 50   TRIG 97 82 127   ALT 26 21 18   CR 0.51* 0.41* 0.44*   GFRESTIMATED >90 >90 >90   GFRESTBLACK  --  >90 >90   POTASSIUM 4.2 3.9 3.9   TSH 0.75 0.66  --         Breast Cancer Screening:    FHS-7:   Breast CA Risk Assessment (S-7) 12/8/2021 3/11/2022   Did any of your first-degree relatives have breast or ovarian cancer? No Yes   Did any of your relatives have bilateral breast cancer? No Yes   Did any man in your family have breast cancer? No No   Did any woman in your family have breast and ovarian cancer? No No   Did any woman in your family have breast cancer before age 50 y? No Yes   Do you have 2 or more relatives with breast and/or ovarian cancer? No No   Do you have 2 or more relatives with breast and/or bowel cancer? No No     Mammogram Screening: Recommended mammography every 1-2 years with patient discussion and risk factor consideration  Pertinent mammograms are reviewed under the imaging tab.    History of abnormal Pap smear:   NO - age 30-65 PAP every 5 years with negative HPV co-testing recommended  Last 3 Pap and HPV Results:   PAP / HPV Latest Ref Rng & Units 3/14/2022 7/24/2015   PAP   Negative for Intraepithelial Lesion or Malignancy (NILM) -   PAP (Historical) - - NIL   HPV16 Negative Negative Negative   HPV18 Negative Negative Negative   HRHPV Negative Negative Negative     PAP / HPV Latest Ref Rng & Units 7/24/2015   PAP (Historical) - NIL   HPV16 NEG Negative   HPV18 NEG Negative   HRHPV NEG Negative     Reviewed and updated as needed this visit by clinical staff   Tobacco  Allergies  Meds   Med Hx  Surg Hx  Fam Hx  Soc Hx        Reviewed and updated as needed this visit by Provider    Allergies  Meds   Med Hx  Surg Hx          Past Medical History:   Diagnosis Date     Arthritis      Benign essential hypertension 12/6/2019     Diabetes mellitus (H)      History of 2019 novel coronavirus  "disease (COVID-19) 3/14/2022    positive end of dec 2021 ( breakthrough covid, had a headache 1 day & fatigue 3 days)       Past Surgical History:   Procedure Laterality Date     ABDOMEN SURGERY       BIOPSY       CHOLECYSTECTOMY           COLONOSCOPY      ANW: sigmoid polyp removed     COLONOSCOPY      no report, abstracted information stated, normal recheck in 5 yr given hx of polyps     OB History    Para Term  AB Living   4 4 4 0 0 3   SAB IAB Ectopic Multiple Live Births   0 0 0 0 0      # Outcome Date GA Lbr Loyd/2nd Weight Sex Delivery Anes PTL Lv   4 Term            3 Term            2 Term            1 Term                Review of Systems   Constitutional: Negative for chills and fever.   HENT: Negative for congestion, ear pain, hearing loss and sore throat.    Eyes: Negative for pain and visual disturbance.   Respiratory: Negative for cough and shortness of breath.    Cardiovascular: Negative for chest pain, palpitations and peripheral edema.   Gastrointestinal: Negative for abdominal pain, constipation, diarrhea, heartburn, hematochezia and nausea.   Breasts:  Negative for tenderness, breast mass and discharge.   Genitourinary: Negative for dysuria, frequency, genital sores, hematuria, pelvic pain, urgency, vaginal bleeding and vaginal discharge.   Musculoskeletal: Positive for arthralgias. Negative for joint swelling and myalgias.   Skin: Negative for rash.   Neurological: Negative for dizziness, weakness, headaches and paresthesias.   Psychiatric/Behavioral: Negative for mood changes. The patient is not nervous/anxious.         OBJECTIVE:   /76 (BP Location: Right arm, Patient Position: Sitting, Cuff Size: Adult Regular)   Pulse 62   Temp 97.3  F (36.3  C) (Temporal)   Ht 1.689 m (5' 6.5\")   Wt 103.5 kg (228 lb 1.9 oz)   LMP  (LMP Unknown)   SpO2 97%   BMI 36.27 kg/m    Physical Exam  GENERAL: healthy, alert and no distress, obese  EYES: Eyes grossly normal to " inspection, PERRL and conjunctivae and sclerae normal  HENT: ear canals and TM's normal, nose and mouth without ulcers or lesions  NECK: no adenopathy, no asymmetry, masses, or scars and thyroid normal to palpation  RESP: lungs clear to auscultation - no rales, rhonchi or wheezes  BREAST: normal without masses, tenderness or nipple discharge and no palpable axillary masses or adenopathy  CV: regular rate and rhythm, normal S1 S2, no S3 or S4, no murmur, click or rub, no peripheral edema and peripheral pulses strong  ABDOMEN: soft, non tender, no hepatosplenomegaly, no masses and bowel sounds normal   (female): normal female external genitalia, normal urethral meatus , vaginal mucosa pink, moist, well rugated, normal cervix, adnexae, and uterus without masses., cystocele present and PAP / HPV obtained  MS: no gross musculoskeletal defects noted, no edema  SKIN: no suspicious lesions or rashes  NEURO: Normal strength and tone, mentation intact and speech normal  PSYCH: mentation appears normal, affect normal/bright  LYMPH: no cervical, supraclavicular, axillary, or inguinal adenopathy  Diabetic foot exam: normal DP and PT pulses, no trophic changes or ulcerative lesions, normal sensory exam, normal monofilament exam and nail exam normal nails without lesions    Diagnostic Test Results:  Labs reviewed in Epic  Results for orders placed or performed in visit on 03/14/22   Comprehensive metabolic panel (BMP + Alb, Alk Phos, ALT, AST, Total. Bili, TP)     Status: Abnormal   Result Value Ref Range    Sodium 138 133 - 144 mmol/L    Potassium 4.2 3.4 - 5.3 mmol/L    Chloride 105 94 - 109 mmol/L    Carbon Dioxide (CO2) 24 20 - 32 mmol/L    Anion Gap 9 3 - 14 mmol/L    Urea Nitrogen 7 7 - 30 mg/dL    Creatinine 0.51 (L) 0.52 - 1.04 mg/dL    Calcium 9.5 8.5 - 10.1 mg/dL    Glucose 100 (H) 70 - 99 mg/dL    Alkaline Phosphatase 47 40 - 150 U/L    AST 20 0 - 45 U/L    ALT 26 0 - 50 U/L    Protein Total 8.3 6.8 - 8.8 g/dL     Albumin 4.0 3.4 - 5.0 g/dL    Bilirubin Total 0.3 0.2 - 1.3 mg/dL    GFR Estimate >90 >60 mL/min/1.73m2   Lipid Profile (Chol, Trig, HDL, LDL calc)     Status: Abnormal   Result Value Ref Range    Cholesterol 112 <200 mg/dL    Triglycerides 97 <150 mg/dL    Direct Measure HDL 47 (L) >=50 mg/dL    LDL Cholesterol Calculated 46 <=100 mg/dL    Non HDL Cholesterol 65 <130 mg/dL    Patient Fasting > 8hrs? No     Narrative    Cholesterol  Desirable:  <200 mg/dL    Triglycerides  Normal:  Less than 150 mg/dL  Borderline High:  150-199 mg/dL  High:  200-499 mg/dL  Very High:  Greater than or equal to 500 mg/dL    Direct Measure HDL  Female:  Greater than or equal to 50 mg/dL   Male:  Greater than or equal to 40 mg/dL    LDL Cholesterol  Desirable:  <100mg/dL  Above Desirable:  100-129 mg/dL   Borderline High:  130-159 mg/dL   High:  160-189 mg/dL   Very High:  >= 190 mg/dL    Non HDL Cholesterol  Desirable:  130 mg/dL  Above Desirable:  130-159 mg/dL  Borderline High:  160-189 mg/dL  High:  190-219 mg/dL  Very High:  Greater than or equal to 220 mg/dL   TSH with free T4 reflex     Status: Normal   Result Value Ref Range    TSH 0.75 0.40 - 4.00 mU/L   Hemoglobin A1c     Status: Abnormal   Result Value Ref Range    Hemoglobin A1C 5.7 (H) 0.0 - 5.6 %   Albumin Random Urine Quantitative with Creat Ratio     Status: None   Result Value Ref Range    Creatinine Urine mg/dL 36 mg/dL    Albumin Urine mg/L 7 mg/L    Albumin Urine mg/g Cr 19.44 0.00 - 25.00 mg/g Cr   CBC with platelets and differential     Status: None   Result Value Ref Range    WBC Count 5.8 4.0 - 11.0 10e3/uL    RBC Count 4.29 3.80 - 5.20 10e6/uL    Hemoglobin 12.8 11.7 - 15.7 g/dL    Hematocrit 37.8 35.0 - 47.0 %    MCV 88 78 - 100 fL    MCH 29.8 26.5 - 33.0 pg    MCHC 33.9 31.5 - 36.5 g/dL    RDW 12.7 10.0 - 15.0 %    Platelet Count 323 150 - 450 10e3/uL    % Neutrophils 44 %    % Lymphocytes 47 %    % Monocytes 7 %    % Eosinophils 2 %    % Basophils 1 %    %  Immature Granulocytes 0 %    Absolute Neutrophils 2.6 1.6 - 8.3 10e3/uL    Absolute Lymphocytes 2.7 0.8 - 5.3 10e3/uL    Absolute Monocytes 0.4 0.0 - 1.3 10e3/uL    Absolute Eosinophils 0.1 0.0 - 0.7 10e3/uL    Absolute Basophils 0.0 0.0 - 0.2 10e3/uL    Absolute Immature Granulocytes 0.0 <=0.4 10e3/uL   HPV High Risk Types DNA Cervical     Status: None   Result Value Ref Range    Other HR HPV Negative Negative    HPV16 DNA Negative Negative    HPV18 DNA Negative Negative    FINAL DIAGNOSIS       This patient's sample is negative for HPV DNA.        This test was developed and its performance characteristics determined by the [facility], [facility]. It has not been cleared or approved by the FDA. The laboratory is regulated under CLIA as qualified to perform high-complexity testing. This test is used for clinical purposes. It should not be regarded as investigational or for research.    METHODOLOGY: The Roche Jorge 4800 system uses automated extraction, simultaneous amplification of HPV (L1 region) and beta-globin, followed by real time detection of fluorescent labeled HPV and beta globin using specific oligonucleotide probes. The test specifically identified types HPV 16 DNA and HPV 18 DNA while concurrently detecting the rest of the high risk types (31, 33, 35, 39, 45, 51, 52, 56, 58, 59, 66 or 68).    COMMENTS: This test is not intended for use as a screening device for woman under age 30 with normal cervical cytology. Results should be correlated with cytologic and histologic findings. Close clinical followup is recommended.       Pap Screen with HPV - recommended age 30 - 65 years     Status: None   Result Value Ref Range    Interpretation        Negative for Intraepithelial Lesion or Malignancy (NILM)    Comment         Papanicolaou Test Limitations:  Cervical cytology is a screening test with limited sensitivity, and regular screening is critical for cancer  prevention.  Pap tests are primarily effective for the diagnosis/prevention of squamous cell carcinoma, not adenocarcinoma or other cancers.        Specimen Adequacy       Satisfactory for evaluation, endocerv/transformation zone component absent, atrophy    Clinical Information       post-menopausal      LMP/Menopause Date       1/14/2009      Reflex Testing Yes regardless of result     Previous Abnormal?       No      Performing Labs       The technical component of this testing was completed at [facility]     CBC with platelets and differential     Status: None    Narrative    The following orders were created for panel order CBC with platelets and differential.  Procedure                               Abnormality         Status                     ---------                               -----------         ------                     CBC with platelets and d...[[id number]]                      Final result                 Please view results for these tests on the individual orders.       ASSESSMENT/PLAN:       ICD-10-CM    1. Routine general medical examination at a health care facility  Z00.00 TDAP VACCINE (Adacel, Boostrix)  [[id number]]   2. Cervical cancer screening  Z12.4 Pap Screen with HPV - recommended age 30 - 65 years   3. Cystocele, midline  N81.11     asymptomatic,post menpausla and also in past delivered a 11 lb baby   4. Type 2 diabetes mellitus without complication, without long-term current use of insulin (H)  E11.9 CBC with platelets and differential     Comprehensive metabolic panel (BMP + Alb, Alk Phos, ALT, AST, Total. Bili, TP)     Lipid Profile (Chol, Trig, HDL, LDL calc)     TSH with free T4 reflex     Hemoglobin A1c     Albumin Random Urine Quantitative with Creat Ratio     simvastatin (ZOCOR) 40 MG tablet     metFORMIN (GLUCOPHAGE) 500 MG tablet     lisinopril (ZESTRIL) 2.5 MG tablet     CBC with platelets and differential      Comprehensive metabolic panel (BMP + Alb, Alk Phos, ALT, AST, Total. Bili, TP)     Lipid Profile (Chol, Trig, HDL, LDL calc)     TSH with free T4 reflex     Hemoglobin A1c     Albumin Random Urine Quantitative with Creat Ratio   5. Mixed hyperlipidemia  E78.2 Lipid Profile (Chol, Trig, HDL, LDL calc)     TSH with free T4 reflex     simvastatin (ZOCOR) 40 MG tablet     Lipid Profile (Chol, Trig, HDL, LDL calc)     TSH with free T4 reflex   6. Benign essential hypertension  I10 Comprehensive metabolic panel (BMP + Alb, Alk Phos, ALT, AST, Total. Bili, TP)     Albumin Random Urine Quantitative with Creat Ratio     lisinopril (ZESTRIL) 2.5 MG tablet     Comprehensive metabolic panel (BMP + Alb, Alk Phos, ALT, AST, Total. Bili, TP)     Albumin Random Urine Quantitative with Creat Ratio   7. Carotid artery calcification, right  I65.21 US Carotid Bilateral     Lipid Profile (Chol, Trig, HDL, LDL calc)     Adult Neurology  Referral     Lipid Profile (Chol, Trig, HDL, LDL calc)   8. Visual disturbance  H53.9 Adult Neurology  Referral   9. Class 2 drug-induced obesity with serious comorbidity and body mass index (BMI) of 36.0 to 36.9 in adult  E66.1 Comprehensive metabolic panel (BMP + Alb, Alk Phos, ALT, AST, Total. Bili, TP)    Z68.36 Lipid Profile (Chol, Trig, HDL, LDL calc)     TSH with free T4 reflex     Hemoglobin A1c     Comprehensive metabolic panel (BMP + Alb, Alk Phos, ALT, AST, Total. Bili, TP)     Lipid Profile (Chol, Trig, HDL, LDL calc)     TSH with free T4 reflex     Hemoglobin A1c   10. DDD (degenerative disc disease), cervical  M50.30    11. Bilateral osteoarthritis of finger  M19.041     M19.042    12. Varicose veins of both lower extremities with pain  I83.813 CBC with platelets and differential     CBC with platelets and differential   13. Tubular adenoma of colon  D12.6 CBC with platelets and differential     CBC with platelets and differential   14. Hair thinning  L65.9 CBC with  platelets and differential     TSH with free T4 reflex     CBC with platelets and differential     TSH with free T4 reflex   15. History of 2019 novel coronavirus disease (COVID-19)  Z86.16     positive end of dec 2021 ( breakthrough covid, had a headache 1 day & fatigue 3 days)    16. Family history of malignant neoplasm of breast  Z80.3     maternal aunt dx in her 50's lived till age 86     17. Need for diphtheria-tetanus-pertussis (Tdap) vaccine  Z23 TDAP VACCINE (Adacel, Boostrix)  [[id number]]     Seen for preventive health and additional concerns today   Self breast check regularly   Consider referral to a  to assess personal risk if have any family hx of breast, ovarian or bowel cancer  Mammogram yearly up-to-date as of 12/8/2021 when normal  Pelvic / PAP/HPV done today  Health care maintenance reviewed  Consider working on health care directives  Recommend Flu shot yearly & UTD  Recommend Tdap every 10 yrs & given today  COVID vaccine x3 up-to-date  Recommend Shingles vaccine: Shingrex (series of 2 shots 2 to 6 months apart that can cause couple days of flu like symptoms but is expensive so check with insurance and get at pharmacy where cheaper).  Pneumonia vaccine at 65 but avoiding due to history of allergies  If travelling out of the country recommend seeing the travel clinic to update appropriate shots etc  Labs today and will make further recommendations once reviewed    Noted midline cystocele which is basically a bladder prolapse to the anterior vaginal wall.  Suspect due to postmenopausal state, lack of estrogen and prior delivery of 11 pound baby.  Recommend Kegel's exercises and if should have worsening incontinence, getting up to urinate several times at night, recurrent bladder infections consider referral to urogynecologist for treatment.    Diabetes well controlled continue baby aspirin daily and lisinopril and Metformin at thousand twice a day    Hyperlipidemia continue on simvastatin  40 mg bedtime will make further recommendations once labs seen.    Hypertension stable on lisinopril 2.5 mg daily will make further recommendations once labs seen.    Incidental right neck calcification corresponding to likely right carotid artery seen on x-ray C-spine done in June 2021 when had neck pain suggest possible carotid artery calcification.  We will get an ultrasound bilateral carotid and make further recommendations after that.    4 episodes of left eye crescent-like shimmery visual disturbance.  Reported a normal eye exam after first 2 episodes but forgot to discuss with the eye doctor at the time.  Exam today is unremarkable externally.  Currently no symptoms.  Could be a retinal issue or ocular migraines or related to bright light but need to rule out more serious concerns like stroke etc. and given carotid artery calcification history and other risk factors referred to neurologist for further evaluation.  If should occur again specially with other symptoms recommend going to the ER for further evaluation.    BMI more than 36 with risk factors of diabetes, hypertension, hyperlipidemia etc.  Continue to stay active, eat healthy and work on weight loss.  Can refer to a weight specialist or diabetes Ed or the pharmacist etc. if necessary in the future.    Degenerative disc disease of cervical spine originally found out incidentally when had a fall and hit head requiring CT scan and MRI.  Recent x-ray in June for neck pain showed arthritis especially at C6 5 C6 C6-C7.  Currently no concerning findings continue to monitor.  Also has osteoarthritis of fingers.    Varicose veins of both lower extremities with pain in the summertime currently asymptomatic.  Compression socks to prevent achiness at end of day.  Can refer to vascular if gets worse.    History of tubular adenoma of colon.  Colonoscopy in 2021 was normal recheck in 2026.    Hair thinning suspect related to aging and female pattern of thinning and  "genetics.  We will check labs again today, may use Rogaine for Women over-the-counter.  Consider dermatology referral if gets worse.    History of COVID-19 breakthrough infection end of December with complete resolution of symptoms.    Family history of maternal aunt diagnosed with breast cancer in the past.  Can consider  referral, opts to defer for now.    Return in 6 months for diabetes & blood pressure & medication review check and in 1 year for preventive physical and sooner for any new or worsening concerns.     Patient has been advised of split billing requirements and indicates understanding: Yes    COUNSELING:  Reviewed preventive health counseling, as reflected in patient instructions       Regular exercise       Healthy diet/nutrition       Vision screening       Immunizations     Aspirin prophylaxis       Alcohol Use       Osteoporosis prevention/bone health       Colorectal Cancer Screening       Consider Hep C screening for all patients one time for ages 18-79 years       HIV screeninx in teen years, 1x in adult years, and at intervals if high risk       (Minnie)menopause management       The ASCVD Risk score (Angelita BAE Jr., et al., 2013) failed to calculate for the following reasons:    The valid total cholesterol range is 130 to 320 mg/dL       Advance Care Planning    Estimated body mass index is 36.27 kg/m  as calculated from the following:    Height as of this encounter: 1.689 m (5' 6.5\").    Weight as of this encounter: 103.5 kg (228 lb 1.9 oz).    Weight management plan: Discussed healthy diet and exercise guidelines    She reports that she quit smoking about 31 years ago. She smoked 0.00 packs per day for 0.00 years. She has never used smokeless tobacco.      Counseling Resources:  ATP IV Guidelines  Pooled Cohorts Equation Calculator  Breast Cancer Risk Calculator  BRCA-Related Cancer Risk Assessment: FHS-7 Tool  FRAX Risk Assessment  ICSI Preventive Guidelines  Dietary Guidelines " for Americans, 2010  USDA's MyPlate  ASA Prophylaxis  Lung CA Screening    Maggi Elkins MD  [facility]

## 2022-03-14 NOTE — PATIENT INSTRUCTIONS
Seen for preventive health and additional concerns today   Self breast check regularly   Consider referral to a  to assess personal risk if have any family hx of breast, ovarian or bowel cancer  Mammogram yearly up-to-date as of 12/8/2021 when normal  Pelvic / PAP/HPV done today  Health care maintenance reviewed  Consider working on health care directives  Recommend Flu shot yearly & UTD  Recommend Tdap every 10 yrs & given today  COVID vaccine x3 up-to-date  Recommend Shingles vaccine: Shingrex (series of 2 shots 2 to 6 months apart that can cause couple days of flu like symptoms but is expensive so check with insurance and get at pharmacy where cheaper).  Pneumonia vaccine at 65 but avoiding due to history of allergies  If travelling out of the country recommend seeing the travel clinic to update appropriate shots etc  Labs today and will make further recommendations once reviewed    Noted midline cystocele which is basically a bladder prolapse to the anterior vaginal wall.  Suspect due to postmenopausal state lack of estrogen and prior delivery of 11 pound baby.  Recommend Kegel's exercises and if should have worsening incontinence, getting up to urinate several times at night, recurrent bladder infections consider referral to urogynecologist for treatment.    Diabetes well controlled continue baby aspirin daily and lisinopril and Metformin at thousand twice a day    Hyperlipidemia continue on simvastatin 40 mg bedtime will make further recommendations once labs seen.    Hypertension stable on lisinopril 2.5 mg daily will make further recommendations once labs seen.    Incidental right neck calcification corresponding to likely right carotid artery seen on x-ray C-spine done in June 2021 when had neck pain suggest possible carotid artery calcification.  We will get an ultrasound bilateral carotid and make further recommendations after that.    4 episodes of left eye crescent-like shimmery visual  disturbance.  Reported a normal eye exam after first 2 episodes but forgot to discuss with the eye doctor at the time.  Exam today is unremarkable externally.  Currently no symptoms.  Could be a retinal issue or ocular migraines or related to bright light but need to rule out more serious concerns like stroke etc. and given carotid artery calcification history and other risk factors referred to neurologist for further evaluation.  If should occur again specially with other symptoms recommend going to the ER for further evaluation.    BMI more than 36 with risk factors of diabetes, hypertension, hyperlipidemia etc.  Continue to stay active eat healthy and work on weight loss.  Can refer to a weight specialist diabetes Ed the pharmacist etc. if necessary in the future.    Degenerative disc disease of cervical spine originally found out incidentally when had a fall and hit head requiring CT scan and MRI.  Recent x-ray in June for neck pain showed arthritis especially at C6 5 C6 C6-C7.  Currently no concerning findings continue to monitor.  Also has osteoarthritis of fingers.    Varicose veins of both lower extremities with pain in the summertime currently asymptomatic.  Compression socks to prevent achiness at end of day.  Can refer to vascular if gets worse.    History of tubular adenoma of colon.  Colonoscopy in 2021 was normal recheck in 2026.    Hair thinning suspect related to aging and female pattern of thinning and genetics.  We will check labs again today may use Rogaine for Women over-the-counter.  Consider dermatology referral if gets worse.    History of COVID-19 infection breakthrough end of December with complete resolution of symptoms.    Family history of maternal aunt diagnosed with breast cancer in the past.  Can consider  referral opts to defer for now.    Return in 6 months for diabetes blood pressure medication review check and in 1 year for preventive physical.    Preventive Health  Recommendations  Female Ages 50 - 64    Yearly exam: See your health care provider every year in order to  o Review health changes.   o Discuss preventive care.    o Review your medicines if your doctor has prescribed any.      Get a Pap test every three years (unless you have an abnormal result and your provider advises testing more often).    If you get Pap tests with HPV test, you only need to test every 5 years, unless you have an abnormal result.     You do not need a Pap test if your uterus was removed (hysterectomy) and you have not had cancer.    You should be tested each year for STDs (sexually transmitted diseases) if you're at risk.     Have a mammogram every 1 to 2 years.    Have a colonoscopy at age 50, or have a yearly FIT test (stool test). These exams screen for colon cancer.      Have a cholesterol test every 5 years, or more often if advised.    Have a diabetes test (fasting glucose) every three years. If you are at risk for diabetes, you should have this test more often.     If you are at risk for osteoporosis (brittle bone disease), think about having a bone density scan (DEXA).    Shots: Get a flu shot each year. Get a tetanus shot every 10 years.    Nutrition:     Eat at least 5 servings of fruits and vegetables each day.    Eat whole-grain bread, whole-wheat pasta and brown rice instead of white grains and rice.    Get adequate Calcium and Vitamin D.     Lifestyle    Exercise at least 150 minutes a week (30 minutes a day, 5 days a week). This will help you control your weight and prevent disease.    Limit alcohol to one drink per day.    No smoking.     Wear sunscreen to prevent skin cancer.     See your dentist every six months for an exam and cleaning.    See your eye doctor every 1 to 2 years.    Patient Education     Understanding Cystocele (Prolapsed Bladder)  A cystocele is when a woman s bladder sags down into the vagina. It does this when the wall of tissue between the bladder and the  vagina gets weak. It s also called a prolapsed bladder. The sagging bladder can stretch the opening of the urethra. This is the tube that carries urine out of the body. This can cause urine to leak when you cough, sneeze, or lift something heavy. A cystocele can also cause discomfort in the pelvis and make it hard to fully empty your bladder.  The risk of cystocele is greater for women who have had vaginal deliveries.   Causes of a cystocele  A cystocele may be caused by:    Heavy lifting    Straining muscles during childbirth    Chronic constipation    Repeated straining during bowel movements or with coughing    Weak muscles around the vagina caused by lack of estrogen after menopause    Obesity    Aging    Previous pelvic surgery  Symptoms of a cystocele  Symptoms of a cystocele include:    Leakage of urine when you cough, sneeze, or lift something heavy    Heavy, achy, or full feeling in the pelvis    Pelvic pressure that gets worse with standing, lifting, or coughing    A bulge in the vagina that you can feel    Lower back pain    Sexual difficulties    Problems with inserting tampons    Frequent urination or the urge to pass urine    Incomplete emptying of the bladder    Trouble starting a stream of urine  Diagnosis of a cystocele  Your healthcare provider will ask about your health history and give you a physical exam. Diagnosis may include looking in your bladder with a camera (cystoscopy), bladder function testing (urodynamics), X-rays, ultrasound, and MRI.   A cystocele is graded during diagnosis. Grade 1 means the bladder sags only a short way into the top of the vagina. Grade 2 means the bladder sags down to the lower opening of the vagina. Grade 3 means the bladder sags out of the lower opening of the vagina.   Treatment of a cystocele  Treatment depends on the grade of your cystocele and other factors. Your choices may include:     Change of activity. You may need to avoid certain activities, such as  heavy lifting or straining, that can cause your cystocele to get worse.    Pessary. This is a device put in the vagina to hold the bladder in place.    Surgery. A procedure can be done to move the bladder back into a more normal position and hold it in place.    Estrogen replacement therapy. This may help to strengthen the muscles around the vagina and bladder. Talk with your healthcare provider about the risks and benefits of hormone therapy based on your medical history.  [employer] last reviewed this educational content on 7/1/2020 2000-2021 [employer]. All rights reserved. This information is not intended as a substitute for professional medical care. Always follow your healthcare professional's instructions.

## 2022-03-15 LAB
ALBUMIN SERPL-MCNC: 4 G/DL (ref 3.4–5)
ALP SERPL-CCNC: 47 U/L (ref 40–150)
ALT SERPL W P-5'-P-CCNC: 26 U/L (ref 0–50)
ANION GAP SERPL CALCULATED.3IONS-SCNC: 9 MMOL/L (ref 3–14)
AST SERPL W P-5'-P-CCNC: 20 U/L (ref 0–45)
BILIRUB SERPL-MCNC: 0.3 MG/DL (ref 0.2–1.3)
BUN SERPL-MCNC: 7 MG/DL (ref 7–30)
CALCIUM SERPL-MCNC: 9.5 MG/DL (ref 8.5–10.1)
CHLORIDE BLD-SCNC: 105 MMOL/L (ref 94–109)
CHOLEST SERPL-MCNC: 112 MG/DL
CO2 SERPL-SCNC: 24 MMOL/L (ref 20–32)
CREAT SERPL-MCNC: 0.51 MG/DL (ref 0.52–1.04)
CREAT UR-MCNC: 36 MG/DL
FASTING STATUS PATIENT QL REPORTED: NO
GFR SERPL CREATININE-BSD FRML MDRD: >90 ML/MIN/1.73M2
GLUCOSE BLD-MCNC: 100 MG/DL (ref 70–99)
HDLC SERPL-MCNC: 47 MG/DL
LDLC SERPL CALC-MCNC: 46 MG/DL
MICROALBUMIN UR-MCNC: 7 MG/L
MICROALBUMIN/CREAT UR: 19.44 MG/G CR (ref 0–25)
NONHDLC SERPL-MCNC: 65 MG/DL
POTASSIUM BLD-SCNC: 4.2 MMOL/L (ref 3.4–5.3)
PROT SERPL-MCNC: 8.3 G/DL (ref 6.8–8.8)
SODIUM SERPL-SCNC: 138 MMOL/L (ref 133–144)
TRIGL SERPL-MCNC: 97 MG/DL
TSH SERPL DL<=0.005 MIU/L-ACNC: 0.75 MU/L (ref 0.4–4)

## 2022-03-15 NOTE — RESULT ENCOUNTER NOTE
Marcy Ms. Guo,  Your results came back and are within acceptable limits.   -Cholesterol levels are at your goal levels.  ADVISE: continuing your medication, a regular exercise program with at least 150 minutes of aerobic exercise per week, and eating a low saturated fat/low carbohydrate diet.  Also, you should recheck this fasting cholesterol panel in 6 months.  -Liver and gallbladder tests (ALT,AST, Alk phos,bilirubin) are normal.  -Kidney function (GFR) is normal.  -Sodium is normal.  -Potassium is normal.  -Calcium is normal.  -Glucose is elevated due to your diabetes.  -TSH (thyroid stimulating hormone) level is normal which indicates normal thyroid function.  . If you have any further concerns please do not hesitate to contact us by message, phone or making an appointment.  Have a good day   Dr Severo QUINTERO

## 2022-03-16 LAB
BKR LAB AP GYN ADEQUACY: NORMAL
BKR LAB AP GYN INTERPRETATION: NORMAL
BKR LAB AP HPV REFLEX: NORMAL
BKR LAB AP LMP: NORMAL
BKR LAB AP PREVIOUS ABNORMAL: NORMAL
PATH REPORT.COMMENTS IMP SPEC: NORMAL
PATH REPORT.COMMENTS IMP SPEC: NORMAL
PATH REPORT.RELEVANT HX SPEC: NORMAL

## 2022-03-18 ENCOUNTER — ANCILLARY PROCEDURE (OUTPATIENT)
Dept: ULTRASOUND IMAGING | Facility: CLINIC | Age: 62
End: 2022-03-18
Attending: FAMILY MEDICINE
Payer: COMMERCIAL

## 2022-03-18 DIAGNOSIS — I65.21 CAROTID ARTERY CALCIFICATION, RIGHT: ICD-10-CM

## 2022-03-18 LAB
HUMAN PAPILLOMA VIRUS 16 DNA: NEGATIVE
HUMAN PAPILLOMA VIRUS 18 DNA: NEGATIVE
HUMAN PAPILLOMA VIRUS FINAL DIAGNOSIS: NORMAL
HUMAN PAPILLOMA VIRUS OTHER HR: NEGATIVE

## 2022-03-18 PROCEDURE — 93880 EXTRACRANIAL BILAT STUDY: CPT | Mod: GC | Performed by: RADIOLOGY

## 2022-03-21 DIAGNOSIS — I65.21 CAROTID ARTERY CALCIFICATION, RIGHT: ICD-10-CM

## 2022-03-21 DIAGNOSIS — H53.9 VISUAL DISTURBANCE: ICD-10-CM

## 2022-03-21 DIAGNOSIS — I65.21 CAROTID STENOSIS, RIGHT: Primary | ICD-10-CM

## 2022-03-21 NOTE — RESULT ENCOUNTER NOTE
Marcy Ms. uGo,  Your results came back and carotid ultrasound does show right carotid artery stenosis about 50 to 69 % with plaque more than 50 %. The left side is ;ess than 50 % I. Reccomned vascularc onsult & a referral has been placed. Please schedule with them soon  f you have any further concerns please do not hesitate to contact us by message, phone or making an appointment.  Have a good day   Dr Severo QUINTERO

## 2022-03-23 ENCOUNTER — TELEPHONE (OUTPATIENT)
Dept: VASCULAR SURGERY | Facility: CLINIC | Age: 62
End: 2022-03-23
Payer: COMMERCIAL

## 2022-03-23 NOTE — TELEPHONE ENCOUNTER
"New vascular referral   Referral Type: Vascular Surgery   Preferred Location: [facility]   Scheduling Instructions: Please call to schedule your appointment   Reason for Referral: right carotid artery stenosis & plaqye & left visual distrubance     Referring provider: Maggi Elkins MD     Most recent imaging?   - 3/18: Bilateral carotid US:  1. Right side:      Degree of stenosis of the internal carotid artery: 50 to 69%.  Plaque estimate is greater than 50%.  2. Left side:       Degree of stenosis of the internal carotid artery: Less than 50 %.    Additional info: 60 y/o female with hx DM, HLD on simvastatin, HTN on lisonopril, & varicose veins. Pt has had 4 episodes of visual disturbance in L eye in the last 6 months. Visual disturbance was described as \"shimmering translucence\" in the outer corner of her visual field that resolved with closing her eyes. Carotid duplex completed at recent annual physical with PCP and pt was referred to vascular for further workup.    Documentation routed to MD for review.    MARIA L Mims, RN  [facility]  Ph: [phone]  Fax: [phone]      "

## 2022-03-31 NOTE — TELEPHONE ENCOUNTER
The pt is scheduled on 7/1 with Ramya at the [facility].  The pt was given the contact information to call SD and MW to possibly be seen sooner.  Dale Tirado on 3/31/2022 at 8:55 AM

## 2022-04-30 DIAGNOSIS — E78.2 MIXED HYPERLIPIDEMIA: ICD-10-CM

## 2022-04-30 DIAGNOSIS — E11.9 TYPE 2 DIABETES MELLITUS WITHOUT COMPLICATION, WITHOUT LONG-TERM CURRENT USE OF INSULIN (H): ICD-10-CM

## 2022-05-02 RX ORDER — SIMVASTATIN 40 MG
TABLET ORAL
Qty: 90 TABLET | Refills: 3 | OUTPATIENT
Start: 2022-05-02

## 2022-05-02 NOTE — TELEPHONE ENCOUNTER
Refills available at requesting pharmacy.    Med refused.  LORETO GarrisonN, RN  [facility]

## 2022-05-05 ENCOUNTER — TELEPHONE (OUTPATIENT)
Dept: OTHER | Facility: CLINIC | Age: 62
End: 2022-05-05
Payer: COMMERCIAL

## 2022-05-05 NOTE — TELEPHONE ENCOUNTER
[facility]    Who is the name of the provider?:  New      What is the location you see this provider at?: [place]    Reason for call:  Scheduled 7/1 with Dr Bui at  Vascular.  Requesting sooner appointment for carotid artery calcification right.     Can we leave a detailed message on this number?  YES

## 2022-05-05 NOTE — TELEPHONE ENCOUNTER
Okay to schedule with Vascular Surgery at next available new patient consult.    Appt Note:  Referred by Dr. Elkins for right carotid stenosis & plaque & left visual disturbance; bilateral carotid US 3/18/22 in Epic.

## 2022-05-06 ENCOUNTER — TELEPHONE (OUTPATIENT)
Dept: OTHER | Facility: CLINIC | Age: 62
End: 2022-05-06
Payer: COMMERCIAL

## 2022-05-06 ENCOUNTER — TELEPHONE (OUTPATIENT)
Dept: FAMILY MEDICINE | Facility: CLINIC | Age: 62
End: 2022-05-06
Payer: COMMERCIAL

## 2022-05-06 NOTE — TELEPHONE ENCOUNTER
Reviewed this with pt.  What vascular clinic sent you this message? PT is asking.    She got this result a month ago.  PT is asking- you do mean ophthalmology?  NOT neurology?    She has the new vascular appt on 5/9/22. She will cancel this if she should see ophthalmology before seeing vascular.    She just doesn't want to be making the wrong appts.    Please get a response to pt today.  AKSHAT Ohara

## 2022-05-06 NOTE — TELEPHONE ENCOUNTER
Reviewed below with patient.  Pt will schedule with the ophthalmologist with whom she is already established before seeing vascular.    JULIEN Hudson RN  [facility]

## 2022-05-06 NOTE — TELEPHONE ENCOUNTER
Got a note from vascular stating her carotid ultrasound  ( results as noted below ) were not significant and did not likely need surgical management  They did ask to confirm if has visited with ophthalmology first about this before seeing them  If she can confirm if seen ophthalmology for the issu ein her left eye yet and if not to schedule with them prior to seeing vascular per vascular recommendation    carotid  1. Right side:       Degree of stenosis of the internal carotid artery: 50 to 69%. Plaque estimate is greater than 50%.     2. Left side:        Degree of stenosis of the internal carotid artery: Less than 50 %.

## 2022-05-06 NOTE — TELEPHONE ENCOUNTER
I got a staff message today about this from Dr Hughes, Braeden Banks MD the vascular person she has an apt with on 5/9  He recommended she see an eye doctor for left eye symptoms first . If no answer there I think then they would see her   She should clarify this if desired directly with their office  The neurology apt was placed in march but no apt yet in epic for that . Can hold on until do this I guess for now  I can put in an eye referral if desired

## 2022-05-06 NOTE — TELEPHONE ENCOUNTER
Called patient to reminder her of her appointment on 5/9/22. Patient states primary provider viewed imaging and said she doesn't need a vascular surgeon. Patient would like to cancel her appointment on 5/9/22.

## 2022-05-06 NOTE — TELEPHONE ENCOUNTER
Left message on answering machine for patient to call clinic triage.  We need to review Dr. Elkins's response with you for these speciality appts.  AKSHAT Ohara

## 2022-05-09 ENCOUNTER — TELEPHONE (OUTPATIENT)
Dept: FAMILY MEDICINE | Facility: CLINIC | Age: 62
End: 2022-05-09
Payer: COMMERCIAL

## 2022-05-09 NOTE — TELEPHONE ENCOUNTER
From staff message-  Message from Maggi Elkins MD sent at 5/9/2022  9:28 AM CDT -----  Regarding: RE: carotid  I told her your'll wanted to see eye first and to confirm with your office about her apt , looks like apt got cancelled. Sorry if I confused things . Maybe can revisit after she sees eye again ?  Maggi      Looks like pt is seeing her ophthalmology before seeing vascular as planned.  AKSHAT Ohara

## 2022-05-09 NOTE — TELEPHONE ENCOUNTER
----- Message from Maggi Elkins MD sent at 5/9/2022  9:28 AM CDT -----  Regarding: RE: carotid  I told her your'll wanted to see eye first and to confirm with your office about her apt , looks like apt got cancelled. Sorry if I confused things . Maybe can revisit after she sees eye again ?  Maggi  ----- Message -----  From: Braeden Hughes MD  Sent: 5/9/2022   8:40 AM CDT  To: Maggi Elkins MD, Marleny Lozano, RN  Subject: RE: carotid                                      Thank you.  We will see her just to go over the findings of the ultrasound and to impress on the need for surveillance.  James   ----- Message -----  From: Maggi Elkins MD  Sent: 5/6/2022  11:29 AM CDT  To: Braeden Hughes MD  Subject: RE: carotid                                      She said she had seen her eye doctor and I had told her to return to them for an eval. I will ask triage to remind her about that. Glad to know the carotids dont need any intervention  Maggi  ----- Message -----  From: Braeden Hughes MD  Sent: 5/6/2022  10:38 AM CDT  To: Maggi Elkins MD, Marleny Lozano, RN  Subject: carotid                                          Carotid ultrasound pretty unimpressive. Has she been by ophthalmologist for the visual disturbance in the left eye?  If not, then let's do that first.  Thank you,  James [email or web address].s.I don't feel the carotids will need any intervention.

## 2022-05-09 NOTE — TELEPHONE ENCOUNTER
Appointment canceled for Azucena Guo ([id number])  Visit Type: NEW  Date        Time      Length    Provider                  Department  5/9/2022     8:30 AM  30 mins.  Braeden Hughes MD  [facility]     Reason for Cancellation: Other     Patient Comments: Provider advised me to see ophthalmologist

## 2022-05-09 NOTE — TELEPHONE ENCOUNTER
----- Message from Braeden Hughes MD sent at 5/9/2022  8:39 AM CDT -----  Regarding: RE: carotid  Thank you.  We will see her just to go over the findings of the ultrasound and to impress on the need for surveillance.  James     Routing to  to coordinate new pt in person OV with Dr. Hughes.     Appt note: Ref by Dr. Elkins for right carotid stenosis & plaque & left visual disturbance; bilateral carotid US 3/18/22. Discuss imaging findings and follow up plan. Pt went to ophthalmologist as recommended already.      MARIA L Culver, RN  [facility]  Office:  [phone] Fax: [phone]

## 2022-05-09 NOTE — TELEPHONE ENCOUNTER
Per Dr Elkins, Patient is to see Ophthalmology and then schedule with Vascular.  Will inform patient.  Valerie Hernandez RN  [facility]  ----- Message -----  From: Valerie Hernandez RN  Sent: 5/9/2022   3:59 PM CDT  To: Maggi Elkins MD  Subject: RE: carotid                                      Dr Elkins - Is there any action needed by triage?  Valerie Hernandez RN  [facility]    ----- Message -----  From: Maggi Elkisn MD  Sent: 5/9/2022   3:31 PM CDT  To:  Triage  Subject: FW: carotid                                        ----- Message -----  From: Braeden Hughes MD  Sent: 5/9/2022   3:26 PM CDT  To: Maggi Elkins MD, Marleny Lozano, AKSHAT,  Triage  Subject: RE: carotid                                      No worries. We will get her back into the clinic after she sees the eye doc.  ----- Message -----  From: Maggi Elkins MD  Sent: 5/9/2022   9:29 AM CDT  To: Braeden Hughes MD,  Triage  Subject: RE: carotid                                      I told her your'll wanted to see eye first and to confirm with your office about her apt , looks like apt got cancelled. Sorry if I confused things . Maybe can revisit after she sees eye again ?  Maggi  ----- Message -----  From: Braeden Hughes MD  Sent: 5/9/2022   8:40 AM CDT  To: Maggi Elkins MD, Marleny Lozano, RN  Subject: RE: carotid                                      Thank you.  We will see her just to go over the findings of the ultrasound and to impress on the need for surveillance.  James   ----- Message -----  From: Maggi Elkins MD  Sent: 5/6/2022  11:29 AM CDT  To: Braeden Hughes MD  Subject: RE: carotid                                      She said she had seen her eye doctor and I had told her to return to them for an eval. I will ask triage to remind her about that. Glad to know the carotids dont need any intervention  Maggi  ----- Message -----  From: Braeden Hughes MD  Sent:  5/6/2022  10:38 AM CDT  To: Maggi Elkins MD, Marleny Lozano RN  Subject: carotid                                          Carotid ultrasound pretty unimpressive. Has she been by ophthalmologist for the visual disturbance in the left eye?  If not, then let's do that first.  Thank you,  James [email or web address] don't feel the carotids will need any intervention.

## 2022-05-09 NOTE — TELEPHONE ENCOUNTER
Patient has made the appointment with the ophthalmologist  Yes and would call our clinic once that has been scheduled.

## 2022-05-09 NOTE — TELEPHONE ENCOUNTER
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  As soon as she has the Eye Dr Appointment scheduled she will call to schedule with Vascular.  Valerie Hernandez RN  [facility]

## 2022-05-10 NOTE — TELEPHONE ENCOUNTER
----- Message from Braeden Hughes MD sent at 5/9/2022  3:25 PM CDT -----  Regarding: RE: carotid  No worries. We will get her back into the clinic after she sees the eye doc.    ----- Message -----  From: Maggi Elkins MD  Sent: 5/9/2022   9:29 AM CDT  To: Braeden Hughes MD,  Triage  Subject: RE: carotid                                    I told her your'll wanted to see eye first and to confirm with your office about her apt , looks like apt got cancelled. Sorry if I confused things . Maybe can revisit after she sees eye again ?  MARIA L Main, RN  [facility]  Office:  [phone] Fax: [phone]

## 2022-05-27 ENCOUNTER — OFFICE VISIT (OUTPATIENT)
Dept: OPHTHALMOLOGY | Facility: CLINIC | Age: 62
End: 2022-05-27
Attending: OPHTHALMOLOGY
Payer: COMMERCIAL

## 2022-05-27 DIAGNOSIS — G43.109 OCULAR MIGRAINE: Primary | ICD-10-CM

## 2022-05-27 DIAGNOSIS — H52.203 HYPEROPIA OF BOTH EYES WITH ASTIGMATISM AND PRESBYOPIA: ICD-10-CM

## 2022-05-27 DIAGNOSIS — E11.9 TYPE 2 DIABETES MELLITUS WITHOUT RETINOPATHY (H): ICD-10-CM

## 2022-05-27 DIAGNOSIS — H04.123 DRY EYE SYNDROME OF BOTH EYES: ICD-10-CM

## 2022-05-27 DIAGNOSIS — H25.813 COMBINED FORMS OF AGE-RELATED CATARACT OF BOTH EYES: ICD-10-CM

## 2022-05-27 DIAGNOSIS — H52.4 HYPEROPIA OF BOTH EYES WITH ASTIGMATISM AND PRESBYOPIA: ICD-10-CM

## 2022-05-27 DIAGNOSIS — H52.03 HYPEROPIA OF BOTH EYES WITH ASTIGMATISM AND PRESBYOPIA: ICD-10-CM

## 2022-05-27 PROCEDURE — G0463 HOSPITAL OUTPT CLINIC VISIT: HCPCS

## 2022-05-27 PROCEDURE — 92004 COMPRE OPH EXAM NEW PT 1/>: CPT | Performed by: OPHTHALMOLOGY

## 2022-05-27 ASSESSMENT — CUP TO DISC RATIO
OD_RATIO: 0.25
OS_RATIO: 0.25

## 2022-05-27 ASSESSMENT — VISUAL ACUITY
OS_CC+: -1
CORRECTION_TYPE: GLASSES
OS_CC: 20/20
OD_CC: 20/20
METHOD: SNELLEN - LINEAR
OD_CC+: -2

## 2022-05-27 ASSESSMENT — SLIT LAMP EXAM - LIDS
COMMENTS: NORMAL
COMMENTS: NORMAL

## 2022-05-27 ASSESSMENT — REFRACTION_WEARINGRX
OD_CYLINDER: +2.50
OD_AXIS: 174
OS_SPHERE: -0.75
OS_AXIS: 003
OD_SPHERE: PLANO
OS_CYLINDER: +2.75
OS_ADD: +2.50
OD_ADD: +2.50

## 2022-05-27 ASSESSMENT — TONOMETRY
IOP_METHOD: TONOPEN
OD_IOP_MMHG: 17
OS_IOP_MMHG: 17

## 2022-05-27 ASSESSMENT — CONF VISUAL FIELD
OS_NORMAL: 1
METHOD: COUNTING FINGERS
OD_NORMAL: 1

## 2022-05-27 NOTE — LETTER
5/27/2022       RE: Azucena Guo  [address]  [place] [address]     Dear Dr. Elkins,    Thank you for referring your patient, Azucena Guo, to the [facility] [place] at [facility]. Please see a copy of my visit note below.    Note to Maggi Elkins  HPI     Vision Changes Os     In left eye.  Presenting in peripheral vision.  Charactertized as  distorted vision.  Occurring intermittently.  Associated symptoms include floaters.  Negative for double vision, eye pain, trauma, headache, photophobia and flashes.  Treatments tried include no treatments.              Comments     62yo female presents to clinic for visual disturbances in the left eye. For about 9 months, patient has been noticing intermittent distortion in her superotemporal visual field that lasts about 30seconds to 1 minute. Her central VA is doing well otherwise. No ocular pain, HA, photophobia, diplopia or recent head trauma. Right eye is doing well with no issues. No gtts.    Last BS: 125 this morning  Lab Results       Component                Value               Date                       A1C                      5.7                 03/14/2022                 A1C                      5.5                 06/09/2021                 A1C                      5.5                 11/24/2020                 A1C                      5.3                 07/23/2020                 A1C                      7.9                 12/06/2019                 A1C                      7.2                 07/30/2019                Juliocesar Dow COT 2:20 PM May 27, 2022     Denies personal or family history of migraine.   Denies following headache.  Occurred once since November, 3 times in the months preceding  Denies prior ocular history  Denies family eye history  Sees Dr. Karin Cross OD annually, last seen in 11/2021    Carotid Doppler 3/18/22  Impression:  1. Right side: Degree of stenosis  of the internal carotid artery: 50 to 69%.  Plaque estimate is greater than 50%.  2. Left side: Degree of stenosis of the internal carotid artery: Less than 50 %.          Last edited by Stefano Vargas MD on 5/29/2022 12:26 PM. (History)         Review of systems for the eyes was negative other than the pertinent positives/negatives listed in the HPI.      Assessment & Plan    HPI:  Azucena Guo is a 61 year old female with history of hyperopia with astigmatism presents with four episodes of visual phenomena in the last nine months. She describes transients episodes lasting 30-90 seconds of blur and photopsias. She believes these are in her left eye only. She denies subsequent headache. Frequency of evens is decreasing, with this occurring once since November. She believes they have occurred while in bright light.    POHx: hyperopia with astigmatism, cataract  PMHx: HTN, HLD, T2DM,   Current Medications: aspirin 81 MG tablet, Take 1 tablet by mouth daily.  blood glucose (NO BRAND SPECIFIED) test strip, Use to test blood sugar once time daily or as directed.  Cholecalciferol (VITAMIN D) 1000 UNIT capsule, Take 1 capsule by mouth daily.  lisinopril (ZESTRIL) 2.5 MG tablet, Take 1 tablet (2.5 mg) by mouth daily  metFORMIN (GLUCOPHAGE) 500 MG tablet, Take 2 tablets (1,000 mg) by mouth 2 times daily (with meals)  Multiple Vitamin (MULTI-VITAMIN PO), Take  by mouth.  Probiotic Product (MARTINA-Q PO), Take  by mouth as needed.  simvastatin (ZOCOR) 40 MG tablet, Take 1 tablet (40 mg) by mouth At Bedtime  order for DME, Equipment being ordered: thigh high compression stockings 20-30mmHg    No current facility-administered medications on file prior to visit.    FHx: denies family history of ocular conditions   PSHx: denies history of ocular surgeries       Current Eye Medications:      Assessment & Plan:  (G43.109) Ocular migraine  (primary encounter diagnosis)  Normal eye exam without signs of ocular ischemic  syndrome or abnormal fundoscopic exam  Discussed that the transient nature of her vision change and decreased frequency are reassuring and would not recommend additional neuroimaging at this time. Carotid dopplers show increased stenosis on the contralateral side of affected vision, which is also reassuring. Recommend observation for now, return to clinic as needed     Hyperopia with astigmatism and presbyopia  Doing well in current MRx    Dry Eye Syndrome  Recommend artificial tears as needed     Combined forms of age related cataract  Mild cataracts are present. No treatment currently recommended. The patient will monitor for vision changes and contact us with any decrease in vision. Recheck in one year.     Type 2 diabetes mellitus without retinopathy  Most recent HgBA1c 5.7 on 3/14/22  No background diabetic retinopathy or neovascularization noted on today's exam.     Return if symptoms worsen or fail to improve.      Stefano Vargas MD     Attending Physician Attestation:  Complete documentation of historical and exam elements from today's encounter can be found in the full encounter summary report (not reduplicated in this progress note).  I personally obtained the chief complaint(s) and history of present illness.  I confirmed and edited as necessary the review of systems, past medical/surgical history, family history, social history, and examination findings as documented by others; and I examined the patient myself.  I personally reviewed the relevant tests, images, and reports as documented above.  I formulated and edited as necessary the assessment and plan and discussed the findings and management plan with the patient and family. - Stefano Vargas MD             Again, thank you for allowing me to participate in the care of your patient.      Sincerely,    Stefano Vargas MD

## 2022-05-27 NOTE — PROGRESS NOTES
Note to Maggi ESPINOSA     Vision Changes Os     In left eye.  Presenting in peripheral vision.  Charactertized as  distorted vision.  Occurring intermittently.  Associated symptoms include floaters.  Negative for double vision, eye pain, trauma, headache, photophobia and flashes.  Treatments tried include no treatments.              Comments     62yo female presents to clinic for visual disturbances in the left eye. For about 9 months, patient has been noticing intermittent distortion in her superotemporal visual field that lasts about 30seconds to 1 minute. Her central VA is doing well otherwise. No ocular pain, HA, photophobia, diplopia or recent head trauma. Right eye is doing well with no issues. No gtts.    Last BS: 125 this morning  Lab Results       Component                Value               Date                       A1C                      5.7                 03/14/2022                 A1C                      5.5                 06/09/2021                 A1C                      5.5                 11/24/2020                 A1C                      5.3                 07/23/2020                 A1C                      7.9                 12/06/2019                 A1C                      7.2                 07/30/2019                Juliocesar Dow COT 2:20 PM May 27, 2022     Denies personal or family history of migraine.   Denies following headache.  Occurred once since November, 3 times in the months preceding  Denies prior ocular history  Denies family eye history  Sees Dr. Karin Cross OD annually, last seen in 11/2021    Carotid Doppler 3/18/22  Impression:  1. Right side: Degree of stenosis of the internal carotid artery: 50 to 69%.  Plaque estimate is greater than 50%.  2. Left side: Degree of stenosis of the internal carotid artery: Less than 50 %.          Last edited by Stefano Vargas MD on 5/29/2022 12:26 PM. (History)         Review of systems for the eyes was negative other  than the pertinent positives/negatives listed in the HPI.      Assessment & Plan    HPI:  Azucena Guo is a 61 year old female with history of hyperopia with astigmatism presents with four episodes of visual phenomena in the last nine months. She describes transients episodes lasting 30-90 seconds of blur and photopsias. She believes these are in her left eye only. She denies subsequent headache. Frequency of evens is decreasing, with this occurring once since November. She believes they have occurred while in bright light.    POHx: hyperopia with astigmatism, cataract  PMHx: HTN, HLD, T2DM,   Current Medications: aspirin 81 MG tablet, Take 1 tablet by mouth daily.  blood glucose (NO BRAND SPECIFIED) test strip, Use to test blood sugar once time daily or as directed.  Cholecalciferol (VITAMIN D) 1000 UNIT capsule, Take 1 capsule by mouth daily.  lisinopril (ZESTRIL) 2.5 MG tablet, Take 1 tablet (2.5 mg) by mouth daily  metFORMIN (GLUCOPHAGE) 500 MG tablet, Take 2 tablets (1,000 mg) by mouth 2 times daily (with meals)  Multiple Vitamin (MULTI-VITAMIN PO), Take  by mouth.  Probiotic Product (MARTINA-Q PO), Take  by mouth as needed.  simvastatin (ZOCOR) 40 MG tablet, Take 1 tablet (40 mg) by mouth At Bedtime  order for DME, Equipment being ordered: thigh high compression stockings 20-30mmHg    No current facility-administered medications on file prior to visit.    FHx: denies family history of ocular conditions   PSHx: denies history of ocular surgeries       Current Eye Medications:      Assessment & Plan:  (G43.109) Ocular migraine  (primary encounter diagnosis)  Normal eye exam without signs of ocular ischemic syndrome or abnormal fundoscopic exam  Discussed that the transient nature of her vision change and decreased frequency are reassuring and would not recommend additional neuroimaging at this time. Carotid dopplers show increased stenosis on the contralateral side of affected vision, which is also reassuring.  Recommend observation for now, return to clinic as needed     Hyperopia with astigmatism and presbyopia  Doing well in current MRx    Dry Eye Syndrome  Recommend artificial tears as needed     Combined forms of age related cataract  Mild cataracts are present. No treatment currently recommended. The patient will monitor for vision changes and contact us with any decrease in vision. Recheck in one year.     Type 2 diabetes mellitus without retinopathy  Most recent HgBA1c 5.7 on 3/14/22  No background diabetic retinopathy or neovascularization noted on today's exam.     Return if symptoms worsen or fail to improve.      Stefano Vargas MD     Attending Physician Attestation:  Complete documentation of historical and exam elements from today's encounter can be found in the full encounter summary report (not reduplicated in this progress note).  I personally obtained the chief complaint(s) and history of present illness.  I confirmed and edited as necessary the review of systems, past medical/surgical history, family history, social history, and examination findings as documented by others; and I examined the patient myself.  I personally reviewed the relevant tests, images, and reports as documented above.  I formulated and edited as necessary the assessment and plan and discussed the findings and management plan with the patient and family. - Stefano Vargas MD

## 2022-05-27 NOTE — NURSING NOTE
Chief Complaints and History of Present Illnesses   Patient presents with     Vision Changes Os     Chief Complaint(s) and History of Present Illness(es)     Vision Changes Os     Laterality: left eye    Location: peripheral vision    Quality: distorted vision    Frequency: intermittently    Associated symptoms: floaters.  Negative for double vision, eye pain, trauma, headache, photophobia and flashes    Treatments tried: no treatments              Comments     62yo female presents to clinic for visual disturbances in the left eye. For about 9 months, patient has been noticing intermittent distortion in her superotemporal visual field that lasts about 30seconds to 1 minute. Her central VA is doing well otherwise. No ocular pain, HA, photophobia, diplopia or recent head trauma. Right eye is doing well with no issues. No gtts.    Last BS: 125 this morning  Lab Results       Component                Value               Date                       A1C                      5.7                 03/14/2022                 A1C                      5.5                 06/09/2021                 A1C                      5.5                 11/24/2020                 A1C                      5.3                 07/23/2020                 A1C                      7.9                 12/06/2019                 A1C                      7.2                 07/30/2019                Juliocesar Dow COT 2:20 PM May 27, 2022

## 2022-08-21 DIAGNOSIS — E11.9 TYPE 2 DIABETES MELLITUS WITHOUT COMPLICATION, WITHOUT LONG-TERM CURRENT USE OF INSULIN (H): ICD-10-CM

## 2022-08-25 NOTE — TELEPHONE ENCOUNTER
Prescription approved per [facility] Refill Protocol.  JULIEN Hudson RN  [facility]

## 2022-09-29 ENCOUNTER — MYC MEDICAL ADVICE (OUTPATIENT)
Dept: FAMILY MEDICINE | Facility: CLINIC | Age: 62
End: 2022-09-29

## 2022-09-29 DIAGNOSIS — I10 BENIGN ESSENTIAL HYPERTENSION: ICD-10-CM

## 2022-09-29 DIAGNOSIS — E78.2 MIXED HYPERLIPIDEMIA: ICD-10-CM

## 2022-09-29 DIAGNOSIS — E11.9 TYPE 2 DIABETES MELLITUS WITHOUT COMPLICATION, WITHOUT LONG-TERM CURRENT USE OF INSULIN (H): ICD-10-CM

## 2022-09-29 NOTE — TELEPHONE ENCOUNTER
Writer responded via [employer].    LORETO GarrisonN, RN-BC  [facility]

## 2022-09-30 RX ORDER — LISINOPRIL 2.5 MG/1
2.5 TABLET ORAL DAILY
Qty: 90 TABLET | Refills: 0 | Status: SHIPPED | OUTPATIENT
Start: 2022-09-30 | End: 2022-12-05

## 2022-09-30 RX ORDER — SIMVASTATIN 40 MG
40 TABLET ORAL AT BEDTIME
Qty: 90 TABLET | Refills: 0 | Status: SHIPPED | OUTPATIENT
Start: 2022-09-30 | End: 2022-12-05

## 2022-10-10 ENCOUNTER — HEALTH MAINTENANCE LETTER (OUTPATIENT)
Age: 62
End: 2022-10-10

## 2022-11-14 ENCOUNTER — OFFICE VISIT (OUTPATIENT)
Dept: FAMILY MEDICINE | Facility: CLINIC | Age: 62
End: 2022-11-14
Payer: COMMERCIAL

## 2022-11-14 VITALS — BODY MASS INDEX: 39.32 KG/M2 | SYSTOLIC BLOOD PRESSURE: 132 MMHG | DIASTOLIC BLOOD PRESSURE: 68 MMHG | WEIGHT: 247.3 LBS

## 2022-11-14 DIAGNOSIS — M25.511 ACUTE PAIN OF RIGHT SHOULDER DUE TO TRAUMA: ICD-10-CM

## 2022-11-14 DIAGNOSIS — M79.645 THUMB PAIN, LEFT: Primary | ICD-10-CM

## 2022-11-14 DIAGNOSIS — Z91.81 PERSONAL HISTORY OF FALL: ICD-10-CM

## 2022-11-14 DIAGNOSIS — G89.11 ACUTE PAIN OF RIGHT SHOULDER DUE TO TRAUMA: ICD-10-CM

## 2022-11-14 PROCEDURE — 99213 OFFICE O/P EST LOW 20 MIN: CPT | Performed by: FAMILY MEDICINE

## 2022-11-14 RX ORDER — FLUTICASONE PROPIONATE 50 MCG
SPRAY, SUSPENSION (ML) NASAL
COMMUNITY
Start: 2022-06-16 | End: 2024-03-18

## 2022-11-14 RX ORDER — NAPROXEN 500 MG/1
500 TABLET ORAL 2 TIMES DAILY WITH MEALS
Qty: 30 TABLET | Refills: 0 | Status: SHIPPED | OUTPATIENT
Start: 2022-11-14 | End: 2022-12-05

## 2022-11-14 NOTE — PATIENT INSTRUCTIONS
- Heat, ice  - Tylenol 1000mg every 8 hours as needed  - Naproxen 500mg twice daily (every 12 hours)

## 2022-11-14 NOTE — PROGRESS NOTES
"  Assessment & Plan     S/p mechanical fall down the stairs with   Pain of left thumb  Acute pain of right shoulder due to trauma  No significant head trauma (small abrasion) with no LOC. NO prodromal sx/signs to suggest any need for additional cardiac or neurologic work up which I reviewed in detail with pt and her daughter today.   At this point has sustained likely soft tissue injury to the left thumb and right shoulder with ecchymoses developing; no red flag features. 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    Weight as of this encounter: 112.2 kg (247 lb 4.8 oz).   Weight management plan: Discussed healthy diet and exercise guidelines, increasing fluids for soft tissue injury/healing      Return in about 2 weeks (around 11/28/2022) for or sooner if symptoms not improving.    Bina Lane MD  [facility] TABITHA Guzman is a 61 year old accompanied by her daughter, presenting for the following health issues:  Fall (Left hand swollen by thumb, right shoulder, left knee. Hit right side of head. No LOC, iced after and stayed awake with concussion procedure )      HPI     Chief Complaint   Patient presents with     Fall     Left hand swollen by thumb, right shoulder, left knee. Hit right side of head. No LOC, iced after and stayed awake with concussion procedure      RECENT FALL: At her daughter's house 7:30pm on 11/13 (yesterday); " went down the stairs to say goodbye with a full load in her arms and step height slightly different; and tripped down the steps. She suspects she fell approx 2/3 down the full flight of stairs. Alert the entire time. No LOC.   No LH/dizziness, numbness/tingling, chest pain or any other sx prior to her fall.    Tried to grasp something and instead caught her left hand with the wall to catch herself on a metal ledge  As she was falling she also hit the right side of her head slightly and also the right top of her shoulder.   She remained alert and watched a movie with her daughter for some time after that and stayed with family that evening just in case.     She woke this AM with increased stiffness in both her left thumb and right shoulder; movement improved with taking 800mg Ibuprofen this AM.      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     Right shoulder pain, pain on her right side of her head  Left thumb pain    Review of Systems   Constitutional, HEENT, cardiovascular, pulmonary, gi and gu systems are negative, except as otherwise noted.      Objective    /68 (BP Location: Left arm, Patient Position: Sitting, Cuff Size: Adult Regular)   Wt 112.2 kg (247 lb 4.8 oz)   LMP  (LMP Unknown)   BMI 39.32 kg/m    Body mass index is 39.32 kg/m .  Physical Exam   GENERAL: healthy, alert and no distress  EYES: Eyes grossly normal to inspection, PERRL and conjunctivae and sclerae normal  NECK: no adenopathy, no asymmetry, masses, or scars and thyroid normal to palpation  RESP: lungs clear to auscultation - no rales, rhonchi or wheezes  CV: regular rate and rhythm, normal S1 S2, no S3 or S4, no murmur, click or rub, no peripheral edema and peripheral pulses strong  ABDOMEN: soft, nontender, no hepatosplenomegaly, no masses and bowel sounds normal  MS: Right upper extremity: shoulder with reduced flexion/extension slightly compared to the left shoulder; there is also eccymoses and TTP over that area  at the top of right shoulder; no clavicular pain or crepitus. THe left thumb is swollen at the thenar eminence with TTp in that area; however no joint tenderness specifically no TTP over scaphoid area; normal flexion/extension of the wrist with mostly full ROM of the thumb joint  NEURO: Normal strength and tone, mentation intact and speech normal

## 2022-12-05 ENCOUNTER — OFFICE VISIT (OUTPATIENT)
Dept: FAMILY MEDICINE | Facility: CLINIC | Age: 62
End: 2022-12-05
Payer: COMMERCIAL

## 2022-12-05 VITALS
BODY MASS INDEX: 39.24 KG/M2 | SYSTOLIC BLOOD PRESSURE: 110 MMHG | RESPIRATION RATE: 20 BRPM | TEMPERATURE: 97.4 F | WEIGHT: 250 LBS | HEART RATE: 70 BPM | OXYGEN SATURATION: 97 % | HEIGHT: 67 IN | DIASTOLIC BLOOD PRESSURE: 70 MMHG

## 2022-12-05 DIAGNOSIS — M19.042 BILATERAL OSTEOARTHRITIS OF FINGER: ICD-10-CM

## 2022-12-05 DIAGNOSIS — M79.645 THUMB PAIN, LEFT: ICD-10-CM

## 2022-12-05 DIAGNOSIS — Z23 NEED FOR SHINGLES VACCINE: ICD-10-CM

## 2022-12-05 DIAGNOSIS — I65.21 CAROTID STENOSIS, RIGHT: ICD-10-CM

## 2022-12-05 DIAGNOSIS — N81.11 CYSTOCELE, MIDLINE: ICD-10-CM

## 2022-12-05 DIAGNOSIS — Z80.3 FAMILY HISTORY OF MALIGNANT NEOPLASM OF BREAST: ICD-10-CM

## 2022-12-05 DIAGNOSIS — E78.2 MIXED HYPERLIPIDEMIA: ICD-10-CM

## 2022-12-05 DIAGNOSIS — D12.6 TUBULAR ADENOMA OF COLON: ICD-10-CM

## 2022-12-05 DIAGNOSIS — Z91.81 PERSONAL HISTORY OF FALL: ICD-10-CM

## 2022-12-05 DIAGNOSIS — Z00.00 HEALTH CARE MAINTENANCE: ICD-10-CM

## 2022-12-05 DIAGNOSIS — Z23 NEED FOR PNEUMOCOCCAL VACCINATION: ICD-10-CM

## 2022-12-05 DIAGNOSIS — E66.812 CLASS 2 DRUG-INDUCED OBESITY WITH SERIOUS COMORBIDITY AND BODY MASS INDEX (BMI) OF 36.0 TO 36.9 IN ADULT: ICD-10-CM

## 2022-12-05 DIAGNOSIS — R12 HEART BURN: ICD-10-CM

## 2022-12-05 DIAGNOSIS — E11.9 TYPE 2 DIABETES MELLITUS WITHOUT COMPLICATION, WITHOUT LONG-TERM CURRENT USE OF INSULIN (H): Primary | ICD-10-CM

## 2022-12-05 DIAGNOSIS — I10 BENIGN ESSENTIAL HYPERTENSION: ICD-10-CM

## 2022-12-05 DIAGNOSIS — I83.813 VARICOSE VEINS OF BOTH LOWER EXTREMITIES WITH PAIN: ICD-10-CM

## 2022-12-05 DIAGNOSIS — G43.109 OCULAR MIGRAINE: ICD-10-CM

## 2022-12-05 DIAGNOSIS — L65.9 HAIR THINNING: ICD-10-CM

## 2022-12-05 DIAGNOSIS — M50.30 DDD (DEGENERATIVE DISC DISEASE), CERVICAL: ICD-10-CM

## 2022-12-05 DIAGNOSIS — M19.041 BILATERAL OSTEOARTHRITIS OF FINGER: ICD-10-CM

## 2022-12-05 DIAGNOSIS — Z12.83 SKIN CANCER SCREENING: ICD-10-CM

## 2022-12-05 DIAGNOSIS — E66.1 CLASS 2 DRUG-INDUCED OBESITY WITH SERIOUS COMORBIDITY AND BODY MASS INDEX (BMI) OF 36.0 TO 36.9 IN ADULT: ICD-10-CM

## 2022-12-05 LAB
ALBUMIN SERPL BCG-MCNC: 4.5 G/DL (ref 3.5–5.2)
ALP SERPL-CCNC: 50 U/L (ref 35–104)
ALT SERPL W P-5'-P-CCNC: 15 U/L (ref 10–35)
ANION GAP SERPL CALCULATED.3IONS-SCNC: 12 MMOL/L (ref 7–15)
AST SERPL W P-5'-P-CCNC: 20 U/L (ref 10–35)
BILIRUB SERPL-MCNC: 0.3 MG/DL
BUN SERPL-MCNC: 10.8 MG/DL (ref 8–23)
CALCIUM SERPL-MCNC: 9 MG/DL (ref 8.8–10.2)
CHLORIDE SERPL-SCNC: 100 MMOL/L (ref 98–107)
CREAT SERPL-MCNC: 0.38 MG/DL (ref 0.51–0.95)
DEPRECATED HCO3 PLAS-SCNC: 23 MMOL/L (ref 22–29)
GFR SERPL CREATININE-BSD FRML MDRD: >90 ML/MIN/1.73M2
GLUCOSE SERPL-MCNC: 138 MG/DL (ref 70–99)
HBA1C MFR BLD: 6.2 % (ref 0–5.6)
POTASSIUM SERPL-SCNC: 4.2 MMOL/L (ref 3.4–5.3)
PROT SERPL-MCNC: 7.7 G/DL (ref 6.4–8.3)
SODIUM SERPL-SCNC: 135 MMOL/L (ref 136–145)

## 2022-12-05 PROCEDURE — 80053 COMPREHEN METABOLIC PANEL: CPT | Performed by: FAMILY MEDICINE

## 2022-12-05 PROCEDURE — 90677 PCV20 VACCINE IM: CPT | Performed by: FAMILY MEDICINE

## 2022-12-05 PROCEDURE — 99215 OFFICE O/P EST HI 40 MIN: CPT | Mod: 25 | Performed by: FAMILY MEDICINE

## 2022-12-05 PROCEDURE — 99207 PR FOOT EXAM NO CHARGE: CPT | Performed by: FAMILY MEDICINE

## 2022-12-05 PROCEDURE — 83036 HEMOGLOBIN GLYCOSYLATED A1C: CPT | Performed by: FAMILY MEDICINE

## 2022-12-05 PROCEDURE — 36415 COLL VENOUS BLD VENIPUNCTURE: CPT | Performed by: FAMILY MEDICINE

## 2022-12-05 PROCEDURE — 90471 IMMUNIZATION ADMIN: CPT | Performed by: FAMILY MEDICINE

## 2022-12-05 RX ORDER — LISINOPRIL 2.5 MG/1
2.5 TABLET ORAL DAILY
Qty: 90 TABLET | Refills: 1 | Status: SHIPPED | OUTPATIENT
Start: 2022-12-05 | End: 2023-06-02

## 2022-12-05 RX ORDER — SIMVASTATIN 40 MG
40 TABLET ORAL AT BEDTIME
Qty: 90 TABLET | Refills: 1 | Status: SHIPPED | OUTPATIENT
Start: 2022-12-05 | End: 2023-06-02

## 2022-12-05 NOTE — PROGRESS NOTES
Assessment & Plan     Type 2 diabetes mellitus without complication, without long-term current use of insulin (H)  Continue meds metformin, asa, lisinopril, statin for diabetes.  Foot exam done today.  Eye check up-to-date.  Will check labs today and adjust as needed  Consider an injectable to help with dm and weight loss in the future.  Currently opts no injectables or referral to weight specialist.  Continue simvastatin for cholesterol  BP stable on lisinopril, continue same  Medical risk factor modification for carotid artery stenosis, on asa & statin. Vascular said no surgery and vascular and eye felt ocular migraine not related to carotid findings. Recheck u/s carotid in 2024  Continue care with eye for cataracts, dry eyes and hyperopia and floaters  Ocular migraines not recurred in a while  Try famotidine 20 mg twice a day for 2 weeks see if helps upper abdominal pain/ heart burn  Det, exercise, weight loss will also help, avoid trigger foods, do freq small meals, do not eat 3 hrs before bedtime  If not better can do imaging and eval, currently does not sound cardiac  Has had prior lap cholecystectomy  Mild cystocele asymptomatic  Arthritis neck stable  Varicose veins not bothering in winter. May use compression socks in summer and refer to vascular if gets more symptomatic despite that.   Colonoscopy due 2026  See derm for skin check and thinning hair  Recent mechanical fall recovering aches and pains can take up to 6 weeks or more to resolved  Health maintenance reviewed  Prevnar 20 today , no real allergy  Consider Shingrex  See back mid march for a physical   - Hemoglobin A1c; Future  - Comprehensive metabolic panel (BMP + Alb, Alk Phos, ALT, AST, Total. Bili, TP); Future  - PNEUMOCOCCAL 20 VALENT CONJUGATE (PREVNAR 20)  - PRIMARY CARE FOLLOW-UP SCHEDULING; Future  - lisinopril (ZESTRIL) 2.5 MG tablet; Take 1 tablet (2.5 mg) by mouth daily  - metFORMIN (GLUCOPHAGE) 500 MG tablet; Take 2 tablets (1,000  mg) by mouth 2 times daily (with meals)  - simvastatin (ZOCOR) 40 MG tablet; Take 1 tablet (40 mg) by mouth At Bedtime    Mixed hyperlipidemia  Continue simvastatin for cholesterol  - simvastatin (ZOCOR) 40 MG tablet; Take 1 tablet (40 mg) by mouth At Bedtime    Benign essential hypertension  BP stable on lisinopril, continue same  - lisinopril (ZESTRIL) 2.5 MG tablet; Take 1 tablet (2.5 mg) by mouth daily    Carotid stenosis, right  Medical risk factor modification for carotid artery stenosis, on asa & statin. Vascular said no surgery and vascular and eye felt ocular migraine not related to carotid findings. Recheck u/s carotid in 2024    Class 2 drug-induced obesity with serious comorbidity and body mass index (BMI) of 36.0 to 36.9 in adult  Diet, exercise, weight loss recommended.  Will defer referral to a weight specialist for now.    Ocular migraine  Continue care with eye for cataracts, dry eyes and hyperopia and floaters  Ocular migraines not recurred in a while    Heart burn  Try famotidine 20 mg twice a day for 2 weeks see if helps upper abdominal pain/ heart burn  Diet, exercise, weight loss will also help, avoid trigger foods, do freq small meals, do not eat 3 hrs before bedtime  If not better can do imaging and eval, currently does not sound cardiac  Has had prior lap cholecystectomy    Cystocele, midline  Mild cystocele asymptomatic    DDD (degenerative disc disease), cervical  Arthritis neck stable    Bilateral osteoarthritis of finger  Managing, recent falls caused more aches getting better.    Varicose veins of both lower extremities with pain  Varicose veins not bothering in winter.     Tubular adenoma of colon  Colonoscopy due 2026    Hair thinning  Skin cancer screening  See derm for skin check and thinning hair  - Adult Dermatology Referral; Future    Family history of malignant neoplasm of breast  Declines referral to a  for now    Personal history of fall  Thumb pain, left  Recent  mechanical fall recovering aches and pains can take up to 6 weeks or more to resolved. Symptoms slowly improving. Does childcare so difficult to rest.    Health care maintenance  Health maintenance reviewed  Prevnar 20 today , no real allergy  Consider Shingrex  See back mid march for a physical    Need for pneumococcal vaccination  - PNEUMOCOCCAL 20 VALENT CONJUGATE (PREVNAR 20)    Need for shingles vaccine  Consider Shigrex a series of 2 shots 2 to 6 months apart that can cause couple days of flu like symptoms but is expensive so check with insurance and get at pharmacy if cheaper.    Review of the result(s) of each unique test - labs since 3/2022  Ordering of each unique test  Prescription drug management  40 minutes spent on the date of the encounter doing chart review, history and exam, documentation and further activities per the note     Work on weight loss  Regular exercise  See Patient Instructions    Return in about 3 months (around 3/5/2023) for Routine preventive, in person, with me.   Follow-up Visit   Expected date:  Mar 05, 2023 (Approximate)      Follow Up Appointment Details:     Follow-up with whom?: Me    Follow-Up for what?: Adult Preventive    Any Additional Chronic Condition Management?:  Hypertension  Hyperlipidemia  Diabetes       How?: In Person                    Maggi Elkins MD  [facility]    Ayah Guzman is a 62 year old accompanied by her alonw, presenting for the following health issues:  Diabetes (Follow up/)      History of Present Illness       Diabetes:   She presents for follow up of diabetes.  She is checking home blood glucose one time daily. She checks blood glucose before meals.  Blood glucose is never over 200 and never under 70. When her blood glucose is low, the patient is asymptomatic for confusion, blurred vision, lethargy and reports not feeling dizzy, shaky, or weak.  She has no concerns regarding her diabetes at this time.  She is not  experiencing numbness or burning in feet, excessive thirst, blurry vision, weight changes or redness, sores or blisters on feet.         She eats 4 or more servings of fruits and vegetables daily.She consumes 0 sweetened beverage(s) daily.She exercises with enough effort to increase her heart rate 20 to 29 minutes per day.  She exercises with enough effort to increase her heart rate 5 days per week. She is missing 1 dose(s) of medications per week.  She is not taking prescribed medications regularly due to remembering to take.       BACKGROUND  BMI >36 ( down from BMI of 45  & 290 lbs in 2019 to 225 lbs and BMI 34 in 2021), with DM on ASA, metformin, HLD on simvastatin, HTN on lisinopril, hx of cervical DDD & radiculopathy in the past, on vit d, MV, & probiotics, hx of varicose veins, on compression socks, prior yanci, hx of colonic polyp 2 mm sigmoid  that was a tubular adenoma noted on colonoscopy in 2011 at [facility]  & colonoscopy in 2016 at [place] [facility] reported as normal and normal in 7/2021, recheck due in 2026,  hx of postmenopausal bleeding x 2 in the past S/p EMB & u/S that was reported normal, had two biopsies at different times, last being may be in 2010 when had a transvaginal ultrasound and noted benign and has noted no episodes since then, allergic to sulfa, with side effect of redness from MMR and swollen LN from pneumococcal vaccine, previously under care of PCP Dr Canales,  Seen by PCP nov 2020 for DM when noted HBA1c was 5.5, taken off glimepiride and Januvia and continued on metformin and given the flu shot.     Seen first time by this provider in 6/2021 to establish care and get a DM check  DM noted well controlled on metformin. Hd been taken off Januvia and glimepiride previously. Also was on statin and ACE inhibitor which was continued. Was going to schedule her DM eye check. Recommended a DM eye check yearly. Noted no longer on asa but encouraged to resume. GI side effects  explained. Needed a refill on metformin & it was sent in for metformin 500 mg 2 bid. Labs done. Was to continue dental care every 6 months. Foot exam done and encouraged to check self nightly.   HLD on simvastatin 40 mg,  With no side effects. HTN stable on lisinopril 2.5 mg with no side effects.  Reviewed BMI > 34-35: Down from BMI of 45  & 290 lbs in 2019 to 225 lbs and BMI 34 in 2021. Congratulated on weight loss & was.to continue with diet, exercise and lifestyle changes.   Discussed hx of Cervical DDD, discovered incidentally in 2008 when had a head injury and imaging. Ct showed moderate degenerative disc disease at C5-C6 with broad-based  disc protrusion centrally and on the right significant for impingement upon the exiting right C6 nerve rootlet and for impression upon the right aspect of cervical cord at this level. Lesser degenerative changes at C6-C7 and to a lesser extent C4-C5. There was at C6-C7 appearance of moderately prominent central  disc protrusion. Noted could have symptoms with some neck movements but discomfort was not enough to warrant meds or surgical intervention. Reported no radiculopathy, myelopathy or neuropathy & was to continue to monitor.   Was encouraged to use compression socks for leg achiness secondary to varicose veins right more than left worse when it was hot. & if got worse could refer to vascular.  Reviewed her Hx of colonic polyp a 2 mm sigmoid polyp that was a tubular adenoma removed on colonoscopy in 2011 at [facility]  & colonoscopy in 2016 at [place] [facility] reported as normal and due again in 2021, GI referral given for a colonoscopy.   Noted hair had been thinning. No bald spots. Hair thinning/ loss felt due to age, genetics, diabetes, stress, meds etc. Advised could try biotin if wanted to ( cautioning it could affect thyroid) & to discontinue if not helpful. Was to resume a multivitamin. Labs done to evaluate more and offered to refer to dermatology if  no answer and remained a  concern.  Health care maintenance reviewed. Noted had advance directives at home and was to bring us a copy. Discussed  labs and a screening mammogram & getting Shingrex. Was to return for a physical and pap another time, had been over due since 2020 due to the pandemic.  Lipids were goal. Cmp showed elevated glucose HBA1c was goal. Had a normal TSH and HIV, cbc and microalbumin level.     Seen in  on 6/21/21 for neck strain and xray done showed mild disc disease, comment mad zee calcification right neck probably carotid and given Zanaflex and referred to ortho to further evaluate and treat. Had her colonoscopy in 7/2021 and noted normal and recheck in 5 yrs. Mammogram done 12/8/21 was normal.    Seen 3/14/22 for a physical Pap was done was normal.  Given Tdap.  Declined genetic consult for family history. Noted midline cystocele Suspected due to postmenopausal state, lack of estrogen and prior delivery of 11 pound baby.  Recommended Kegel's exercises and if should have worsening incontinence, getting up to urinate several times at night, recurrent bladder infections consider referral to urogynecologist for treatment. Diabetes well controlled continue baby aspirin daily and lisinopril and Metformin at thousand twice a day. Hyperlipidemia continued on simvastatin 40 mg bedtime & to make further recommendations once labs seen. Hypertension stable on lisinopril 2.5 mg daily. Incidental right neck calcification corresponding to likely right carotid artery seen on x-ray C-spine done in June 2021 when had neck pain suggest possible carotid artery calcification.  To get an ultrasound bilateral carotid and make further recommendations after that. Noted had had 4 episodes of left eye crescent-like shimmery visual disturbance.  Reported a normal eye exam after first 2 episodes but forgot to discuss with the eye doctor at the time.  Exam was unremarkable externally.  Had no symptoms at visit.  Could  be a retinal issue or ocular migraines or related to bright light but need to rule out more serious concerns like stroke etc. and given carotid artery calcification history and other risk factors referred to neurologist for further evaluation.  If should occur again specially with other symptoms recommend going to the ER for further evaluation. BMI more than 36 with risk factors of diabetes, hypertension, hyperlipidemia etc.  Continue to stay active, eat healthy and work on weight loss.  Offered to refer to a weight specialist or diabetes Ed or the pharmacist etc. if necessary in the future. Degenerative disc disease of cervical spine originally found out incidentally when had a fall and hit head requiring CT scan and MRI.  Recent x-ray in June for neck pain showed arthritis especially at C6 5 C6 C6-C7.  Currently no concerning findings continue to monitor. Also had osteoarthritis of fingers. Varicose veins of both lower extremities with pain in the summertime currently asymptomatic.  Compression socks to prevent achiness at end of day.  Could refer to vascular if got worse. History of tubular adenoma of colon.  Colonoscopy in 2021 was normal recheck in 2026. Hair thinning suspect related to aging and female pattern of thinning and genetics.  To check labs again today, may use Rogaine for Women over-the-counter.  Consider dermatology referral if got worse. Noted history of COVID-19 breakthrough infection end of December with complete resolution of symptoms. Family history of maternal aunt diagnosed with breast cancer in the past.  Could consider  referral, opted to defer for now. Was to return in 6 months for diabetes & blood pressure & medication review check and in 1 year for preventive physical and sooner for any new or worsening concerns.   Labs showed normal lipids, CMP with elevated glucose, normal TSH microalbumin CBC and hemoglobin A1c and no med changes made.  Carotid ultrasound on 3/15/2022  showed right carotid artery stenosis 50 to 69% with more than 50% plaque and left less than 50%.  Refer to vascular they reviewed advised no surgical intervention and to see GI doctor first for ocular symptoms.  Seen by the eye doctor 5/27/2022 noted normal exam diagnosed with ocular migraine and hyperopia dry eye syndrome and age-related cataracts.  On 11/14/2022 seen in urgent care for mechanical fall with thumb pain and shoulder pain and advised supportive care and given naproxen.    CURRENTLY  Here for follow up of diabetes 9 months later     Diabetes well controlled continue baby aspirin daily and lisinopril and Metformin at thousand twice a day sugars higher. FS in 120' to 180 . Declines meds for weight loss/ dm injectables  Hyperlipidemia continued on simvastatin 40 mg bedtime  Hypertension stable on lisinopril 2.5 mg daily.  Incidental right neck calcification corresponding to likely right carotid artery seen on x-ray u/s showed right 50 to 69 % stenosis, > 50 % plaque, left < 50 %  Vascular said no surgery needed, eye jen not cause of ocular migraine  Ocular migraine seen by eye in may, not occurred in a while  Upper abdominal symptoms since sept , no radiation, no chest pain, no shortness of breath, burping. Couple times antacid helped, couple time not helped. Last had sat after ome;et with chorizo, not currently     Mild cystocele, no sig incontinence except cough r sneeze hard     Degenerative disc disease of cervical spine originally found out incidentally when had a fall and hit head requiring CT scan and MRI.  Recent x-ray in June for neck pain showed arthritis especially at C6 5 C6 C6-C7.  Aggravated since fall improving. Also had osteoarthritis of fingers.     Varicose veins of both lower extremities with pain in the summertime currently asymptomatic.  Compression socks to prevent achiness at end of day.  Could refer to vascular if got worse.     History of tubular adenoma of colon.  Colonoscopy in  "2021 was normal recheck in 2026.     Hair thinning suspect related to aging and female pattern of thinning and genetics.  Labs normal. Consider dermatology referral if got worse.     Recent fall , carrying thins downstairs, left knee catch/ give and got too ahead and fell down, hit first 3 to 4 stairs and body was right while side and left wrist   Naproxen 2 days, not since then   Seen in , no broken bones, no bleeding, scratches resolved, aches and pains still there getting better     reviewed   Flu and Covid utd    Due for pneumonia vaccine, no true allergy, swollen arm/ lymph node in past when had in 2014  Discussed shingles vaccine     Review of Systems   Constitutional, HEENT, cardiovascular, pulmonary, GI, , musculoskeletal, neuro, skin, endocrine and psych systems are negative, except as otherwise noted.      Objective    /70 (BP Location: Left arm, Patient Position: Sitting, Cuff Size: Adult Large)   Pulse 70   Temp 97.4  F (36.3  C) (Temporal)   Resp 20   Ht 1.702 m (5' 7\")   Wt 113.4 kg (250 lb)   LMP  (LMP Unknown)   SpO2 97%   BMI 39.16 kg/m    Body mass index is 39.16 kg/m .  Physical Exam   GENERAL: healthy, alert, no distress and obese  EYES: Eyes grossly normal to inspection, PERRL and conjunctivae and sclerae normal, glasses  HENT: ear canals and TM's normal, nose and mouth without ulcers or lesions  NECK: no adenopathy, no asymmetry, masses, or scars and thyroid normal to palpation  RESP: lungs clear to auscultation - no rales, rhonchi or wheezes  CV: regular rate and rhythm, normal S1 S2, no S3 or S4, no murmur, click or rub, no peripheral edema and peripheral pulses strong  ABDOMEN: soft, non tender, no hepatosplenomegaly, no masses and bowel sounds normal  MS: no gross musculoskeletal defects noted, no edema  SKIN: no suspicious lesions or rashes, cherry angiomas, age spots, thinning hair  NEURO: Normal strength and tone, mentation intact and speech normal  PSYCH: mentation " appears normal, affect normal/bright    No results found for any visits on 12/05/22.  No results found for this or any previous visit (from the past 24 hour(s)).

## 2022-12-05 NOTE — RESULT ENCOUNTER NOTE
Marcy Salvador Brant,  Your results came back and are within acceptable limits. -A1C (test of diabetes control the last 2-3 months) is at your goal.though up form before  Please continue with your current plan. Also, you should make an appointment to see me and recheck your A1C test in 3months. . If you have any further concerns please do not hesitate to contact us by message, phone or making an appointment.  Have a good day   Dr Severo QUINTERO

## 2022-12-05 NOTE — RESULT ENCOUNTER NOTE
Marcy Ms. Guo,  Some of your results came back showing -Liver and gallbladder tests (ALT,AST, Alk phos,bilirubin) are normal.  -Kidney function (GFR) is normal.  -Sodium is minimally decreased.  ADVISE: This is still acceptable do not increase salt in diet  -Potassium is normal.  -Calcium is normal.  -Glucose is elevated due to your diabetes.  . If you have any further concerns please do not hesitate to contact us by message, phone or making an appointment.  Have a good day   Dr Severo QUINTERO

## 2022-12-05 NOTE — PATIENT INSTRUCTIONS
Continue meds metformin, asa, lisinopril, statin for diabetes  Will check labs today and adjust as needed  Consider an injectable t help with dm and weight loss in the future   Continue simvastatin for cholesterol  BP stable on lisinopril, continue same  Medical risk factor modification for carotid artery stenosis  Continue care with eye for cataracts, dry eyes and hyperopia and floaters  Ocular migraines not recurred in a while  Tr famotidine 20 mg twice a day for 2 weeks see if helps upper abdominal pain/ heart burn  Det, exercise, weight loss will also help, avoid trigger food,s freq small meals, do not eat 3 hr sbefore bedtime  If not better can do imaging and eval, currently mann snot sound cardiac  Has had prior lap cholecystectomy  Mild cystocele asymptomatic  Arthritis neck stable  Varicose veins not bothering in winter  Colonoscopy due 2026  See derm for skin check and thinning hair  Recent mechanical fall recovering ache sand pains can take p to 6 weeks or more to resolved  Health maintenance reviewed  Prevnar 20 today , no real allergy  Consider Shingrex  See you back mid march for a physical          She has lost weight.  She is controlling her risk factors much better.

## 2022-12-19 ENCOUNTER — HOSPITAL ENCOUNTER (OUTPATIENT)
Dept: MAMMOGRAPHY | Facility: CLINIC | Age: 62
Discharge: HOME | End: 2022-12-19
Attending: FAMILY MEDICINE | Admitting: FAMILY MEDICINE
Payer: COMMERCIAL

## 2022-12-19 DIAGNOSIS — Z12.31 VISIT FOR SCREENING MAMMOGRAM: ICD-10-CM

## 2022-12-19 PROCEDURE — 77067 SCR MAMMO BI INCL CAD: CPT

## 2022-12-20 NOTE — RESULT ENCOUNTER NOTE
Marcy Guo,  Your results came back and are within acceptable limits. -Mammogram was normal.  ADVISE: rechecking in 1 year.. If you have any further concerns please do not hesitate to contact us by message, phone or making an appointment.  Have a good day   Dr Severo QUINTERO

## 2023-03-08 DIAGNOSIS — E11.9 TYPE 2 DIABETES MELLITUS WITHOUT COMPLICATION, WITHOUT LONG-TERM CURRENT USE OF INSULIN (H): ICD-10-CM

## 2023-03-15 ASSESSMENT — ENCOUNTER SYMPTOMS
ABDOMINAL PAIN: 0
JOINT SWELLING: 0
SHORTNESS OF BREATH: 0
PALPITATIONS: 0
WEAKNESS: 0
CHILLS: 0
MYALGIAS: 0
BREAST MASS: 0
PARESTHESIAS: 0
EYE PAIN: 0
COUGH: 0
DYSURIA: 0
HEMATOCHEZIA: 0
HEARTBURN: 0
HEADACHES: 1
FREQUENCY: 0
CONSTIPATION: 0
NAUSEA: 0
HEMATURIA: 0
FEVER: 0
DIARRHEA: 0
NERVOUS/ANXIOUS: 0
SORE THROAT: 0
DIZZINESS: 0
ARTHRALGIAS: 0

## 2023-03-16 ENCOUNTER — ANCILLARY PROCEDURE (OUTPATIENT)
Dept: GENERAL RADIOLOGY | Facility: CLINIC | Age: 63
End: 2023-03-16
Attending: FAMILY MEDICINE
Payer: COMMERCIAL

## 2023-03-16 ENCOUNTER — OFFICE VISIT (OUTPATIENT)
Dept: FAMILY MEDICINE | Facility: CLINIC | Age: 63
End: 2023-03-16
Attending: FAMILY MEDICINE
Payer: COMMERCIAL

## 2023-03-16 VITALS
SYSTOLIC BLOOD PRESSURE: 128 MMHG | DIASTOLIC BLOOD PRESSURE: 79 MMHG | WEIGHT: 254 LBS | HEIGHT: 67 IN | RESPIRATION RATE: 20 BRPM | TEMPERATURE: 97.2 F | OXYGEN SATURATION: 96 % | BODY MASS INDEX: 39.87 KG/M2 | HEART RATE: 62 BPM

## 2023-03-16 DIAGNOSIS — L60.3 NAIL DYSTROPHY: ICD-10-CM

## 2023-03-16 DIAGNOSIS — I10 BENIGN ESSENTIAL HYPERTENSION: ICD-10-CM

## 2023-03-16 DIAGNOSIS — M50.30 DDD (DEGENERATIVE DISC DISEASE), CERVICAL: ICD-10-CM

## 2023-03-16 DIAGNOSIS — I65.21 CAROTID STENOSIS, RIGHT: ICD-10-CM

## 2023-03-16 DIAGNOSIS — L81.9 CHANGE IN PIGMENTED SKIN LESION OF FACE: ICD-10-CM

## 2023-03-16 DIAGNOSIS — Z00.00 ROUTINE HISTORY AND PHYSICAL EXAMINATION OF ADULT: Primary | ICD-10-CM

## 2023-03-16 DIAGNOSIS — N81.11 CYSTOCELE, MIDLINE: ICD-10-CM

## 2023-03-16 DIAGNOSIS — M25.511 ACUTE PAIN OF RIGHT SHOULDER: ICD-10-CM

## 2023-03-16 DIAGNOSIS — D12.6 TUBULAR ADENOMA OF COLON: ICD-10-CM

## 2023-03-16 DIAGNOSIS — Z80.3 FAMILY HISTORY OF MALIGNANT NEOPLASM OF BREAST: ICD-10-CM

## 2023-03-16 DIAGNOSIS — E78.2 MIXED HYPERLIPIDEMIA: ICD-10-CM

## 2023-03-16 DIAGNOSIS — Z71.89 ADVANCED DIRECTIVES, COUNSELING/DISCUSSION: ICD-10-CM

## 2023-03-16 DIAGNOSIS — M19.042 BILATERAL OSTEOARTHRITIS OF FINGER: ICD-10-CM

## 2023-03-16 DIAGNOSIS — Z23 NEED FOR SHINGLES VACCINE: ICD-10-CM

## 2023-03-16 DIAGNOSIS — L60.8 NAIL DISCOLORATION: ICD-10-CM

## 2023-03-16 DIAGNOSIS — E66.01 CLASS 2 SEVERE OBESITY WITH SERIOUS COMORBIDITY AND BODY MASS INDEX (BMI) OF 35.0 TO 35.9 IN ADULT, UNSPECIFIED OBESITY TYPE (H): ICD-10-CM

## 2023-03-16 DIAGNOSIS — E11.9 TYPE 2 DIABETES MELLITUS WITHOUT COMPLICATION, WITHOUT LONG-TERM CURRENT USE OF INSULIN (H): ICD-10-CM

## 2023-03-16 DIAGNOSIS — G44.219 EPISODIC TENSION-TYPE HEADACHE, NOT INTRACTABLE: ICD-10-CM

## 2023-03-16 DIAGNOSIS — E66.812 CLASS 2 SEVERE OBESITY WITH SERIOUS COMORBIDITY AND BODY MASS INDEX (BMI) OF 35.0 TO 35.9 IN ADULT, UNSPECIFIED OBESITY TYPE (H): ICD-10-CM

## 2023-03-16 DIAGNOSIS — M19.041 BILATERAL OSTEOARTHRITIS OF FINGER: ICD-10-CM

## 2023-03-16 DIAGNOSIS — G43.109 OCULAR MIGRAINE: ICD-10-CM

## 2023-03-16 DIAGNOSIS — Z00.00 HEALTH CARE MAINTENANCE: ICD-10-CM

## 2023-03-16 DIAGNOSIS — H92.01 RIGHT EAR PAIN: ICD-10-CM

## 2023-03-16 DIAGNOSIS — I83.813 VARICOSE VEINS OF BOTH LOWER EXTREMITIES WITH PAIN: ICD-10-CM

## 2023-03-16 DIAGNOSIS — L65.9 HAIR THINNING: ICD-10-CM

## 2023-03-16 LAB
ALBUMIN SERPL BCG-MCNC: 4.7 G/DL (ref 3.5–5.2)
ALP SERPL-CCNC: 51 U/L (ref 35–104)
ALT SERPL W P-5'-P-CCNC: 16 U/L (ref 10–35)
ANION GAP SERPL CALCULATED.3IONS-SCNC: 13 MMOL/L (ref 7–15)
AST SERPL W P-5'-P-CCNC: 23 U/L (ref 10–35)
BASOPHILS # BLD AUTO: 0 10E3/UL (ref 0–0.2)
BASOPHILS NFR BLD AUTO: 1 %
BILIRUB SERPL-MCNC: 0.4 MG/DL
BUN SERPL-MCNC: 8.5 MG/DL (ref 8–23)
CALCIUM SERPL-MCNC: 10 MG/DL (ref 8.8–10.2)
CHLORIDE SERPL-SCNC: 101 MMOL/L (ref 98–107)
CHOLEST SERPL-MCNC: 151 MG/DL
CREAT SERPL-MCNC: 0.41 MG/DL (ref 0.51–0.95)
CREAT UR-MCNC: 14.9 MG/DL
DEPRECATED HCO3 PLAS-SCNC: 24 MMOL/L (ref 22–29)
EOSINOPHIL # BLD AUTO: 0.1 10E3/UL (ref 0–0.7)
EOSINOPHIL NFR BLD AUTO: 1 %
ERYTHROCYTE [DISTWIDTH] IN BLOOD BY AUTOMATED COUNT: 12.4 % (ref 10–15)
GFR SERPL CREATININE-BSD FRML MDRD: >90 ML/MIN/1.73M2
GLUCOSE SERPL-MCNC: 141 MG/DL (ref 70–99)
HBA1C MFR BLD: 6.4 % (ref 0–5.6)
HCT VFR BLD AUTO: 39.6 % (ref 35–47)
HDLC SERPL-MCNC: 55 MG/DL
HGB BLD-MCNC: 13.3 G/DL (ref 11.7–15.7)
IMM GRANULOCYTES # BLD: 0 10E3/UL
IMM GRANULOCYTES NFR BLD: 0 %
LDLC SERPL CALC-MCNC: 73 MG/DL
LYMPHOCYTES # BLD AUTO: 2.6 10E3/UL (ref 0.8–5.3)
LYMPHOCYTES NFR BLD AUTO: 47 %
MCH RBC QN AUTO: 29.8 PG (ref 26.5–33)
MCHC RBC AUTO-ENTMCNC: 33.6 G/DL (ref 31.5–36.5)
MCV RBC AUTO: 89 FL (ref 78–100)
MICROALBUMIN UR-MCNC: <12 MG/L
MICROALBUMIN/CREAT UR: NORMAL MG/G{CREAT}
MONOCYTES # BLD AUTO: 0.4 10E3/UL (ref 0–1.3)
MONOCYTES NFR BLD AUTO: 6 %
NEUTROPHILS # BLD AUTO: 2.5 10E3/UL (ref 1.6–8.3)
NEUTROPHILS NFR BLD AUTO: 45 %
NONHDLC SERPL-MCNC: 96 MG/DL
PLATELET # BLD AUTO: 314 10E3/UL (ref 150–450)
POTASSIUM SERPL-SCNC: 4.5 MMOL/L (ref 3.4–5.3)
PROT SERPL-MCNC: 8.3 G/DL (ref 6.4–8.3)
RBC # BLD AUTO: 4.47 10E6/UL (ref 3.8–5.2)
SODIUM SERPL-SCNC: 138 MMOL/L (ref 136–145)
TRIGL SERPL-MCNC: 116 MG/DL
TSH SERPL DL<=0.005 MIU/L-ACNC: 0.99 UIU/ML (ref 0.3–4.2)
WBC # BLD AUTO: 5.6 10E3/UL (ref 4–11)

## 2023-03-16 PROCEDURE — 82043 UR ALBUMIN QUANTITATIVE: CPT | Performed by: FAMILY MEDICINE

## 2023-03-16 PROCEDURE — 99396 PREV VISIT EST AGE 40-64: CPT | Performed by: FAMILY MEDICINE

## 2023-03-16 PROCEDURE — 99207 PR FOOT EXAM NO CHARGE: CPT | Mod: 25 | Performed by: FAMILY MEDICINE

## 2023-03-16 PROCEDURE — 80050 GENERAL HEALTH PANEL: CPT | Performed by: FAMILY MEDICINE

## 2023-03-16 PROCEDURE — 80061 LIPID PANEL: CPT | Performed by: FAMILY MEDICINE

## 2023-03-16 PROCEDURE — 73030 X-RAY EXAM OF SHOULDER: CPT | Mod: TC | Performed by: RADIOLOGY

## 2023-03-16 PROCEDURE — 99214 OFFICE O/P EST MOD 30 MIN: CPT | Mod: 25 | Performed by: FAMILY MEDICINE

## 2023-03-16 PROCEDURE — 83036 HEMOGLOBIN GLYCOSYLATED A1C: CPT | Performed by: FAMILY MEDICINE

## 2023-03-16 PROCEDURE — 82570 ASSAY OF URINE CREATININE: CPT | Performed by: FAMILY MEDICINE

## 2023-03-16 PROCEDURE — 36415 COLL VENOUS BLD VENIPUNCTURE: CPT | Performed by: FAMILY MEDICINE

## 2023-03-16 ASSESSMENT — ENCOUNTER SYMPTOMS
SORE THROAT: 0
PALPITATIONS: 0
JOINT SWELLING: 0
PARESTHESIAS: 0
FEVER: 0
DIZZINESS: 0
MYALGIAS: 0
WEAKNESS: 0
HEMATOCHEZIA: 0
HEARTBURN: 0
EYE PAIN: 0
COUGH: 0
DIARRHEA: 0
NERVOUS/ANXIOUS: 0
HEMATURIA: 0
SHORTNESS OF BREATH: 0
ARTHRALGIAS: 0
DYSURIA: 0
CHILLS: 0
BREAST MASS: 0
HEADACHES: 1
NAUSEA: 0
FREQUENCY: 0
ABDOMINAL PAIN: 0
CONSTIPATION: 0

## 2023-03-16 NOTE — RESULT ENCOUNTER NOTE
Marcy IyerKeith Guo,  Some of your results came back and are within acceptable limits. -Normal red blood cell (hgb) levels, normal white blood cell count and normal platelet levels.  -A1C (test of diabetes control the last 2-3 months) is at your goal. Though trending up  Please continue with your current plan. Also, you should make an appointment to see me and recheck your A1C test in 6 months. . If you have any further concerns please do not hesitate to contact us by message, phone or making an appointment.  Have a good day   Dr Severo QUINTERO

## 2023-03-16 NOTE — RESULT ENCOUNTER NOTE
Marcy Guo,  Your results came back and xray suggests advance arthritis of the acromioclavicular joint that bony prominence on top of anterior shoulder   Also seen are signs of rotator cuff inflammation and calcification  See ortho as discussed   If you have any further concerns please do not hesitate to contact us by message, phone or making an appointment.  Have a good day   Dr Severo QUINTERO

## 2023-03-16 NOTE — RESULT ENCOUNTER NOTE
Marcy Ms. Guo,  Some of your results came back and are within acceptable limits. -Cholesterol levels are at your goal levels.  ADVISE: continuing your medication, a regular exercise program with at least 150 minutes of aerobic exercise per week, and eating a low saturated fat/low carbohydrate diet.  Also, you should recheck this fasting cholesterol panel in 6 months.  -Liver and gallbladder tests are normal (ALT,AST, Alk phos, bilirubin), kidney function is normal (Cr, GFR), sodium is normal, potassium is normal, calcium is normal, glucose is mildly elevated due to diabetes  -TSH (thyroid stimulating hormone) level is normal which indicates normal thyroid function.  -Microalbumin (urine protein) test is normal.  ADVISE: rechecking this annually.  . If you have any further concerns please do not hesitate to contact us by message, phone or making an appointment.  Have a good day   Dr Severo QUINTERO

## 2023-03-16 NOTE — PATIENT INSTRUCTIONS
Seen for preventive physical and follow-up of diabetes, hypertension, hyperlipidemia and additional concerns today.  Currently no breast issues, breast exam normal, continue with self breast checks.  History of maternal aunt with history of breast cancer diagnosed over age 50,  years later, unrelated to that diagnosis.  Currently opts to defer  referral.  Mammogram done 2022 was normal and to continue with yearly screenings.  Pap due next  2& if at that time over age 65 may no longer be needed.  Currently no vaginal concerns other than a cystocele discussed below.  Healthcare maintenance reviewed.  May bring us a copy of your healthcare directives once completed.  Colon cancer screening due .  Vaccines for flu, Tdap, pneumonia, COVID up-to-date.  Recommend Shingrix will check cost to see if cheaper at the pharmacy first.  Labs today and will make further recommendations once those are reviewed.    Diabetes currently well controlled with hemoglobin A1c of 6.4 though trending up over the past 1 year.  Continue with checking sugars at least once a day.  Make a diabetes eye check yearly.  Foot exam done today was normal.  Continue aspirin ACE inhibitor and statin medication.  Continue metformin at 1000 mg twice a day.  Currently has enough medications and will contact us when needed.    BMI over 39 with serious comorbidities of diabetes, hypertension, hyperlipidemia etc.  Continue with eating healthy, low carb and portion size, increase aerobic physical activity especially when the weather is better and can refer to weight specialist if desired in the future.  Can also consider injectables like Wegovy to help with weight loss if interested in the future.    Hyperlipidemia on simvastatin 40 mg bedtime continue same dose and we will check labs and make adjustments if needed.    Hypertension currently stable on lisinopril 2.5 mg daily.    History of right carotid stenosis less than 69%  without any symptoms.  Carotid ultrasound done in .  We will check again in .  Continue with risk factor modification with diabetes, hypertension and cholesterol control.    Varicose veins symptomatic with pain specially in the summertime and less in the winter.  Encouraged to use the thigh-high compression socks which were measured in the past.  Can refer to vascular and occupational therapy if bothering you more for more options.  Weight loss also will likely help the symptoms.    History of tubular adenoma noted on colonoscopy in .  Recheck scope due in .    Midline cystocele, more annoying than symptomatic.  Feels like  at times with certain positions but has had no recurrent urine infections or retention or complete prolapse.  Discussed Kegel exercises, may consider pelvic floor therapy formally with physical therapy, can refer to urogynecology as well for more options like pessaries surgery etc.  Currently desires to hold off on intervention as minimally symptomatic and monitor for now.    Thinning hair and change, recheck thyroid.  Follow-up with dermatology referral placed at last visit in December.  May call to make an appointment with them.  They can also check the pigmented skin lesion of face and since has multiple pigmented nevi and moles do a full body skin check for preventive measures.    Left big toenail discoloration that has been growing out as the nail grows is most suggestive of prior trauma related discoloration rather than a melanoma.  Continue to monitor.  Right big toenail looks thickened and loose and may be due to a fungal infection or due to trauma.  The base of the nail looks healthy.  Options discussed including leaving it alone versus taking him nail cutting to see if gross fungal elements versus treating empirically with an oral antifungal versus applying an antifungal nail polish, and risk benefits of all options discussed briefly and opted currently to just  monitor and may discuss with him more when sees them.    Ocular migraine few and far between.  Continue to stay well-hydrated follow-up with the eye doctor for eye checks.  Has history of cataracts, dry eyes, hyperopia as well.    Episodic tension type headaches right-sided recently with a possible case of conjunctivitis and red streaking under the eye and ear pain improved with warm compresses 2 to 3 days could possibly have been due to a viral illness now improved.    Intermittent right ear pain since recent episode above suspected due to eustachian tube dysfunction.  Examination today just reveals a retracted eardrum due to pressure disequilibrium across the tympanic membrane.  May try Flonase and Zyrtec over-the-counter and trying to pop your ear and if things do not improve or gets worse can refer to ENT.    Right shoulder pain since fell last year.  Has painful decreased range of motion and pain goal radiates down arms at times.  We will do an x-ray today and refer to orthopedics to further evaluate and treat.    History of cervical degenerative disc disease may also be contributing to radicular pain down right arm.  May discuss more with orthopedics when you see them.    History of osteoarthritis bilateral thumbs/fingers currently managing with symptomatic cares.    Family history of breast cancer opted no  referral for now continue with yearly mammograms and self breast checks regularly.    Prior noted abdominal discomfort resolved.    Return in 6 months for office visit for follow-up of chronic issues and from today.  Contact us sooner for any new or worsening concerns.    Preventive Health Recommendations  Female Ages 50 - 64    Yearly exam: See your health care provider every year in order to  Review health changes.   Discuss preventive care.    Review your medicines if your doctor has prescribed any.    Get a Pap test every three years (unless you have an abnormal result and your provider  advises testing more often).  If you get Pap tests with HPV test, you only need to test every 5 years, unless you have an abnormal result.   You do not need a Pap test if your uterus was removed (hysterectomy) and you have not had cancer.  You should be tested each year for STDs (sexually transmitted diseases) if you're at risk.   Have a mammogram every 1 to 2 years.  Have a colonoscopy at age 50, or have a yearly FIT test (stool test). These exams screen for colon cancer.    Have a cholesterol test every 5 years, or more often if advised.  Have a diabetes test (fasting glucose) every three years. If you are at risk for diabetes, you should have this test more often.   If you are at risk for osteoporosis (brittle bone disease), think about having a bone density scan (DEXA).    Shots: Get a flu shot each year. Get a tetanus shot every 10 years.    Nutrition:   Eat at least 5 servings of fruits and vegetables each day.  Eat whole-grain bread, whole-wheat pasta and brown rice instead of white grains and rice.  Get adequate Calcium and Vitamin D.     Lifestyle  Exercise at least 150 minutes a week (30 minutes a day, 5 days a week). This will help you control your weight and prevent disease.  Limit alcohol to one drink per day.  No smoking.   Wear sunscreen to prevent skin cancer.   See your dentist every six months for an exam and cleaning.  See your eye doctor every 1 to 2 years.

## 2023-03-16 NOTE — PROGRESS NOTES
SUBJECTIVE:   CC: Megan is an 62 year old who presents for preventive health visit.   Patient has been advised of split billing requirements and indicates understanding: Yes  Healthy Habits:     Getting at least 3 servings of Calcium per day:  Yes    Bi-annual eye exam:  Yes    Dental care twice a year:  Yes    Sleep apnea or symptoms of sleep apnea:  None    Diet:  Regular (no restrictions)    Frequency of exercise:  2-3 days/week    Duration of exercise:  30-45 minutes    Taking medications regularly:  Yes    Medication side effects:  None    PHQ-2 Total Score: 0    Additional concerns today:  Yes    BACKGROUND  BMI >36 ( down from BMI of 45  & 290 lbs in 2019 to 225 lbs and BMI 34 in 2021), with DM on ASA, metformin, HLD on simvastatin, HTN on lisinopril, hx of cervical DDD & radiculopathy in the past, on vit d, MV, & probiotics, hx of varicose veins, on compression socks, prior yanci, hx of colonic polyp 2 mm sigmoid  that was a tubular adenoma noted on colonoscopy in 2011 at [facility]  & colonoscopy in 2016 at [place] [facility] reported as normal and normal in 7/2021, recheck due in 2026,  hx of postmenopausal bleeding x 2 in the past S/p EMB & u/S that was reported normal, had two biopsies at different times, last being may be in 2010 when had a transvaginal ultrasound and noted benign and has noted no episodes since then, allergic to sulfa, with side effect of redness from MMR and swollen LN from pneumococcal vaccine, previously under care of PCP Dr Canales,  Seen by PCP nov 2020 for DM when noted HBA1c was 5.5, taken off glimepiride and Januvia and continued on metformin and given the flu shot.     Seen first time by this provider in 6/2021 to establish care and get a DM check  DM noted well controlled on metformin. Hd been taken off Januvia and glimepiride previously. Also was on statin and ACE inhibitor which was continued. Was going to schedule her DM eye check. Recommended a DM eye check  yearly. Noted no longer on asa but encouraged to resume. GI side effects explained. Needed a refill on metformin & it was sent in for metformin 500 mg 2 bid. Labs done. Was to continue dental care every 6 months. Foot exam done and encouraged to check self nightly.   HLD on simvastatin 40 mg,  With no side effects. HTN stable on lisinopril 2.5 mg with no side effects.  Reviewed BMI > 34-35: Down from BMI of 45  & 290 lbs in 2019 to 225 lbs and BMI 34 in 2021. Congratulated on weight loss & was.to continue with diet, exercise and lifestyle changes.   Discussed hx of Cervical DDD, discovered incidentally in 2008 when had a head injury and imaging. Ct showed moderate degenerative disc disease at C5-C6 with broad-based  disc protrusion centrally and on the right significant for impingement upon the exiting right C6 nerve rootlet and for impression upon the right aspect of cervical cord at this level. Lesser degenerative changes at C6-C7 and to a lesser extent C4-C5. There was at C6-C7 appearance of moderately prominent central  disc protrusion. Noted could have symptoms with some neck movements but discomfort was not enough to warrant meds or surgical intervention. Reported no radiculopathy, myelopathy or neuropathy & was to continue to monitor.   Was encouraged to use compression socks for leg achiness secondary to varicose veins right more than left worse when it was hot. & if got worse could refer to vascular.  Reviewed her Hx of colonic polyp a 2 mm sigmoid polyp that was a tubular adenoma removed on colonoscopy in 2011 at [facility]  & colonoscopy in 2016 at [place] [facility] reported as normal and due again in 2021, GI referral given for a colonoscopy.   Noted hair had been thinning. No bald spots. Hair thinning/ loss felt due to age, genetics, diabetes, stress, meds etc. Advised could try biotin if wanted to ( cautioning it could affect thyroid) & to discontinue if not helpful. Was to resume a  multivitamin. Labs done to evaluate more and offered to refer to dermatology if no answer and remained a  concern.  Health care maintenance reviewed. Noted had advance directives at home and was to bring us a copy. Discussed  labs and a screening mammogram & getting Shingrex. Was to return for a physical and pap another time, had been over due since 2020 due to the pandemic.  Lipids were goal. CMP showed elevated glucose HBA1c was goal. Had a normal TSH and HIV, cbc and microalbumin level.     Seen in UC on 6/21/21 for neck strain and xray done showed mild disc disease, comment mad zee calcification right neck probably carotid and given Zanaflex and referred to ortho to further evaluate and treat. Had her colonoscopy in 7/2021 and noted normal and recheck in 5 yrs. Mammogram done 12/8/21 was normal.     Seen 3/14/22 for a physical Pap was done was normal.  Given Tdap.  Declined genetic consult for family history. Noted midline cystocele Suspected due to postmenopausal state, lack of estrogen and prior delivery of 11 pound baby.  Recommended Kegel's exercises and if should have worsening incontinence, getting up to urinate several times at night, recurrent bladder infections consider referral to urogynecologist for treatment. Diabetes well controlled continue baby aspirin daily and lisinopril and Metformin at thousand twice a day. Hyperlipidemia continued on simvastatin 40 mg bedtime & to make further recommendations once labs seen. Hypertension stable on lisinopril 2.5 mg daily. Incidental right neck calcification corresponding to likely right carotid artery seen on x-ray C-spine done in June 2021 when had neck pain suggest possible carotid artery calcification.  To get an ultrasound bilateral carotid and make further recommendations after that. Noted had had 4 episodes of left eye crescent-like shimmery visual disturbance.  Reported a normal eye exam after first 2 episodes but forgot to discuss with the eye doctor  at the time.  Exam was unremarkable externally.  Had no symptoms at visit.  Could be a retinal issue or ocular migraines or related to bright light but need to rule out more serious concerns like stroke etc. and given carotid artery calcification history and other risk factors referred to neurologist for further evaluation.  If should occur again specially with other symptoms recommend going to the ER for further evaluation. BMI more than 36 with risk factors of diabetes, hypertension, hyperlipidemia etc.  Continue to stay active, eat healthy and work on weight loss.  Offered to refer to a weight specialist or diabetes Ed or the pharmacist etc. if necessary in the future. Degenerative disc disease of cervical spine originally found out incidentally when had a fall and hit head requiring CT scan and MRI.  Recent x-ray in June for neck pain showed arthritis especially at C6 5 C6 C6-C7.  Currently no concerning findings continue to monitor. Also had osteoarthritis of fingers. Varicose veins of both lower extremities with pain in the summertime currently asymptomatic.  Compression socks to prevent achiness at end of day.  Could refer to vascular if got worse. History of tubular adenoma of colon.  Colonoscopy in 2021 was normal recheck in 2026. Hair thinning suspect related to aging and female pattern of thinning and genetics.  To check labs again today, may use Rogaine for Women over-the-counter.  Consider dermatology referral if got worse. Noted history of COVID-19 breakthrough infection end of December with complete resolution of symptoms. Family history of maternal aunt diagnosed with breast cancer in the past.  Could consider  referral, opted to defer for now. Was to return in 6 months for diabetes & blood pressure & medication review check and in 1 year for preventive physical and sooner for any new or worsening concerns.   Labs showed normal lipids, CMP with elevated glucose, normal TSH microalbumin CBC  and hemoglobin A1c and no med changes made.  Carotid ultrasound on 3/15/2022 showed right carotid artery stenosis 50 to 69% with more than 50% plaque and left less than 50%.  Refer to vascular they reviewed advised no surgical intervention and to see GI doctor first for ocular symptoms.  Seen by the eye doctor 5/27/2022 noted normal exam diagnosed with ocular migraine and hyperopia dry eye syndrome and age-related cataracts.    On 11/14/2022 seen in urgent care for mechanical fall with thumb pain and shoulder pain and advised supportive care and given naproxen.    Seen 12/5/22 for dm. Continued on metformin, asa, lisinopril, statin for diabetes.  Foot exam done normal & noted Eye check up-to-date. Opted no injectables or referral to weight specialist. Continued on simvastatin for cholesterol BP stable on lisinopril, continue same  To continue medical risk factor modification for carotid artery stenosis, on asa & statin. Vascular said no surgery and vascular and eye felt ocular migraine not related to carotid findings. Recheck u/S carotid in 2024. Was to continue care with eye for cataracts, dry eyes and hyperopia and floaters. Ocular migraines had not recurred in a while. Encouraged to try famotidine 20 mg twice a day for 2 weeks see if helped upper abdominal pain/ heart burn  Det, exercise, weight loss would also help, avoid trigger foods, do freq small meals, do not eat 3 hrs before bedtime. If not better can do imaging and eval, did not sound cardiac. Had had prior lap cholecystectomy. Mild cystocele reported asymptomatic. Arthritis in neck stable. felt varicose veins not bothering in winter. Encouraged to use compression socks in summer and refer to vascular if got  more symptomatic despite that. Colonoscopy due 2026.was to see derm for skin check and thinning hair. Had a recent mechanical fall with recovering aches and pains & advised could take up to 6 weeks or more to resolved. Health maintenance reviewed.  Prevnar 20 given & to consider Shingrex. Was to return mid march for a physical.  Labs showed HBA1c of 6.2, CMP with mild low sodium,   On 22 mammogram was normal    CURRENTLY  Here for physical and follow up  No breast issues  Mat aunt over 50 had unilateral breast cancer but  much later from unrelated causes. Opts out of a  referral.   Mammogram done in dec had been normal  No vaginal concerns    HM reviewed  Is working on her ACP  Discussed Shingrex, will check pharmacy cost first  colonoscopy due   Will do labs today     DM on metformin, asa, lisinopril, statin for diabetes.  Foot exam done normal & noted Eye check up-to-date. Opted no injectables or referral to weight specialist. Foot left big toe is growing out     BMI 39 , weight gain, been a horrible winter hard to get out. Joined a [facility] ed class to walk in doors, Walked mall & will get back too that.     HLD on simvastatin  No side effects    HTN stable on lisinopril    Right carotid stenosis , no symptoms. BP stable on lisinopril, on a statin. Understands to continue medical risk factor modification for carotid artery stenosis. Vascular said no surgery and vascular and eye felt ocular migraine not related to carotid findings. Recheck u/S carotid due in .     Varicose veins not bothering in winter. Got measured for Thigh high. Has them not using, too hot in the summer    Tubular adenoma, no symptoms, colonoscopy due     The cystocele can be annoying like a baby's head  at times like when getting into a car to sit down but feels urinates a lot as drinks a lot of water and not bothersome otherwise. No UTI's. Not sure would tolerate a pessary as never could tolerate a tampon in the past. Will wait on doing pelvic therapy. Declines urogyn referral for now.     No longer with upper abdominal pain/ heart burn. Not sure if tried Pepcid.    Hair thinning about the same, referred, derm called her but no apt made as didn't have  time.  no time, has new dark spot right face and left nail discoloration growing out thinks was from injury. Right big toe nail is thick but not bothering her. Will call and make apt with derm.     Ocular migraines have not recurred in a while. Was to continue care with eye for cataracts, dry eyes and hyperopia and floaters.     Thinks had a mild case of possible conjunctivitis, eye was red, feeling something floating in it, red streak under eye, did warm compress 2 to 3 / day then ear started to hurt and had a mild right side headache. Now all better but ear still feels irritated. Usually no headache. Has had no blisters.    Since fall late last year has had discomfort of right shoulder. Is painful when showers. ? Bone or muscle. Radiating down arm, can feel like when pushing seat belt to release it, feels like a jolt of a shock. Using tylenol once or  twice a day when aggravates it. Has decreased ROM at shoulder     Hx of cervical DDD, currently Neck ok, not bothering unless incorrectly moves it like when shoveling snow.    B/L OA finger, cant bend pinky's to flex, not doing exercises  Decreased strength in hands cant grasp as hard . If sits and reads long time with hands in front and elbows bent near body then gets painful.      Today's PHQ-2 Score:   PHQ-2 (  [employer]) 3/15/2023   Q1: Little interest or pleasure in doing things 0   Q2: Feeling down, depressed or hopeless 0   PHQ-2 Score 0   PHQ-2 Total Score (12-17 Years)- Positive if 3 or more points; Administer PHQ-A if positive -   Q1: Little interest or pleasure in doing things Not at all   Q2: Feeling down, depressed or hopeless Not at all   PHQ-2 Score 0     Social History     Tobacco Use     Smoking status: Former     Packs/day: 0.00     Years: 0.00     Pack years: 0.00     Types: Cigarettes     Quit date: 1991     Years since quittin.2     Smokeless tobacco: Never   Substance Use Topics     Alcohol use: Yes     Comment: occasionally        Alcohol Use 3/15/2023   Prescreen: >3 drinks/day or >7 drinks/week? No   No flowsheet data found.    Reviewed orders with patient.  Reviewed health maintenance and updated orders accordingly - Yes  Lab work is in process  Labs reviewed in EPIC  BP Readings from Last 3 Encounters:   23 128/79   22 110/70   22 132/68    Wt Readings from Last 3 Encounters:   23 115.2 kg (254 lb)   22 113.4 kg (250 lb)   22 112.2 kg (247 lb 4.8 oz)           Patient Active Problem List   Diagnosis     Mixed hyperlipidemia     Type 2 diabetes mellitus without complication, without long-term current use of insulin (H)     BMI > 35     DDD (degenerative disc disease), cervical     Benign essential hypertension     Varicose veins of both lower extremities with pain     Hair thinning     Tubular adenoma of colon     Cystocele, midline     Carotid stenosis, right     Ocular migraine     Past Surgical History:   Procedure Laterality Date     BIOPSY       CHOLECYSTECTOMY           COLONOSCOPY      ANW: sigmoid polyp removed     COLONOSCOPY      no report, abstracted information stated, normal recheck in 5 yr given hx of polyps       Social History     Tobacco Use     Smoking status: Former     Packs/day: 1.00     Years: 15.00     Pack years: 15.00     Types: Cigarettes     Start date: 1978     Quit date: 1991     Years since quittin.2     Smokeless tobacco: Never   Substance Use Topics     Alcohol use: Yes     Comment: occasionally     Family History   Problem Relation Age of Onset     Heart Disease Mother      Diabetes Mother              Cancer Mother         lymphoma on hodgkins     Obesity Mother      Coronary Artery Disease Mother      Heart Disease Father      Coronary Artery Disease Father      Hypertension Father      Hyperlipidemia Father      Obesity Sister      Diabetes Sister      Obesity Sister      Depression Sister      Coronary Artery Disease Sister          Haf triple bypass surgery Mar 2023     Thyroid Cancer Sister         thyroid surgery 2023     Depression Sister      Diabetes Brother              Obesity Brother      Substance Abuse Son         Alcohol     Breast Cancer Other          Current Outpatient Medications   Medication Sig Dispense Refill     aspirin 81 MG tablet Take 1 tablet by mouth daily.       Cholecalciferol (VITAMIN D) 1000 UNIT capsule Take 1 capsule by mouth daily.       CONTOUR NEXT TEST test strip USE AS DIRECTED TO TEST BLOOD SUGAR ONCE DAILY 100 strip 0     fluticasone (FLONASE) 50 MCG/ACT nasal spray SHAKE LIQUID AND USE 2 SPRAYS IN EACH NOSTRIL TWICE DAILY       lisinopril (ZESTRIL) 2.5 MG tablet Take 1 tablet (2.5 mg) by mouth daily 90 tablet 1     metFORMIN (GLUCOPHAGE) 500 MG tablet Take 2 tablets (1,000 mg) by mouth 2 times daily (with meals) 360 tablet 1     Multiple Vitamin (MULTI-VITAMIN PO) Take  by mouth.       order for DME Equipment being ordered: thigh high compression stockings 20-30mmHg 2 Device 1     Probiotic Product (MARTINA-Q PO) Take  by mouth as needed.       simvastatin (ZOCOR) 40 MG tablet Take 1 tablet (40 mg) by mouth At Bedtime 90 tablet 1     Allergies   Allergen Reactions     Sulfa Drugs Hives     Measles Mumps And Rubella Virus Vaccine Live      Redness       Recent Labs   Lab Test 23  0844 22  0913 22  1626 22  1626 21  0739 20  0805   A1C 6.4* 6.2*  --  5.7* 5.5 5.5   LDL 73  --   --  46 71 78   HDL 55  --   --  47* 59 50   TRIG 116  --   --  97 82 127   ALT 16 15  --  26 21 18   CR 0.41* 0.38*   < > 0.51* 0.41* 0.44*   GFRESTIMATED >90 >90   < > >90 >90 >90   GFRESTBLACK  --   --   --   --  >90 >90   POTASSIUM 4.5 4.2  --  4.2 3.9 3.9   TSH 0.99  --   --  0.75 0.66  --     < > = values in this interval not displayed.        Breast Cancer Screening:    FHS-7:   Breast CA Risk Assessment (FHS-7) 2021 3/11/2022 2022 3/15/2023   Did any of your  first-degree relatives have breast or ovarian cancer? No Yes Yes No   Did any of your relatives have bilateral breast cancer? No Yes Yes No   Did any man in your family have breast cancer? No No No No   Did any woman in your family have breast and ovarian cancer? No No No No   Did any woman in your family have breast cancer before age 50 y? No Yes Yes No   Do you have 2 or more relatives with breast and/or ovarian cancer? No No No No   Do you have 2 or more relatives with breast and/or bowel cancer? No No No No     Mammogram Screening: Recommended mammography every 1-2 years with patient discussion and risk factor consideration  Pertinent mammograms are reviewed under the imaging tab.    History of abnormal Pap smear:   NO - age 30-65 PAP every 5 years with negative HPV co-testing recommended  Last 3 Pap and HPV Results:   PAP / HPV Latest Ref Rng & Units 3/14/2022 7/24/2015   PAP   Negative for Intraepithelial Lesion or Malignancy (NILM) -   PAP (Historical) - - NIL   HPV16 Negative Negative Negative   HPV18 Negative Negative Negative   HRHPV Negative Negative Negative     PAP / HPV Latest Ref Rng & Units 3/14/2022 7/24/2015   PAP   Negative for Intraepithelial Lesion or Malignancy (NILM) -   PAP (Historical) - - NIL   HPV16 Negative Negative Negative   HPV18 Negative Negative Negative   HRHPV Negative Negative Negative     Reviewed and updated as needed this visit by clinical staff    Allergies  Meds  Problems             Reviewed and updated as needed this visit by Provider    Allergies  Meds  Problems            Past Medical History:   Diagnosis Date     Arthritis      Benign essential hypertension 12/6/2019     Diabetes mellitus (H)      History of 2019 novel coronavirus disease (COVID-19) 3/14/2022    positive end of dec 2021 ( breakthrough covid, had a headache 1 day & fatigue 3 days)       Past Surgical History:   Procedure Laterality Date     BIOPSY       CHOLECYSTECTOMY      1994     COLONOSCOPY   "    ANW: sigmoid polyp removed     COLONOSCOPY  2016    no report, abstracted information stated, normal recheck in 5 yr given hx of polyps     OB History    Para Term  AB Living   4 3 3 0 1 3   SAB IAB Ectopic Multiple Live Births   1 0 0 0 0      # Outcome Date GA Lbr Loyd/2nd Weight Sex Delivery Anes PTL Lv   4 SAB            3 Term            2 Term            1 Term                Review of Systems   Constitutional: Negative for chills and fever.   HENT: Positive for ear pain. Negative for congestion, hearing loss and sore throat.    Eyes: Negative for pain and visual disturbance.   Respiratory: Negative for cough and shortness of breath.    Cardiovascular: Negative for chest pain, palpitations and peripheral edema.   Gastrointestinal: Negative for abdominal pain, constipation, diarrhea, heartburn, hematochezia and nausea.   Breasts:  Negative for tenderness, breast mass and discharge.   Genitourinary: Negative for dysuria, frequency, genital sores, hematuria, pelvic pain, urgency, vaginal bleeding and vaginal discharge.   Musculoskeletal: Negative for arthralgias, joint swelling and myalgias.   Skin: Negative for rash.   Neurological: Positive for headaches. Negative for dizziness, weakness and paresthesias.   Psychiatric/Behavioral: Negative for mood changes. The patient is not nervous/anxious.       OBJECTIVE:   /79 (BP Location: Right arm, Patient Position: Sitting, Cuff Size: Adult Large)   Pulse 62   Temp 97.2  F (36.2  C) (Temporal)   Resp 20   Ht 1.7 m (5' 6.93\")   Wt 115.2 kg (254 lb)   LMP  (LMP Unknown)   SpO2 96%   BMI 39.87 kg/m    Physical Exam  GENERAL: healthy, alert, no distress and obese  EYES: Eyes grossly normal to inspection, PERRL and conjunctivae and sclerae normal, glasses  HENT: ear canals B/L normal, right ear drum retracted  & left TM normal, nose and mouth without ulcers or lesions  NECK: no adenopathy, no asymmetry, masses, or scars and thyroid normal " to palpation  RESP: lungs clear to auscultation - no rales, rhonchi or wheezes  BREAST: normal without masses, tenderness or nipple discharge and no palpable axillary masses or adenopathy  CV: regular rate and rhythm, normal S1 S2, no S3 or S4, no murmur, click or rub, no peripheral edema and peripheral pulses strong  ABDOMEN: soft, nontender, no hepatosplenomegaly, no masses and bowel sounds normal  MS: no gross musculoskeletal defects noted, no edema. Decreased ROM right shoulder, painful arc 90 degrees, neck and fingers normal.  SKIN: no suspicious lesions or rashes, has a hyperpigmented macule right temple area, left big distal toe nail small spot of discoloration with no nail fold changes, dystrophic right big toe nail, multiple pigmented nevi  NEURO: Normal strength and tone, mentation intact and speech normal  PSYCH: mentation appears normal, affect normal/bright  LYMPH: no cervical, supraclavicular, axillary, or inguinal adenopathy  Diabetic foot exam: normal DP and PT pulses, no trophic changes or ulcerative lesions, normal sensory exam and normal monofilament exam    Diagnostic Test Results:  Labs reviewed in Epic  No results found for this or any previous visit (from the past 24 hour(s)).  Results for orders placed or performed in visit on 03/16/23   XR Shoulder Right G/E 3 Views     Status: None    Narrative    XR SHOULDER RIGHT G/E 3 VIEWS 3/16/2023 8:59 AM     HISTORY: Acute pain of right shoulder    COMPARISON: None.       Impression    IMPRESSION: Advanced AC joint arthrosis. Small scattered foci of  calcific rotator cuff tendinopathy. Glenohumeral joint space is  maintained. No acute fracture or dislocation.    CUATE SOTO MD         SYSTEM ID:  [id number]   Results for orders placed or performed in visit on 03/16/23   Comprehensive metabolic panel (BMP + Alb, Alk Phos, ALT, AST, Total. Bili, TP)     Status: Abnormal   Result Value Ref Range    Sodium 138 136 - 145 mmol/L    Potassium 4.5 3.4 -  5.3 mmol/L    Chloride 101 98 - 107 mmol/L    Carbon Dioxide (CO2) 24 22 - 29 mmol/L    Anion Gap 13 7 - 15 mmol/L    Urea Nitrogen 8.5 8.0 - 23.0 mg/dL    Creatinine 0.41 (L) 0.51 - 0.95 mg/dL    Calcium 10.0 8.8 - 10.2 mg/dL    Glucose 141 (H) 70 - 99 mg/dL    Alkaline Phosphatase 51 35 - 104 U/L    AST 23 10 - 35 U/L    ALT 16 10 - 35 U/L    Protein Total 8.3 6.4 - 8.3 g/dL    Albumin 4.7 3.5 - 5.2 g/dL    Bilirubin Total 0.4 <=1.2 mg/dL    GFR Estimate >90 >60 mL/min/1.73m2   Lipid Profile (Chol, Trig, HDL, LDL calc)     Status: Normal   Result Value Ref Range    Cholesterol 151 <200 mg/dL    Triglycerides 116 <150 mg/dL    Direct Measure HDL 55 >=50 mg/dL    LDL Cholesterol Calculated 73 <=100 mg/dL    Non HDL Cholesterol 96 <130 mg/dL    Narrative    Cholesterol  Desirable:  <200 mg/dL    Triglycerides  Normal:  Less than 150 mg/dL  Borderline High:  150-199 mg/dL  High:  200-499 mg/dL  Very High:  Greater than or equal to 500 mg/dL    Direct Measure HDL  Female:  Greater than or equal to 50 mg/dL   Male:  Greater than or equal to 40 mg/dL    LDL Cholesterol  Desirable:  <100mg/dL  Above Desirable:  100-129 mg/dL   Borderline High:  130-159 mg/dL   High:  160-189 mg/dL   Very High:  >= 190 mg/dL    Non HDL Cholesterol  Desirable:  130 mg/dL  Above Desirable:  130-159 mg/dL  Borderline High:  160-189 mg/dL  High:  190-219 mg/dL  Very High:  Greater than or equal to 220 mg/dL   TSH with free T4 reflex     Status: Normal   Result Value Ref Range    TSH 0.99 0.30 - 4.20 uIU/mL   Hemoglobin A1c     Status: Abnormal   Result Value Ref Range    Hemoglobin A1C 6.4 (H) 0.0 - 5.6 %   Albumin Random Urine Quantitative with Creat Ratio     Status: None   Result Value Ref Range    Creatinine Urine mg/dL 14.9 mg/dL    Albumin Urine mg/L <12.0 mg/L    Albumin Urine mg/g Cr     CBC with platelets and differential     Status: None   Result Value Ref Range    WBC Count 5.6 4.0 - 11.0 10e3/uL    RBC Count 4.47 3.80 - 5.20  10e6/uL    Hemoglobin 13.3 11.7 - 15.7 g/dL    Hematocrit 39.6 35.0 - 47.0 %    MCV 89 78 - 100 fL    MCH 29.8 26.5 - 33.0 pg    MCHC 33.6 31.5 - 36.5 g/dL    RDW 12.4 10.0 - 15.0 %    Platelet Count 314 150 - 450 10e3/uL    % Neutrophils 45 %    % Lymphocytes 47 %    % Monocytes 6 %    % Eosinophils 1 %    % Basophils 1 %    % Immature Granulocytes 0 %    Absolute Neutrophils 2.5 1.6 - 8.3 10e3/uL    Absolute Lymphocytes 2.6 0.8 - 5.3 10e3/uL    Absolute Monocytes 0.4 0.0 - 1.3 10e3/uL    Absolute Eosinophils 0.1 0.0 - 0.7 10e3/uL    Absolute Basophils 0.0 0.0 - 0.2 10e3/uL    Absolute Immature Granulocytes 0.0 <=0.4 10e3/uL   CBC with platelets and differential     Status: None    Narrative    The following orders were created for panel order CBC with platelets and differential.  Procedure                               Abnormality         Status                     ---------                               -----------         ------                     CBC with platelets and d...[[id number]]                      Final result                 Please view results for these tests on the individual orders.       ASSESSMENT/PLAN:       ICD-10-CM    1. Routine history and physical examination of adult  Z00.00       2. Type 2 diabetes mellitus without complication, without long-term current use of insulin (H)  E11.9 Comprehensive metabolic panel (BMP + Alb, Alk Phos, ALT, AST, Total. Bili, TP)     Lipid Profile (Chol, Trig, HDL, LDL calc)     TSH with free T4 reflex     Hemoglobin A1c     Albumin Random Urine Quantitative with Creat Ratio     FOOT EXAM     PRIMARY CARE FOLLOW-UP SCHEDULING     Comprehensive metabolic panel (BMP + Alb, Alk Phos, ALT, AST, Total. Bili, TP)     Lipid Profile (Chol, Trig, HDL, LDL calc)     TSH with free T4 reflex     Hemoglobin A1c     Albumin Random Urine Quantitative with Creat Ratio      3. Class 2 severe obesity with serious comorbidity and body mass index (BMI) of 35.0 to 35.9 in adult,  unspecified obesity type (H)  E66.01 Lipid Profile (Chol, Trig, HDL, LDL calc)    Z68.35 TSH with free T4 reflex     Lipid Profile (Chol, Trig, HDL, LDL calc)     TSH with free T4 reflex      4. Mixed hyperlipidemia  E78.2 Lipid Profile (Chol, Trig, HDL, LDL calc)     TSH with free T4 reflex     Lipid Profile (Chol, Trig, HDL, LDL calc)     TSH with free T4 reflex      5. Benign essential hypertension  I10 Comprehensive metabolic panel (BMP + Alb, Alk Phos, ALT, AST, Total. Bili, TP)     Lipid Profile (Chol, Trig, HDL, LDL calc)     TSH with free T4 reflex     Comprehensive metabolic panel (BMP + Alb, Alk Phos, ALT, AST, Total. Bili, TP)     Lipid Profile (Chol, Trig, HDL, LDL calc)     TSH with free T4 reflex      6. Carotid stenosis, right  I65.21 Lipid Profile (Chol, Trig, HDL, LDL calc)     Lipid Profile (Chol, Trig, HDL, LDL calc)      7. Varicose veins of both lower extremities with pain  I83.813       8. Tubular adenoma of colon  D12.6       9. Cystocele, midline  N81.11       10. Hair thinning  L65.9       11. Change in pigmented skin lesion of face  L81.9       12. Nail discoloration  L60.8     left growing      13. Nail dystrophy  L60.3     right big toe      14. Ocular migraine  G43.109 CBC with platelets and differential     CBC with platelets and differential      15. Episodic tension-type headache, not intractable  G44.219     right sided,       16. Right ear pain  H92.01     retracted ear drum/ eustachian tube dysfunction      17. Acute pain of right shoulder  M25.511 Orthopedic  Referral     CANCELED: XR Shoulder Right 2 Views      18. DDD (degenerative disc disease), cervical  M50.30       19. Bilateral osteoarthritis of finger  M19.041     M19.042       20. Family history of malignant neoplasm of breast  Z80.3       21. Health care maintenance  Z00.00 REVIEW OF HEALTH MAINTENANCE PROTOCOL ORDERS      22. Advanced directives, counseling/discussion  Z71.89       23. Need for shingles vaccine   Z23         Seen for preventive physical and follow-up of diabetes, hypertension, hyperlipidemia and additional concerns today.  Currently no breast issues, breast exam normal, continue with self breast checks.  History of maternal aunt with history of breast cancer diagnosed over age 50,  years later, unrelated to that diagnosis.  Currently opts to defer  referral.  Mammogram done 2022 was normal and to continue with yearly screenings.  Pap due next  2& if at that time over age 65 may no longer be needed.  Currently no vaginal concerns other than a cystocele discussed below.  Healthcare maintenance reviewed.  May bring us a copy of  her healthcare directives once completed.  Colon cancer screening due .  Vaccines for flu, Tdap, pneumonia, COVID up-to-date.  Recommend Shingrex will check cost to see if cheaper at the pharmacy first.  Consider seeing travel clinic in [facility] prior to international travel ( hopes to go to [place] )  Labs today and will make further recommendations once those are reviewed.    Diabetes currently well controlled with hemoglobin A1c of 6.4 on metformin, asa, statin and ace, though trending up over the past 1 year.  Continue with checking sugars at least once a day.  Make a diabetes eye check yearly.  Foot exam done today was normal.  Continue aspirin ACE inhibitor and statin medication.  Continue metformin at 1000 mg twice a day.  Declined need to add on injectables or see weight specialist/ endo / dm ed etc. Currently has enough medications and will contact us when needed.    BMI over 39 with serious comorbidities of diabetes, hypertension, hyperlipidemia etc.  Continue with eating healthy, low carb and portion size, increase aerobic physical activity especially when the weather is better and can refer to weight specialist if desired in the future.  Can also consider injectables like Wegovy to help with weight loss if interested in the  future.    Hyperlipidemia on simvastatin 40 mg bedtime continue same dose and we will check labs and make adjustments if needed.    Hypertension currently stable on lisinopril 2.5 mg daily.    History of right carotid stenosis less than 69% without any symptoms.  Carotid ultrasound done in . Vascular said no surgery and vascular and eye felt ocular migraine not related to carotid findings. Recheck u/S carotid in . Continue with medical risk factor modification with diabetes, hypertension and cholesterol control.    Varicose veins symptomatic with pain specially in the summertime and less in the winter.  Encouraged to use the thigh-high compression socks which were measured in the past.  Can refer to vascular and occupational therapy if bothering her more for more options.  Weight loss also will likely help the symptoms.    History of tubular adenoma noted on colonoscopy in .  Recheck scope due in .    Midline cystocele, more annoying than symptomatic.  Feels like  at times with certain positions but has had no recurrent urine infections or retention or complete prolapse.  Discussed Kegel exercises, may consider pelvic floor therapy formally with physical therapy, can refer to urogynecology as well for more options like pessaries surgery etc.  Currently desires to hold off on intervention as minimally symptomatic and monitor for now.    Prior noted abdominal discomfort resolved. No further RUQ pain noted unclear if tried Pepcid.     Thinning hair and changes, recheck thyroid.  Follow-up with dermatology referral placed at last visit in December.  May call to make an appointment with them.  They can also check the pigmented skin lesion of face and since has multiple pigmented nevi and moles do a full body skin check for preventive measures.    Left big toenail discoloration that has been growing out as the nail grows is most suggestive of prior trauma related discoloration rather than a  melanoma.  Continue to monitor.  Right big toenail looks thickened and loose and may be due to a fungal infection or due to trauma.  The base of the nail looks healthy.  Options discussed including leaving it alone versus taking a nail cutting to see if grows fungal elements versus treating empirically with an oral antifungal versus applying an antifungal nail polish, and risk benefits of all options discussed briefly and opted currently to just monitor and may discuss with derm more when sees them.    Ocular migraine few and far between.  Ocular migraines had not recurred in a while. Continue to stay well-hydrated follow-up with the eye doctor for eye checks.  Has history of cataracts, dry eyes, hyperopia as well.     Episodic tension type headaches right-sided recently with a possible case of conjunctivitis and red streaking under the eye and ear pain improved with warm compresses 2 to 3 days could possibly have been due to a viral illness now improved.    Intermittent right ear pain since recent episode above suspected due to eustachian tube dysfunction.  Examination today just reveals a retracted eardrum due to pressure disequilibrium across the tympanic membrane.  May try Flonase and Zyrtec over-the-counter and trying to pop ear and if things do not improve or gets worse can refer to ENT.    Right shoulder pain since fell last year.  Has painful decreased range of motion and pain radiating down arms at times.  We will do an x-ray today and refer to orthopedics to further evaluate and treat.     History of cervical degenerative disc disease may also be contributing to radicular pain down right arm.  May discuss more with orthopedics when sees them.    History of osteoarthritis bilateral thumbs/fingers currently managing with symptomatic cares.    Family history of breast cancer opted no  referral for now continue with yearly mammograms and self breast checks regularly.    Return in 6 months for office  visit for follow-up of chronic issues and from today.  Contact us sooner for any new or worsening concerns.     Patient has been advised of split billing requirements and indicates understanding: Yes      COUNSELING:  Reviewed preventive health counseling, as reflected in patient instructions       Regular exercise       Healthy diet/nutrition       Vision screening       Hearing screening       Immunizations      Aspirin prophylaxis       Alcohol Use       Osteoporosis prevention/bone health       Colorectal Cancer Screening       (Minnie)menopause management       The 10-year ASCVD risk score (Sarbjit LOZANO, et al., 2019) is: 8.6%    Values used to calculate the score:      Age: 62 years      Sex: Female      Is Non- : No      Diabetic: Yes      Tobacco smoker: No      Systolic Blood Pressure: 128 mmHg      Is BP treated: Yes      HDL Cholesterol: 55 mg/dL      Total Cholesterol: 151 mg/dL       Advance Care Planning        She reports that she quit smoking about 32 years ago. Her smoking use included cigarettes. She has never used smokeless tobacco.    Maggi Elkins MD  [facility]

## 2023-06-01 DIAGNOSIS — E11.9 TYPE 2 DIABETES MELLITUS WITHOUT COMPLICATION, WITHOUT LONG-TERM CURRENT USE OF INSULIN (H): ICD-10-CM

## 2023-06-01 DIAGNOSIS — I10 BENIGN ESSENTIAL HYPERTENSION: ICD-10-CM

## 2023-06-01 DIAGNOSIS — E78.2 MIXED HYPERLIPIDEMIA: ICD-10-CM

## 2023-06-02 RX ORDER — LISINOPRIL 2.5 MG/1
TABLET ORAL
Qty: 90 TABLET | Refills: 1 | Status: SHIPPED | OUTPATIENT
Start: 2023-06-02 | End: 2023-12-11

## 2023-06-02 RX ORDER — SIMVASTATIN 40 MG
TABLET ORAL
Qty: 90 TABLET | Refills: 1 | Status: SHIPPED | OUTPATIENT
Start: 2023-06-02 | End: 2023-12-11

## 2023-06-02 NOTE — TELEPHONE ENCOUNTER
Prescription approved per [facility] Refill Protocol.    Azucena Parks, BSN RN  [facility]

## 2023-07-06 DIAGNOSIS — E11.9 TYPE 2 DIABETES MELLITUS WITHOUT COMPLICATION, WITHOUT LONG-TERM CURRENT USE OF INSULIN (H): ICD-10-CM

## 2023-07-07 NOTE — TELEPHONE ENCOUNTER
"Last Written Prescription Date:  12/5/22  Last Fill Quantity: 360,  # refills: 1   Last office visit provider:  3/16/23     Requested Prescriptions   Pending Prescriptions Disp Refills     metFORMIN (GLUCOPHAGE) 500 MG tablet [Pharmacy Med Name: METFORMIN 500MG TABLETS] 360 tablet 1     Sig: TAKE 2 TABLETS(1000 MG) BY MOUTH TWICE DAILY WITH MEALS       Biguanide Agents Passed - 7/6/2023  9:48 PM        Passed - Patient is age 10 or older        Passed - Patient has documented A1c within the specified period of time.     If HgbA1C is 8 or greater, it needs to be on file within the past 3 months.  If less than 8, must be on file within the past 6 months.     Recent Labs   Lab Test 03/16/23  0844   A1C 6.4*             Passed - Patient's CR is NOT>1.4 OR Patient's EGFR is NOT<45 within past 12 mos.     Recent Labs   Lab Test 03/16/23  0844 03/14/22  1626 06/09/21  0739   GFRESTIMATED >90   < > >90   GFRESTBLACK  --   --  >90    < > = values in this interval not displayed.       Recent Labs   Lab Test 03/16/23  0844   CR 0.41*             Passed - Patient does NOT have a diagnosis of CHF.        Passed - Medication is active on med list        Passed - Patient is not pregnant        Passed - Patient has not had a positive pregnancy test within the past 12 mos.         Passed - Recent (6 mo) or future (30 days) visit within the authorizing provider's specialty     Patient had office visit in the last 6 months or has a visit in the next 30 days with authorizing provider or within the authorizing provider's specialty.  See \"Patient Info\" tab in inbasket, or \"Choose Columns\" in Meds & Orders section of the refill encounter.                 Azucena Easley RN 07/07/23 1:11 PM  "

## 2023-07-09 DIAGNOSIS — E11.9 TYPE 2 DIABETES MELLITUS WITHOUT COMPLICATION, WITHOUT LONG-TERM CURRENT USE OF INSULIN (H): ICD-10-CM

## 2023-10-28 ENCOUNTER — HEALTH MAINTENANCE LETTER (OUTPATIENT)
Age: 63
End: 2023-10-28

## 2023-11-21 ENCOUNTER — PATIENT OUTREACH (OUTPATIENT)
Dept: GASTROENTEROLOGY | Facility: CLINIC | Age: 63
End: 2023-11-21
Payer: COMMERCIAL

## 2023-12-03 ENCOUNTER — TRANSFERRED RECORDS (OUTPATIENT)
Dept: MULTI SPECIALTY CLINIC | Facility: CLINIC | Age: 63
End: 2023-12-03
Payer: COMMERCIAL

## 2023-12-03 LAB — RETINOPATHY: NEGATIVE

## 2023-12-11 ENCOUNTER — OFFICE VISIT (OUTPATIENT)
Dept: FAMILY MEDICINE | Facility: CLINIC | Age: 63
End: 2023-12-11
Payer: COMMERCIAL

## 2023-12-11 VITALS
BODY MASS INDEX: 41.86 KG/M2 | HEART RATE: 70 BPM | DIASTOLIC BLOOD PRESSURE: 80 MMHG | HEIGHT: 67 IN | RESPIRATION RATE: 20 BRPM | TEMPERATURE: 97.1 F | OXYGEN SATURATION: 98 % | WEIGHT: 266.7 LBS | SYSTOLIC BLOOD PRESSURE: 124 MMHG

## 2023-12-11 DIAGNOSIS — Z91.81 HISTORY OF FALL: ICD-10-CM

## 2023-12-11 DIAGNOSIS — M50.30 DDD (DEGENERATIVE DISC DISEASE), CERVICAL: ICD-10-CM

## 2023-12-11 DIAGNOSIS — Z86.0101 HISTORY OF ADENOMATOUS POLYP OF COLON: ICD-10-CM

## 2023-12-11 DIAGNOSIS — N81.11 CYSTOCELE, MIDLINE: ICD-10-CM

## 2023-12-11 DIAGNOSIS — L60.3 NAIL DYSTROPHY: ICD-10-CM

## 2023-12-11 DIAGNOSIS — M19.042 BILATERAL OSTEOARTHRITIS OF FINGER: ICD-10-CM

## 2023-12-11 DIAGNOSIS — G43.109 OCULAR MIGRAINE: ICD-10-CM

## 2023-12-11 DIAGNOSIS — L60.8 NAIL DISCOLORATION: ICD-10-CM

## 2023-12-11 DIAGNOSIS — N64.4 BREAST PAIN: ICD-10-CM

## 2023-12-11 DIAGNOSIS — L65.9 HAIR THINNING: ICD-10-CM

## 2023-12-11 DIAGNOSIS — M19.011 PRIMARY OSTEOARTHRITIS OF RIGHT SHOULDER: ICD-10-CM

## 2023-12-11 DIAGNOSIS — E11.9 TYPE 2 DIABETES MELLITUS WITHOUT COMPLICATION, WITHOUT LONG-TERM CURRENT USE OF INSULIN (H): Primary | ICD-10-CM

## 2023-12-11 DIAGNOSIS — I65.21 CAROTID STENOSIS, RIGHT: ICD-10-CM

## 2023-12-11 DIAGNOSIS — I10 BENIGN ESSENTIAL HYPERTENSION: ICD-10-CM

## 2023-12-11 DIAGNOSIS — E78.2 MIXED HYPERLIPIDEMIA: ICD-10-CM

## 2023-12-11 DIAGNOSIS — Z23 NEED FOR SHINGLES VACCINE: ICD-10-CM

## 2023-12-11 DIAGNOSIS — M19.041 BILATERAL OSTEOARTHRITIS OF FINGER: ICD-10-CM

## 2023-12-11 DIAGNOSIS — I83.813 VARICOSE VEINS OF BOTH LOWER EXTREMITIES WITH PAIN: ICD-10-CM

## 2023-12-11 LAB
ALBUMIN SERPL BCG-MCNC: 4.6 G/DL (ref 3.5–5.2)
ALP SERPL-CCNC: 60 U/L (ref 40–150)
ALT SERPL W P-5'-P-CCNC: 14 U/L (ref 0–50)
ANION GAP SERPL CALCULATED.3IONS-SCNC: 10 MMOL/L (ref 7–15)
AST SERPL W P-5'-P-CCNC: 17 U/L (ref 0–45)
BILIRUB SERPL-MCNC: 0.4 MG/DL
BUN SERPL-MCNC: 7.6 MG/DL (ref 8–23)
CALCIUM SERPL-MCNC: 9.3 MG/DL (ref 8.8–10.2)
CHLORIDE SERPL-SCNC: 102 MMOL/L (ref 98–107)
CHOLEST SERPL-MCNC: 177 MG/DL
CREAT SERPL-MCNC: 0.4 MG/DL (ref 0.51–0.95)
DEPRECATED HCO3 PLAS-SCNC: 26 MMOL/L (ref 22–29)
EGFRCR SERPLBLD CKD-EPI 2021: >90 ML/MIN/1.73M2
FASTING STATUS PATIENT QL REPORTED: YES
GLUCOSE SERPL-MCNC: 174 MG/DL (ref 70–99)
HBA1C MFR BLD: 6.9 % (ref 0–5.6)
HDLC SERPL-MCNC: 53 MG/DL
LDLC SERPL CALC-MCNC: 96 MG/DL
NONHDLC SERPL-MCNC: 124 MG/DL
POTASSIUM SERPL-SCNC: 4.2 MMOL/L (ref 3.4–5.3)
PROT SERPL-MCNC: 7.7 G/DL (ref 6.4–8.3)
SODIUM SERPL-SCNC: 138 MMOL/L (ref 135–145)
TRIGL SERPL-MCNC: 142 MG/DL

## 2023-12-11 PROCEDURE — 83036 HEMOGLOBIN GLYCOSYLATED A1C: CPT | Performed by: FAMILY MEDICINE

## 2023-12-11 PROCEDURE — 80061 LIPID PANEL: CPT | Performed by: FAMILY MEDICINE

## 2023-12-11 PROCEDURE — 80053 COMPREHEN METABOLIC PANEL: CPT | Performed by: FAMILY MEDICINE

## 2023-12-11 PROCEDURE — 99214 OFFICE O/P EST MOD 30 MIN: CPT | Performed by: FAMILY MEDICINE

## 2023-12-11 PROCEDURE — 36415 COLL VENOUS BLD VENIPUNCTURE: CPT | Performed by: FAMILY MEDICINE

## 2023-12-11 RX ORDER — SIMVASTATIN 40 MG
40 TABLET ORAL AT BEDTIME
Qty: 90 TABLET | Refills: 1 | Status: SHIPPED | OUTPATIENT
Start: 2023-12-11 | End: 2024-06-06

## 2023-12-11 RX ORDER — LISINOPRIL 2.5 MG/1
2.5 TABLET ORAL DAILY
Qty: 90 TABLET | Refills: 1 | Status: SHIPPED | OUTPATIENT
Start: 2023-12-11 | End: 2024-06-06

## 2023-12-11 NOTE — PROGRESS NOTES
Assessment & Plan     Type 2 diabetes mellitus without complication, without long-term current use of insulin (H)  Diabetes controlled on metformin 1000 mg twice, asa, statin and ace, Checking sugars at least once a day.  Declined need to add on injectables or see weight specialist/ endo / dm ed etc. Or MTM or weight referral.  to 170 , only checks in am. Been to eye doctor and dentist. Has floaters and early cataracts. No foot issues. Has had mild burning outer side left ankle. Foot exam normal in march. Needs test strips & refilled.  Later labs showed . HBa1c goal but trending up.  To continue to work on diet, exercise, weight loss, lower carb's, smaller portions & recheck A1C test in 3 months. Normal CMP except for glucose which is elevated due to her diabetes. Continue current meds &refills sent in.    BMI up to over 41 with serious comorbidities of diabetes, hypertension, hyperlipidemia etc.  Discussed eating healthy, low carb and portion size, increase aerobic physical activity especially when the weather is better and can refer to weight specialist if desired in the future.  Can also consider injectables like Wegovy/ ozempic/mounjaro etc  to help with weight loss if interested in the future. Declines for now. Consider MTM referral    Hyperlipidemia on simvastatin 40 mg bedtime. Lipids in march was wnl on this dosing.  Cholesterol levels goal. Continue same medication, encouraged a regular exercise program with at least 150 minutes of aerobic exercise per week, and eating a low saturated fat/low carbohydrate diet.     Hypertension currently stable on lisinopril 2.5 mg daily.    History of right carotid stenosis less than 69% without any symptoms. Carotid ultrasound done in 2022. Vascular said no surgery and vascular and eye felt ocular migraine not related to carotid findings. Recheck u/S carotid  due in 2024. Order placed. Discussed medical risk factor modification with diabetes, hypertension and  cholesterol control.    Varicose veins symptomatic with pain specially in the summertime and less in the winter. Never used the thigh-high compression socks for which was measured in the past. Discussed zip up options. Feels currently not bothering so declines referral to vascular and occupational therapy yet.  Weight loss also will likely help the symptoms.    Ocular migraine few and far between.  A couple a year at most. Used moisturizing eye drops by optometrist, Has history of cataracts, dry eyes, hyperopia as well.     Episodic tension type headaches improved.  Intermittent right ear pain resolved     History of tubular adenoma noted on colonoscopy in .  Recheck scope due in     Midline cystocele, more annoying than symptomatic.  Unchanged. Kegel's seems to make no difference but still does it. Feels like  at times with certain positions but has had no recurrent urine infections or retention or complete prolapse. Currently desires to hold off on pelvic floor therapy with physical therapy,or referral to urogynecology or any intervention as minimally symptomatic.    Prior noted abdominal discomfort resolved. No further RUQ pain noted unclear if tried Pepcid. Had a recent viral GI illness that is also better.     Thinning hair to see derm not scheduled yet. Prior TSH normal. Has a  pigmented skin lesion of face and  multiple pigmented nevi and moles & encouraged an apt with derm to do a full body skin check for preventive measures.    Left big toenail discoloration from a prior trauma is growing out.  Right thickened big toenail in past felt to be' due to a fungal infection or due to trauma.  The base of the nail looked healthy. In past options discussed including leaving it alone versus taking a nail cutting to see if grew fungal elements versus treating empirically with an oral antifungal versus applying an antifungal nail polish, and risk benefits of all options discussed briefly and opted to  just monitor and discuss with derm more when saw them. Reporting dystrophy growing out. Got another dark spot, may be nail polish in a groove, to see derm    History of cervical degenerative disc disease & Right AC arthritis noted on prior xray's.Intermittent radicular pain down right arm depended on activity. No longer with painful decreased range of motion or pain radiating down arms at times. Continue doing home exercises as needed.    History of osteoarthritis bilateral thumbs/fingers managing with symptomatic cares.    Had a mechanical fall dec 1st due to uneven curb and fell on chest sustained a bruise & pleuritic like pain that subsided with residual left upper breast pain. Exam normal. To monitor.    History of maternal aunt with history of breast cancer diagnosed over age 50, & who  years later from & unrelated dx. Currently opts to defer  referral. Mammogram done 2022 & to get her 3d Mammo soon.     Health maintenance  Pap due next  at that time over age 65 & may no longer be needed.   Colon cancer screening due .  Had shingrex #1  & to get the 2nd after feb  Utd for COVID, flu, pneumonia, RSV, Tdap,   Former smoker no lung caner screening indicated given hx reviewed  Labs today and will make further recommendations once those are reviewed.  Work on diet and exercise and wt loss  Return in 3 months for a physical   - Comprehensive metabolic panel; Future  - Lipid panel reflex to direct LDL Fasting; Future  - Hemoglobin A1c; Future  - metFORMIN (GLUCOPHAGE) 500 MG tablet; Take 2 tablets (1,000 mg) by mouth 2 times daily (with meals)  - blood glucose (CONTOUR NEXT TEST) test strip; 100 strips by In Vitro route daily Use to test blood sugar 1 times daily or as directed.  - simvastatin (ZOCOR) 40 MG tablet; Take 1 tablet (40 mg) by mouth at bedtime  - lisinopril (ZESTRIL) 2.5 MG tablet; Take 1 tablet (2.5 mg) by mouth daily  - Comprehensive metabolic panel  - Lipid panel  reflex to direct LDL Fasting  - Hemoglobin A1c    BMI 40.0-44.9, adult (H)  BMI up to over 41 with serious comorbidities of diabetes, hypertension, hyperlipidemia etc.  Discussed eating healthy, low carb and portion size, increase aerobic physical activity especially when the weather is better and can refer to weight specialist if desired in the future.  Can also consider injectables like Wegovy/ ozempic/mounjaro etc  to help with weight loss if interested in the future. Declines for now. Consider MTM referral  - Comprehensive metabolic panel; Future  - Lipid panel reflex to direct LDL Fasting; Future  - Hemoglobin A1c; Future  - Comprehensive metabolic panel  - Lipid panel reflex to direct LDL Fasting  - Hemoglobin A1c    Mixed hyperlipidemia  Hyperlipidemia on simvastatin 40 mg bedtime. Lipids in march was wnl on this dosing.  Cholesterol levels goal. Continue same medication, encouraged a regular exercise program with at least 150 minutes of aerobic exercise per week, and eating a low saturated fat/low carbohydrate diet.   - Lipid panel reflex to direct LDL Fasting; Future  - simvastatin (ZOCOR) 40 MG tablet; Take 1 tablet (40 mg) by mouth at bedtime  - Lipid panel reflex to direct LDL Fasting    Benign essential hypertension  Hypertension currently stable on lisinopril 2.5 mg daily.  - Comprehensive metabolic panel; Future  - lisinopril (ZESTRIL) 2.5 MG tablet; Take 1 tablet (2.5 mg) by mouth daily  - Comprehensive metabolic panel    Carotid stenosis, right  History of right carotid stenosis less than 69% without any symptoms. Carotid ultrasound done in 2022. Vascular said no surgery and vascular and eye felt ocular migraine not related to carotid findings. Recheck u/S carotid  due in 2024. Order placed. Discussed medical risk factor modification with diabetes, hypertension and cholesterol control.  - Lipid panel reflex to direct LDL Fasting; Future  - US Carotid Bilateral; Future  - Lipid panel reflex to direct  LDL Fasting    Varicose veins of both lower extremities with pain  Varicose veins symptomatic with pain specially in the summertime and less in the winter. Never used the thigh-high compression socks for which was measured in the past. Discussed zip up options. Feels currently not bothering so declines referral to vascular and occupational therapy yet.  Weight loss also will likely help the symptoms.    Ocular migraine  Ocular migraine few and far between.  A couple a year at most. Used moisturizing eye drops by optometrist, Has history of cataracts, dry eyes, hyperopia as well.   Episodic tension type headaches improved.  Intermittent right ear pain resolved   - Comprehensive metabolic panel; Future  - Comprehensive metabolic panel    History of adenomatous polyp of colon  History of tubular adenoma noted on colonoscopy in .  Recheck scope due in     Cystocele, midline  Midline cystocele, more annoying than symptomatic.  Unchanged. Kegel's seems to make no difference but still does it. Feels like  at times with certain positions but has had no recurrent urine infections or retention or complete prolapse. Currently desires to hold off on pelvic floor therapy with physical therapy,or referral to urogynecology or any intervention as minimally symptomatic.  Prior noted abdominal discomfort resolved. No further RUQ pain noted unclear if tried Pepcid. Had a recent viral GI illness that is also better.     Hair thinning  Thinning hair to see derm not scheduled yet. Prior TSH normal. Has a  pigmented skin lesion of face and  multiple pigmented nevi and moles & encouraged an apt with derm to do a full body skin check for preventive measures.  - Adult Dermatology  Referral; Future    Nail discoloration  Nail dystrophy  Left big toenail discoloration from a prior trauma is growing out.  Right thickened big toenail in past felt to be' due to a fungal infection or due to trauma.  The base of the nail  looked healthy. In past options discussed including leaving it alone versus taking a nail cutting to see if grew fungal elements versus treating empirically with an oral antifungal versus applying an antifungal nail polish, and risk benefits of all options discussed briefly and opted to just monitor and discuss with derm more when saw them. Reporting dystrophy growing out. Got another dark spot, may be nail polish in a groove, to see derm  - Adult Dermatology  Referral; Future    DDD (degenerative disc disease), cervical  Primary osteoarthritis of right shoulder  Bilateral osteoarthritis of finger  History of cervical degenerative disc disease & Right AC arthritis noted on prior xray's.Intermittent radicular pain down right arm depended on activity. No longer with painful decreased range of motion or pain radiating down arms at times. Continue doing home exercises as needed.  History of osteoarthritis bilateral thumbs/fingers managing with symptomatic cares.    History of fall  Had a mechanical fall dec 1st due to uneven curb and fell on chest sustained a bruise & pleuritic like pain that subsided with residual left upper breast pain. Exam normal. To monitor.    Breast pain  Felt due to recent fall. Exam benign. M ay go ahead with routine screening mammogram. History of maternal aunt with history of breast cancer diagnosed over age 50, & who  years later from & unrelated dx. Currently opts to defer  referral. Mammogram done 2022 & to get her 3d Mammo soon.     Need for shingles vaccine  Health maintenance  Pap due next  at that time over age 65 & may no longer be needed.   Colon cancer screening due .  Had shingrex #1  & to get the 2nd after feb  Utd for COVID, flu, pneumonia, RSV, Tdap,   Former smoker no lung caner screening indicated given hx reviewed  Labs today and will make further recommendations once those are reviewed.  Work on diet and exercise and wt loss  Return  "in 3 months for a physical     Review of the result(s) of each unique test - diagnostics in epic  Ordering of each unique test  Prescription drug management  38  minutes spent by me on the date of the encounter doing chart review, history and exam, documentation and further activities per the note       BMI:   Estimated body mass index is 41.86 kg/m  as calculated from the following:    Height as of this encounter: 1.7 m (5' 6.93\").    Weight as of this encounter: 121 kg (266 lb 11.2 oz).   Weight management plan: Discussed healthy diet and exercise guidelines    Work on weight loss  Regular exercise  See Patient Instructions    Maggi Elkins MD  [facility]      Ayah Guzman is a 63 year old, presenting for the following health issues:  Follow Up (Diabetes)      12/11/2023     7:51 AM   Additional Questions   Roomed by Fredy   Accompanied by self       History of Present Illness       Diabetes:   She presents for follow up of diabetes.  She is checking home blood glucose one time daily.   She checks blood glucose before meals.  Blood glucose is never over 200 and never under 70. She is aware of hypoglycemia symptoms including none.    She has no concerns regarding her diabetes at this time.  She is having numbness in feet and burning in feet.  The patient has had a diabetic eye exam in the last 12 months. Eye exam performed on 11/25/2024. [facility] of last eye [facility]  I see Dr Leon.        She eats 4 or more servings of fruits and vegetables daily.She consumes 0 sweetened beverage(s) daily.She exercises with enough effort to increase her heart rate 20 to 29 minutes per day.  She exercises with enough effort to increase her heart rate 3 or less days per week. She is missing 1 dose(s) of medications per week.  She is not taking prescribed medications regularly due to remembering to take.     CURRENTLY   Diabetes on metformin 1000 mg twice, asa, " statin and ace, Checking sugars at least once a day.  Declined need to add on injectables or see weight specialist/ endo / dm ed etc. Or MTM or weight referral.  to 170 , only checks in am. Been to eye doctor and dentist. Has floaters and early cataracts. No foot issues. Has had mild burning outer side left ankle. Foot exam normal in march. Needs test strips.     BMI up to over 41 with serious comorbidities of diabetes, hypertension, hyperlipidemia etc.  Discussed eating healthy, low carb and portion size, increase aerobic physical activity especially when the weather is better and can refer to weight specialist if desired in the future.  Can also consider injectables like Wegovy to help with weight loss if interested in the future. Declines for now    Hyperlipidemia on simvastatin 40 mg bedtime. Lipids in march was wnl on this dosing.    Hypertension currently stable on lisinopril 2.5 mg daily.    History of right carotid stenosis less than 69% without any symptoms. Carotid ultrasound done in 2022. Vascular said no surgery and vascular and eye felt ocular migraine not related to carotid findings. Recheck u/S carotid  due in 2024. Discussed medical risk factor modification with diabetes, hypertension and cholesterol control.    Varicose veins symptomatic with pain specially in the summertime and less in the winter. Never used the thigh-high compression socks for which was measured in the past. Discussed zip up options. Feels currently not bothering so declines referral to vascular and occupational therapy yet.  Weight loss also will likely help the symptoms.    Ocular migraine few and far between.  A couple a year at most. Used moisturizing eye drops by optometrist, Has history of cataracts, dry eyes, hyperopia as well.     Episodic tension type headaches improved.    Intermittent right ear pain resolved     History of tubular adenoma noted on colonoscopy in 2021.  Recheck scope due in 2026    Midline  cystocele, more annoying than symptomatic.  Unchanged. Kegel's seems to make no difference but still does it. Feels like  at times with certain positions but has had no recurrent urine infections or retention or complete prolapse. Currently desires to hold off on pelvic floor therapy with physical therapy,or referral to urogynecology or any intervention as minimally symptomatic.    Prior noted abdominal discomfort resolved. No further RUQ pain noted unclear if tried Pepcid. Had a recent viral GI illness that is also better.     Thinning hair to see derm not scheduled yet. Prior TSH normal. Has a  pigmented skin lesion of face and  multiple pigmented nevi and moles & encouraged an apt with derm to do a full body skin check for preventive measures.    Left big toenail discoloration from a prior trauma is growing out.  Right big toenail looks thickened and loose and may be due to a fungal infection or due to trauma.  The base of the nail looked healthy. In past options discussed including leaving it alone versus taking a nail cutting to see if grew fungal elements versus treating empirically with an oral antifungal versus applying an antifungal nail polish, and risk benefits of all options discussed briefly and opted to just monitor and discuss with derm more when saw them. Reporting dystrophy growing out. Got another dark spot, may be nail polish in a groove, to see derm    History of cervical degenerative disc disease may also be contributing to intermittent radicular pain down right arm. Symptoms depended on activity  Right shoulder pain since fell  no longer with painful decreased range of motion or pain radiating down arms at times.  Xray in march showed AC arthritis. Doing home exercises.    History of osteoarthritis bilateral thumbs/fingers managing with symptomatic cares.    Fell tripped on curb dec 1st , bruised chest that subsided, residual left breast pain, no physical bruise, still hurts when  sneezing. initially even a deep breath and hiccupping caused sharp pain, . was splinting, currently no trouble breathing and doing well    History of maternal aunt with history of breast cancer diagnosed over age 50, & who  years later from & unrelated dx. Currently opts to defer  referral. Mammogram done 2022 was normal and to continue with yearly screenings. To get her 3d Mammo. Pap due next  at that time over age 65 & may no longer be needed.     Colon cancer screening due .  Had shingrex #1  & to get the 2nd after feb  Utd for COVID, flu, pneumonia, RSV, Tdap,   Visited [facility] since last seen had a good time  Former smoker no lung caner screening indicated given hx reviewed  Labs today and will make further recommendations once those are reviewed.    BACKGROUND  BMI >36 ( down from BMI of 45  & 290 lbs in  to 225 lbs and BMI 34 in ), with DM on ASA, metformin, HLD on simvastatin, HTN on lisinopril, hx of cervical DDD & radiculopathy in the past, on vit d, MV, & probiotics, hx of varicose veins, on compression socks, prior yanci, hx of colonic polyp 2 mm sigmoid  that was a tubular adenoma noted on colonoscopy in  at [facility]  & colonoscopy in  at [place] [facility] reported as normal and normal in 2021, recheck due in ,  hx of postmenopausal bleeding x 2 in the past S/p EMB & u/S that was reported normal, had two biopsies at different times, last being may be in  when had a transvaginal ultrasound and noted benign and has noted no episodes since then, allergic to sulfa, with side effect of redness from MMR and swollen LN from pneumococcal vaccine, previously under care of PCP Dr Canales,  Seen by PCP 2020 for DM when noted HBA1c was 5.5, taken off glimepiride and Januvia and continued on metformin and given the flu shot.  Seen first time by this provider in 2021 to establish care and get a DM check  DM noted well controlled on  metformin. Hd been taken off Januvia and glimepiride previously. Also was on statin and ACE inhibitor which was continued. Was going to schedule her DM eye check. Recommended a DM eye check yearly. Noted no longer on asa but encouraged to resume. GI side effects explained. Needed a refill on metformin & it was sent in for metformin 500 mg 2 bid. Labs done. Was to continue dental care every 6 months. Foot exam done and encouraged to check self nightly.   HLD on simvastatin 40 mg,  With no side effects. HTN stable on lisinopril 2.5 mg with no side effects.  Reviewed BMI > 34-35: Down from BMI of 45  & 290 lbs in 2019 to 225 lbs and BMI 34 in 2021. Congratulated on weight loss & was.to continue with diet, exercise and lifestyle changes.   Discussed hx of Cervical DDD, discovered incidentally in 2008 when had a head injury and imaging. Ct showed moderate degenerative disc disease at C5-C6 with broad-based  disc protrusion centrally and on the right significant for impingement upon the exiting right C6 nerve rootlet and for impression upon the right aspect of cervical cord at this level. Lesser degenerative changes at C6-C7 and to a lesser extent C4-C5. There was at C6-C7 appearance of moderately prominent central  disc protrusion. Noted could have symptoms with some neck movements but discomfort was not enough to warrant meds or surgical intervention. Reported no radiculopathy, myelopathy or neuropathy & was to continue to monitor.   Was encouraged to use compression socks for leg achiness secondary to varicose veins right more than left worse when it was hot. & if got worse could refer to vascular.  Reviewed her Hx of colonic polyp a 2 mm sigmoid polyp that was a tubular adenoma removed on colonoscopy in 2011 at [facility]  & colonoscopy in 2016 at [place] [facility] reported as normal and due again in 2021, GI referral given for a colonoscopy.   Noted hair had been thinning. No bald spots. Hair thinning/  loss felt due to age, genetics, diabetes, stress, meds etc. Advised could try biotin if wanted to ( cautioning it could affect thyroid) & to discontinue if not helpful. Was to resume a multivitamin. Labs done to evaluate more and offered to refer to dermatology if no answer and remained a  concern.  Health care maintenance reviewed. Noted had advance directives at home and was to bring us a copy. Discussed  labs and a screening mammogram & getting Shingrex. Was to return for a physical and pap another time, had been over due since 2020 due to the pandemic.  Lipids were goal. CMP showed elevated glucose HBA1c was goal. Had a normal TSH and HIV, cbc and microalbumin level.  Seen in  on 6/21/21 for neck strain and xray done showed mild disc disease, comment mad zee calcification right neck probably carotid and given Zanaflex and referred to ortho to further evaluate and treat. Had her colonoscopy in 7/2021 and noted normal and recheck in 5 yrs. Mammogram done 12/8/21 was normal.  Seen 3/14/22 for a physical Pap was done was normal.  Given Tdap.  Declined genetic consult for family history. Noted midline cystocele Suspected due to postmenopausal state, lack of estrogen and prior delivery of 11 pound baby.  Recommended Kegel's exercises and if should have worsening incontinence, getting up to urinate several times at night, recurrent bladder infections consider referral to urogynecologist for treatment. Diabetes well controlled continue baby aspirin daily and lisinopril and Metformin at thousand twice a day. Hyperlipidemia continued on simvastatin 40 mg bedtime & to make further recommendations once labs seen. Hypertension stable on lisinopril 2.5 mg daily. Incidental right neck calcification corresponding to likely right carotid artery seen on x-ray C-spine done in June 2021 when had neck pain suggest possible carotid artery calcification.  To get an ultrasound bilateral carotid and make further recommendations after  that. Noted had had 4 episodes of left eye crescent-like shimmery visual disturbance.  Reported a normal eye exam after first 2 episodes but forgot to discuss with the eye doctor at the time.  Exam was unremarkable externally.  Had no symptoms at visit.  Could be a retinal issue or ocular migraines or related to bright light but need to rule out more serious concerns like stroke etc. and given carotid artery calcification history and other risk factors referred to neurologist for further evaluation.  If should occur again specially with other symptoms recommend going to the ER for further evaluation. BMI more than 36 with risk factors of diabetes, hypertension, hyperlipidemia etc.  Continue to stay active, eat healthy and work on weight loss.  Offered to refer to a weight specialist or diabetes Ed or the pharmacist etc. if necessary in the future. Degenerative disc disease of cervical spine originally found out incidentally when had a fall and hit head requiring CT scan and MRI.  Recent x-ray in June for neck pain showed arthritis especially at C6 5 C6 C6-C7.  Currently no concerning findings continue to monitor. Also had osteoarthritis of fingers. Varicose veins of both lower extremities with pain in the summertime currently asymptomatic.  Compression socks to prevent achiness at end of day.  Could refer to vascular if got worse. History of tubular adenoma of colon.  Colonoscopy in 2021 was normal recheck in 2026. Hair thinning suspect related to aging and female pattern of thinning and genetics.  To check labs again today, may use Rogaine for Women over-the-counter.  Consider dermatology referral if got worse. Noted history of COVID-19 breakthrough infection end of December with complete resolution of symptoms. Family history of maternal aunt diagnosed with breast cancer in the past.  Could consider  referral, opted to defer for now. Was to return in 6 months for diabetes & blood pressure & medication  review check and in 1 year for preventive physical and sooner for any new or worsening concerns.   Labs showed normal lipids, CMP with elevated glucose, normal TSH microalbumin CBC and hemoglobin A1c and no med changes made.  Carotid ultrasound on 3/15/2022 showed right carotid artery stenosis 50 to 69% with more than 50% plaque and left less than 50%.  Refer to vascular they reviewed advised no surgical intervention and to see GI doctor first for ocular symptoms.  Seen by the eye doctor 5/27/2022 noted normal exam diagnosed with ocular migraine and hyperopia dry eye syndrome and age-related cataracts.     On 11/14/2022 seen in urgent care for mechanical fall with thumb pain and shoulder pain and advised supportive care and given naproxen.     Seen 12/5/22 for dm. Continued on metformin, asa, lisinopril, statin for diabetes.  Foot exam done normal & noted Eye check up-to-date. Opted no injectables or referral to weight specialist. Continued on simvastatin for cholesterol BP stable on lisinopril, continue same  To continue medical risk factor modification for carotid artery stenosis, on asa & statin. Vascular said no surgery and vascular and eye felt ocular migraine not related to carotid findings. Recheck u/S carotid in 2024. Was to continue care with eye for cataracts, dry eyes and hyperopia and floaters. Ocular migraines had not recurred in a while. Encouraged to try famotidine 20 mg twice a day for 2 weeks see if helped upper abdominal pain/ heart burn  Det, exercise, weight loss would also help, avoid trigger foods, do freq small meals, do not eat 3 hrs before bedtime. If not better can do imaging and eval, did not sound cardiac. Had had prior lap cholecystectomy. Mild cystocele reported asymptomatic. Arthritis in neck stable. felt varicose veins not bothering in winter. Encouraged to use compression socks in summer and refer to vascular if got  more symptomatic despite that. Colonoscopy due 2026.was to see derm  for skin check and thinning hair. Had a recent mechanical fall with recovering aches and pains & advised could take up to 6 weeks or more to resolved. Health maintenance reviewed. Prevnar 20 given & to consider Shingrex. Was to return mid march for a physical.  Labs showed HBA1c of 6.2, CMP with mild low sodium,   On 22 mammogram was normal    Seen 3/16/23 for preventive physical and follow-up of diabetes, hypertension, hyperlipidemia and additional concerns. Reported no breast issues, breast exam normal, to continue with self breast checks. History of maternal aunt with history of breast cancer diagnosed over age 50,  years later, unrelated to that diagnosis. Opted to defer  referral. Mammogram done 2022 was normal and to continue with yearly screenings. Pap due next  2& if at that time over age 65 may no longer be needed. Reported no vaginal concerns other than a cystocele .  Healthcare maintenance reviewed. Encouraged she bring us a copy of her healthcare directives once completed. Colon cancer screening due . Vaccines for flu, Tdap, pneumonia, COVID up-to-date.  Recommended Shingrex to check cost to see if cheaper at the pharmacy first. Consider seeing travel clinic in [facility] prior to international travel ( hoped to go to [place] )  Diabetes  well controlled with hemoglobin A1c of 6.4 on metformin, asa, statin and ace, though trending up over the prior 1 year.  To continue with checking sugars at least once a day.  Make a diabetes eye check yearly.  Foot exam done  was normal.  To continue aspirin ACE inhibitor and statin medication.  To continue metformin at 1000 mg twice a day.  Declined need to add on injectables or see weight specialist/ endo / dm ed etc. Had enough medications to contact us when needed.  BMI over 39 with serious comorbidities of diabetes, hypertension, hyperlipidemia etc.  To continue with eating healthy, low carb and portion size, increase aerobic  physical activity especially when the weather was better and could refer to weight specialist if desired in the future.  Could also consider injectables like Wegovy to help with weight loss if interested in the future.  Hyperlipidemia on simvastatin 40 mg bedtime to continue same dose. Hypertension stable on lisinopril 2.5 mg daily. History of right carotid stenosis less than 69% without any symptoms.  Carotid ultrasound done in . Vascular said no surgery and vascular and eye felt ocular migraine not related to carotid findings. To recheck u/S carotid in . Continue with medical risk factor modification with diabetes, hypertension and cholesterol control.  Varicose veins symptomatic with pain specially in the summertime and less in the winter. Encouraged to use the thigh-high compression socks which were measured in the past. Could refer to vascular and occupational therapy if bothering her more for more options. Weight loss would also likely help the symptoms.  History of tubular adenoma noted on colonoscopy in .  Recheck scope due in .  Midline cystocele, more annoying than symptomatic.  Felt like  at times with certain positions but had had no recurrent urine infections or retention or complete prolapse.  Discussed Kegel exercises,  consider pelvic floor therapy formally with physical therapy, & could refer to urogynecology as well for more options like pessaries surgery etc. Desired to hold off on intervention as minimally symptomatic and monitor.  Prior noted abdominal discomfort resolved. No further RUQ pain noted unclear if tried Pepcid.   Thinning hair and changes, to follow-up with dermatology, referral had been placed at last visit in December. To call to make an appointment with them. They could  also check the pigmented skin lesion of face and since had multiple pigmented nevi and moles to do a full body skin check for preventive measures.  Left big toenail discoloration had been  growing out as the nail grew was most suggestive of prior trauma related discoloration rather than a melanoma.  To continue to monitor.  Right big toenail looked thickened and loose and may be due to a fungal infection or due to trauma.  The base of the nail looked healthy.  Options discussed including leaving it alone versus taking a nail cutting to see if grew fungal elements versus treating empirically with an oral antifungal versus applying an antifungal nail polish, and risk benefits of all options discussed briefly and opted to just monitor and discuss with derm more when saw them.  Ocular migraine few and far between, had not recurred in a while. To continue to stay well-hydrated follow-up with the eye doctor for eye checks. Had history of cataracts, dry eyes, hyperopia as well.   Episodic tension type headaches right-sided with a possible case of conjunctivitis and red streaking under the eye and ear pain improved with warm compresses 2 to 3 days possibly due to a viral illness since  improved. Intermittent right ear pain since recent episode suspected due to eustachian tube dysfunction.  Examination revealed a retracted eardrum due to pressure disequilibrium across the tympanic membrane. Encouraged to try Flonase and Zyrtec over-the-counter and try to pop ear and if things did not improve or got worse could refer to ENT.  Right shoulder pain since fell in 2022. Hada painful decreased range of motion and pain radiating down arms at times.  History of cervical degenerative disc disease may also be contributing to radicular pain down right arm.  Xray done of shoulder & to discuss more with orthopedics when saw them. History of osteoarthritis bilateral thumbs/fingers managing with symptomatic cares. Family history of breast cancer opted no  referral to continue with yearly mammograms and self breast checks regularly. To return in 6 months for office visit for follow-up of chronic issues. Labs showed  "lipids goal, CMP normal other than elevated glucose, hemoglobin A1c goal normal TSH microalbumin CBC x-ray showed AC joint arthritis rotator cuff issues.  Recommended seeing orthopedics.  [facility] negative    Review of Systems   Constitutional, HEENT, cardiovascular, pulmonary, GI, , musculoskeletal, neuro, skin, endocrine and psych systems are negative, except as otherwise noted.      Objective    /80 (BP Location: Right arm, Patient Position: Sitting, Cuff Size: Adult Large)   Pulse 70   Temp 97.1  F (36.2  C) (Temporal)   Resp 20   Ht 1.7 m (5' 6.93\")   Wt 121 kg (266 lb 11.2 oz)   LMP  (LMP Unknown)   SpO2 98%   BMI 41.86 kg/m    Body mass index is 41.86 kg/m .  Physical Exam   GENERAL: healthy, alert and no distress  EYES: Eyes grossly normal to inspection, PERRL and conjunctivae and sclerae normal, glasses  HENT: ear canals and TM's normal, nose and mouth without ulcers or lesions  NECK: no adenopathy, no asymmetry, masses, or scars and thyroid normal to palpation  RESP: lungs clear to auscultation - no rales, rhonchi or wheezes  BREAST: normal without masses, tenderness or nipple discharge and no palpable axillary masses or adenopathy  CV: regular rate and rhythm, normal S1 S2, no S3 or S4, no murmur, click or rub, no peripheral edema and peripheral pulses strong  ABDOMEN: soft, non tender, no hepatosplenomegaly, no masses and bowel sounds normal  MS: no gross musculoskeletal defects noted, no edema  SKIN: no suspicious lesions or rashes  NEURO: Normal strength and tone, mentation intact and speech normal  PSYCH: mentation appears normal, affect normal/bright  LYMPH: no cervical, supraclavicular, axillary, or inguinal adenopathy    Results for orders placed or performed in visit on 12/11/23   Comprehensive metabolic panel     Status: Abnormal   Result Value Ref Range    Sodium 138 135 - 145 mmol/L    Potassium 4.2 3.4 - 5.3 mmol/L    Carbon Dioxide (CO2) 26 22 - 29 mmol/L    Anion Gap 10 " 7 - 15 mmol/L    Urea Nitrogen 7.6 (L) 8.0 - 23.0 mg/dL    Creatinine 0.40 (L) 0.51 - 0.95 mg/dL    GFR Estimate >90 >60 mL/min/1.73m2    Calcium 9.3 8.8 - 10.2 mg/dL    Chloride 102 98 - 107 mmol/L    Glucose 174 (H) 70 - 99 mg/dL    Alkaline Phosphatase 60 40 - 150 U/L    AST 17 0 - 45 U/L    ALT 14 0 - 50 U/L    Protein Total 7.7 6.4 - 8.3 g/dL    Albumin 4.6 3.5 - 5.2 g/dL    Bilirubin Total 0.4 <=1.2 mg/dL   Lipid panel reflex to direct LDL Fasting     Status: None   Result Value Ref Range    Cholesterol 177 <200 mg/dL    Triglycerides 142 <150 mg/dL    Direct Measure HDL 53 >=50 mg/dL    LDL Cholesterol Calculated 96 <=100 mg/dL    Non HDL Cholesterol 124 <130 mg/dL    Patient Fasting > 8hrs? Yes     Narrative    Cholesterol  Desirable:  <200 mg/dL    Triglycerides  Normal:  Less than 150 mg/dL  Borderline High:  150-199 mg/dL  High:  200-499 mg/dL  Very High:  Greater than or equal to 500 mg/dL    Direct Measure HDL  Female:  Greater than or equal to 50 mg/dL   Male:  Greater than or equal to 40 mg/dL    LDL Cholesterol  Desirable:  <100mg/dL  Above Desirable:  100-129 mg/dL   Borderline High:  130-159 mg/dL   High:  160-189 mg/dL   Very High:  >= 190 mg/dL    Non HDL Cholesterol  Desirable:  130 mg/dL  Above Desirable:  130-159 mg/dL  Borderline High:  160-189 mg/dL  High:  190-219 mg/dL  Very High:  Greater than or equal to 220 mg/dL   Hemoglobin A1c     Status: Abnormal   Result Value Ref Range    Hemoglobin A1C 6.9 (H) 0.0 - 5.6 %     No results found for this or any previous visit (from the past 24 hour(s)).

## 2023-12-11 NOTE — PATIENT INSTRUCTIONS
Continue current meds  Consider MTM referral  Work on diet and exercise and wt loss  Jeraldex # 2 after feb  See you back in 3 months for a physical

## 2023-12-12 NOTE — RESULT ENCOUNTER NOTE
Hello -    Here are my comments about your recent results:  -Cholesterol levels are at your goal levels.  ADVISE: continuing your medication, a regular exercise program with at least 150 minutes of aerobic exercise per week, and eating a low saturated fat/low carbohydrate diet.  Also, you should recheck this fasting cholesterol panel in 6 months.  -Liver and gallbladder tests (ALT,AST, Alk phos,bilirubin) are normal.  -Kidney function (GFR) is normal.  -Sodium is normal.  -Potassium is normal.  -Calcium is normal.  -Glucose is elevated due to your diabetes.  For additional lab test information, labtestsonline.org is an excellent reference..    Please let us know if you have any questions or concerns.     Regards,  Maggi Elkins MD

## 2023-12-13 NOTE — TELEPHONE ENCOUNTER
Vascular Clinic Appointment Request    Imaging needed? No.     Visit type: in person visit    Provider: Dr. Gibbs OR Dr. Bui    Timeframe: Next available    Appt notes: Carotid stenosis    Additional info: N/A    Documentation routed to clinic coordinators for scheduling.      LORETO MimsN, RN  [facility]  Ph: [phone]  Fax: [phone]           significant B LE muscle tightness and R hip pain during stretches. Further gradual advancement of therex and manual therapy will be appropriate to reduce symptoms. Communication/Consultation:   goals of therex, emphasis on gradual and tolerable stretch. Equipment provided today:  HEP  Recommendations/Intent for next treatment session: Next visit will focus on improving trunk strength, bilateral LE strength and mobility independence.     >Total Treatment Billable Duration:  55 minutes  Time In: 0900  Time Out: 1000    ANA LUISA BUNCH PT         Charge Capture  [employer] Portal  Appt Desk     Future Appointments   Date Time Provider [address]   12/18/2023  8:00 AM Sallie Olga, PTA [facility]   12/20/2023  2:30 PM Frausto Civatte, PT [facility]   12/27/2023  2:30 PM Frausto Civatte, PT [facility]   12/29/2023  2:30 PM Miguel Angel Hung, PTA [facility]   1/2/2024  1:30 PM Frausto Civmonikae, PT [facility]   1/4/2024  1:30 PM Sallie Delhaley, PTA [facility]   1/9/2024  2:30 PM Sallie Delhaley, PTA [facility]   1/11/2024  1:30 PM Frausto Civatte, PT [facility]   1/16/2024 12:30 PM Sallie Dellen, PTA [facility]   1/18/2024  2:30 PM Frausto Civmonikae, PT [facility]   1/23/2024  1:30 PM Sallie Olga, PTA [facility]   1/25/2024  1:30 PM Frausto Civmonikae, PT [facility]   5/15/2024  9:30 AM Vivian Horton, Deborah Wynne MD [facility]

## 2023-12-23 PROBLEM — L60.8 NAIL DISCOLORATION: Status: RESOLVED | Noted: 2023-12-11 | Resolved: 2023-12-23

## 2024-01-22 ENCOUNTER — HOSPITAL ENCOUNTER (OUTPATIENT)
Dept: MAMMOGRAPHY | Facility: CLINIC | Age: 64
Discharge: HOME | End: 2024-01-22
Attending: FAMILY MEDICINE | Admitting: FAMILY MEDICINE
Payer: COMMERCIAL

## 2024-01-22 DIAGNOSIS — Z12.31 ENCOUNTER FOR SCREENING MAMMOGRAM FOR BREAST CANCER: ICD-10-CM

## 2024-01-22 PROCEDURE — 77067 SCR MAMMO BI INCL CAD: CPT

## 2024-03-15 SDOH — HEALTH STABILITY: PHYSICAL HEALTH: ON AVERAGE, HOW MANY MINUTES DO YOU ENGAGE IN EXERCISE AT THIS LEVEL?: 30 MIN

## 2024-03-15 SDOH — HEALTH STABILITY: PHYSICAL HEALTH: ON AVERAGE, HOW MANY DAYS PER WEEK DO YOU ENGAGE IN MODERATE TO STRENUOUS EXERCISE (LIKE A BRISK WALK)?: 3 DAYS

## 2024-03-15 ASSESSMENT — SOCIAL DETERMINANTS OF HEALTH (SDOH): HOW OFTEN DO YOU GET TOGETHER WITH FRIENDS OR RELATIVES?: TWICE A WEEK

## 2024-03-18 ENCOUNTER — OFFICE VISIT (OUTPATIENT)
Dept: FAMILY MEDICINE | Facility: CLINIC | Age: 64
End: 2024-03-18
Payer: COMMERCIAL

## 2024-03-18 VITALS
HEIGHT: 67 IN | SYSTOLIC BLOOD PRESSURE: 121 MMHG | WEIGHT: 274.7 LBS | RESPIRATION RATE: 17 BRPM | DIASTOLIC BLOOD PRESSURE: 74 MMHG | TEMPERATURE: 97.3 F | HEART RATE: 65 BPM | BODY MASS INDEX: 43.11 KG/M2 | OXYGEN SATURATION: 96 %

## 2024-03-18 DIAGNOSIS — M19.011 PRIMARY OSTEOARTHRITIS OF RIGHT SHOULDER: ICD-10-CM

## 2024-03-18 DIAGNOSIS — I10 BENIGN ESSENTIAL HYPERTENSION: ICD-10-CM

## 2024-03-18 DIAGNOSIS — Z86.0101 HISTORY OF ADENOMATOUS POLYP OF COLON: ICD-10-CM

## 2024-03-18 DIAGNOSIS — M54.50 INTERMITTENT LOW BACK PAIN: ICD-10-CM

## 2024-03-18 DIAGNOSIS — M50.30 DDD (DEGENERATIVE DISC DISEASE), CERVICAL: ICD-10-CM

## 2024-03-18 DIAGNOSIS — M19.042 BILATERAL OSTEOARTHRITIS OF FINGER: ICD-10-CM

## 2024-03-18 DIAGNOSIS — M19.041 BILATERAL OSTEOARTHRITIS OF FINGER: ICD-10-CM

## 2024-03-18 DIAGNOSIS — E11.9 TYPE 2 DIABETES MELLITUS WITHOUT COMPLICATION, WITHOUT LONG-TERM CURRENT USE OF INSULIN (H): ICD-10-CM

## 2024-03-18 DIAGNOSIS — I83.813 VARICOSE VEINS OF BOTH LOWER EXTREMITIES WITH PAIN: ICD-10-CM

## 2024-03-18 DIAGNOSIS — G43.109 OCULAR MIGRAINE: ICD-10-CM

## 2024-03-18 DIAGNOSIS — E78.2 MIXED HYPERLIPIDEMIA: ICD-10-CM

## 2024-03-18 DIAGNOSIS — M79.10 MYALGIA: ICD-10-CM

## 2024-03-18 DIAGNOSIS — L60.8 NAIL DISCOLORATION: ICD-10-CM

## 2024-03-18 DIAGNOSIS — N81.11 CYSTOCELE, MIDLINE: ICD-10-CM

## 2024-03-18 DIAGNOSIS — Z23 NEED FOR COVID-19 VACCINE: ICD-10-CM

## 2024-03-18 DIAGNOSIS — E66.01 OBESITY, CLASS III, BMI 40-49.9 (MORBID OBESITY) (H): ICD-10-CM

## 2024-03-18 DIAGNOSIS — L60.3 NAIL DYSTROPHY: ICD-10-CM

## 2024-03-18 DIAGNOSIS — R20.2 PARESTHESIAS: ICD-10-CM

## 2024-03-18 DIAGNOSIS — Z80.3 FAMILY HISTORY OF MALIGNANT NEOPLASM OF BREAST: ICD-10-CM

## 2024-03-18 DIAGNOSIS — L65.9 HAIR THINNING: ICD-10-CM

## 2024-03-18 DIAGNOSIS — Z00.00 HEALTH CARE MAINTENANCE: ICD-10-CM

## 2024-03-18 DIAGNOSIS — Z00.00 ROUTINE GENERAL MEDICAL EXAMINATION AT A HEALTH CARE FACILITY: Primary | ICD-10-CM

## 2024-03-18 DIAGNOSIS — Z71.89 ADVANCED DIRECTIVES, COUNSELING/DISCUSSION: ICD-10-CM

## 2024-03-18 DIAGNOSIS — I65.21 CAROTID STENOSIS, RIGHT: ICD-10-CM

## 2024-03-18 LAB
ALBUMIN SERPL BCG-MCNC: 4.5 G/DL (ref 3.5–5.2)
ALP SERPL-CCNC: 55 U/L (ref 40–150)
ALT SERPL W P-5'-P-CCNC: 17 U/L (ref 0–50)
ANION GAP SERPL CALCULATED.3IONS-SCNC: 12 MMOL/L (ref 7–15)
AST SERPL W P-5'-P-CCNC: 15 U/L (ref 0–45)
BASOPHILS # BLD AUTO: 0 10E3/UL (ref 0–0.2)
BASOPHILS NFR BLD AUTO: 1 %
BILIRUB SERPL-MCNC: 0.2 MG/DL
BUN SERPL-MCNC: 7.3 MG/DL (ref 8–23)
CALCIUM SERPL-MCNC: 9.5 MG/DL (ref 8.8–10.2)
CHLORIDE SERPL-SCNC: 100 MMOL/L (ref 98–107)
CHOLEST SERPL-MCNC: 155 MG/DL
CK SERPL-CCNC: 133 U/L (ref 26–192)
CREAT SERPL-MCNC: 0.43 MG/DL (ref 0.51–0.95)
CREAT UR-MCNC: 37.9 MG/DL
DEPRECATED HCO3 PLAS-SCNC: 24 MMOL/L (ref 22–29)
EGFRCR SERPLBLD CKD-EPI 2021: >90 ML/MIN/1.73M2
EOSINOPHIL # BLD AUTO: 0.1 10E3/UL (ref 0–0.7)
EOSINOPHIL NFR BLD AUTO: 1 %
ERYTHROCYTE [DISTWIDTH] IN BLOOD BY AUTOMATED COUNT: 13 % (ref 10–15)
FASTING STATUS PATIENT QL REPORTED: YES
GLUCOSE SERPL-MCNC: 184 MG/DL (ref 70–99)
HBA1C MFR BLD: 7.4 % (ref 0–5.6)
HCT VFR BLD AUTO: 38.3 % (ref 35–47)
HDLC SERPL-MCNC: 47 MG/DL
HGB BLD-MCNC: 12.7 G/DL (ref 11.7–15.7)
IMM GRANULOCYTES # BLD: 0 10E3/UL
IMM GRANULOCYTES NFR BLD: 0 %
LDLC SERPL CALC-MCNC: 74 MG/DL
LYMPHOCYTES # BLD AUTO: 2.2 10E3/UL (ref 0.8–5.3)
LYMPHOCYTES NFR BLD AUTO: 41 %
MCH RBC QN AUTO: 29.7 PG (ref 26.5–33)
MCHC RBC AUTO-ENTMCNC: 33.2 G/DL (ref 31.5–36.5)
MCV RBC AUTO: 90 FL (ref 78–100)
MICROALBUMIN UR-MCNC: 17.9 MG/L
MICROALBUMIN/CREAT UR: 47.23 MG/G CR (ref 0–25)
MONOCYTES # BLD AUTO: 0.3 10E3/UL (ref 0–1.3)
MONOCYTES NFR BLD AUTO: 6 %
NEUTROPHILS # BLD AUTO: 2.8 10E3/UL (ref 1.6–8.3)
NEUTROPHILS NFR BLD AUTO: 51 %
NONHDLC SERPL-MCNC: 108 MG/DL
PLATELET # BLD AUTO: 297 10E3/UL (ref 150–450)
POTASSIUM SERPL-SCNC: 4.3 MMOL/L (ref 3.4–5.3)
PROT SERPL-MCNC: 7.5 G/DL (ref 6.4–8.3)
RBC # BLD AUTO: 4.27 10E6/UL (ref 3.8–5.2)
SODIUM SERPL-SCNC: 136 MMOL/L (ref 135–145)
TRIGL SERPL-MCNC: 168 MG/DL
TSH SERPL DL<=0.005 MIU/L-ACNC: 0.84 UIU/ML (ref 0.3–4.2)
VIT B12 SERPL-MCNC: 412 PG/ML (ref 232–1245)
WBC # BLD AUTO: 5.4 10E3/UL (ref 4–11)

## 2024-03-18 PROCEDURE — 82043 UR ALBUMIN QUANTITATIVE: CPT | Performed by: FAMILY MEDICINE

## 2024-03-18 PROCEDURE — 80061 LIPID PANEL: CPT | Performed by: FAMILY MEDICINE

## 2024-03-18 PROCEDURE — 84443 ASSAY THYROID STIM HORMONE: CPT | Performed by: FAMILY MEDICINE

## 2024-03-18 PROCEDURE — 82607 VITAMIN B-12: CPT | Performed by: FAMILY MEDICINE

## 2024-03-18 PROCEDURE — 80053 COMPREHEN METABOLIC PANEL: CPT | Performed by: FAMILY MEDICINE

## 2024-03-18 PROCEDURE — 82570 ASSAY OF URINE CREATININE: CPT | Performed by: FAMILY MEDICINE

## 2024-03-18 PROCEDURE — 91320 SARSCV2 VAC 30MCG TRS-SUC IM: CPT | Performed by: FAMILY MEDICINE

## 2024-03-18 PROCEDURE — 99214 OFFICE O/P EST MOD 30 MIN: CPT | Mod: 25 | Performed by: FAMILY MEDICINE

## 2024-03-18 PROCEDURE — 83036 HEMOGLOBIN GLYCOSYLATED A1C: CPT | Performed by: FAMILY MEDICINE

## 2024-03-18 PROCEDURE — 82550 ASSAY OF CK (CPK): CPT | Performed by: FAMILY MEDICINE

## 2024-03-18 PROCEDURE — 99396 PREV VISIT EST AGE 40-64: CPT | Mod: 25 | Performed by: FAMILY MEDICINE

## 2024-03-18 PROCEDURE — 90480 ADMN SARSCOV2 VAC 1/ONLY CMP: CPT | Performed by: FAMILY MEDICINE

## 2024-03-18 PROCEDURE — 36415 COLL VENOUS BLD VENIPUNCTURE: CPT | Performed by: FAMILY MEDICINE

## 2024-03-18 PROCEDURE — 85025 COMPLETE CBC W/AUTO DIFF WBC: CPT | Performed by: FAMILY MEDICINE

## 2024-03-18 ASSESSMENT — PAIN SCALES - GENERAL: PAINLEVEL: NO PAIN (0)

## 2024-03-18 NOTE — PROGRESS NOTES
Preventive Care Visit  [facility]  Maggi Elkins MD, Family Medicine  Mar 18, 2024      Assessment & Plan     Routine general medical examination at a health care facility  Here for physical and dm/HTN/HLD etc  Health maintenance reviewed   Consider working on health care directives or may bring us a copy if has one at home to put in wendy if desired   No breast issues, Mammogram in jan noted normal, continue with annual screening  Pap due next 2027 at that time will be over age 65 & may no longer be needed.   Reported had red blood when wiped one day, one time after one wipe nothing since, can get itching at times, Has had post menopausal bleeding with normal biopsies in past. Discussed monitoring versus gyn referral versus topical estrogen for possible atrophic vaginitis. Opts to monitor for now. Discussed less detergent in under wear wash, cotton underwear, none at night & if recurs to contact us.   Colon cancer screening due 2026.  Completed shingrex  Utd for COVID, flu, pneumonia, RSV, Tdap,   Given the 2nd COVID today   Former smoker no lung cancer screening indicated given hx reviewed  Labs today and will make further recommendations once those are reviewed. Has refills on meds till end of June at least.    Diabetes on metformin 1000 mg twice, asa, statin and ace, Been to eye doctor and dentist. Has floaters and early cataracts. Dm eye check due 12/2024. No foot issues. Has had mild burning outer side left ankle & tingling numbness scattered fingers and feet. ? From dm or low back issues. Foot exam normal today. Later labs showed A1C test elevated to 7.4, noted not been exercising as hurt back dec to feb, Declines need to add on injectables or see weight specialist/ endo / dm ed or MTM etc. Encouraged to increase physical activity, low-carb diet & weight loss will help. & if changes mind regarding options dicussed to contact us.     BMI up to 43 with serious comorbidities of  diabetes, hypertension, hyperlipidemia etc. Discussed eating healthy, low carb and portion size, increased aerobic physical activity. Can also consider injectables like Wegovy/ ozempic/mounjaro etc to help with weight loss if interested Sleeps with mouth open as dry in am, like sludge to clear in am, reports no sig snoring, wakes refreshed & no day time drowsiness & declines need for sleep eval. No sig alcohol use. Declines meds, referral to wt or mtm or sleep at this time.    Hypertension stable on lisinopril 2.5 mg daily.     HLD on simvastatin 40 mg Will check lipids today. Encouraged a regular exercise program with at least 150 minutes of aerobic exercise per week, and eating a low saturated fat/low carbohydrate diet.      History of right carotid stenosis less than 69% without any symptoms. Carotid ultrasound done in 2022. Vascular said no surgery and vascular and eye felt ocular migraine not related to carotid findings. Recheck u/s carotid due in 2024. Order placed in dec, not scheduled yet. Encouraged she call radiology to set up. Discussed medical risk factor modification with diabetes, hypertension and cholesterol control.    Varicose veins symptomatic with pain specially in the summertime and less in the winter. Never used the thigh-high compression socks for which was measured previously. Discussed zip up options. Feels currently not bothering so declines referral to vascular and occupational therapy yet.  Some discoloration lower legs suspected from varicose veins. Seeing derm in June. Knows wt los will help.     Ocular migraine few and far between, at most a couple a year. Used moisturizing eye drops by optometrist, Has history of cataracts, dry eyes, hyperopia as well. Episodic tension type headaches improved. Intermittent right ear pain resolved. DM check due in dec 2024.     History of tubular adenoma noted on colonoscopy in 2021.  Recheck scope due in 2026     Midline cystocele, more annoying than  symptomatic with unchanged urgency. Kegel's seems to make no difference but still does them. Feels like  at times with certain positions but has had no recurrent urine infections or retention or complete prolapse. Currently desires to hold off on pelvic floor therapy with physical therapy, or referral to urogynecology or any intervention as minimally symptomatic.  Prior noted abdominal discomfort resolved. No further RUQ pain.    Back pain / body aches Had back pain right sciatica dec to feb, opted not to see anyone, used icy hot, tylenol. Ok & declines ortho referral for now.    History of cervical degenerative disc disease & Right AC arthritis noted on prior xray's. No longer with painful decreased range of motion or pain radiating down arms. Does home exercises as needed.     History of osteoarthritis bilateral thumbs/fingers managing with symptomatic cares.     Tingling numbness fingers ? from neck/ back or dm or all?. Not dropping things. Later labs showed Normal red blood cell (hgb) levels, normal white blood cell count and normal platelet levels but HBA1c up to 7.4. Rest of labs not back at time of this note.     Thinning hair to see derm in . Prior TSH wl.     Left big toenail discoloration from a prior trauma is growing out. Right thickened big toenail in past felt to be' due to a fungal infection or due to trauma. The base of the nail looked healthy. In past options discussed including leaving it alone versus taking a nail cutting to see if grew fungal elements versus treating empirically with an oral antifungal versus applying an antifungal nail polish, and risk benefits of all options discussed briefly and opted to just monitor and discuss with derm more when sees them in .     History of maternal aunt with history of breast cancer diagnosed over age 50, & who  years later from & unrelated dx. Currently opts to defer  referral. 3D Mammo in  was normal.   Return in   months, office visit for DM & have pharmacy request as needed regarding refills good now till end of June 2024. Waiting for labs to see if changes needed to any dosing first.   - PRIMARY CARE FOLLOW-UP SCHEDULING; Future  - COVID-19 12+ (2023-24) (PFIZER)    Type 2 diabetes mellitus without complication, without long-term current use of insulin (H)  Diabetes on metformin 1000 mg twice, asa, statin and ace, Been to eye doctor and dentist. Has floaters and early cataracts. Dm eye check due 12/2024. No foot issues. Has had mild burning outer side left ankle & tingling numbness scattered fingers and feet. ? From dm or low back issues. Foot exam normal today. Later labs showed A1C test elevated to 7.4, noted not been exercising as hurt back dec to feb, Declines need to add on injectables or see weight specialist/ endo / dm ed or MTM etc. Encouraged to increase physical activity, low-carb diet & weight loss will help. & if changes mind regarding options dicussed to contact us.  - CBC with Platelets & Differential; Future  - Comprehensive metabolic panel; Future  - TSH with free T4 reflex; Future  - Lipid panel reflex to direct LDL Fasting; Future  - Albumin Random Urine Quantitative with Creat Ratio; Future  - Hemoglobin A1c; Future  - CBC with Platelets & Differential  - Comprehensive metabolic panel  - TSH with free T4 reflex  - Lipid panel reflex to direct LDL Fasting  - Albumin Random Urine Quantitative with Creat Ratio  - Hemoglobin A1c  - FOOT EXAM    BMI 40.0-44.9, adult (H)/ morbid obesity   BMI up to 43 with serious comorbidities of diabetes, hypertension, hyperlipidemia etc. Discussed eating healthy, low carb and portion size, increased aerobic physical activity. Can also consider injectables like Wegovy/ ozempic/mounjaro etc to help with weight loss if interested Sleeps with mouth open as dry in am, like sludge to clear in am, reports no sig snoring, wakes refreshed & no day time drowsiness & declines need for  sleep eval. No sig alcohol use. Declines meds, referral to wt or mtm or sleep at this time.    Benign essential hypertension  Hypertension stable on lisinopril 2.5 mg daily.   - Comprehensive metabolic panel; Future  - Albumin Random Urine Quantitative with Creat Ratio; Future  - Comprehensive metabolic panel  - Albumin Random Urine Quantitative with Creat Ratio    Mixed hyperlipidemia  HLD on simvastatin 40 mg Will check lipids today. Encouraged a regular exercise program with at least 150 minutes of aerobic exercise per week, and eating a low saturated fat/low carbohydrate diet.   - Lipid panel reflex to direct LDL Fasting; Future  - Lipid panel reflex to direct LDL Fasting    Carotid stenosis, right  History of right carotid stenosis less than 69% without any symptoms. Carotid ultrasound done in 2022. Vascular said no surgery and vascular and eye felt ocular migraine not related to carotid findings. Recheck u/s carotid due in 2024. Order placed in dec, not scheduled yet. Encouraged she call radiology to set up. Discussed medical risk factor modification with diabetes, hypertension and cholesterol control.  - Lipid panel reflex to direct LDL Fasting; Future  - Lipid panel reflex to direct LDL Fasting    Varicose veins of both lower extremities with pain  Varicose veins symptomatic with pain specially in the summertime and less in the winter. Never used the thigh-high compression socks for which was measured previously. Discussed zip up options. Feels currently not bothering so declines referral to vascular and occupational therapy yet.  Some discoloration lower legs suspected from varicose veins. Seeing derm in June. Knows wt los will help.    Ocular migraine  Ocular migraine few and far between, at most a couple a year. Used moisturizing eye drops by optometrist, Has history of cataracts, dry eyes, hyperopia as well. Episodic tension type headaches improved. Intermittent right ear pain resolved. DM check due in  dec 2024.   - Comprehensive metabolic panel; Future  - Comprehensive metabolic panel    History of adenomatous polyp of colon  History of tubular adenoma noted on colonoscopy in .  Recheck scope due in     Cystocele, midline  Midline cystocele, more annoying than symptomatic with unchanged urgency. Kegel's seems to make no difference but still does them. Feels like  at times with certain positions but has had no recurrent urine infections or retention or complete prolapse. Currently desires to hold off on pelvic floor therapy with physical therapy, or referral to urogynecology or any intervention as minimally symptomatic.  Prior noted abdominal discomfort resolved. No further RUQ pain.    Myalgia  Intermittent low back pain  Back pain / body aches Had back pain right sciatica dec to feb, opted not to see anyone, used icy hot, tylenol. Ok & declines ortho referral for now.  - CK total; Future  - Comprehensive metabolic panel; Future    DDD (degenerative disc disease), cervical  Primary osteoarthritis of right shoulder  Bilateral osteoarthritis of finger  History of cervical degenerative disc disease & Right AC arthritis noted on prior xray's. No longer with painful decreased range of motion or pain radiating down arms. Does home exercises as needed.  History of osteoarthritis bilateral thumbs/fingers managing with symptomatic cares.     Paresthesias  Tingling numbness fingers ? from neck/ back or dm or all?. Not dropping things. Later labs showed Normal red blood cell (hgb) levels, normal white blood cell count and normal platelet levels but HBA1c up to 7.4. Rest of labs not back at time of this note.  - Vitamin B12; Future  - Vitamin B12    Hair thinning  Thinning hair to see derm in . Prior TSH wl.     Nail discoloration  Nail dystrophy  Left big toenail discoloration from a prior trauma is growing out. Right thickened big toenail in past felt to be' due to a fungal infection or due to trauma.  The base of the nail looked healthy. In past options discussed including leaving it alone versus taking a nail cutting to see if grew fungal elements versus treating empirically with an oral antifungal versus applying an antifungal nail polish, and risk benefits of all options discussed briefly and opted to just monitor and discuss with derm more when sees them in .     Family history of malignant neoplasm of breast  History of maternal aunt with history of breast cancer diagnosed over age 50, & who  years later from & unrelated dx. Currently opts to defer  referral. 3D Mammo in  was normal.    Health care maintenance  Here for physical and dm/HTN/HLD etc  Health maintenance reviewed   Consider working on health care directives or may bring us a copy if has one at home to put in wendy if desired   No breast issues, Mammogram in  noted normal, continue with annual screening  Pap due next  at that time will be over age 65 & may no longer be needed.   Reported had red blood when wiped one day, one time after one wipe nothing since, can get itching at times, Has had post menopausal bleeding with normal biopsies in past. Discussed monitoring versus gyn referral versus topical estrogen for possible atrophic vaginitis. Opts to monitor for now. Discussed less detergent in under wear wash, cotton underwear, none at night & if recurs to contact us.   Colon cancer screening due .  Completed shingrex  Utd for COVID, flu, pneumonia, RSV, Tdap,   Given the 2nd COVID today   Former smoker no lung cancer screening indicated given hx reviewed  Labs today and will make further recommendations once those are reviewed. Has refills on meds till end  at least.  Return in 6 months, office visit for DM & have pharmacy request as needed regarding refills good now till end of 2024. Waiting for labs to see if changes needed to any dosing first.   - REVIEW OF HEALTH MAINTENANCE PROTOCOL  ORDERS    Advanced directives, counseling/discussion    Need for COVID-19 vaccine  - COVID-19 12+ (2023-24) (PFIZER)    Patient has been advised of split billing requirements and indicates understanding: Yes  Review of the result(s) of each unique test - diagnostics   Ordering of each unique test  Prescription drug management  40 minutes spent by me on the date of the encounter beyond the preventive ( total encounter 90 minutes), spent by me on the date of the encounter doing chart review, history and exam, documentation and further activities per the note     Counseling  Appropriate preventive services were discussed with this patient, including applicable screening as appropriate for fall prevention, nutrition, physical activity, Tobacco-use cessation, weight loss and cognition.  Checklist reviewing preventive services available has been given to the patient.  Reviewed patient's diet, addressing concerns and/or questions.   She is at risk for lack of exercise and has been provided with information to increase physical activity for the benefit of her well-being.   She is at risk for psychosocial distress and has been provided with information to reduce risk.       Work on weight loss  Regular exercise  See Patient Instructions        Ayah Guzman is a 63 year old, presenting for the following:  Physical (Back pain./Body aches and pains)        3/18/2024     7:50 AM   Additional Questions   Roomed by Sarai STEWART        Health Care Directive  Patient does not have a Health Care Directive or Living Will: Patient states has Advance Directive and will bring in a copy to clinic.    HPI      3/15/2024   General Health   How would you rate your overall physical health? Good   Feel stress (tense, anxious, or unable to sleep) Only a little   (!) STRESS CONCERN      3/15/2024   Nutrition   Three or more servings of calcium each day? Yes   Diet: Diabetic   How many servings of fruit and vegetables per day? 4 or more   How many  sweetened beverages each day? 0-1         3/15/2024   Exercise   Days per week of moderate/strenuous exercise 3 days   Average minutes spent exercising at this level 30 min         3/15/2024   Social Factors   Frequency of gathering with friends or relatives Twice a week   Worry food won't last until get money to buy more No   Food not last or not have enough money for food? No   Do you have housing?  Yes   Are you worried about losing your housing? No   Lack of transportation? No   Unable to get utilities (heat,electricity)? No         3/18/2024   Fall Risk   Gait Speed Test (Document in seconds) 3.42   Gait Speed Test Interpretation Less than or equal to 5.00 seconds - PASS          3/15/2024   Dental   Dentist two times every year? Yes         3/15/2024   TB Screening   Were you born outside of [place]?  No     Today's PHQ-2 Score:       3/18/2024     7:40 AM   PHQ-2 (  [employer])   Q1: Little interest or pleasure in doing things 0   Q2: Feeling down, depressed or hopeless 0   PHQ-2 Score 0   Q1: Little interest or pleasure in doing things Not at all   Q2: Feeling down, depressed or hopeless Not at all   PHQ-2 Score 0         3/15/2024   Substance Use   Alcohol more than 3/day or more than 7/wk No   Do you use any other substances recreationally? (!) ALCOHOL     Social History     Tobacco Use    Smoking status: Former     Current packs/day: 0.00     Average packs/day: 1 pack/day for 15.0 years (15.0 ttl pk-yrs)     Types: Cigarettes     Start date: 1978     Quit date: 1991     Years since quittin.3    Smokeless tobacco: Never   Vaping Use    Vaping status: Never Used   Substance Use Topics    Alcohol use: Yes     Comment: occasionally    Drug use: No           2024   LAST FHS-7 RESULTS   1st degree relative breast or ovarian cancer No   Any relative bilateral breast cancer No   Any male have breast cancer No   Any ONE woman have BOTH breast AND ovarian cancer No   Any woman with breast cancer before  50 yrs No   2 or more relatives with breast AND/OR ovarian cancer No   2 or more relatives with breast AND/OR bowel cancer No        Mammogram Screening - Mammogram every 1-2 years updated in Health Maintenance based on mutual decision making        3/15/2024   STI Screening   New sexual partner(s) since last STI/HIV test? No     History of abnormal Pap smear: NO - age 30-65 PAP every 5 years with negative HPV co-testing recommended  Last 3 Pap and HPV Results:       Latest Ref Rng & Units 3/14/2022     3:56 PM 2015     8:25 AM 2015    12:00 AM   PAP / HPV   PAP  Negative for Intraepithelial Lesion or Malignancy (NILM)      PAP (Historical)    NIL    HPV 16 DNA Negative Negative  Negative     HPV 18 DNA Negative Negative  Negative     Other HR HPV Negative Negative  Negative             Latest Ref Rng & Units 3/14/2022     3:56 PM 2015     8:25 AM 2015    12:00 AM   PAP / HPV   PAP  Negative for Intraepithelial Lesion or Malignancy (NILM)      PAP (Historical)    NIL    HPV 16 DNA Negative Negative  Negative     HPV 18 DNA Negative Negative  Negative     Other HR HPV Negative Negative  Negative       ASCVD Risk   The 10-year ASCVD risk score (Sarbjit LOZANO, et al., 2019) is: 9.5%    Values used to calculate the score:      Age: 63 years      Sex: Female      Is Non- : No      Diabetic: Yes      Tobacco smoker: No      Systolic Blood Pressure: 121 mmHg      Is BP treated: Yes      HDL Cholesterol: 47 mg/dL      Total Cholesterol: 155 mg/dL    Fracture Risk Assessment Tool  Link to Frax Calculator  Use the information below to complete the Frax calculator  : 1960  Sex: female  Weight (kg): 124.6 kg (actual weight)  Height (cm): 170.2 cm  Previous Fragility Fracture:  No  History of parent with fractured hip:  No  Current Smoking:  No  Patient has been on glucocorticoids for more than 3 months (5mg/day or more): No  Rheumatoid Arthritis on Problem List:   No  Secondary Osteoporosis on Problem List:  No  Consumes 3 or more units of alcohol per day: No  Femoral Neck BMD (g/cm2)           Reviewed and updated as needed this visit by Provider   Tobacco  Allergies  Meds  Problems  Med Hx  Surg Hx  Fam Hx          CURRENTLY   Here for physical and dm/HTN/HLD etc  Doing well  Health maintenance reviewed   Thinks has aCP at home  No breast issues, Mammogram in [place] normal  Pap due next 2027 at that time will be over age 65 & may no longer be needed.   Had red blood when wiped one day, one time after one wipe nothing since, can get itching at times, Has had post menopausal bleeding with normal biopsies in past. Discussed monitoring versus gyn referral versus topical estrogen. Opts to monitor for now. Discussed less detergent in under wear wash, cotton underwear, none at night & contact if recurs.   Colon cancer screening due 2026.  Completed shingrex  Utd for COVID, flu, pneumonia, RSV, Tdap,   Can get 2nd COVID today   Former smoker no lung cancer screening indicated given hx reviewed  Labs today and will make further recommendations once those are reviewed.    Diabetes on metformin 1000 mg twice, asa, statin and ace, sugars higher, not been exercising as hurt back dec to feb, Declines need to add on injectables or see weight specialist/ endo / dm ed or MTM etc. Been to eye doctor and dentist. Has floaters and early cataracts. Dm eye check due 12/2024. No foot issues. Has had mild burning outer side left ankle & tingling numbness scattered fingers and feet. ? From dm or low back issues. Foot exam normal today.      BMI up to 43 with serious comorbidities of diabetes, hypertension, hyperlipidemia etc. Discussed eating healthy, low carb and portion size, increase aerobic physical activity especially when the weather is better and can refer to weight specialist if desired in the future.  Can also consider injectables like Wegovy/ ozempic/mounjaro etc  to help with weight  loss if interested in the future. Declines for now. Declines MTM referral. Sleeps with mouth open as dry in am, like sludge to clear in am, reports no sig snoring, wakes refreshed & no day time drowsiness & declines need for sleep eval. No sig alcohol use.    Hypertension stable on lisinopril 2.5 mg daily.     HLD on simvastatin 40 mg Will check lipids today. Encouraged a regular exercise program with at least 150 minutes of aerobic exercise per week, and eating a low saturated fat/low carbohydrate diet.      History of right carotid stenosis less than 69% without any symptoms. Carotid ultrasound done in . Vascular said no surgery and vascular and eye felt ocular migraine not related to carotid findings. Recheck u/s carotid due in . Order placed in dec, not scheduled yet. Encouraged she call radiology to set up. Discussed medical risk factor modification with diabetes, hypertension and cholesterol control.    Varicose veins symptomatic with pain specially in the summertime and less in the winter. Never used the thigh-high compression socks for which was measured in the past. Discussed zip up options. Feels currently not bothering so declines referral to vascular and occupational therapy yet.  Some discoloration lower legs suspected from varicose veins. Seeing derm in . Knows wt los will help.     Ocular migraine few and far between, at most a couple a year. Used moisturizing eye drops by optometrist, Has history of cataracts, dry eyes, hyperopia as well. Episodic tension type headaches improved. Intermittent right ear pain resolved      History of tubular adenoma noted on colonoscopy in .  Recheck scope due in      Midline cystocele, more annoying than symptomatic with unchanged urgency. Kegel's seems to make no difference but still does them. Feels like  at times with certain positions but has had no recurrent urine infections or retention or complete prolapse. Currently desires to hold  off on pelvic floor therapy with physical therapy, or referral to urogynecology or any intervention as minimally symptomatic.  Prior noted abdominal discomfort resolved. No further RUQ pain.    Back pain / body aches Had back pain right sciatica dec to feb, opted not to see anyone, used icy hot, tylenol. Ok & declines ortho referral for now.    History of cervical degenerative disc disease & Right AC arthritis noted on prior xray's. No longer with painful decreased range of motion or pain radiating down arms. Does home exercises as needed.     History of osteoarthritis bilateral thumbs/fingers managing with symptomatic cares.     Tingling numbness fingers > from neck/ back or dm or all?. Not dropping things.      Thinning hair to see derm in      Left big toenail discoloration from a prior trauma is growing out.  Right thickened big toenail in past felt to be' due to a fungal infection or due to trauma.  The base of the nail looked healthy. In past options discussed including leaving it alone versus taking a nail cutting to see if grew fungal elements versus treating empirically with an oral antifungal versus applying an antifungal nail polish, and risk benefits of all options discussed briefly and opted to just monitor and discuss with derm more when sees them in .     History of maternal aunt with history of breast cancer diagnosed over age 50, & who  years later from & unrelated dx. Currently opts to defer  referral. 3D Mammo in  was normal.      BACKGROUND  BMI >36 ( down from BMI of 45  & 290 lbs in 2019 to 225 lbs and BMI 34 in ), with DM on ASA, metformin, HLD on simvastatin, HTN on lisinopril, hx of cervical DDD & radiculopathy in the past, on vit d, MV, & probiotics, hx of varicose veins, on compression socks, prior yanci, hx of colonic polyp 2 mm sigmoid  that was a tubular adenoma noted on colonoscopy in  at [facility]  & colonoscopy in 2016 at [place] [facility] reported as normal and normal in 7/2021, recheck due in 2026,  hx of postmenopausal bleeding x 2 in the past S/p EMB & u/S that was reported normal, had two biopsies at different times, last being may be in 2010 when had a transvaginal ultrasound and noted benign and has noted no episodes since then, allergic to sulfa, with side effect of redness from MMR and swollen LN from pneumococcal vaccine, previously under care of PCP Dr Canales,  Seen by PCP nov 2020 for DM when noted HBA1c was 5.5, taken off glimepiride and Januvia and continued on metformin and given the flu shot.  Seen first time by this provider in 6/2021 to establish care and get a DM check  DM noted well controlled on metformin. Hd been taken off Januvia and glimepiride previously. Also was on statin and ACE inhibitor which was continued. Was going to schedule her DM eye check. Recommended a DM eye check yearly. Noted no longer on asa but encouraged to resume. GI side effects explained. Needed a refill on metformin & it was sent in for metformin 500 mg 2 bid. Labs done. Was to continue dental care every 6 months. Foot exam done and encouraged to check self nightly.   HLD on simvastatin 40 mg,  With no side effects. HTN stable on lisinopril 2.5 mg with no side effects.  Reviewed BMI > 34-35: Down from BMI of 45  & 290 lbs in 2019 to 225 lbs and BMI 34 in 2021. Congratulated on weight loss & was.to continue with diet, exercise and lifestyle changes.   Discussed hx of Cervical DDD, discovered incidentally in 2008 when had a head injury and imaging. Ct showed moderate degenerative disc disease at C5-C6 with broad-based  disc protrusion centrally and on the right significant for impingement upon the exiting right C6 nerve rootlet and for impression upon the right aspect of cervical cord at this level. Lesser degenerative changes at C6-C7 and to a lesser extent C4-C5. There was at C6-C7 appearance of moderately prominent central  disc protrusion.  Noted could have symptoms with some neck movements but discomfort was not enough to warrant meds or surgical intervention. Reported no radiculopathy, myelopathy or neuropathy & was to continue to monitor.   Was encouraged to use compression socks for leg achiness secondary to varicose veins right more than left worse when it was hot. & if got worse could refer to vascular.  Reviewed her Hx of colonic polyp a 2 mm sigmoid polyp that was a tubular adenoma removed on colonoscopy in 2011 at [facility]  & colonoscopy in 2016 at [place] [facility] reported as normal and due again in 2021, GI referral given for a colonoscopy.   Noted hair had been thinning. No bald spots. Hair thinning/ loss felt due to age, genetics, diabetes, stress, meds etc. Advised could try biotin if wanted to ( cautioning it could affect thyroid) & to discontinue if not helpful. Was to resume a multivitamin. Labs done to evaluate more and offered to refer to dermatology if no answer and remained a  concern.  Health care maintenance reviewed. Noted had advance directives at home and was to bring us a copy. Discussed  labs and a screening mammogram & getting Shingrex. Was to return for a physical and pap another time, had been over due since 2020 due to the pandemic.  Lipids were goal. CMP showed elevated glucose HBA1c was goal. Had a normal TSH and HIV, cbc and microalbumin level.  Seen in UC on 6/21/21 for neck strain and xray done showed mild disc disease, comment mad zee calcification right neck probably carotid and given Zanaflex and referred to ortho to further evaluate and treat. Had her colonoscopy in 7/2021 and noted normal and recheck in 5 yrs. Mammogram done 12/8/21 was normal.  Seen 3/14/22 for a physical Pap was done was normal.  Given Tdap.  Declined genetic consult for family history. Noted midline cystocele Suspected due to postmenopausal state, lack of estrogen and prior delivery of 11 pound baby.  Recommended Kegel's  exercises and if should have worsening incontinence, getting up to urinate several times at night, recurrent bladder infections consider referral to urogynecologist for treatment. Diabetes well controlled continue baby aspirin daily and lisinopril and Metformin at thousand twice a day. Hyperlipidemia continued on simvastatin 40 mg bedtime & to make further recommendations once labs seen. Hypertension stable on lisinopril 2.5 mg daily. Incidental right neck calcification corresponding to likely right carotid artery seen on x-ray C-spine done in June 2021 when had neck pain suggest possible carotid artery calcification.  To get an ultrasound bilateral carotid and make further recommendations after that. Noted had had 4 episodes of left eye crescent-like shimmery visual disturbance.  Reported a normal eye exam after first 2 episodes but forgot to discuss with the eye doctor at the time.  Exam was unremarkable externally.  Had no symptoms at visit.  Could be a retinal issue or ocular migraines or related to bright light but need to rule out more serious concerns like stroke etc. and given carotid artery calcification history and other risk factors referred to neurologist for further evaluation.  If should occur again specially with other symptoms recommend going to the ER for further evaluation. BMI more than 36 with risk factors of diabetes, hypertension, hyperlipidemia etc.  Continue to stay active, eat healthy and work on weight loss.  Offered to refer to a weight specialist or diabetes Ed or the pharmacist etc. if necessary in the future. Degenerative disc disease of cervical spine originally found out incidentally when had a fall and hit head requiring CT scan and MRI.  Recent x-ray in June for neck pain showed arthritis especially at C6 5 C6 C6-C7.  Currently no concerning findings continue to monitor. Also had osteoarthritis of fingers. Varicose veins of both lower extremities with pain in the summertime  currently asymptomatic.  Compression socks to prevent achiness at end of day.  Could refer to vascular if got worse. History of tubular adenoma of colon.  Colonoscopy in 2021 was normal recheck in 2026. Hair thinning suspect related to aging and female pattern of thinning and genetics.  To check labs again today, may use Rogaine for Women over-the-counter.  Consider dermatology referral if got worse. Noted history of COVID-19 breakthrough infection end of December with complete resolution of symptoms. Family history of maternal aunt diagnosed with breast cancer in the past.  Could consider  referral, opted to defer for now. Was to return in 6 months for diabetes & blood pressure & medication review check and in 1 year for preventive physical and sooner for any new or worsening concerns.   Labs showed normal lipids, CMP with elevated glucose, normal TSH microalbumin CBC and hemoglobin A1c and no med changes made.  Carotid ultrasound on 3/15/2022 showed right carotid artery stenosis 50 to 69% with more than 50% plaque and left less than 50%.  Refer to vascular they reviewed advised no surgical intervention and to see GI doctor first for ocular symptoms.  Seen by the eye doctor 5/27/2022 noted normal exam diagnosed with ocular migraine and hyperopia dry eye syndrome and age-related cataracts.     On 11/14/2022 seen in urgent care for mechanical fall with thumb pain and shoulder pain and advised supportive care and given naproxen.     Seen 12/5/22 for dm. Continued on metformin, asa, lisinopril, statin for diabetes.  Foot exam done normal & noted Eye check up-to-date. Opted no injectables or referral to weight specialist. Continued on simvastatin for cholesterol BP stable on lisinopril, continue same  To continue medical risk factor modification for carotid artery stenosis, on asa & statin. Vascular said no surgery and vascular and eye felt ocular migraine not related to carotid findings. Recheck u/S carotid  in . Was to continue care with eye for cataracts, dry eyes and hyperopia and floaters. Ocular migraines had not recurred in a while. Encouraged to try famotidine 20 mg twice a day for 2 weeks see if helped upper abdominal pain/ heart burn  Det, exercise, weight loss would also help, avoid trigger foods, do freq small meals, do not eat 3 hrs before bedtime. If not better can do imaging and eval, did not sound cardiac. Had had prior lap cholecystectomy. Mild cystocele reported asymptomatic. Arthritis in neck stable. felt varicose veins not bothering in winter. Encouraged to use compression socks in summer and refer to vascular if got  more symptomatic despite that. Colonoscopy due .was to see derm for skin check and thinning hair. Had a recent mechanical fall with recovering aches and pains & advised could take up to 6 weeks or more to resolved. Health maintenance reviewed. Prevnar 20 given & to consider Shingrex. Was to return mid march for a physical.  Labs showed HBA1c of 6.2, CMP with mild low sodium,   On 22 mammogram was normal     Seen 3/16/23 for preventive physical and follow-up of diabetes, hypertension, hyperlipidemia and additional concerns. Reported no breast issues, breast exam normal, to continue with self breast checks. History of maternal aunt with history of breast cancer diagnosed over age 50,  years later, unrelated to that diagnosis. Opted to defer  referral. Mammogram done 2022 was normal and to continue with yearly screenings. Pap due next  2& if at that time over age 65 may no longer be needed. Reported no vaginal concerns other than a cystocele .  Healthcare maintenance reviewed. Encouraged she bring us a copy of her healthcare directives once completed. Colon cancer screening due . Vaccines for flu, Tdap, pneumonia, COVID up-to-date.  Recommended Shingrex to check cost to see if cheaper at the pharmacy first. Consider seeing travel clinic in  [place] prior to international travel ( hoped to go to [place] )  Diabetes  well controlled with hemoglobin A1c of 6.4 on metformin, asa, statin and ace, though trending up over the prior 1 year.  To continue with checking sugars at least once a day.  Make a diabetes eye check yearly.  Foot exam done  was normal.  To continue aspirin ACE inhibitor and statin medication.  To continue metformin at 1000 mg twice a day.  Declined need to add on injectables or see weight specialist/ endo / dm ed etc. Had enough medications to contact us when needed.  BMI over 39 with serious comorbidities of diabetes, hypertension, hyperlipidemia etc.  To continue with eating healthy, low carb and portion size, increase aerobic physical activity especially when the weather was better and could refer to weight specialist if desired in the future.  Could also consider injectables like Wegovy to help with weight loss if interested in the future.  Hyperlipidemia on simvastatin 40 mg bedtime to continue same dose. Hypertension stable on lisinopril 2.5 mg daily. History of right carotid stenosis less than 69% without any symptoms.  Carotid ultrasound done in . Vascular said no surgery and vascular and eye felt ocular migraine not related to carotid findings. To recheck u/S carotid in . Continue with medical risk factor modification with diabetes, hypertension and cholesterol control.  Varicose veins symptomatic with pain specially in the summertime and less in the winter. Encouraged to use the thigh-high compression socks which were measured in the past. Could refer to vascular and occupational therapy if bothering her more for more options. Weight loss would also likely help the symptoms.  History of tubular adenoma noted on colonoscopy in .  Recheck scope due in .  Midline cystocele, more annoying than symptomatic.  Felt like  at times with certain positions but had had no recurrent urine infections or retention or  complete prolapse.  Discussed Kegel exercises,  consider pelvic floor therapy formally with physical therapy, & could refer to urogynecology as well for more options like pessaries surgery etc. Desired to hold off on intervention as minimally symptomatic and monitor.  Prior noted abdominal discomfort resolved. No further RUQ pain noted unclear if tried Pepcid.   Thinning hair and changes, to follow-up with dermatology, referral had been placed at last visit in December. To call to make an appointment with them. They could  also check the pigmented skin lesion of face and since had multiple pigmented nevi and moles to do a full body skin check for preventive measures.  Left big toenail discoloration had been growing out as the nail grew was most suggestive of prior trauma related discoloration rather than a melanoma.  To continue to monitor.  Right big toenail looked thickened and loose and may be due to a fungal infection or due to trauma.  The base of the nail looked healthy.  Options discussed including leaving it alone versus taking a nail cutting to see if grew fungal elements versus treating empirically with an oral antifungal versus applying an antifungal nail polish, and risk benefits of all options discussed briefly and opted to just monitor and discuss with derm more when saw them.  Ocular migraine few and far between, had not recurred in a while. To continue to stay well-hydrated follow-up with the eye doctor for eye checks. Had history of cataracts, dry eyes, hyperopia as well.   Episodic tension type headaches right-sided with a possible case of conjunctivitis and red streaking under the eye and ear pain improved with warm compresses 2 to 3 days possibly due to a viral illness since  improved. Intermittent right ear pain since recent episode suspected due to eustachian tube dysfunction.  Examination revealed a retracted eardrum due to pressure disequilibrium across the tympanic membrane. Encouraged to  try Flonase and Zyrtec over-the-counter and try to pop ear and if things did not improve or got worse could refer to ENT.  Right shoulder pain since fell in 2022. Had a painful decreased range of motion and pain radiating down arms at times.  History of cervical degenerative disc disease may also be contributing to radicular pain down right arm.  Xray done of shoulder & to discuss more with orthopedics when saw them. History of osteoarthritis bilateral thumbs/fingers managing with symptomatic cares. Family history of breast cancer opted no  referral to continue with yearly mammograms and self breast checks regularly. To return in 6 months for office visit for follow-up of chronic issues. Labs showed lipids goal, CMP normal other than elevated glucose, hemoglobin A1c goal normal TSH microalbumin CBC x-ray showed AC joint arthritis rotator cuff issues.  Recommended seeing orthopedics.  [place]  negative    Seen 12/11/23 for diabetes noted controlled on metformin 1000 mg twice, asa, statin and ace. Declined need to add on injectables or see weight specialist/ endo / dm ed MTM etc. FS reported 130 to 170 , only checked in am. Had been to eye doctor and dentist. Had floaters and early cataracts. No foot issues. Had had mild burning outer side left ankle. Foot exam normal in march. Needed test strips & was refilled.  Later labs showed HBA1c goal but trending up. To continue to work on diet, exercise, weight loss, lower carb's, smaller portions & recheck A1C test in 3 months. Normal CMP except for glucose which was elevated due to her diabetes. To continue current meds & refills sent in.  BMI up to over 41 with serious comorbidities of diabetes, hypertension, hyperlipidemia etc.  Discussed eating healthy, low carb and portion size, increased aerobic physical activity especially when the weather  was better and could refer to weight specialist if desired in the future.  Could also consider injectables like  Wegovy/ ozempic/mounjaro etc  to help with weight loss if interested in the future. Declines meds or MTM referral. Hyperlipidemia on simvastatin 40 mg bedtime. Lipids in march had been wnl on this dosing & continued on same medication, encouraged a regular exercise program with at least 150 minutes of aerobic exercise per week, and eating a low saturated fat/low carbohydrate diet. Hypertension stable on lisinopril 2.5 mg daily. History of right carotid stenosis less than 69% without any symptoms. Carotid ultrasound done in . Vascular said no surgery and vascular and eye felt ocular migraine not related to carotid findings. Encouraged recheck u/S carotid due in  & order placed. Discussed medical risk factor modification with diabetes, hypertension and cholesterol control. Varicose veins symptomatic with pain specially in the summertime and less in the winter. Never used the thigh-high compression socks for which had been measured in the past. Discussed zip up options. Felt was not bothering so declined referral to vascular and occupational therapy. Understood weight loss would likely help. Ocular migraine few and far between, occurred a couple times a year at most. Used moisturizing eye drops by optometrist, Had history of cataracts, dry eyes, hyperopia as well.  Episodic tension type headaches improved. Intermittent right ear pain resolved  History of tubular adenoma noted on colonoscopy in .  Recheck scope due in . Midline cystocele, more annoying than symptomatic, was unchanged. Kegel's seemed to make no difference. Felt like  at times with certain positions but had had no recurrent urine infections or retention or complete prolapse. Desired to hold off on pelvic floor therapy with physical therapy, or referral to urogynecology or any intervention as minimally symptomatic. Prior noted abdominal discomfort resolved. No further RUQ pain noted unclear if had tried Pepcid. Had a recent viral GI  illness that was also better. Thinning hair to see derm not scheduled yet. Prior TSH normal. Had a  pigmented skin lesion of face and  multiple pigmented nevi and moles & encouraged an apt with derm to do a full body skin check for preventive measures. Left big toenail discoloration from a prior trauma was growing out. Right thickened big toenail in past felt to be due to a fungal infection or due to trauma. The base of the nail looked healthy. In past options discussed including leaving it alone versus taking a nail cutting to see if grew fungal elements versus treating empirically with an oral antifungal versus applying an antifungal nail polish, and risk benefits of all options discussed briefly and opted to just monitor and discuss with derm more when saw them. Reported dystrophy growing out. But had gotten another dark spot, may be nail polish in a groove, but was to see derm  History of cervical degenerative disc disease & Right AC arthritis noted on prior xray's. Had intermittent radicular pain down right arm depended on activity. No longer with painful decreased range of motion or pain radiating down arms at times. Was to continue doing home exercises as needed. History of osteoarthritis bilateral thumbs/fingers managing with symptomatic cares. Had a mechanical fall dec 1st due to uneven curb and fell on chest sustained a bruise & pleuritic like pain that subsided with residual left upper breast pain. Exam was normal. & to monitor. History of maternal aunt with history of breast cancer diagnosed over age 50, & who  years later from & unrelated dx. Opted to defer  referral. Mammogram done 2022 & to get her 3D Mammo soon. Health maintenance reviewed. Pap due next  but at that time would over age 65 & possibly no longer needed. Colon cancer screening due . Had shingrex #1  & to get the 2nd after feb. Was Utd for COVID, flu, pneumonia, RSV, & Tdap, Former smoker but no lung  cancer screening indicated given hx reviewed. Labs done & to make further recommendations once those were reviewed. Was to return in 3 months for a physical.    Mammogram normal 24.     Past Medical History:   Diagnosis Date    Arthritis     Benign essential hypertension 2019    Diabetes mellitus (H)     History of 2019 novel coronavirus disease (COVID-19) 2022    positive end of dec 2021 ( breakthrough covid, had a headache 1 day & fatigue 3 days)     Nail discoloration 2023     Past Surgical History:   Procedure Laterality Date    ABDOMEN SURGERY      lap cholecystomy    BIOPSY      CHOLECYSTECTOMY          COLONOSCOPY      ANW: sigmoid polyp removed    COLONOSCOPY      no report, abstracted information stated, normal recheck in 5 yr given hx of polyps     OB History    Para Term  AB Living   4 3 3 0 1 3   SAB IAB Ectopic Multiple Live Births   1 0 0 0 0      # Outcome Date GA Lbr Lody/2nd Weight Sex Type Anes PTL Lv   4 SAB            3 Term            2 Term            1 Term              Lab work is in process  Labs reviewed in EPIC  BP Readings from Last 3 Encounters:   24 121/74   23 124/80   23 128/79    Wt Readings from Last 3 Encounters:   24 124.6 kg (274 lb 11.2 oz)   23 121 kg (266 lb 11.2 oz)   23 115.2 kg (254 lb)                  Patient Active Problem List   Diagnosis    Mixed hyperlipidemia    Type 2 diabetes mellitus without complication, without long-term current use of insulin (H)    Obesity, Class III, BMI 40-49.9 (morbid obesity) (H)    DDD (degenerative disc disease), cervical    Benign essential hypertension    Varicose veins of both lower extremities with pain    Hair thinning    History of adenomatous polyp of colon    Cystocele, midline    Carotid stenosis, right    Ocular migraine    Nail dystrophy    Primary osteoarthritis of right shoulder    Bilateral osteoarthritis of finger     Past Surgical  History:   Procedure Laterality Date    ABDOMEN SURGERY      lap cholecystomy    BIOPSY      CHOLECYSTECTOMY          COLONOSCOPY      ANW: sigmoid polyp removed    COLONOSCOPY  2016    no report, abstracted information stated, normal recheck in 5 yr given hx of polyps       Social History     Tobacco Use    Smoking status: Former     Current packs/day: 0.00     Average packs/day: 1 pack/day for 15.0 years (15.0 ttl pk-yrs)     Types: Cigarettes     Start date: 1978     Quit date: 1991     Years since quittin.3    Smokeless tobacco: Never   Substance Use Topics    Alcohol use: Yes     Comment: occasionally     Family History   Problem Relation Age of Onset    Heart Disease Mother     Diabetes Mother             Cancer Mother         lymphoma on hodgkins    Obesity Mother     Coronary Artery Disease Mother     Heart Disease Father     Coronary Artery Disease Father     Hypertension Father     Hyperlipidemia Father     Obesity Sister     Diabetes Sister     Heart Failure Sister     Coronary Artery Disease Sister     Obesity Sister     Diabetes Sister     Depression Sister     Coronary Artery Disease Sister         Haf triple bypass surgery Mar 2023    Thyroid Cancer Sister         thyroid surgery 2023    Depression Sister     Obesity Sister     Diabetes Brother             Obesity Brother     Substance Abuse Son         Alcohol    Breast Cancer Other          Current Outpatient Medications   Medication Sig Dispense Refill    aspirin 81 MG tablet Take 1 tablet by mouth daily.      blood glucose (CONTOUR NEXT TEST) test strip 100 strips by In Vitro route daily Use to test blood sugar 1 times daily or as directed. 100 strip 0    Cholecalciferol (VITAMIN D) 1000 UNIT capsule Take 1 capsule by mouth daily.      lisinopril (ZESTRIL) 2.5 MG tablet Take 1 tablet (2.5 mg) by mouth daily 90 tablet 1    metFORMIN (GLUCOPHAGE) 500 MG tablet Take 2 tablets (1,000 mg) by mouth 2 times  "daily (with meals) 360 tablet 1    Multiple Vitamin (MULTI-VITAMIN PO) Take  by mouth.      simvastatin (ZOCOR) 40 MG tablet Take 1 tablet (40 mg) by mouth at bedtime 90 tablet 1     Allergies   Allergen Reactions    Sulfa Antibiotics Hives    Measles Mumps And Rubella Virus Vaccine Live      Redness       Recent Labs   Lab Test 03/18/24  0853 12/11/23  0849 03/16/23  0844 03/14/22  1626 06/09/21  0739 11/24/20  0805   A1C 7.4* 6.9* 6.4*   < > 5.5 5.5   LDL 74 96 73   < > 71 78   HDL 47* 53 55   < > 59 50   TRIG 168* 142 116   < > 82 127   ALT 17 14 16   < > 21 18   CR 0.43* 0.40* 0.41*   < > 0.41* 0.44*   GFRESTIMATED >90 >90 >90   < > >90 >90   GFRESTBLACK  --   --   --   --  >90 >90   POTASSIUM 4.3 4.2 4.5   < > 3.9 3.9   TSH 0.84  --  0.99   < > 0.66  --     < > = values in this interval not displayed.          Review of Systems  Constitutional, HEENT, cardiovascular, pulmonary, GI, , musculoskeletal, neuro, skin, endocrine and psych systems are negative, except as otherwise noted.     Objective    Exam  /74 (BP Location: Right arm, Patient Position: Sitting, Cuff Size: Adult Large)   Pulse 65   Temp 97.3  F (36.3  C) (Temporal)   Resp 17   Ht 1.702 m (5' 7\")   Wt 124.6 kg (274 lb 11.2 oz)   LMP  (LMP Unknown)   SpO2 96%   BMI 43.02 kg/m     Estimated body mass index is 43.02 kg/m  as calculated from the following:    Height as of this encounter: 1.702 m (5' 7\").    Weight as of this encounter: 124.6 kg (274 lb 11.2 oz).    Physical Exam  GENERAL: alert, no distress, and obese  EYES: Eyes grossly normal to inspection, PERRL and conjunctivae and sclerae normal, glasses  HENT: ear canals and TM's normal, nose and mouth without ulcers or lesions  NECK: no adenopathy, no asymmetry, masses, or scars  RESP: lungs clear to auscultation - no rales, rhonchi or wheezes  BREAST: normal without masses, tenderness or nipple discharge and no palpable axillary masses or adenopathy  CV: regular rate and rhythm, " normal S1 S2, no S3 or S4, no murmur, click or rub, no peripheral edema  ABDOMEN: soft, nontender, no hepatosplenomegaly, no masses and bowel sounds normal  MS: no gross musculoskeletal defects noted, no edema  SKIN: no suspicious lesions or rashes, thinning hair  NEURO: Normal strength and tone, mentation intact and speech normal  PSYCH: mentation appears normal, affect normal/bright  LYMPH: no cervical, supraclavicular, axillary, or inguinal adenopathy    Signed Electronically by: Maggi Elkins MD

## 2024-03-18 NOTE — RESULT ENCOUNTER NOTE
Hello -    Here are my comments about your recent results:  -Normal red blood cell (hgb) levels, normal white blood cell count and normal platelet levels.  -A1C test (average blood sugar the last 2-3 months) is above your goal.  Increase physical activity, low-carb diet weight loss will help.  As discussed may benefit from new injectable meds and also help with weight loss if you change your mind about these let us know  For additional lab test information, [email or web address] is an excellent reference..    Please let us know if you have any questions or concerns.     Regards,  Maggi Elkins MD

## 2024-03-18 NOTE — PATIENT INSTRUCTIONS
Seen for medicare wellness  Work on diet, exercise and wt loss  COVID 2nd today   Labs today   Has refills till June anyway   Return in 6 months for DM  See notes for details      Preventive Care Advice   This is general advice given by our system to help you stay healthy. However, your care team may have specific advice just for you. Please talk to your care team about your preventive care needs.  Nutrition  Eat 5 or more servings of fruits and vegetables each day.  Try wheat bread, brown rice and whole grain pasta (instead of white bread, rice, and pasta).  Get enough calcium and vitamin D. Check the label on foods and aim for 100% of the RDA (recommended daily allowance).  Lifestyle  Exercise at least 150 minutes each week   (30 minutes a day, 5 days a week).  Do muscle strengthening activities 2 days a week. These help control your weight and prevent disease.  No smoking.  Wear sunscreen to prevent skin cancer.  Have a dental exam and cleaning every 6 months.  Yearly exams  See your health care team every year to talk about:  Any changes in your health.  Any medicines your care team has prescribed.  Preventive care, family planning, and ways to prevent chronic diseases.  Shots (vaccines)   HPV shots (up to age 26), if you've never had them before.  Hepatitis B shots (up to age 59), if you've never had them before.  COVID-19 shot: Get this shot when it's due.  Flu shot: Get a flu shot every year.  Tetanus shot: Get a tetanus shot every 10 years.  Pneumococcal, hepatitis A, and RSV shots: Ask your care team if you need these based on your risk.  Shingles shot (for age 50 and up).  General health tests  Diabetes screening:  Starting at age 35, Get screened for diabetes at least every 3 years.  If you are younger than age 35, ask your care team if you should be screened for diabetes.  Cholesterol test: At age 39, start having a cholesterol test every 5 years, or more often if advised.  Bone density scan (DEXA): At  age 50, ask your care team if you should have this scan for osteoporosis (brittle bones).  Hepatitis C: Get tested at least once in your life.  STIs (sexually transmitted infections)  Before age 24: Ask your care team if you should be screened for STIs.  After age 24: Get screened for STIs if you're at risk. You are at risk for STIs (including HIV) if:  You are sexually active with more than one person.  You don't use condoms every time.  You or a partner was diagnosed with a sexually transmitted infection.  If you are at risk for HIV, ask about PrEP medicine to prevent HIV.  Get tested for HIV at least once in your life, whether you are at risk for HIV or not.  Cancer screening tests  Cervical cancer screening: If you have a cervix, begin getting regular cervical cancer screening tests at age 21. Most people who have regular screenings with normal results can stop after age 65. Talk about this with your provider.  Breast cancer scan (mammogram): If you've ever had breasts, begin having regular mammograms starting at age 40. This is a scan to check for breast cancer.  Colon cancer screening: It is important to start screening for colon cancer at age 45.  Have a colonoscopy test every 10 years (or more often if you're at risk) Or, ask your provider about stool tests like a FIT test every year or Cologuard test every 3 years.  To learn more about your testing options, visit: https://www.[employer]/582591.pdf.  For help making a decision, visit: https://bit.ly/lr17624.  Prostate cancer screening test: If you have a prostate and are age 55 to 69, ask your provider if you would benefit from a yearly prostate cancer screening test.  Lung cancer screening: If you are a current or former smoker age 50 to 80, ask your care team if ongoing lung cancer screenings are right for you.  For informational purposes only. Not to replace the advice of your health care provider. Copyright   2023 [employer]. All rights  reserved. Clinically reviewed by the [facility] Transitions Program. [employer] 146195 - REV 01/24.    Preventing Falls: Care Instructions  Injuries and health problems such as trouble walking or poor eyesight can increase your risk of falling. So can some medicines. But there are things you can do to help prevent falls. You can exercise to get stronger. You can also arrange your home to make it safer.    Talk to your doctor about the medicines you take. Ask if any of them increase the risk of falls and whether they can be changed or stopped.   Try to exercise regularly. It can help improve your strength and balance. This can help lower your risk of falling.     Practice fall safety and prevention.    Wear low-heeled shoes that fit well and give your feet good support. Talk to your doctor if you have foot problems that make this hard.  Carry a cellphone or wear a medical alert device that you can use to call for help.  Use stepladders instead of chairs to reach high objects. Don't climb if you're at risk for falls. Ask for help, if needed.  Wear the correct eyeglasses, if you need them.    Make your home safer.    Remove rugs, cords, clutter, and furniture from walkways.  Keep your house well lit. Use night-lights in hallways and bathrooms.  Install and use sturdy handrails on stairways.  Wear nonskid footwear, even inside. Don't walk barefoot or in socks without shoes.    Be safe outside.    Use handrails, curb cuts, and ramps whenever possible.  Keep your hands free by using a shoulder bag or backpack.  Try to walk in well-lit areas. Watch out for uneven ground, changes in pavement, and debris.  Be careful in the winter. Walk on the grass or gravel when sidewalks are slippery. Use de-icer on steps and walkways. Add non-slip devices to shoes.    Put grab bars and nonskid mats in your shower or tub and near the toilet. Try to use a shower chair or bath bench when bathing.   Get into a tub or shower by putting  "in your weaker leg first. Get out with your strong side first. Have a phone or medical alert device in the bathroom with you.   Where can you learn more?  Go to https://www.[employer]CLC.net/patiented  Enter G117 in the search box to learn more about \"Preventing Falls: Care Instructions.\"  Current as of: July 17, 2023               Content Version: 14.0    0306-6040 [employer].   Care instructions adapted under license by your healthcare professional. If you have questions about a medical condition or this instruction, always ask your healthcare professional. [employer] disclaims any warranty or liability for your use of this information.      Learning About Stress  What is stress?     Stress is your body's response to a hard situation. Your body can have a physical, emotional, or mental response. Stress is a fact of life for most people, and it affects everyone differently. What causes stress for you may not be stressful for someone else.  A lot of things can cause stress. You may feel stress when you go on a job interview, take a test, or run a race. This kind of short-term stress is normal and even useful. It can help you if you need to work hard or react quickly. For example, stress can help you finish an important job on time.  Long-term stress is caused by ongoing stressful situations or events. Examples of long-term stress include long-term health problems, ongoing problems at work, or conflicts in your family. Long-term stress can harm your health.  How does stress affect your health?  When you are stressed, your body responds as though you are in danger. It makes hormones that speed up your heart, make you breathe faster, and give you a burst of energy. This is called the fight-or-flight stress response. If the stress is over quickly, your body goes back to normal and no harm is done.  But if stress happens too often or lasts too long, it can have bad effects. Long-term stress can " make you more likely to get sick, and it can make symptoms of some diseases worse. If you tense up when you are stressed, you may develop neck, shoulder, or low back pain. Stress is linked to high blood pressure and heart disease.  Stress also harms your emotional health. It can make you banks, tense, or depressed. Your relationships may suffer, and you may not do well at work or school.  What can you do to manage stress?  You can try these things to help manage stress:   Do something active. Exercise or activity can help reduce stress. Walking is a great way to get started. Even everyday activities such as housecleaning or yard work can help.  Try yoga or farhan chi. These techniques combine exercise and meditation. You may need some training at first to learn them.  Do something you enjoy. For example, listen to music or go to a movie. Practice your hobby or do volunteer work.  Meditate. This can help you relax, because you are not worrying about what happened before or what may happen in the future.  Do guided imagery. Imagine yourself in any setting that helps you feel calm. You can use online videos, books, or a teacher to guide you.  Do breathing exercises. For example:  From a standing position, bend forward from the waist with your knees slightly bent. Let your arms dangle close to the floor.  Breathe in slowly and deeply as you return to a standing position. Roll up slowly and lift your head last.  Hold your breath for just a few seconds in the standing position.  Breathe out slowly and bend forward from the waist.  Let your feelings out. Talk, laugh, cry, and express anger when you need to. Talking with supportive friends or family, a counselor, or a adolph leader about your feelings is a healthy way to relieve stress. Avoid discussing your feelings with people who make you feel worse.  Write. It may help to write about things that are bothering you. This helps you find out how much stress you feel and what is  "causing it. When you know this, you can find better ways to cope.  What can you do to prevent stress?  You might try some of these things to help prevent stress:  Manage your time. This helps you find time to do the things you want and need to do.  Get enough sleep. Your body recovers from the stresses of the day while you are sleeping.  Get support. Your family, friends, and community can make a difference in how you experience stress.  Limit your news feed. Avoid or limit time on social media or news that may make you feel stressed.  Do something active. Exercise or activity can help reduce stress. Walking is a great way to get started.  Where can you learn more?  Go to https://www.[employer].net/patiented  Enter N032 in the search box to learn more about \"Learning About Stress.\"  Current as of: October 24, 2023               Content Version: 14.0    4135-8008 [employer].   Care instructions adapted under license by your healthcare professional. If you have questions about a medical condition or this instruction, always ask your healthcare professional. [employer] disclaims any warranty or liability for your use of this information.      Substance Use Disorder: Care Instructions  Overview     You can improve your life and health by stopping your use of alcohol or drugs. When you don't drink or use drugs, you may feel and sleep better. You may get along better with your family, friends, and coworkers. There are medicines and programs that can help with substance use disorder.  How can you care for yourself at home?  Here are some ways to help you stay sober and prevent relapse.  If you have been given medicine to help keep you sober or reduce your cravings, be sure to take it exactly as prescribed.  Talk to your doctor about programs that can help you stop using drugs or drinking alcohol.  Do not keep alcohol or drugs in your home.  Plan ahead. Think about what you'll say if other people " ask you to drink or use drugs. Try not to spend time with people who drink or use drugs.  Use the time and money spent on drinking or drugs to do something that's important to you.  Preventing a relapse  Have a plan to deal with relapse. Learn to recognize changes in your thinking that lead you to drink or use drugs. Get help before you start to drink or use drugs again.  Try to stay away from situations, friends, or places that may lead you to drink or use drugs.  If you feel the need to drink alcohol or use drugs again, seek help right away. Call a trusted friend or family member. Some people get support from organizations such as Narcotics Anonymous or [employer] or from treatment facilities.  If you relapse, get help as soon as you can. Some people make a plan with another person that outlines what they want that person to do for them if they relapse. The plan usually includes how to handle the relapse and who to notify in case of relapse.  Don't give up. Remember that a relapse doesn't mean that you have failed. Use the experience to learn the triggers that lead you to drink or use drugs. Then quit again. Recovery is a lifelong process. Many people have several relapses before they are able to quit for good.  Follow-up care is a key part of your treatment and safety. Be sure to make and go to all appointments, and call your doctor if you are having problems. It's also a good idea to know your test results and keep a list of the medicines you take.  When should you call for help?   Call 911  anytime you think you may need emergency care. For example, call if you or someone else:    Has overdosed or has withdrawal signs. Be sure to tell the emergency workers that you are or someone else is using or trying to quit using drugs. Overdose or withdrawal signs may include:  Losing consciousness.  Seizure.  Seeing or hearing things that aren't there (hallucinations).     Is thinking or talking about suicide or  "harming others.   Where to get help 24 hours a day, 7 days a week   If you or someone you know talks about suicide, self-harm, a mental health crisis, a substance use crisis, or any other kind of emotional distress, get help right away. You can:    Call the Suicide and Crisis Lifeline at 988.     Call 4-690-504-TALK (1-514.346.6499).     Text HOME to 276042 to access the Crisis Text Line.   Consider saving these numbers in your phone.  Go to [employer]asy for more information or to chat online.  Call your doctor now or seek immediate medical care if:    You are having withdrawal symptoms. These may include nausea or vomiting, sweating, shakiness, and anxiety.   Watch closely for changes in your health, and be sure to contact your doctor if:    You have a relapse.     You need more help or support to stop.   Where can you learn more?  Go to https://www.[employer].net/patiented  Enter H573 in the search box to learn more about \"Substance Use Disorder: Care Instructions.\"  Current as of: November 15, 2023               Content Version: 14.0    6988-8949 [employer].   Care instructions adapted under license by your healthcare professional. If you have questions about a medical condition or this instruction, always ask your healthcare professional. [employer] disclaims any warranty or liability for your use of this information.      "

## 2024-03-18 NOTE — RESULT ENCOUNTER NOTE
Hello -    Here are my comments about your recent results:  -Microalbumin (urine protein) level is elevated. This is suggestive of early damage to your kidneys from high blood pressure and diabetes.  ADVISE: avoiding anti-inflamatory agents such as ibuprofen (Advil, Motrin) or naproxen (Aleve) as much as possible, keeping your blood pressure & diabetes in a normal range, and continuing your medication (lisinopril) that helps protect your kidneys.  Will plan to recheck when I see you back and if remains elevated may benefit from increasing dose of lisinopril.    For additional lab test information, [email or web address] is an excellent reference..    Please let us know if you have any questions or concerns.     Regards,  Maggi Elkins MD

## 2024-03-19 NOTE — RESULT ENCOUNTER NOTE
Hello -    Here are my comments about your recent results:  The 10-year ASCVD risk score (Sarbjit LOZANO, et al., 2019) is: 9.5%    Values used to calculate the score:      Age: 63 years      Sex: Female      Is Non- : No      Diabetic: Yes      Tobacco smoker: No      Systolic Blood Pressure: 121 mmHg      Is BP treated: Yes      HDL Cholesterol: 47 mg/dL      Total Cholesterol: 155 mg/dL  -HDL(good) cholesterol level is low and your triglycerides are elevated which can increase your heart disease risk.  A diet high in fat and simple carbohydrates, genetics and being overweight can contribute to this. LDL(bad) cholesterol level is normal.  ADVISE:exercising 150 minutes of aerobic exercise per week (30 minutes 5 days per week or 50 minutes 3 days per week are options), and omega-3 fatty acids (fish oil) 2767-8981 mg daily are helpful to improve this. Continue with current dosing of simvastatin  -Liver and gallbladder tests (ALT,AST, Alk phos,bilirubin) are normal.  -Kidney function (GFR) is normal.  -Sodium is normal.  -Potassium is normal.  -Calcium is normal.  -Glucose is elevated due to your diabetes.  -TSH (thyroid stimulating hormone) level is normal which indicates normal thyroid function.  For additional lab test information, [email or web address] is an excellent reference..    Please let us know if you have any questions or concerns.     Regards,  Maggi Elkins MD

## 2024-05-01 ENCOUNTER — ANCILLARY PROCEDURE (OUTPATIENT)
Dept: ULTRASOUND IMAGING | Facility: CLINIC | Age: 64
End: 2024-05-01
Attending: FAMILY MEDICINE
Payer: COMMERCIAL

## 2024-05-01 DIAGNOSIS — I65.21 CAROTID STENOSIS, RIGHT: ICD-10-CM

## 2024-05-01 PROCEDURE — 93880 EXTRACRANIAL BILAT STUDY: CPT | Performed by: STUDENT IN AN ORGANIZED HEALTH CARE EDUCATION/TRAINING PROGRAM

## 2024-05-01 NOTE — RESULT ENCOUNTER NOTE
Hello -    Here are my comments about your recent results:  Carotid ultrasound continues to show 50 to 69 % stenosis ( narrowing) due to plaque on right & less than 50 % on left. Continue with current medical risk factor modification, & recheck in 2 yrs ( 2026)     Please let us know if you have any questions or concerns.     Regards,  Maggi Elkins MD

## 2024-05-13 ENCOUNTER — MYC MEDICAL ADVICE (OUTPATIENT)
Dept: FAMILY MEDICINE | Facility: CLINIC | Age: 64
End: 2024-05-13
Payer: COMMERCIAL

## 2024-06-05 DIAGNOSIS — E78.2 MIXED HYPERLIPIDEMIA: ICD-10-CM

## 2024-06-05 DIAGNOSIS — E11.9 TYPE 2 DIABETES MELLITUS WITHOUT COMPLICATION, WITHOUT LONG-TERM CURRENT USE OF INSULIN (H): ICD-10-CM

## 2024-06-05 DIAGNOSIS — I10 BENIGN ESSENTIAL HYPERTENSION: ICD-10-CM

## 2024-06-06 RX ORDER — SIMVASTATIN 40 MG
40 TABLET ORAL AT BEDTIME
Qty: 90 TABLET | Refills: 2 | Status: SHIPPED | OUTPATIENT
Start: 2024-06-06

## 2024-06-06 RX ORDER — LISINOPRIL 2.5 MG/1
2.5 TABLET ORAL DAILY
Qty: 90 TABLET | Refills: 2 | Status: SHIPPED | OUTPATIENT
Start: 2024-06-06 | End: 2024-09-26

## 2024-07-04 ENCOUNTER — MYC REFILL (OUTPATIENT)
Dept: FAMILY MEDICINE | Facility: CLINIC | Age: 64
End: 2024-07-04
Payer: COMMERCIAL

## 2024-07-04 DIAGNOSIS — E11.9 TYPE 2 DIABETES MELLITUS WITHOUT COMPLICATION, WITHOUT LONG-TERM CURRENT USE OF INSULIN (H): ICD-10-CM

## 2024-07-18 ENCOUNTER — OFFICE VISIT (OUTPATIENT)
Dept: DERMATOLOGY | Facility: CLINIC | Age: 64
End: 2024-07-18
Attending: FAMILY MEDICINE
Payer: COMMERCIAL

## 2024-07-18 DIAGNOSIS — L64.9 ANDROGENETIC ALOPECIA: ICD-10-CM

## 2024-07-18 DIAGNOSIS — B35.1 ONYCHOMYCOSIS: ICD-10-CM

## 2024-07-18 DIAGNOSIS — L81.6 POIKILODERMA: Primary | ICD-10-CM

## 2024-07-18 LAB
ERYTHROCYTE [DISTWIDTH] IN BLOOD BY AUTOMATED COUNT: 12.6 % (ref 10–15)
FERRITIN SERPL-MCNC: 66 NG/ML (ref 11–328)
HCT VFR BLD AUTO: 37.6 % (ref 35–47)
HGB BLD-MCNC: 12.5 G/DL (ref 11.7–15.7)
IRON BINDING CAPACITY (ROCHE): 376 UG/DL (ref 240–430)
IRON SATN MFR SERPL: 18 % (ref 15–46)
IRON SERPL-MCNC: 67 UG/DL (ref 37–145)
MCH RBC QN AUTO: 30 PG (ref 26.5–33)
MCHC RBC AUTO-ENTMCNC: 33.2 G/DL (ref 31.5–36.5)
MCV RBC AUTO: 90 FL (ref 78–100)
PLATELET # BLD AUTO: 301 10E3/UL (ref 150–450)
PROT SERPL-MCNC: 7.9 G/DL (ref 6.4–8.3)
RBC # BLD AUTO: 4.16 10E6/UL (ref 3.8–5.2)
RHEUMATOID FACT SERPL-ACNC: <10 IU/ML
SHBG SERPL-SCNC: 28 NMOL/L (ref 30–135)
TSH SERPL DL<=0.005 MIU/L-ACNC: 0.68 UIU/ML (ref 0.3–4.2)
VIT D+METAB SERPL-MCNC: 31 NG/ML (ref 20–50)
WBC # BLD AUTO: 4.7 10E3/UL (ref 4–11)

## 2024-07-18 PROCEDURE — 86376 MICROSOMAL ANTIBODY EACH: CPT | Performed by: DERMATOLOGY

## 2024-07-18 PROCEDURE — 84443 ASSAY THYROID STIM HORMONE: CPT | Performed by: DERMATOLOGY

## 2024-07-18 PROCEDURE — 86038 ANTINUCLEAR ANTIBODIES: CPT | Performed by: DERMATOLOGY

## 2024-07-18 PROCEDURE — 85027 COMPLETE CBC AUTOMATED: CPT | Performed by: DERMATOLOGY

## 2024-07-18 PROCEDURE — 84155 ASSAY OF PROTEIN SERUM: CPT | Performed by: DERMATOLOGY

## 2024-07-18 PROCEDURE — 82627 DEHYDROEPIANDROSTERONE: CPT | Performed by: DERMATOLOGY

## 2024-07-18 PROCEDURE — 82728 ASSAY OF FERRITIN: CPT | Performed by: DERMATOLOGY

## 2024-07-18 PROCEDURE — 86431 RHEUMATOID FACTOR QUANT: CPT | Performed by: DERMATOLOGY

## 2024-07-18 PROCEDURE — 83540 ASSAY OF IRON: CPT | Performed by: DERMATOLOGY

## 2024-07-18 PROCEDURE — 84630 ASSAY OF ZINC: CPT | Mod: 90 | Performed by: DERMATOLOGY

## 2024-07-18 PROCEDURE — 82306 VITAMIN D 25 HYDROXY: CPT | Performed by: DERMATOLOGY

## 2024-07-18 PROCEDURE — 36415 COLL VENOUS BLD VENIPUNCTURE: CPT | Performed by: DERMATOLOGY

## 2024-07-18 PROCEDURE — 84270 ASSAY OF SEX HORMONE GLOBUL: CPT | Performed by: DERMATOLOGY

## 2024-07-18 PROCEDURE — 99243 OFF/OP CNSLTJ NEW/EST LOW 30: CPT | Performed by: DERMATOLOGY

## 2024-07-18 PROCEDURE — 84403 ASSAY OF TOTAL TESTOSTERONE: CPT | Performed by: DERMATOLOGY

## 2024-07-18 PROCEDURE — 84425 ASSAY OF VITAMIN B-1: CPT | Mod: 90 | Performed by: DERMATOLOGY

## 2024-07-18 PROCEDURE — 99000 SPECIMEN HANDLING OFFICE-LAB: CPT | Performed by: DERMATOLOGY

## 2024-07-18 PROCEDURE — 83550 IRON BINDING TEST: CPT | Performed by: DERMATOLOGY

## 2024-07-18 RX ORDER — CICLOPIROX 80 MG/ML
SOLUTION TOPICAL
Qty: 6.6 ML | Refills: 11 | Status: SHIPPED | OUTPATIENT
Start: 2024-07-18

## 2024-07-18 RX ORDER — MINOXIDIL 2.5 MG/1
TABLET ORAL
Qty: 60 TABLET | Refills: 3 | Status: SHIPPED | OUTPATIENT
Start: 2024-07-18

## 2024-07-18 NOTE — PATIENT INSTRUCTIONS
Follow up 4 months     Blood work today    Topical cream - sent to the pharmacy   Minoxidil  Viviscal vitamin for hair   Retinol cream

## 2024-07-18 NOTE — PROGRESS NOTES
Azucena Guo , a 63 year old year old female patient, I was asked to see by Dr. Elkins sliding scale and nail changes.  Patient has no other skin complaints today.  Remainder of the HPI, Meds, PMH, Allergies, FH, and SH was reviewed in chart.      Past Medical History:   Diagnosis Date    Arthritis     Benign essential hypertension 2019    Diabetes mellitus (H)     History of 2019 novel coronavirus disease (COVID-19) 2022    positive end of dec 2021 ( breakthrough covid, had a headache 1 day & fatigue 3 days)     Nail discoloration 2023       Past Surgical History:   Procedure Laterality Date    ABDOMEN SURGERY      lap cholecystomy    BIOPSY      CHOLECYSTECTOMY          COLONOSCOPY      ANW: sigmoid polyp removed    COLONOSCOPY      no report, abstracted information stated, normal recheck in 5 yr given hx of polyps        Family History   Problem Relation Age of Onset    Heart Disease Mother     Diabetes Mother             Cancer Mother         lymphoma on hodgkins    Obesity Mother     Coronary Artery Disease Mother     Heart Disease Father     Coronary Artery Disease Father     Hypertension Father     Hyperlipidemia Father     Obesity Sister     Diabetes Sister     Heart Failure Sister     Coronary Artery Disease Sister     Obesity Sister     Diabetes Sister     Depression Sister     Coronary Artery Disease Sister         Haf triple bypass surgery Mar 2023    Thyroid Cancer Sister         thyroid surgery 2023    Depression Sister     Obesity Sister     Diabetes Brother             Obesity Brother     Substance Abuse Son         Alcohol    Breast Cancer Other        Social History     Socioeconomic History    Marital status:      Spouse name: Not on file    Number of children: Not on file    Years of education: Not on file    Highest education level: Not on file   Occupational History    Not on file   Tobacco Use    Smoking status: Former     Current  packs/day: 0.00     Average packs/day: 1 pack/day for 15.0 years (15.0 ttl pk-yrs)     Types: Cigarettes     Start date: 1978     Quit date: 1991     Years since quittin.5    Smokeless tobacco: Never   Vaping Use    Vaping status: Never Used   Substance and Sexual Activity    Alcohol use: Yes     Comment: occasionally    Drug use: No    Sexual activity: Not Currently     Partners: Male     Birth control/protection: Post-menopausal     Comment: post menopause - same partner for 40 years   Other Topics Concern    Parent/sibling w/ CABG, MI or angioplasty before 65F 55M? 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 Got to visit [place]        3/2023: lives with  and two cats, daughter moved out to her own place with her dog since then. Does like a nanny share childcare. 3 children.         2021: lives with  and daughter & her dog.  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     Social Determinants of Health     Financial Resource Strain: Low Risk  (3/15/2024)    Financial Resource Strain     Within the past 12 months, have you or your family members you live with been unable to get utilities (heat, electricity) when it was really needed?: No   Food Insecurity: Low Risk  (3/15/2024)    Food Insecurity     Within the past 12 months, did you worry that your food would run out before you got money to buy more?: No     Within the past 12 months, did the food you bought just not last and you didn t have money to get more?: No   Transportation Needs: Low Risk  (3/15/2024)    Transportation Needs     Within the past 12 months, has lack of transportation kept you from medical appointments, getting your medicines, non-medical meetings or appointments, work, or from getting things that you need?: No   Physical  Activity: Insufficiently Active (3/15/2024)    Exercise Vital Sign     Days of Exercise per Week: 3 days     Minutes of Exercise per Session: 30 min   Stress: No Stress Concern Present (3/15/2024)    [employer] [place] [employer] - Occupational Stress Questionnaire     Feeling of Stress : Only a little   Social Connections: Unknown (3/15/2024)    Social Connection and Isolation Panel [NHANES]     Frequency of Communication with Friends and Family: Not on file     Frequency of Social Gatherings with Friends and Family: Twice a week     Attends Restorationism Services: Not on file     Active Member of Clubs or Organizations: Not on file     Attends Club or Organization Meetings: Not on file     Marital Status: Not on file   Interpersonal Safety: Low Risk  (3/18/2024)    Interpersonal Safety     Do you feel physically and emotionally safe where you currently live?: Yes     Within the past 12 months, have you been hit, slapped, kicked or otherwise physically hurt by someone?: No     Within the past 12 months, have you been humiliated or emotionally abused in other ways by your partner or ex-partner?: No   Housing Stability: Low Risk  (3/15/2024)    Housing Stability     Do you have housing? : Yes     Are you worried about losing your housing?: No       Outpatient Encounter Medications as of 7/18/2024   Medication Sig Dispense Refill    aspirin 81 MG tablet Take 1 tablet by mouth daily.      blood glucose (CONTOUR NEXT TEST) test strip 100 strips by In Vitro route daily Use to test blood sugar 1 times daily or as directed. 100 strip 0    Cholecalciferol (VITAMIN D) 1000 UNIT capsule Take 1 capsule by mouth daily.      lisinopril (ZESTRIL) 2.5 MG tablet TAKE 1 TABLET(2.5 MG) BY MOUTH DAILY 90 tablet 2    metFORMIN (GLUCOPHAGE) 500 MG tablet Take 2 tablets (1,000 mg) by mouth 2 times daily (with meals) 360 tablet 0    Multiple Vitamin (MULTI-VITAMIN PO) Take  by mouth.      simvastatin (ZOCOR) 40 MG tablet TAKE 1  TABLET(40 MG) BY MOUTH AT BEDTIME 90 tablet 2     No facility-administered encounter medications on file as of 7/18/2024.             Review Of Systems  Skin: As above  Eyes: negative  Ears/Nose/Throat: negative  Respiratory: No shortness of breath, dyspnea on exertion, cough, or hemoptysis  Cardiovascular: negative  Gastrointestinal: negative  Genitourinary: negative  Musculoskeletal: negative  Neurologic: negative  Psychiatric: negative  Hematologic/Lymphatic/Immunologic: negative  Endocrine: negative      O:   NAD, WDWN, Alert & Oriented, Mood & Affect wnl, Vitals stable   General appearance nando ii   Vitals stable   Alert, oriented and in no acute distress   Non scarring frontal hair thinning  Onychomycosis of toenails  Poikiloderma on face      Eyes: Conjunctivae/lids:Normal     ENT: Lips, mucosa: normal    MSK:Normal    Cardiovascular: peripheral edema none    Pulm: Breathing Normal    Neuro/Psych: Orientation:Normal; Mood/Affect:Normal      A/P:  Poikiloderma  Laser discussed with patient   2. Androgenetic alopecia  Pathophysiology discussed with pateint   Check CBC, ARTHUR, DHEA-S, Ferritin,Iron and iron binding capacity, total protein, total and free T, TSH and T4, vit B1, vit D, zinc. Thyroid peroxidase antibody, RF  Vivascal daily  She has no plans for pregnancy  Minoxidil daily  Return to clinic 4 months  3. Onychomuycosis  Topicals and orals discussed with patient   Penlaq daily  It was a pleasure speaking to Azucena Guo today.  Previous clinic  notes and pertinent laboratory tests were reviewed prior to Azucena Guo's visit.  Nature and genetics of benign skin lesions dicussed with patient.  UV precautions reviewed with patient.

## 2024-07-18 NOTE — LETTER
2024      Azucena Guo  [address]  [facility] [address]      Dear Colleague,    Thank you for referring your patient, Azucena Guo, to the [facility]. Please see a copy of my visit note below.    Azucena Guo , a 63 year old year old female patient, I was asked to see by Dr. Elkins sliding scale and nail changes.  Patient has no other skin complaints today.  Remainder of the HPI, Meds, PMH, Allergies, FH, and SH was reviewed in chart.      Past Medical History:   Diagnosis Date     Arthritis      Benign essential hypertension 2019     Diabetes mellitus (H)      History of 2019 novel coronavirus disease (COVID-19) 2022    positive end of dec 2021 ( breakthrough covid, had a headache 1 day & fatigue 3 days)      Nail discoloration 2023       Past Surgical History:   Procedure Laterality Date     ABDOMEN SURGERY      lap cholecystomy     BIOPSY       CHOLECYSTECTOMY           COLONOSCOPY      ANW: sigmoid polyp removed     COLONOSCOPY      no report, abstracted information stated, normal recheck in 5 yr given hx of polyps        Family History   Problem Relation Age of Onset     Heart Disease Mother      Diabetes Mother              Cancer Mother         lymphoma on hodgkins     Obesity Mother      Coronary Artery Disease Mother      Heart Disease Father      Coronary Artery Disease Father      Hypertension Father      Hyperlipidemia Father      Obesity Sister      Diabetes Sister      Heart Failure Sister      Coronary Artery Disease Sister      Obesity Sister      Diabetes Sister      Depression Sister      Coronary Artery Disease Sister         Haf triple bypass surgery Mar 2023     Thyroid Cancer Sister         thyroid surgery 2023     Depression Sister      Obesity Sister      Diabetes Brother              Obesity Brother      Substance Abuse Son         Alcohol     Breast Cancer Other        Social History      Socioeconomic History     Marital status:      Spouse name: Not on file     Number of children: Not on file     Years of education: Not on file     Highest education level: Not on file   Occupational History     Not on file   Tobacco Use     Smoking status: Former     Current packs/day: 0.00     Average packs/day: 1 pack/day for 15.0 years (15.0 ttl pk-yrs)     Types: Cigarettes     Start date: 1978     Quit date: 1991     Years since quittin.5     Smokeless tobacco: Never   Vaping Use     Vaping status: Never Used   Substance and Sexual Activity     Alcohol use: Yes     Comment: occasionally     Drug use: No     Sexual activity: Not Currently     Partners: Male     Birth control/protection: Post-menopausal     Comment: post menopause - same partner for 40 years   Other Topics Concern     Parent/sibling w/ CABG, MI or angioplasty before 65F 55M? 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 Got to visit [place]        3/2023: lives with  and two cats, daughter moved out to her own place with her dog since then. Does like a nanny share childcare. 3 children.         2021: lives with  and daughter & her dog.  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     Social Determinants of Health     Financial Resource Strain: Low Risk  (3/15/2024)    Financial Resource Strain      Within the past 12 months, have you or your family members you live with been unable to get utilities (heat, electricity) when it was really needed?: No   Food Insecurity: Low Risk  (3/15/2024)    Food Insecurity      Within the past 12 months, did you worry that your food would run out before you got money to buy more?: No      Within the past 12 months, did the food you bought just not last and you didn t have money to get  more?: No   Transportation Needs: Low Risk  (3/15/2024)    Transportation Needs      Within the past 12 months, has lack of transportation kept you from medical appointments, getting your medicines, non-medical meetings or appointments, work, or from getting things that you need?: No   Physical Activity: Insufficiently Active (3/15/2024)    Exercise Vital Sign      Days of Exercise per Week: 3 days      Minutes of Exercise per Session: 30 min   Stress: No Stress Concern Present (3/15/2024)    [employer] [employer] - Occupational Stress Questionnaire      Feeling of Stress : Only a little   Social Connections: Unknown (3/15/2024)    Social Connection and Isolation Panel [NHANES]      Frequency of Communication with Friends and Family: Not on file      Frequency of Social Gatherings with Friends and Family: Twice a week      Attends Taoism Services: Not on file      Active Member of Clubs or Organizations: Not on file      Attends Club or Organization Meetings: Not on file      Marital Status: Not on file   Interpersonal Safety: Low Risk  (3/18/2024)    Interpersonal Safety      Do you feel physically and emotionally safe where you currently live?: Yes      Within the past 12 months, have you been hit, slapped, kicked or otherwise physically hurt by someone?: No      Within the past 12 months, have you been humiliated or emotionally abused in other ways by your partner or ex-partner?: No   Housing Stability: Low Risk  (3/15/2024)    Housing Stability      Do you have housing? : Yes      Are you worried about losing your housing?: No       Outpatient Encounter Medications as of 7/18/2024   Medication Sig Dispense Refill     aspirin 81 MG tablet Take 1 tablet by mouth daily.       blood glucose (CONTOUR NEXT TEST) test strip 100 strips by In Vitro route daily Use to test blood sugar 1 times daily or as directed. 100 strip 0     Cholecalciferol (VITAMIN D) 1000 UNIT capsule Take 1 capsule by mouth  daily.       lisinopril (ZESTRIL) 2.5 MG tablet TAKE 1 TABLET(2.5 MG) BY MOUTH DAILY 90 tablet 2     metFORMIN (GLUCOPHAGE) 500 MG tablet Take 2 tablets (1,000 mg) by mouth 2 times daily (with meals) 360 tablet 0     Multiple Vitamin (MULTI-VITAMIN PO) Take  by mouth.       simvastatin (ZOCOR) 40 MG tablet TAKE 1 TABLET(40 MG) BY MOUTH AT BEDTIME 90 tablet 2     No facility-administered encounter medications on file as of 7/18/2024.             Review Of Systems  Skin: As above  Eyes: negative  Ears/Nose/Throat: negative  Respiratory: No shortness of breath, dyspnea on exertion, cough, or hemoptysis  Cardiovascular: negative  Gastrointestinal: negative  Genitourinary: negative  Musculoskeletal: negative  Neurologic: negative  Psychiatric: negative  Hematologic/Lymphatic/Immunologic: negative  Endocrine: negative      O:   NAD, WDWN, Alert & Oriented, Mood & Affect wnl, Vitals stable   General appearance nando ii   Vitals stable   Alert, oriented and in no acute distress   Non scarring frontal hair thinning  Onychomycosis of toenails  Poikiloderma on face      Eyes: Conjunctivae/lids:Normal     ENT: Lips, mucosa: normal    MSK:Normal    Cardiovascular: peripheral edema none    Pulm: Breathing Normal    Neuro/Psych: Orientation:Normal; Mood/Affect:Normal      A/P:  Poikiloderma  Laser discussed with patient   2. Androgenetic alopecia  Pathophysiology discussed with pateint   Check CBC, ARTHUR, DHEA-S, Ferritin,Iron and iron binding capacity, total protein, total and free T, TSH and T4, vit B1, vit D, zinc. Thyroid peroxidase antibody, RF  Vivascal daily  She has no plans for pregnancy  Minoxidil daily  Return to clinic 4 months  3. Onychomuycosis  Topicals and orals discussed with patient   Penlaq daily  It was a pleasure speaking to Azucena Guo today.  Previous clinic  notes and pertinent laboratory tests were reviewed prior to Azucena Guo's visit.  Nature and genetics of benign skin lesions dicussed with  patient.  UV precautions reviewed with patient.      Again, thank you for allowing me to participate in the care of your patient.        Sincerely,        Stephane Bhardwaj MD

## 2024-07-19 LAB
ANA SER QL IF: NEGATIVE
DHEA-S SERPL-MCNC: 34 UG/DL (ref 35–430)
THYROPEROXIDASE AB SERPL-ACNC: <10 IU/ML
ZINC SERPL-MCNC: 75.8 UG/DL

## 2024-07-20 LAB — VIT B1 PYROPHOSHATE BLD-SCNC: 151 NMOL/L

## 2024-07-21 LAB
TESTOST FREE SERPL-MCNC: 0.18 NG/DL
TESTOST SERPL-MCNC: 9 NG/DL (ref 8–60)

## 2024-07-22 ENCOUNTER — TELEPHONE (OUTPATIENT)
Dept: DERMATOLOGY | Facility: CLINIC | Age: 64
End: 2024-07-22
Payer: COMMERCIAL

## 2024-07-22 DIAGNOSIS — B35.1 ONYCHOMYCOSIS: Primary | ICD-10-CM

## 2024-07-22 NOTE — TELEPHONE ENCOUNTER
Prior Authorization Specialty Medication Request    Medication/Dose: ciclopirox (PENLAC) 8 % external solution   Diagnosis and ICD code (if different than what is on RX):    New/renewal/insurance change PA/secondary ins. PA:  Previously Tried and Failed:      Important Lab Values:   Rationale:     Insurance   Primary:   Insurance ID:      Secondary (if applicable):  Insurance ID:      Pharmacy Information (if different than what is on RX)  Name:    Phone:    Fax:

## 2024-07-23 NOTE — TELEPHONE ENCOUNTER
PRIOR AUTHORIZATION DENIED    Medication: CICLOPIROX - DENIED    Denial Date: 7/22/2024    Denial Rational: INSURANCE STATES PATIENT DID NOT MEET COVERAGE CRITERIA -          Appeal Information:  IF YOU WOULD LIKE TO APPEAL PLEASE SUPPLY PA TEAM WITH A LETTER OF MEDICAL NECESSITY WITH CLINICAL REASON.

## 2024-09-12 ENCOUNTER — MYC REFILL (OUTPATIENT)
Dept: FAMILY MEDICINE | Facility: CLINIC | Age: 64
End: 2024-09-12
Payer: COMMERCIAL

## 2024-09-12 DIAGNOSIS — E11.9 TYPE 2 DIABETES MELLITUS WITHOUT COMPLICATION, WITHOUT LONG-TERM CURRENT USE OF INSULIN (H): ICD-10-CM

## 2024-09-26 ENCOUNTER — ANCILLARY PROCEDURE (OUTPATIENT)
Dept: GENERAL RADIOLOGY | Facility: CLINIC | Age: 64
End: 2024-09-26
Attending: FAMILY MEDICINE
Payer: COMMERCIAL

## 2024-09-26 ENCOUNTER — OFFICE VISIT (OUTPATIENT)
Dept: FAMILY MEDICINE | Facility: CLINIC | Age: 64
End: 2024-09-26
Payer: COMMERCIAL

## 2024-09-26 VITALS
SYSTOLIC BLOOD PRESSURE: 135 MMHG | OXYGEN SATURATION: 98 % | RESPIRATION RATE: 18 BRPM | HEART RATE: 78 BPM | WEIGHT: 275.9 LBS | TEMPERATURE: 97.2 F | HEIGHT: 67 IN | BODY MASS INDEX: 43.3 KG/M2 | DIASTOLIC BLOOD PRESSURE: 71 MMHG

## 2024-09-26 DIAGNOSIS — M77.01 MEDIAL EPICONDYLITIS OF ELBOW, RIGHT: ICD-10-CM

## 2024-09-26 DIAGNOSIS — M25.561 ACUTE PAIN OF RIGHT KNEE: ICD-10-CM

## 2024-09-26 DIAGNOSIS — M50.30 DDD (DEGENERATIVE DISC DISEASE), CERVICAL: ICD-10-CM

## 2024-09-26 DIAGNOSIS — M19.011 PRIMARY OSTEOARTHRITIS OF RIGHT SHOULDER: ICD-10-CM

## 2024-09-26 DIAGNOSIS — E11.29 TYPE 2 DIABETES MELLITUS WITH DIABETIC MICROALBUMINURIA, WITHOUT LONG-TERM CURRENT USE OF INSULIN (H): Primary | ICD-10-CM

## 2024-09-26 DIAGNOSIS — R80.9 TYPE 2 DIABETES MELLITUS WITH DIABETIC MICROALBUMINURIA, WITHOUT LONG-TERM CURRENT USE OF INSULIN (H): Primary | ICD-10-CM

## 2024-09-26 DIAGNOSIS — M19.042 BILATERAL OSTEOARTHRITIS OF FINGER: ICD-10-CM

## 2024-09-26 DIAGNOSIS — I83.813 VARICOSE VEINS OF BOTH LOWER EXTREMITIES WITH PAIN: ICD-10-CM

## 2024-09-26 DIAGNOSIS — E78.2 MIXED HYPERLIPIDEMIA: ICD-10-CM

## 2024-09-26 DIAGNOSIS — L60.3 NAIL DYSTROPHY: ICD-10-CM

## 2024-09-26 DIAGNOSIS — L65.9 HAIR THINNING: ICD-10-CM

## 2024-09-26 DIAGNOSIS — M19.041 BILATERAL OSTEOARTHRITIS OF FINGER: ICD-10-CM

## 2024-09-26 DIAGNOSIS — G43.109 OCULAR MIGRAINE: ICD-10-CM

## 2024-09-26 DIAGNOSIS — I10 BENIGN ESSENTIAL HYPERTENSION: ICD-10-CM

## 2024-09-26 DIAGNOSIS — F43.29 ADJUSTMENT DISORDER WITH OTHER SYMPTOM: ICD-10-CM

## 2024-09-26 DIAGNOSIS — E66.01 OBESITY, CLASS III, BMI 40-49.9 (MORBID OBESITY) (H): ICD-10-CM

## 2024-09-26 DIAGNOSIS — Z23 NEED FOR COVID-19 VACCINE: ICD-10-CM

## 2024-09-26 DIAGNOSIS — Z23 NEED FOR INFLUENZA VACCINATION: ICD-10-CM

## 2024-09-26 DIAGNOSIS — I65.21 CAROTID STENOSIS, RIGHT: ICD-10-CM

## 2024-09-26 DIAGNOSIS — Z80.3 FAMILY HISTORY OF MALIGNANT NEOPLASM OF BREAST: ICD-10-CM

## 2024-09-26 DIAGNOSIS — N81.11 CYSTOCELE, MIDLINE: ICD-10-CM

## 2024-09-26 DIAGNOSIS — Z86.0101 HISTORY OF ADENOMATOUS POLYP OF COLON: ICD-10-CM

## 2024-09-26 DIAGNOSIS — M54.50 INTERMITTENT LOW BACK PAIN: ICD-10-CM

## 2024-09-26 LAB
ALBUMIN SERPL BCG-MCNC: 4.3 G/DL (ref 3.5–5.2)
ALP SERPL-CCNC: 56 U/L (ref 40–150)
ALT SERPL W P-5'-P-CCNC: 18 U/L (ref 0–50)
ANION GAP SERPL CALCULATED.3IONS-SCNC: 11 MMOL/L (ref 7–15)
AST SERPL W P-5'-P-CCNC: 20 U/L (ref 0–45)
BILIRUB SERPL-MCNC: 0.4 MG/DL
BUN SERPL-MCNC: 9.8 MG/DL (ref 8–23)
CALCIUM SERPL-MCNC: 9.2 MG/DL (ref 8.8–10.4)
CHLORIDE SERPL-SCNC: 100 MMOL/L (ref 98–107)
CHOLEST SERPL-MCNC: 163 MG/DL
CREAT SERPL-MCNC: 0.38 MG/DL (ref 0.51–0.95)
CREAT UR-MCNC: 82.1 MG/DL
EGFRCR SERPLBLD CKD-EPI 2021: >90 ML/MIN/1.73M2
EST. AVERAGE GLUCOSE BLD GHB EST-MCNC: 171 MG/DL
FASTING STATUS PATIENT QL REPORTED: YES
FASTING STATUS PATIENT QL REPORTED: YES
GLUCOSE SERPL-MCNC: 204 MG/DL (ref 70–99)
HBA1C MFR BLD: 7.6 % (ref 0–5.6)
HCO3 SERPL-SCNC: 25 MMOL/L (ref 22–29)
HDLC SERPL-MCNC: 49 MG/DL
HOLD SPECIMEN: NORMAL
LDLC SERPL CALC-MCNC: 74 MG/DL
MICROALBUMIN UR-MCNC: 119 MG/L
MICROALBUMIN/CREAT UR: 144.95 MG/G CR (ref 0–25)
NONHDLC SERPL-MCNC: 114 MG/DL
POTASSIUM SERPL-SCNC: 4.3 MMOL/L (ref 3.4–5.3)
PROT SERPL-MCNC: 7.7 G/DL (ref 6.4–8.3)
SODIUM SERPL-SCNC: 136 MMOL/L (ref 135–145)
TRIGL SERPL-MCNC: 200 MG/DL

## 2024-09-26 PROCEDURE — 90673 RIV3 VACCINE NO PRESERV IM: CPT | Performed by: FAMILY MEDICINE

## 2024-09-26 PROCEDURE — 83036 HEMOGLOBIN GLYCOSYLATED A1C: CPT | Performed by: FAMILY MEDICINE

## 2024-09-26 PROCEDURE — 82570 ASSAY OF URINE CREATININE: CPT | Performed by: FAMILY MEDICINE

## 2024-09-26 PROCEDURE — 99214 OFFICE O/P EST MOD 30 MIN: CPT | Mod: 25 | Performed by: FAMILY MEDICINE

## 2024-09-26 PROCEDURE — 80061 LIPID PANEL: CPT | Performed by: FAMILY MEDICINE

## 2024-09-26 PROCEDURE — 80053 COMPREHEN METABOLIC PANEL: CPT | Performed by: FAMILY MEDICINE

## 2024-09-26 PROCEDURE — 90471 IMMUNIZATION ADMIN: CPT | Performed by: FAMILY MEDICINE

## 2024-09-26 PROCEDURE — 36415 COLL VENOUS BLD VENIPUNCTURE: CPT | Performed by: FAMILY MEDICINE

## 2024-09-26 PROCEDURE — 73562 X-RAY EXAM OF KNEE 3: CPT | Mod: TC | Performed by: STUDENT IN AN ORGANIZED HEALTH CARE EDUCATION/TRAINING PROGRAM

## 2024-09-26 PROCEDURE — 91320 SARSCV2 VAC 30MCG TRS-SUC IM: CPT | Performed by: FAMILY MEDICINE

## 2024-09-26 PROCEDURE — 82043 UR ALBUMIN QUANTITATIVE: CPT | Performed by: FAMILY MEDICINE

## 2024-09-26 PROCEDURE — 90480 ADMN SARSCOV2 VAC 1/ONLY CMP: CPT | Performed by: FAMILY MEDICINE

## 2024-09-26 RX ORDER — LISINOPRIL 2.5 MG/1
2.5 TABLET ORAL DAILY
Qty: 90 TABLET | Refills: 1 | Status: SHIPPED | OUTPATIENT
Start: 2024-09-26

## 2024-09-26 ASSESSMENT — PAIN SCALES - GENERAL: PAINLEVEL: MODERATE PAIN (5)

## 2024-09-26 NOTE — RESULT ENCOUNTER NOTE
Hello -    Here are my comments about your recent results:    Microalbumin protein in the urine that can go up with effect of diabetes on the kidneys and early kidney injury has gotten worse since 6 months ago but the medication I started you on Jardiance should help protect and help reverse some of this  We should make sure that this is helpful and not harmful by repeating a blood kidney test in 1 month of starting Jardiance and a lab only appointment if you can make this appointment then I can get back to you once I see those results on whether we need to do anything different or not    Please let us know if you have any questions or concerns.     Regards,  Maggi Elkins MD

## 2024-09-26 NOTE — PROGRESS NOTES
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 calcification right neck probably carotid and given Zanaflex and referred to ortho to further evaluate and treat. Had her colonoscopy in 7/2021 and noted normal and recheck in 5 yrs. Mammogram done 12/8/21 was normal.  Seen 3/14/22 for a physical Pap was done was normal.  Given Tdap.  Declined genetic consult for family history. Noted midline cystocele Suspected due to postmenopausal state, lack of estrogen and prior delivery of 11 pound baby.  Recommended Kegel's exercises and if should have worsening incontinence, getting up to urinate several times at night, recurrent bladder infections consider referral to urogynecologist for treatment. Diabetes well controlled continue baby aspirin daily and lisinopril and Metformin at thousand twice a day. Hyperlipidemia continued on simvastatin 40 mg bedtime & to make further recommendations once labs seen. Hypertension stable on lisinopril 2.5 mg daily. Incidental right neck calcification corresponding to likely right carotid artery seen on x-ray C-spine done in June 2021 when had neck pain suggest possible carotid artery calcification.  To  get an ultrasound bilateral carotid and make further recommendations after that. Noted had had 4 episodes of left eye crescent-like shimmery visual disturbance.  Reported a normal eye exam after first 2 episodes but forgot to discuss with the eye doctor at the time.  Exam was unremarkable externally.  Had no symptoms at visit.  Could be a retinal issue or ocular migraines or related to bright light but need to rule out more serious concerns like stroke etc. and given carotid artery calcification history and other risk factors referred to neurologist for further evaluation.  If should occur again specially with other symptoms recommend going to the ER for further evaluation. BMI more than 36 with risk factors of diabetes, hypertension, hyperlipidemia etc.  Continue to stay active, eat healthy and work on weight loss.  Offered to refer to a weight specialist or diabetes Ed or the pharmacist etc. if necessary in the future. Degenerative disc disease of cervical spine originally found out incidentally when had a fall and hit head requiring CT scan and MRI.  Recent x-ray in June for neck pain showed arthritis especially at C6 5 C6 C6-C7.  Currently no concerning findings continue to monitor. Also had osteoarthritis of fingers. Varicose veins of both lower extremities with pain in the summertime currently asymptomatic.  Compression socks to prevent achiness at end of day.  Could refer to vascular if got worse. History of tubular adenoma of colon.  Colonoscopy in 2021 was normal recheck in 2026. Hair thinning suspect related to aging and female pattern of thinning and genetics.  To check labs again today, may use Rogaine for Women over-the-counter.  Consider dermatology referral if got worse. Noted history of COVID-19 breakthrough infection end of December with complete resolution of symptoms. Family history of maternal aunt diagnosed with breast cancer in the past.  Could consider  referral, opted to defer for  now. Was to return in 6 months for diabetes & blood pressure & medication review check and in 1 year for preventive physical and sooner for any new or worsening concerns.   Labs showed normal lipids, CMP with elevated glucose, normal TSH microalbumin CBC and hemoglobin A1c and no med changes made.  Carotid ultrasound on 3/15/2022 showed right carotid artery stenosis 50 to 69% with more than 50% plaque and left less than 50%.  Refer to vascular they reviewed advised no surgical intervention and to see GI doctor first for ocular symptoms.  Seen by the eye doctor 5/27/2022 noted normal exam diagnosed with ocular migraine and hyperopia dry eye syndrome and age-related cataracts.     On 11/14/2022 seen in urgent care for mechanical fall with thumb pain and shoulder pain and advised supportive care and given naproxen.     Seen 12/5/22 for dm. Continued on metformin, asa, lisinopril, statin for diabetes.  Foot exam done normal & noted Eye check up-to-date. Opted no injectables or referral to weight specialist. Continued on simvastatin for cholesterol BP stable on lisinopril, continue same  To continue medical risk factor modification for carotid artery stenosis, on asa & statin. Vascular said no surgery and vascular and eye felt ocular migraine not related to carotid findings. Recheck u/S carotid in 2024. Was to continue care with eye for cataracts, dry eyes and hyperopia and floaters. Ocular migraines had not recurred in a while. Encouraged to try famotidine 20 mg twice a day for 2 weeks see if helped upper abdominal pain/ heart burn  Det, exercise, weight loss would also help, avoid trigger foods, do freq small meals, do not eat 3 hrs before bedtime. If not better can do imaging and eval, did not sound cardiac. Had had prior lap cholecystectomy. Mild cystocele reported asymptomatic. Arthritis in neck stable. felt varicose veins not bothering in winter. Encouraged to use compression socks in summer and refer to vascular  if got  more symptomatic despite that. Colonoscopy due .was to see derm for skin check and thinning hair. Had a recent mechanical fall with recovering aches and pains & advised could take up to 6 weeks or more to resolved. Health maintenance reviewed. Prevnar 20 given & to consider Shingrex. Was to return mid march for a physical.  Labs showed HBA1c of 6.2, CMP with mild low sodium,   On 22 mammogram was normal     Seen 3/16/23 for preventive physical and follow-up of diabetes, hypertension, hyperlipidemia and additional concerns. Reported no breast issues, breast exam normal, to continue with self breast checks. History of maternal aunt with history of breast cancer diagnosed over age 50,  years later, unrelated to that diagnosis. Opted to defer  referral. Mammogram done 2022 was normal and to continue with yearly screenings. Pap due next  2& if at that time over age 65 may no longer be needed. Reported no vaginal concerns other than a cystocele .  Healthcare maintenance reviewed. Encouraged she bring us a copy of her healthcare directives once completed. Colon cancer screening due . Vaccines for flu, Tdap, pneumonia, COVID up-to-date.  Recommended Shingrex to check cost to see if cheaper at the pharmacy first. Consider seeing travel clinic in [facility] prior to international travel ( hoped to go to [place] )  Diabetes  well controlled with hemoglobin A1c of 6.4 on metformin, asa, statin and ace, though trending up over the prior 1 year.  To continue with checking sugars at least once a day.  Make a diabetes eye check yearly.  Foot exam done  was normal.  To continue aspirin ACE inhibitor and statin medication.  To continue metformin at 1000 mg twice a day.  Declined need to add on injectables or see weight specialist/ endo / dm ed etc. Had enough medications to contact us when needed.  BMI over 39 with serious comorbidities of diabetes, hypertension, hyperlipidemia etc.  To  continue with eating healthy, low carb and portion size, increase aerobic physical activity especially when the weather was better and could refer to weight specialist if desired in the future.  Could also consider injectables like Wegovy to help with weight loss if interested in the future.  Hyperlipidemia on simvastatin 40 mg bedtime to continue same dose. Hypertension stable on lisinopril 2.5 mg daily. History of right carotid stenosis less than 69% without any symptoms.  Carotid ultrasound done in . Vascular said no surgery and vascular and eye felt ocular migraine not related to carotid findings. To recheck u/S carotid in . Continue with medical risk factor modification with diabetes, hypertension and cholesterol control.  Varicose veins symptomatic with pain specially in the summertime and less in the winter. Encouraged to use the thigh-high compression socks which were measured in the past. Could refer to vascular and occupational therapy if bothering her more for more options. Weight loss would also likely help the symptoms.  History of tubular adenoma noted on colonoscopy in .  Recheck scope due in .  Midline cystocele, more annoying than symptomatic.  Felt like  at times with certain positions but had had no recurrent urine infections or retention or complete prolapse.  Discussed Kegel exercises,  consider pelvic floor therapy formally with physical therapy, & could refer to urogynecology as well for more options like pessaries surgery etc. Desired to hold off on intervention as minimally symptomatic and monitor.  Prior noted abdominal discomfort resolved. No further RUQ pain noted unclear if tried Pepcid.   Thinning hair and changes, to follow-up with dermatology, referral had been placed at last visit in December. To call to make an appointment with them. They could  also check the pigmented skin lesion of face and since had multiple pigmented nevi and moles to do a full body skin  check for preventive measures.  Left big toenail discoloration had been growing out as the nail grew was most suggestive of prior trauma related discoloration rather than a melanoma.  To continue to monitor.  Right big toenail looked thickened and loose and may be due to a fungal infection or due to trauma.  The base of the nail looked healthy.  Options discussed including leaving it alone versus taking a nail cutting to see if grew fungal elements versus treating empirically with an oral antifungal versus applying an antifungal nail polish, and risk benefits of all options discussed briefly and opted to just monitor and discuss with derm more when saw them.  Ocular migraine few and far between, had not recurred in a while. To continue to stay well-hydrated follow-up with the eye doctor for eye checks. Had history of cataracts, dry eyes, hyperopia as well.   Episodic tension type headaches right-sided with a possible case of conjunctivitis and red streaking under the eye and ear pain improved with warm compresses 2 to 3 days possibly due to a viral illness since  improved. Intermittent right ear pain since recent episode suspected due to eustachian tube dysfunction.  Examination revealed a retracted eardrum due to pressure disequilibrium across the tympanic membrane. Encouraged to try Flonase and Zyrtec over-the-counter and try to pop ear and if things did not improve or got worse could refer to ENT.  Right shoulder pain since fell in 2022. Had a painful decreased range of motion and pain radiating down arms at times.  History of cervical degenerative disc disease may also be contributing to radicular pain down right arm.  Xray done of shoulder & to discuss more with orthopedics when saw them. History of osteoarthritis bilateral thumbs/fingers managing with symptomatic cares. Family history of breast cancer opted no  referral to continue with yearly mammograms and self breast checks regularly. To return in  6 months for office visit for follow-up of chronic issues. Labs showed lipids goal, CMP normal other than elevated glucose, hemoglobin A1c goal normal TSH microalbumin CBC x-ray showed AC joint arthritis rotator cuff issues.  Recommended seeing orthopedics.  [place]  negative     Seen 12/11/23 for diabetes noted controlled on metformin 1000 mg twice, asa, statin and ace. Declined need to add on injectables or see weight specialist/ endo / dm ed MTM etc. FS reported 130 to 170 , only checked in am. Had been to eye doctor and dentist. Had floaters and early cataracts. No foot issues. Had had mild burning outer side left ankle. Foot exam normal in march. Needed test strips & was refilled.  Later labs showed HBA1c goal but trending up. To continue to work on diet, exercise, weight loss, lower carb's, smaller portions & recheck A1C test in 3 months. Normal CMP except for glucose which was elevated due to her diabetes. To continue current meds & refills sent in.  BMI up to over 41 with serious comorbidities of diabetes, hypertension, hyperlipidemia etc.  Discussed eating healthy, low carb and portion size, increased aerobic physical activity especially when the weather  was better and could refer to weight specialist if desired in the future.  Could also consider injectables like Wegovy/ ozempic/mounjaro etc  to help with weight loss if interested in the future. Declines meds or MTM referral. Hyperlipidemia on simvastatin 40 mg bedtime. Lipids in march had been wnl on this dosing & continued on same medication, encouraged a regular exercise program with at least 150 minutes of aerobic exercise per week, and eating a low saturated fat/low carbohydrate diet. Hypertension stable on lisinopril 2.5 mg daily. History of right carotid stenosis less than 69% without any symptoms. Carotid ultrasound done in 2022. Vascular said no surgery and vascular and eye felt ocular migraine not related to carotid findings. Encouraged  recheck u/S carotid due in  & order placed. Discussed medical risk factor modification with diabetes, hypertension and cholesterol control. Varicose veins symptomatic with pain specially in the summertime and less in the winter. Never used the thigh-high compression socks for which had been measured in the past. Discussed zip up options. Felt was not bothering so declined referral to vascular and occupational therapy. Understood weight loss would likely help. Ocular migraine few and far between, occurred a couple times a year at most. Used moisturizing eye drops by optometrist, Had history of cataracts, dry eyes, hyperopia as well.  Episodic tension type headaches improved. Intermittent right ear pain resolved  History of tubular adenoma noted on colonoscopy in .  Recheck scope due in . Midline cystocele, more annoying than symptomatic, was unchanged. Kegel's seemed to make no difference. Felt like  at times with certain positions but had had no recurrent urine infections or retention or complete prolapse. Desired to hold off on pelvic floor therapy with physical therapy, or referral to urogynecology or any intervention as minimally symptomatic. Prior noted abdominal discomfort resolved. No further RUQ pain noted unclear if had tried Pepcid. Had a recent viral GI illness that was also better. Thinning hair to see derm not scheduled yet. Prior TSH normal. Had a  pigmented skin lesion of face and  multiple pigmented nevi and moles & encouraged an apt with derm to do a full body skin check for preventive measures. Left big toenail discoloration from a prior trauma was growing out. Right thickened big toenail in past felt to be due to a fungal infection or due to trauma. The base of the nail looked healthy. In past options discussed including leaving it alone versus taking a nail cutting to see if grew fungal elements versus treating empirically with an oral antifungal versus applying an antifungal  nail polish, and risk benefits of all options discussed briefly and opted to just monitor and discuss with derm more when saw them. Reported dystrophy growing out. But had gotten another dark spot, may be nail polish in a groove, but was to see derm  History of cervical degenerative disc disease & Right AC arthritis noted on prior xray's. Had intermittent radicular pain down right arm depended on activity. No longer with painful decreased range of motion or pain radiating down arms at times. Was to continue doing home exercises as needed. History of osteoarthritis bilateral thumbs/fingers managing with symptomatic cares. Had a mechanical fall dec 1st due to uneven curb and fell on chest sustained a bruise & pleuritic like pain that subsided with residual left upper breast pain. Exam was normal. & to monitor. History of maternal aunt with history of breast cancer diagnosed over age 50, & who  years later from & unrelated dx. Opted to defer  referral. Mammogram done 2022 & to get her 3D Mammo soon. Health maintenance reviewed. Pap due next  but at that time would over age 65 & possibly no longer needed. Colon cancer screening due . Had shingrex #1  & to get the 2nd after feb. Was Utd for COVID, flu, pneumonia, RSV, & Tdap, Former smoker but no lung cancer screening indicated given hx reviewed. Labs done & to make further recommendations once those were reviewed. Was to return in 3 months for a physical.  Mammogram normal 24.    Seen 3/18/24 for physical and dm/HTN/HLD etc. Health maintenance reviewed. To consider working on health care directives. No breast issues, Mammogram in  noted normal, continue with annual screening. Pap due next  at that time would be over age 65 & may no longer be needed.  Reported had red blood when wiped one day, one time after one wipe nothing since, could get itching at times, Had had post menopausal bleeding with normal biopsies in past.  Discussed monitoring versus gyn referral versus topical estrogen for possible atrophic vaginitis. Opted to monitor. Discussed less detergent in under wear wash, cotton underwear, none at night & if recurred to contact us. Colon cancer screening due 2026. Had completed shingrex. Utd for COVID, flu, pneumonia, RSV, Tdap, Given the 2nd COVID for season. Former smoker no lung cancer screening indicated given hx reviewed. Labs done.   Diabetes on metformin 1000 mg twice, asa, statin and ace, Been to eye doctor and dentist. Had floaters and early cataracts. Dm eye check due 12/2024. No foot issues. Had had mild burning outer side left ankle & tingling numbness scattered fingers and feet. ? From dm or low back issues. Foot exam normal. Later A1C test elevated to 7.4, noted had not been exercising as hurt back dec to feb, Declined need to add on injectables or see weight specialist/ endo / dm ed or MTM etc. Encouraged to increase physical activity, low-carb diet & weight loss would help.    BMI up to 43 with serious comorbidities of diabetes, hypertension, hyperlipidemia etc. Discussed eating healthy, low carb and portion size, increased aerobic physical activity. To consider injectables like Wegovy/ ozempic/mounjaro etc to help with weight loss if interested. Slept with mouth open as dry in am, like sludge to clear in am, reported no sig snoring, woke refreshed & no day time drowsiness & declined need for sleep eval. No sig alcohol use. Declined meds, referral to wt or MTM or sleep  Hypertension stable on lisinopril 2.5 mg daily. HLD on simvastatin 40 mg.Encouraged a regular exercise program with at least 150 minutes of aerobic exercise per week, and eating a low saturated fat/low carbohydrate diet.   History of right carotid stenosis less than 69% without any symptoms. Carotid ultrasound done in 2022. Vascular said no surgery and vascular and eye felt ocular migraine not related to carotid findings. Recheck u/s carotid  due in . Encouraged she call radiology to set up. Discussed medical risk factor modification with diabetes, hypertension and cholesterol control.  Varicose veins symptomatic with pain specially in the summertime and less in the winter. Never used the thigh-high compression socks for which was measured previously. Discussed zip up options. Felt not bothering so declined referral to vascular and occupational therapy. Some discoloration lower legs suspected from varicose veins. Seeing derm in .   Ocular migraine few and far between, at most a couple a year. Used moisturizing eye drops by optometrist, Has history of cataracts, dry eyes, hyperopia as well. Episodic tension type headaches improved. Intermittent right ear pain resolved. DM check due in dec 2024.  History of tubular adenoma noted on colonoscopy in .  Recheck scope due in   Midline cystocele, more annoying than symptomatic with unchanged urgency. Kegel's seemed to make no difference but still did them. Felt like  at times with certain positions but had had no recurrent urine infections or retention or complete prolapse. Desired to hold off on pelvic floor therapy with physical therapy, or referral to urogynecology or any intervention as minimally symptomatic. Prior noted abdominal discomfort resolved. No further RUQ pain.  Back pain / body aches Had back pain right sciatica dec to feb, opted not to see anyone, used icy hot, tylenol. Declined ortho referral for now.  History of cervical degenerative disc disease & Right AC arthritis noted on prior xray's. No longer with painful decreased range of motion or pain radiating down arms. Did home exercises as needed. History of osteoarthritis bilateral thumbs/fingers managing with symptomatic cares.  Tingling numbness fingers ? from neck/ back or dm or all?. Not dropping things. Later labs showed Normal red blood cell (hgb) levels, normal white blood cell count and normal platelet levels but  "HBA1c up to 7.4.  Thinning hair to see derm in . Prior TSH wl.  Left big toenail discoloration from a prior trauma was growing out. Right thickened big toenail in past felt to be' due to a fungal infection or due to trauma. The base of the nail looked healthy. In past options discussed including leaving it alone versus taking a nail cutting to see if grew fungal elements versus treating empirically with an oral antifungal versus applying an antifungal nail polish, and risk benefits of all options discussed briefly and opted to just monitor and discuss with derm more when saw them in .  History of maternal aunt with history of breast cancer diagnosed over age 50, & who  years later from an unrelated dx. Opted to defer  referral. 3D Mammo in  had been normal.     Labs showed low HDL, elevated TG, naheed LDL, ASCVD risk of 9.8 5. Normal CMP except glucose elevated with normal TSH, cbc, & elevated microalbumin and HBA1c of 7.4. carotid u/s 24 showed 50 to 69 % stenosis on right and < 50 on left & continue don medial risk factor modification, & to recheck in . 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 Discussed the onychomycosis of right toenail. Given Penlac not covered and sent to compounding pharmacy also not covered so not done. Also  given diflucan.    Review of Systems  Constitutional, HEENT, cardiovascular, pulmonary, GI, , musculoskeletal, neuro, skin, endocrine and psych systems are negative, except as otherwise noted.      Objective    /71 (BP Location: Right arm, Patient Position: Sitting, Cuff Size: Adult Regular)   Pulse 78   Temp 97.2  F (36.2  C) (Temporal)   Resp 18   Ht 1.702 m (5' 7\")   Wt 125.1 kg (275 lb 14.4 oz)   LMP  (LMP Unknown)   SpO2 98%   BMI 43.21 kg/m    Body mass index is 43.21 kg/m .  Physical " Exam   GENERAL: alert and no distress  EYES: Eyes grossly normal to inspection, PERRL and conjunctivae and sclerae normal, glasses  HENT: ear canals and TM's normal, nose and mouth without ulcers or lesions  NECK: no adenopathy, no asymmetry, masses, or scars  RESP: lungs clear to auscultation - no rales, rhonchi or wheezes  CV: regular rate and rhythm, normal S1 S2, no S3 or S4, no murmur, click or rub, no peripheral edema  ABDOMEN: soft, nontender, no hepatosplenomegaly, no masses and bowel sounds normal  MS: no gross musculoskeletal defects noted, no edema  SKIN: no suspicious lesions or rashes, androgenetic alopecia  NEURO: Normal strength and tone, mentation intact and speech normal  PSYCH: mentation appears normal, tearful, fatigued, judgement and insight intact, and appearance well groomed  Diabetic foot exam: normal DP and PT pulses, no trophic changes or ulcerative lesions, normal sensory exam, and normal monofilament exam    Results for orders placed or performed in visit on 09/26/24   XR Knee Right 3 Views     Status: None    Narrative    XR KNEE RIGHT 3 VIEWS 9/26/2024 8:40 AM     HISTORY: Primary osteoarthritis of right shoulder    COMPARISON: None.       Impression    IMPRESSION:  No fracture or malalignment. Probable small right knee effusion.  Moderate lateral patellar subluxation. Mild medial and patellofemoral  compartment degenerative change.    Som Haley MD         SYSTEM ID:  [id number]   Results for orders placed or performed in visit on 09/26/24   Comprehensive metabolic panel     Status: Abnormal   Result Value Ref Range    Sodium 136 135 - 145 mmol/L    Potassium 4.3 3.4 - 5.3 mmol/L    Carbon Dioxide (CO2) 25 22 - 29 mmol/L    Anion Gap 11 7 - 15 mmol/L    Urea Nitrogen 9.8 8.0 - 23.0 mg/dL    Creatinine 0.38 (L) 0.51 - 0.95 mg/dL    GFR Estimate >90 >60 mL/min/1.73m2    Calcium 9.2 8.8 - 10.4 mg/dL    Chloride 100 98 - 107 mmol/L    Glucose 204 (H) 70 - 99 mg/dL    Alkaline  Phosphatase 56 40 - 150 U/L    AST 20 0 - 45 U/L    ALT 18 0 - 50 U/L    Protein Total 7.7 6.4 - 8.3 g/dL    Albumin 4.3 3.5 - 5.2 g/dL    Bilirubin Total 0.4 <=1.2 mg/dL    Patient Fasting > 8hrs? Yes    Lipid panel reflex to direct LDL Fasting     Status: Abnormal   Result Value Ref Range    Cholesterol 163 <200 mg/dL    Triglycerides 200 (H) <150 mg/dL    Direct Measure HDL 49 (L) >=50 mg/dL    LDL Cholesterol Calculated 74 <100 mg/dL    Non HDL Cholesterol 114 <130 mg/dL    Patient Fasting > 8hrs? Yes     Narrative    Cholesterol  Desirable: < 200 mg/dL  Borderline High: 200 - 239 mg/dL  High: >= 240 mg/dL    Triglycerides  Normal: < 150 mg/dL  Borderline High: 150 - 199 mg/dL  High: 200-499 mg/dL  Very High: >= 500 mg/dL    Direct Measure HDL  Female: >= 50 mg/dL   Male: >= 40 mg/dL    LDL Cholesterol  Desirable: < 100 mg/dL  Above Desirable: 100 - 129 mg/dL   Borderline High: 130 - 159 mg/dL   High:  160 - 189 mg/dL   Very High: >= 190 mg/dL    Non HDL Cholesterol  Desirable: < 130 mg/dL  Above Desirable: 130 - 159 mg/dL  Borderline High: 160 - 189 mg/dL  High: 190 - 219 mg/dL  Very High: >= 220 mg/dL   Albumin Random Urine Quantitative with Creat Ratio     Status: Abnormal   Result Value Ref Range    Creatinine Urine mg/dL 82.1 mg/dL    Albumin Urine mg/L 119.0 mg/L    Albumin Urine mg/g Cr 144.95 (H) 0.00 - 25.00 mg/g Cr   Extra Tube     Status: None    Narrative    The following orders were created for panel order Extra Tube.  Procedure                               Abnormality         Status                     ---------                               -----------         ------                     Extra Purple Top Tube[[id number]]                            Final result                 Please view results for these tests on the individual orders.   Extra Purple Top Tube     Status: None   Result Value Ref Range    Hold Specimen JIC    Hemoglobin A1c     Status: Abnormal   Result Value Ref Range    Estimated  Average Glucose 171 (H) <117 mg/dL    Hemoglobin A1C 7.6 (H) 0.0 - 5.6 %     No results found for this or any previous visit (from the past 24 hour(s)).        Signed Electronically by: Maggi Elkins MD

## 2024-09-26 NOTE — PATIENT INSTRUCTIONS
Start jardiance 10 mg daily   Stay well hydrated  Continue rest of meds  Flu and covid vaccine today   Try Voltaren gel and ice to right elbow and knee as needed for pain  See ortho and get xray for suspected arthritis  Follow up with derm   Referred for counseling  See you back at your physical in march

## 2024-09-26 NOTE — RESULT ENCOUNTER NOTE
Hello -    Here are my comments about your recent results:  HBA1c 7.6. plan discussed at visit today    Please let us know if you have any questions or concerns.     Regards,  Maggi Elkins MD

## 2024-09-26 NOTE — RESULT ENCOUNTER NOTE
Hello -    Here are my comments about your recent results:  X-ray shows arthritis in the inner part of the right knee and under the kneecap.  No fractures noted.  There is a small right knee fluid and the patella seems to be moving more outwards than necessary and may benefit from physical therapy will see what orthopedics says and go from there    Please let us know if you have any questions or concerns.     Regards,  Maggi Elkins MD

## 2024-09-27 NOTE — RESULT ENCOUNTER NOTE
Hello -    Here are my comments about your recent results:  LDL is goal.  HDL is low similar to prior.  Triglycerides elevated likely due to diabetes controlling the diabetes will help improve this to continue with a low carbohydrate low-fat diet and be as active as possible  Continue current dose of statin medication.  -Liver and gallbladder tests (ALT,AST, Alk phos,bilirubin) are normal.  -Kidney function (GFR) is normal.  -Sodium is normal.  -Potassium is normal.  -Calcium is normal.  -Glucose is elevated due to your diabetes.  For additional lab test information, labtestsonline.org is an excellent reference..    Please let us know if you have any questions or concerns.     Regards,  Maggi Elkins MD

## 2024-10-01 ENCOUNTER — TELEPHONE (OUTPATIENT)
Dept: FAMILY MEDICINE | Facility: CLINIC | Age: 64
End: 2024-10-01
Payer: COMMERCIAL

## 2024-10-01 NOTE — PROGRESS NOTES
CHIEF COMPLAINT:  Pain of the Right Knee       HISTORY OF PRESENT ILLNESS  Ms. Guo is a pleasant 63 year old year old female who presents to clinic today with right knee pain.  Azucena explains that soreness/ tenderness on surrounding the right knee. Currently, it is located below the knee cap and is noticed when carrying heavy objects.     Onset: gradual  Location: right knee  Quality:  stabbing and sharp  Duration: 4 months  Severity: 9/10 at worst  Timing: intermittent episodes with walking, stairs,   Modifying factors:  resting and non-use makes it better, movement and use makes it worse  Associated signs & symptoms: pain, tenderness, and swelling  Previous similar pain: No, chronic issue  Treatments to date: Shaniqua Dimas    Additional history: as documented    Review of Systems: A 10-point review of systems was obtained and is negative except for as noted in the HPI.     MEDICAL HISTORY  Patient Active Problem List   Diagnosis    Mixed hyperlipidemia    Type 2 diabetes mellitus with diabetic microalbuminuria, without long-term current use of insulin (H)    Obesity, Class III, BMI 40-49.9 (morbid obesity) (H)    DDD (degenerative disc disease), cervical    Benign essential hypertension    Varicose veins of both lower extremities with pain    Hair thinning    History of adenomatous polyp of colon    Cystocele, midline    Carotid stenosis, right    Ocular migraine    Nail dystrophy    Primary osteoarthritis of right shoulder    Bilateral osteoarthritis of finger    Intermittent low back pain       Current Outpatient Medications   Medication Sig Dispense Refill    aspirin 81 MG tablet Take 1 tablet by mouth daily.      blood glucose (CONTOUR NEXT TEST) test strip 100 strips by In Vitro route daily Use to test blood sugar 1 times daily or as directed. 100 strip 0    Cholecalciferol (VITAMIN D) 1000 UNIT capsule Take 1 capsule by mouth daily.      ciclopirox (PENLAC) 8 % external solution Apply to adjacent skin  and affected nails daily.  Remove with alcohol every 7 days, then repeat. 6.6 mL 11    empagliflozin (JARDIANCE) 10 MG TABS tablet Take 1 tablet (10 mg) by mouth daily. 90 tablet 1    lisinopril (ZESTRIL) 2.5 MG tablet Take 1 tablet (2.5 mg) by mouth daily. 90 tablet 1    metFORMIN (GLUCOPHAGE) 500 MG tablet Take 2 tablets (1,000 mg) by mouth 2 times daily (with meals). 360 tablet 1    minoxidil (LONITEN) 2.5 MG tablet 1/4 tablet daily 60 tablet 3    Multiple Vitamin (MULTI-VITAMIN PO) Take  by mouth.      simvastatin (ZOCOR) 40 MG tablet TAKE 1 TABLET(40 MG) BY MOUTH AT BEDTIME 90 tablet 2       Allergies   Allergen Reactions    Sulfa Antibiotics Hives    Measles Mumps And Rubella Virus Vaccine Live      Redness         Family History   Problem Relation Age of Onset    Heart Disease Mother     Diabetes Mother             Cancer Mother         lymphoma on hodgkins    Obesity Mother     Coronary Artery Disease Mother     Heart Disease Father     Coronary Artery Disease Father     Hypertension Father     Hyperlipidemia Father     Obesity Sister     Diabetes Sister     Heart Failure Sister     Coronary Artery Disease Sister     Obesity Sister     Diabetes Sister     Depression Sister     Coronary Artery Disease Sister         Haf triple bypass surgery Mar 2023    Thyroid Cancer Sister         thyroid surgery 2023    Depression Sister     Obesity Sister     Diabetes Brother             Obesity Brother     Substance Abuse Son         Alcohol    Breast Cancer Other        Additional medical/Social/Surgical histories reviewed in EPIC and updated as appropriate.       PHYSICAL EXAM  LMP  (LMP Unknown)     General  - normal appearance, in no obvious distress  Musculoskeletal - Right knee  - stance: normal gait without limp  - inspection: no swelling or effusion  - palpation: no joint line tenderness, patellar tendon non-tender, tender medial patellar facet  - ROM: 135 degrees flexion, -5 degrees  extension, not painful, crepitus with weight-bearing flexion  - strength: 5/5 in flexion, 5/5 in extension  - special tests:  (-) Lachman  (-) anterior drawer  (-) Anne  (-) Thessaly  (-) varus at 0 and 30 degrees flexion  (-) valgus at 0 and 30 degrees flexion  (+) patellar compression  (+) patellar grind  (-) patellar apprehension  Neuro  - no sensory or motor deficit, grossly normal coordination, normal muscle tone    IMAGING : Right knee xray 3 views. Final results and radiologist's interpretation, available in the [facility] health record. Images were reviewed with the patient/family members in the office today. My personal interpretation of the performed imaging is degenerative changes greatest at patellofemoral joint with lateral subluxation.     ASSESSMENT & PLAN  Ms. Guo is a 63 year old year old female who presents to clinic today with chronic right knee pain, anteriorly, particular with carrying heavier weight and negotiating stairs.    Diagnosis: Patellofemoral osteoarthritis of right knee    Clinical examination and radiographs consistent with patellofemoral osteoarthritis which is symptomatic today.  We discussed the lateral subluxation of her knee which is likely contributing to ongoing chondral wear.  Discussed home exercise program to improve hip and quad strength to improve alignment.  Discussed use of Voltaren gel.  May consider patellofemoral knee brace in the future.  We also briefly discussed injections which would be a consideration if pain worsens.    It was a pleasure seeing Azucena today.    Silvestre Conway DO, [facility]  Primary Care Sports Medicine

## 2024-10-01 NOTE — TELEPHONE ENCOUNTER
Here are my comments about your recent results:    Microalbumin protein in the urine that can go up with effect of diabetes on the kidneys and early kidney injury has gotten worse since 6 months ago but the medication I started you on Jardiance should help protect and help reverse some of this  We should make sure that this is helpful and not harmful by repeating a blood kidney test in 1 month of starting Jardiance and a lab only appointment if you can make this appointment then I can get back to you once I see those results on whether we need to do anything different or not    HBA1c 7.6. plan discussed at visit today     LDL is goal.  HDL is low similar to prior.  Triglycerides elevated likely due to diabetes controlling the diabetes will help improve this to continue with a low carbohydrate low-fat diet and be as active as possible  Continue current dose of statin medication.  -Liver and gallbladder tests (ALT,AST, Alk phos,bilirubin) are normal.  -Kidney function (GFR) is normal.  -Sodium is normal.  -Potassium is normal.  -Calcium is normal.  -Glucose is elevated due to your diabetes.  For additional lab test information, labtestsonline.org is an excellent reference..     Please let us know if you have any questions or concerns.     Regards,  Maggi Elkins MD       13-Apr-2021 17:05

## 2024-10-01 NOTE — TELEPHONE ENCOUNTER
Writer called and left message on patient's voicemail to call back and speak with a triage nurse.    Azucena Gutierres, BSN RN  [facility]

## 2024-10-02 NOTE — TELEPHONE ENCOUNTER
Relayed lab result note. Pt scheduled for follow up lab 11/5/24. Pt states she has no additional questions at this time.    LORETO GainesN, RN (she/her)  [facility] RN

## 2024-10-16 ENCOUNTER — OFFICE VISIT (OUTPATIENT)
Dept: ORTHOPEDICS | Facility: CLINIC | Age: 64
End: 2024-10-16
Attending: FAMILY MEDICINE
Payer: COMMERCIAL

## 2024-10-16 DIAGNOSIS — M17.11 OSTEOARTHRITIS OF RIGHT PATELLOFEMORAL JOINT: ICD-10-CM

## 2024-10-16 PROCEDURE — 99203 OFFICE O/P NEW LOW 30 MIN: CPT | Performed by: FAMILY MEDICINE

## 2024-10-16 NOTE — PATIENT INSTRUCTIONS
Chondromalacia / Patellofemoral Pain    CHONDROMALACIA PATELLAE:  -Pain in the front of your knee due to increased pressure from the knee cap (patella)  -There are many causes including trauma, history of dislocation or subluxation of knee cap but most often it is due to an imbalance of the thigh muscles or weak core muscles which cause irregular tracking of your kneecap on your thigh bone.  -People whose feet pronate (roll in) increase the outward pulling on the kneecap as well    SIGNS AND SYMPTOMS:  -Diffuse knee pain that worsens with stairs, squatting, prolonged sitting, jumping, and similar movements.  -Pain may be achy or sharp  -Stiffness with prolonged sitting  -Occasionally, giving way of the knee, grinding or a catching sensation    TREATMENT:  -regular exercise (biking, swimming, or elliptical are good for cardio)  -weight lifting/plyometrics are helpful but remember to keep your knees behind your toes (don't bend knee more than 90degrees)  -regular core exercises (yoga and pilates)  -Stretching and strengthening therapy  -Ice 10-15 minutes after activity. (Ice cups for massage 5-7min)  -Voltaren Gel - AM and PM      -Perform exercises as instructed through handout or formal therapy if doing. Until then start with the following:        Standing hamstring stretch: Put the heel of the leg on your injured side on a stool about 15 inches high. Keep your leg straight. Lean forward, bending at the hips, until you feel a mild stretch in the back of your thigh. Make sure you don't roll your shoulders or bend at the waist when doing this or you will stretch your lower back instead of your leg. Hold the stretch for 15 to 30 seconds. Repeat 3 times.    Quadriceps stretch: Stand at an arm's length away from the wall with your injured side farthest from the wall. Facing straight ahead, brace yourself by keeping one hand against the wall. With your other hand, grasp the ankle on your injured side and pull your heel  toward your buttocks. Don't arch or twist your back. Keep your knees together. Hold this stretch for 15 to 30 seconds.    Side-lying leg lift: Lie on your uninjured side. Tighten the front thigh muscles on your injured leg and lift that leg 8 to 10 inches (20 to 25 centimeters) away from the other leg. Keep the leg straight and lower it slowly. Do 2 sets of 15.    Quad sets: Sit on the floor with your injured leg straight and your other leg bent. Press the back of the knee of your injured leg against the floor by tightening the muscles on the top of your thigh. Hold this position 10 seconds. Relax. Do 2 sets of 15.  Straight leg raise: Lie on your back with your legs straight out in front of you. Bend the knee on your uninjured side and place the foot flat on the floor. Tighten the thigh muscle on your injured side and lift your leg about 8 inches off the floor. Keep your leg straight and your thigh muscle tight. Slowly lower your leg back down to the floor. Do 2 sets of 15.    Clam exercise: Lie on your uninjured side with your hips and knees bent and feet together. Slowly raise your top leg toward the ceiling while keeping your heels touching each other. Hold for 2 seconds and lower slowly. Do 2 sets of 15 repetitions.    Wall squat with a ball: Stand with your back, shoulders, and head against a wall. Look straight ahead. Keep your shoulders relaxed and your feet 3 feet (90 centimeters) from the wall and shoulder's width apart. Place a soccer or basketball-sized ball behind your back. Keeping your back against the wall, slowly squat down to a 45-degree angle. Your thighs will not yet be parallel to the floor. Hold this position for 10 seconds and then slowly slide back up the wall. Repeat 10 times. Build up to 2 sets of 15.    Knee stabilization: Wrap a piece of elastic tubing around the ankle of your uninjured leg. Tie a knot in the other end of the tubing and close it in a door at about ankle height.  Stand  facing the door on the leg without tubing (your injured leg) and bend your knee slightly, keeping your thigh muscles tight. Stay in this position while you move the leg with the tubing (the uninjured leg) straight back behind you. Do 2 sets of 15.  Turn 90 degrees so the leg without tubing is closest to the door. Move the leg with tubing away from your body. Do 2 sets of 15.  Turn 90 degrees again so your back is to the door. Move the leg with tubing straight out in front of you. Do 2 sets of 15.  Turn your body 90 degrees again so the leg with tubing is closest to the door. Move the leg with tubing across your body. Do 2 sets of 15.  Hold onto a chair if you need help balancing. This exercise can be made more challenging by standing on a firm pillow or foam mat while you move the leg with tubing.    Resisted terminal knee extension: Make a loop with a piece of elastic tubing by tying a knot in both ends. Close the knot in a door at knee height. Step into the loop with your injured leg so the tubing is around the back of your knee. Lift the other foot off the ground and hold onto a chair for balance, if needed. Bend the knee with tubing about 45 degrees. Slowly straighten your leg, keeping your thigh muscle tight as you do this. Repeat 15 times. Do 2 sets of 15. If you need an easier way to do this, stand on both legs for better support while you do the exercise.    Standing calf stretch: Stand facing a wall with your hands on the wall at about eye level. Keep your injured leg back with your heel on the floor. Keep the other leg forward with the knee bent. Turn your back foot slightly inward (as if you were pigeon-toed). Slowly lean into the wall until you feel a stretch in the back of your calf. Hold the stretch for 15 to 30 seconds. Return to the starting position. Repeat 3 times. Do this exercise several times each day.    Step-up: Stand with the foot of your injured leg on a support 3 to 5 inches (8 to 13  centimeters) high --like a small step or block of wood. Keep your other foot flat on the floor. Shift your weight onto the injured leg on the support. Straighten your injured leg as the other leg comes off the floor. Return to the starting position by bending your injured leg and slowly lowering your uninjured leg back to the floor. Do 2 sets of 15.    Iliotibial band stretch, side-bending: Cross one leg in front of the other leg and lean in the opposite direction from the front leg. Reach the arm on the side of the back leg over your head while you do this. Hold this position for 15 to 30 seconds. Return to the starting position. Repeat 3 times and then switch legs and repeat the exercise.  Developed by [employer].  Published by [employer].  Copyright  2014 [employer] and/or one of its subsidiaries. All rights reserved.

## 2024-10-16 NOTE — LETTER
10/16/2024      Azucena Guo  [address]  [facility] [address]      Dear Colleague,    Thank you for referring your patient, Azucena Guo, to the [facility]. Please see a copy of my visit note below.    CHIEF COMPLAINT:  Pain of the Right Knee       HISTORY OF PRESENT ILLNESS  Ms. Guo is a pleasant 63 year old year old female who presents to clinic today with right knee pain.  Azucena explains that soreness/ tenderness on surrounding the right knee. Currently, it is located below the knee cap and is noticed when carrying heavy objects.     Onset: gradual  Location: right knee  Quality:  stabbing and sharp  Duration: 4 months  Severity: 9/10 at worst  Timing: intermittent episodes with walking, stairs,   Modifying factors:  resting and non-use makes it better, movement and use makes it worse  Associated signs & symptoms: pain, tenderness, and swelling  Previous similar pain: No, chronic issue  Treatments to date: Shaniqua Dimsa    Additional history: as documented    Review of Systems: A 10-point review of systems was obtained and is negative except for as noted in the HPI.     MEDICAL HISTORY  Patient Active Problem List   Diagnosis     Mixed hyperlipidemia     Type 2 diabetes mellitus with diabetic microalbuminuria, without long-term current use of insulin (H)     Obesity, Class III, BMI 40-49.9 (morbid obesity) (H)     DDD (degenerative disc disease), cervical     Benign essential hypertension     Varicose veins of both lower extremities with pain     Hair thinning     History of adenomatous polyp of colon     Cystocele, midline     Carotid stenosis, right     Ocular migraine     Nail dystrophy     Primary osteoarthritis of right shoulder     Bilateral osteoarthritis of finger     Intermittent low back pain       Current Outpatient Medications   Medication Sig Dispense Refill     aspirin 81 MG tablet Take 1 tablet by mouth daily.       blood glucose (CONTOUR  NEXT TEST) test strip 100 strips by In Vitro route daily Use to test blood sugar 1 times daily or as directed. 100 strip 0     Cholecalciferol (VITAMIN D) 1000 UNIT capsule Take 1 capsule by mouth daily.       ciclopirox (PENLAC) 8 % external solution Apply to adjacent skin and affected nails daily.  Remove with alcohol every 7 days, then repeat. 6.6 mL 11     empagliflozin (JARDIANCE) 10 MG TABS tablet Take 1 tablet (10 mg) by mouth daily. 90 tablet 1     lisinopril (ZESTRIL) 2.5 MG tablet Take 1 tablet (2.5 mg) by mouth daily. 90 tablet 1     metFORMIN (GLUCOPHAGE) 500 MG tablet Take 2 tablets (1,000 mg) by mouth 2 times daily (with meals). 360 tablet 1     minoxidil (LONITEN) 2.5 MG tablet 1/4 tablet daily 60 tablet 3     Multiple Vitamin (MULTI-VITAMIN PO) Take  by mouth.       simvastatin (ZOCOR) 40 MG tablet TAKE 1 TABLET(40 MG) BY MOUTH AT BEDTIME 90 tablet 2       Allergies   Allergen Reactions     Sulfa Antibiotics Hives     Measles Mumps And Rubella Virus Vaccine Live      Redness         Family History   Problem Relation Age of Onset     Heart Disease Mother      Diabetes Mother              Cancer Mother         lymphoma on hodgkins     Obesity Mother      Coronary Artery Disease Mother      Heart Disease Father      Coronary Artery Disease Father      Hypertension Father      Hyperlipidemia Father      Obesity Sister      Diabetes Sister      Heart Failure Sister      Coronary Artery Disease Sister      Obesity Sister      Diabetes Sister      Depression Sister      Coronary Artery Disease Sister         Haf triple bypass surgery Mar 2023     Thyroid Cancer Sister         thyroid surgery 2023     Depression Sister      Obesity Sister      Diabetes Brother              Obesity Brother      Substance Abuse Son         Alcohol     Breast Cancer Other        Additional medical/Social/Surgical histories reviewed in [facility] and updated as appropriate.       PHYSICAL EXAM  LMP  (LMP Unknown)      General  - normal appearance, in no obvious distress  Musculoskeletal - Right knee  - stance: normal gait without limp  - inspection: no swelling or effusion  - palpation: no joint line tenderness, patellar tendon non-tender, tender medial patellar facet  - ROM: 135 degrees flexion, -5 degrees extension, not painful, crepitus with weight-bearing flexion  - strength: 5/5 in flexion, 5/5 in extension  - special tests:  (-) Lachman  (-) anterior drawer  (-) Anne  (-) Thessaly  (-) varus at 0 and 30 degrees flexion  (-) valgus at 0 and 30 degrees flexion  (+) patellar compression  (+) patellar grind  (-) patellar apprehension  Neuro  - no sensory or motor deficit, grossly normal coordination, normal muscle tone    IMAGING : Right knee xray 3 views. Final results and radiologist's interpretation, available in the [facility] health record. Images were reviewed with the patient/family members in the office today. My personal interpretation of the performed imaging is degenerative changes greatest at patellofemoral joint with lateral subluxation.     ASSESSMENT & PLAN  Ms. Guo is a 63 year old year old female who presents to clinic today with chronic right knee pain, anteriorly, particular with carrying heavier weight and negotiating stairs.    Diagnosis: Patellofemoral osteoarthritis of right knee    Clinical examination and radiographs consistent with patellofemoral osteoarthritis which is symptomatic today.  We discussed the lateral subluxation of her knee which is likely contributing to ongoing chondral wear.  Discussed home exercise program to improve hip and quad strength to improve alignment.  Discussed use of Voltaren gel.  May consider patellofemoral knee brace in the future.  We also briefly discussed injections which would be a consideration if pain worsens.    It was a pleasure seeing Azucena today.    Silvestre Conway DO, [facility]  Primary Care Sports Medicine      Again, thank you for allowing me to participate in  the care of your patient.        Sincerely,        Silvestre Conway, DO

## 2024-11-05 ENCOUNTER — LAB (OUTPATIENT)
Dept: LAB | Facility: CLINIC | Age: 64
End: 2024-11-05
Payer: COMMERCIAL

## 2024-11-05 DIAGNOSIS — E11.29 TYPE 2 DIABETES MELLITUS WITH DIABETIC MICROALBUMINURIA, WITHOUT LONG-TERM CURRENT USE OF INSULIN (H): ICD-10-CM

## 2024-11-05 DIAGNOSIS — R80.9 TYPE 2 DIABETES MELLITUS WITH DIABETIC MICROALBUMINURIA, WITHOUT LONG-TERM CURRENT USE OF INSULIN (H): ICD-10-CM

## 2024-11-05 LAB
ANION GAP SERPL CALCULATED.3IONS-SCNC: 11 MMOL/L (ref 7–15)
BUN SERPL-MCNC: 13.2 MG/DL (ref 8–23)
CALCIUM SERPL-MCNC: 9.1 MG/DL (ref 8.8–10.4)
CHLORIDE SERPL-SCNC: 100 MMOL/L (ref 98–107)
CREAT SERPL-MCNC: 0.39 MG/DL (ref 0.51–0.95)
EGFRCR SERPLBLD CKD-EPI 2021: >90 ML/MIN/1.73M2
GLUCOSE SERPL-MCNC: 191 MG/DL (ref 70–99)
HCO3 SERPL-SCNC: 23 MMOL/L (ref 22–29)
POTASSIUM SERPL-SCNC: 4.3 MMOL/L (ref 3.4–5.3)
SODIUM SERPL-SCNC: 134 MMOL/L (ref 135–145)

## 2024-11-05 PROCEDURE — 80048 BASIC METABOLIC PNL TOTAL CA: CPT

## 2024-11-05 PROCEDURE — 36415 COLL VENOUS BLD VENIPUNCTURE: CPT

## 2024-11-06 ENCOUNTER — MYC MEDICAL ADVICE (OUTPATIENT)
Dept: FAMILY MEDICINE | Facility: CLINIC | Age: 64
End: 2024-11-06
Payer: COMMERCIAL

## 2024-11-06 NOTE — TELEPHONE ENCOUNTER
Actually a low creatinine is not of clinical concern. I would be more worried if it was elevated which is a sign of decrease kidney function   Your labs looked great

## 2024-11-06 NOTE — RESULT ENCOUNTER NOTE
Hello -    Here are my comments about your recent results:  -Kidney function (GFR) is normal.  -Sodium is normal when corrected for glucose level  -Potassium is normal.  -Calcium is normal.  -Glucose is elevated due to your diabetes.  Continue jardiance   For additional lab test information, labtestsonline.org is an excellent reference..    Please let us know if you have any questions or concerns.     Regards,  Maggi Elkins MD

## 2024-11-21 ENCOUNTER — OFFICE VISIT (OUTPATIENT)
Dept: DERMATOLOGY | Facility: CLINIC | Age: 64
End: 2024-11-21
Payer: COMMERCIAL

## 2024-11-21 DIAGNOSIS — L64.9 ANDROGENETIC ALOPECIA: Primary | ICD-10-CM
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
## 2024-12-23 ENCOUNTER — MYC REFILL (OUTPATIENT)
Dept: FAMILY MEDICINE | Facility: CLINIC | Age: 64
End: 2024-12-23
Payer: COMMERCIAL

## 2024-12-23 DIAGNOSIS — R80.9 TYPE 2 DIABETES MELLITUS WITH DIABETIC MICROALBUMINURIA, WITHOUT LONG-TERM CURRENT USE OF INSULIN (H): ICD-10-CM

## 2024-12-23 DIAGNOSIS — E11.29 TYPE 2 DIABETES MELLITUS WITH DIABETIC MICROALBUMINURIA, WITHOUT LONG-TERM CURRENT USE OF INSULIN (H): ICD-10-CM

## 2024-12-30 ENCOUNTER — MYC MEDICAL ADVICE (OUTPATIENT)
Dept: FAMILY MEDICINE | Facility: CLINIC | Age: 64
End: 2024-12-30
Payer: COMMERCIAL

## 2024-12-30 DIAGNOSIS — E11.29 TYPE 2 DIABETES MELLITUS WITH DIABETIC MICROALBUMINURIA, WITHOUT LONG-TERM CURRENT USE OF INSULIN (H): ICD-10-CM

## 2024-12-30 DIAGNOSIS — I10 BENIGN ESSENTIAL HYPERTENSION: ICD-10-CM

## 2024-12-30 DIAGNOSIS — E11.9 TYPE 2 DIABETES MELLITUS WITHOUT COMPLICATION, WITHOUT LONG-TERM CURRENT USE OF INSULIN (H): ICD-10-CM

## 2024-12-30 DIAGNOSIS — R80.9 TYPE 2 DIABETES MELLITUS WITH DIABETIC MICROALBUMINURIA, WITHOUT LONG-TERM CURRENT USE OF INSULIN (H): ICD-10-CM

## 2024-12-30 DIAGNOSIS — E78.2 MIXED HYPERLIPIDEMIA: ICD-10-CM

## 2024-12-30 NOTE — TELEPHONE ENCOUNTER
She should continue on metformin.  It was filled on 9/26/2024 360 tablets with 1 refill so should be good till appointment in March but if needs a new refill I can put 1 in

## 2024-12-31 DIAGNOSIS — E11.29 TYPE 2 DIABETES MELLITUS WITH DIABETIC MICROALBUMINURIA, WITHOUT LONG-TERM CURRENT USE OF INSULIN (H): ICD-10-CM

## 2024-12-31 DIAGNOSIS — R80.9 TYPE 2 DIABETES MELLITUS WITH DIABETIC MICROALBUMINURIA, WITHOUT LONG-TERM CURRENT USE OF INSULIN (H): ICD-10-CM

## 2024-12-31 NOTE — TELEPHONE ENCOUNTER
Triage please let her know I saw her on 26 September and that is when I filled her medications for 90 days with 1 refill so I am confused why she needs a refill already as its too soon.  She probably just needs to  the refill already on file. & is maybe looking at an old bottle instead of the new script sent on 9/26/24.  ( Check date on her current bottles)   Or  her pharmacy for some reason not giving her the refill already on file.

## 2024-12-31 NOTE — TELEPHONE ENCOUNTER
ChatterBlock message sent to patient.  JULIEN Hudson, BSN, PHN, RN-[facility]  [phone]

## 2025-01-14 ENCOUNTER — TRANSFERRED RECORDS (OUTPATIENT)
Dept: MULTI SPECIALTY CLINIC | Facility: CLINIC | Age: 65
End: 2025-01-14
Payer: COMMERCIAL

## 2025-01-14 LAB — RETINOPATHY: NORMAL

## 2025-02-22 ENCOUNTER — OFFICE VISIT (OUTPATIENT)
Dept: FAMILY MEDICINE | Facility: CLINIC | Age: 65
End: 2025-02-22
Payer: COMMERCIAL

## 2025-02-22 VITALS
RESPIRATION RATE: 20 BRPM | HEART RATE: 75 BPM | OXYGEN SATURATION: 97 % | DIASTOLIC BLOOD PRESSURE: 73 MMHG | TEMPERATURE: 97.3 F | SYSTOLIC BLOOD PRESSURE: 136 MMHG

## 2025-02-22 DIAGNOSIS — R30.0 DYSURIA: Primary | ICD-10-CM

## 2025-02-22 DIAGNOSIS — R01.1 UNDIAGNOSED CARDIAC MURMURS: ICD-10-CM

## 2025-02-22 LAB
BACTERIA #/AREA URNS HPF: ABNORMAL /HPF
RBC #/AREA URNS AUTO: ABNORMAL /HPF
SQUAMOUS #/AREA URNS AUTO: ABNORMAL /LPF
WBC #/AREA URNS AUTO: ABNORMAL /HPF

## 2025-02-22 PROCEDURE — 81015 MICROSCOPIC EXAM OF URINE: CPT

## 2025-02-22 PROCEDURE — 93000 ELECTROCARDIOGRAM COMPLETE: CPT

## 2025-02-22 PROCEDURE — 99214 OFFICE O/P EST MOD 30 MIN: CPT

## 2025-02-22 PROCEDURE — 87086 URINE CULTURE/COLONY COUNT: CPT

## 2025-02-22 RX ORDER — NITROFURANTOIN 25; 75 MG/1; MG/1
100 CAPSULE ORAL 2 TIMES DAILY
Qty: 10 CAPSULE | Refills: 0 | Status: SHIPPED | OUTPATIENT
Start: 2025-02-22 | End: 2025-02-27

## 2025-02-22 NOTE — PROGRESS NOTES
Assessment & Plan     Dysuria  She took Azo this morning so urine macro could not be done.  Urine was sent out for micro and culture.  Will start her on nitrofurantoin twice daily and reevaluate when her labs come back.    - Urine Microscopic Exam  - Urine Culture  - nitroFURantoin macrocrystal-monohydrate (MACROBID) 100 MG capsule; Take 1 capsule (100 mg) by mouth 2 times daily for 5 days.    Undiagnosed cardiac murmurs  Incidental finding of new systolic murmur heard best along the right sternal border.  States she has some carotid stenosis on the right side that is being monitored by her primary and has ultrasounds done yearly.  She has no chest pain or shortness of breath.  Her EKG had no acute findings.  She has an appointment with her primary care provider in 3 weeks and will have her follow-up at that time.  We did discuss red flag symptoms and when to seek treatment in the emergency room.  - EKG 12-lead complete w/read - Clinics      Follow up with PCP as scheduled, sooner if needed    Return in about 3 days (around 2/25/2025), or if symptoms worsen or fail to improve.    Ayah Guzman is a 64 year old, presenting for the following health issues:  Urgent Care and UTI (Yesterday pt was feeling heavy and bloated and today woke up feeling pain, burning sensation, and urgency)    HPI       Genitourinary - Female  Onset/Duration: last night  Description:   Painful urination (Dysuria): YES           Frequency: YES  Blood in urine (Hematuria): No  Delay in urine (Hesitency): No  Intensity: moderate  Progression of Symptoms:  same  Accompanying Signs & Symptoms:  Fever/chills: No  Flank pain: No  Nausea and vomiting: No  Vaginal symptoms: none  Abdominal/Pelvic Pain: No  History:   History of frequent UTI s: No  History of kidney stones: No  Sexually Active: No  Possibility of pregnancy: No  Precipitating or alleviating factors: None  Therapies tried and outcome: Azo        Review of Systems  Constitutional,  HEENT, cardiovascular, pulmonary, GI, , musculoskeletal, neuro, skin, endocrine and psych systems are negative, except as otherwise noted.      Objective    /73   Pulse 75   Temp 97.3  F (36.3  C) (Tympanic)   Resp 20   LMP  (LMP Unknown)   SpO2 97%   There is no height or weight on file to calculate BMI.  Physical Exam   GENERAL: alert and no distress  NECK: no adenopathy, no asymmetry, masses, or scars  RESP: lungs clear to auscultation - no rales, rhonchi or wheezes  CV: regular rates and rhythm, normal S1 S2, no S3 or S4, grade 2/6 systolic murmur heard best over the right sternal border, peripheral pulses strong, and no peripheral edema  ABDOMEN: soft, nontender, no hepatosplenomegaly, no masses and bowel sounds normal    EKG - Reviewed and interpreted by me appears normal, NSR, normal axis, normal intervals, no acute ST/T changes c/w ischemia, no LVH by voltage criteria, there are no prior tracings available        Signed Electronically by: [facility]

## 2025-02-22 NOTE — PATIENT INSTRUCTIONS
Your EKG looks good  Please follow up with your provider as scheduled for recheck on new murmur.   ER for any chest pain, shortness of breath, dizziness or weakness  Your UA results will come back later and I will message you with results  Go ahead and start the antibiotic.

## 2025-02-24 LAB — BACTERIA UR CULT: NORMAL

## 2025-03-17 ENCOUNTER — HOSPITAL ENCOUNTER (INPATIENT)
Facility: CLINIC | Age: 65
End: 2025-03-17
Attending: EMERGENCY MEDICINE | Admitting: PSYCHIATRY & NEUROLOGY
Payer: COMMERCIAL

## 2025-03-17 ENCOUNTER — APPOINTMENT (OUTPATIENT)
Dept: CT IMAGING | Facility: CLINIC | Age: 65
End: 2025-03-17
Attending: EMERGENCY MEDICINE
Payer: COMMERCIAL

## 2025-03-17 DIAGNOSIS — L08.9 SOFT TISSUE INFECTION: Primary | ICD-10-CM

## 2025-03-17 DIAGNOSIS — I62.9 INTRACRANIAL HEMORRHAGE (H): ICD-10-CM

## 2025-03-17 DIAGNOSIS — G47.30 SLEEP APNEA, UNSPECIFIED TYPE: ICD-10-CM

## 2025-03-17 LAB
ABO + RH BLD: NORMAL
ANION GAP SERPL CALCULATED.3IONS-SCNC: 15 MMOL/L (ref 7–15)
APTT PPP: 29 SECONDS (ref 22–38)
BASOPHILS # BLD AUTO: 0.1 10E3/UL (ref 0–0.2)
BASOPHILS NFR BLD AUTO: 1 %
BLD PROD TYP BPU: NORMAL
BLOOD COMPONENT TYPE: NORMAL
BUN SERPL-MCNC: 11.7 MG/DL (ref 8–23)
CALCIUM SERPL-MCNC: 10.1 MG/DL (ref 8.8–10.4)
CHLORIDE SERPL-SCNC: 100 MMOL/L (ref 98–107)
CODING SYSTEM: NORMAL
CREAT SERPL-MCNC: 0.43 MG/DL (ref 0.51–0.95)
EGFRCR SERPLBLD CKD-EPI 2021: >90 ML/MIN/1.73M2
EOSINOPHIL # BLD AUTO: 0.1 10E3/UL (ref 0–0.7)
EOSINOPHIL NFR BLD AUTO: 1 %
ERYTHROCYTE [DISTWIDTH] IN BLOOD BY AUTOMATED COUNT: 13.4 % (ref 10–15)
GLUCOSE BLDC GLUCOMTR-MCNC: 137 MG/DL (ref 70–99)
GLUCOSE SERPL-MCNC: 136 MG/DL (ref 70–99)
HCO3 SERPL-SCNC: 22 MMOL/L (ref 22–29)
HCT VFR BLD AUTO: 40.5 % (ref 35–47)
HGB BLD-MCNC: 13.5 G/DL (ref 11.7–15.7)
IMM GRANULOCYTES # BLD: 0 10E3/UL
IMM GRANULOCYTES NFR BLD: 0 %
INR PPP: 0.93 (ref 0.85–1.15)
ISSUE DATE AND TIME: NORMAL
LYMPHOCYTES # BLD AUTO: 2.3 10E3/UL (ref 0.8–5.3)
LYMPHOCYTES NFR BLD AUTO: 32 %
MAGNESIUM SERPL-MCNC: 2 MG/DL (ref 1.7–2.3)
MCH RBC QN AUTO: 29.7 PG (ref 26.5–33)
MCHC RBC AUTO-ENTMCNC: 33.3 G/DL (ref 31.5–36.5)
MCV RBC AUTO: 89 FL (ref 78–100)
MONOCYTES # BLD AUTO: 0.5 10E3/UL (ref 0–1.3)
MONOCYTES NFR BLD AUTO: 7 %
NEUTROPHILS # BLD AUTO: 4.3 10E3/UL (ref 1.6–8.3)
NEUTROPHILS NFR BLD AUTO: 59 %
NRBC # BLD AUTO: 0 10E3/UL
NRBC BLD AUTO-RTO: 0 /100
PHOSPHATE SERPL-MCNC: 4.2 MG/DL (ref 2.5–4.5)
PLATELET # BLD AUTO: 308 10E3/UL (ref 150–450)
POTASSIUM SERPL-SCNC: 4 MMOL/L (ref 3.4–5.3)
RBC # BLD AUTO: 4.54 10E6/UL (ref 3.8–5.2)
SODIUM SERPL-SCNC: 137 MMOL/L (ref 135–145)
SPECIMEN EXP DATE BLD: NORMAL
TROPONIN T SERPL HS-MCNC: 10 NG/L
TROPONIN T SERPL HS-MCNC: 7 NG/L
UNIT ABO/RH: NORMAL
UNIT NUMBER: NORMAL
UNIT STATUS: NORMAL
UNIT TYPE ISBT: 8400
WBC # BLD AUTO: 7.2 10E3/UL (ref 4–11)

## 2025-03-17 PROCEDURE — 36600 WITHDRAWAL OF ARTERIAL BLOOD: CPT | Mod: GC | Performed by: NEUROLOGICAL SURGERY

## 2025-03-17 PROCEDURE — 84484 ASSAY OF TROPONIN QUANT: CPT | Performed by: EMERGENCY MEDICINE

## 2025-03-17 PROCEDURE — 85730 THROMBOPLASTIN TIME PARTIAL: CPT | Performed by: EMERGENCY MEDICINE

## 2025-03-17 PROCEDURE — 85004 AUTOMATED DIFF WBC COUNT: CPT | Performed by: EMERGENCY MEDICINE

## 2025-03-17 PROCEDURE — 70450 CT HEAD/BRAIN W/O DYE: CPT

## 2025-03-17 PROCEDURE — 250N000009 HC RX 250: Performed by: EMERGENCY MEDICINE

## 2025-03-17 PROCEDURE — 03HY32Z INSERTION OF MONITORING DEVICE INTO UPPER ARTERY, PERCUTANEOUS APPROACH: ICD-10-PCS | Performed by: NEUROLOGICAL SURGERY

## 2025-03-17 PROCEDURE — 93005 ELECTROCARDIOGRAM TRACING: CPT | Performed by: EMERGENCY MEDICINE

## 2025-03-17 PROCEDURE — 93010 ELECTROCARDIOGRAM REPORT: CPT | Performed by: EMERGENCY MEDICINE

## 2025-03-17 PROCEDURE — 36415 COLL VENOUS BLD VENIPUNCTURE: CPT | Performed by: EMERGENCY MEDICINE

## 2025-03-17 PROCEDURE — P9037 PLATE PHERES LEUKOREDU IRRAD: HCPCS

## 2025-03-17 PROCEDURE — 70498 CT ANGIOGRAPHY NECK: CPT | Mod: 26 | Performed by: RADIOLOGY

## 2025-03-17 PROCEDURE — 999N000248 HC STATISTIC IV INSERT WITH US BY RN

## 2025-03-17 PROCEDURE — 99223 1ST HOSP IP/OBS HIGH 75: CPT | Mod: 25 | Performed by: NEUROLOGICAL SURGERY

## 2025-03-17 PROCEDURE — 250N000011 HC RX IP 250 OP 636: Mod: JZ | Performed by: EMERGENCY MEDICINE

## 2025-03-17 PROCEDURE — 120N000005 HC R&B MS OVERFLOW UMMC

## 2025-03-17 PROCEDURE — 83735 ASSAY OF MAGNESIUM: CPT

## 2025-03-17 PROCEDURE — 250N000013 HC RX MED GY IP 250 OP 250 PS 637

## 2025-03-17 PROCEDURE — 70496 CT ANGIOGRAPHY HEAD: CPT

## 2025-03-17 PROCEDURE — 85610 PROTHROMBIN TIME: CPT | Performed by: EMERGENCY MEDICINE

## 2025-03-17 PROCEDURE — 99291 CRITICAL CARE FIRST HOUR: CPT | Performed by: EMERGENCY MEDICINE

## 2025-03-17 PROCEDURE — 84100 ASSAY OF PHOSPHORUS: CPT

## 2025-03-17 PROCEDURE — 70496 CT ANGIOGRAPHY HEAD: CPT | Mod: 26 | Performed by: RADIOLOGY

## 2025-03-17 PROCEDURE — 82465 ASSAY BLD/SERUM CHOLESTEROL: CPT

## 2025-03-17 PROCEDURE — 250N000011 HC RX IP 250 OP 636: Performed by: EMERGENCY MEDICINE

## 2025-03-17 PROCEDURE — 99285 EMERGENCY DEPT VISIT HI MDM: CPT | Mod: 25 | Performed by: EMERGENCY MEDICINE

## 2025-03-17 PROCEDURE — 96374 THER/PROPH/DIAG INJ IV PUSH: CPT | Performed by: EMERGENCY MEDICINE

## 2025-03-17 PROCEDURE — 84443 ASSAY THYROID STIM HORMONE: CPT

## 2025-03-17 PROCEDURE — 80048 BASIC METABOLIC PNL TOTAL CA: CPT | Performed by: EMERGENCY MEDICINE

## 2025-03-17 PROCEDURE — 250N000011 HC RX IP 250 OP 636

## 2025-03-17 PROCEDURE — 86901 BLOOD TYPING SEROLOGIC RH(D): CPT | Performed by: EMERGENCY MEDICINE

## 2025-03-17 RX ORDER — POLYETHYLENE GLYCOL 3350 17 G/17G
17 POWDER, FOR SOLUTION ORAL DAILY
Status: DISCONTINUED | OUTPATIENT
Start: 2025-03-18 | End: 2025-03-22 | Stop reason: HOSPADM

## 2025-03-17 RX ORDER — HYDRALAZINE HYDROCHLORIDE 20 MG/ML
10-20 INJECTION INTRAMUSCULAR; INTRAVENOUS EVERY 30 MIN PRN
Status: DISCONTINUED | OUTPATIENT
Start: 2025-03-17 | End: 2025-03-22 | Stop reason: HOSPADM

## 2025-03-17 RX ORDER — AMOXICILLIN 250 MG
1 CAPSULE ORAL 2 TIMES DAILY PRN
Status: DISCONTINUED | OUTPATIENT
Start: 2025-03-17 | End: 2025-03-18

## 2025-03-17 RX ORDER — ONDANSETRON 4 MG/1
4 TABLET, ORALLY DISINTEGRATING ORAL EVERY 6 HOURS PRN
Status: DISCONTINUED | OUTPATIENT
Start: 2025-03-17 | End: 2025-03-22 | Stop reason: HOSPADM

## 2025-03-17 RX ORDER — NALOXONE HYDROCHLORIDE 0.4 MG/ML
0.4 INJECTION, SOLUTION INTRAMUSCULAR; INTRAVENOUS; SUBCUTANEOUS
Status: DISCONTINUED | OUTPATIENT
Start: 2025-03-17 | End: 2025-03-22 | Stop reason: HOSPADM

## 2025-03-17 RX ORDER — AMOXICILLIN 250 MG
1 CAPSULE ORAL 2 TIMES DAILY PRN
Status: DISCONTINUED | OUTPATIENT
Start: 2025-03-17 | End: 2025-03-17

## 2025-03-17 RX ORDER — OXYCODONE HYDROCHLORIDE 5 MG/1
5 TABLET ORAL EVERY 4 HOURS PRN
Status: DISCONTINUED | OUTPATIENT
Start: 2025-03-17 | End: 2025-03-18

## 2025-03-17 RX ORDER — PROCHLORPERAZINE MALEATE 10 MG
10 TABLET ORAL EVERY 6 HOURS PRN
Status: DISCONTINUED | OUTPATIENT
Start: 2025-03-17 | End: 2025-03-22 | Stop reason: HOSPADM

## 2025-03-17 RX ORDER — SODIUM CHLORIDE 9 MG/ML
INJECTION, SOLUTION INTRAVENOUS CONTINUOUS
Status: DISCONTINUED | OUTPATIENT
Start: 2025-03-18 | End: 2025-03-19

## 2025-03-17 RX ORDER — ACETAMINOPHEN 325 MG/1
650 TABLET ORAL EVERY 4 HOURS PRN
Status: DISCONTINUED | OUTPATIENT
Start: 2025-03-17 | End: 2025-03-17

## 2025-03-17 RX ORDER — LEVETIRACETAM 500 MG/1
500 TABLET ORAL 2 TIMES DAILY
Status: DISCONTINUED | OUTPATIENT
Start: 2025-03-17 | End: 2025-03-18

## 2025-03-17 RX ORDER — NALOXONE HYDROCHLORIDE 0.4 MG/ML
0.2 INJECTION, SOLUTION INTRAMUSCULAR; INTRAVENOUS; SUBCUTANEOUS
Status: DISCONTINUED | OUTPATIENT
Start: 2025-03-17 | End: 2025-03-22 | Stop reason: HOSPADM

## 2025-03-17 RX ORDER — NICOTINE POLACRILEX 4 MG
15-30 LOZENGE BUCCAL
Status: DISCONTINUED | OUTPATIENT
Start: 2025-03-17 | End: 2025-03-22 | Stop reason: HOSPADM

## 2025-03-17 RX ORDER — LABETALOL HYDROCHLORIDE 5 MG/ML
10-20 INJECTION, SOLUTION INTRAVENOUS EVERY 10 MIN PRN
Status: DISCONTINUED | OUTPATIENT
Start: 2025-03-17 | End: 2025-03-22 | Stop reason: HOSPADM

## 2025-03-17 RX ORDER — ONDANSETRON 2 MG/ML
4 INJECTION INTRAMUSCULAR; INTRAVENOUS EVERY 6 HOURS PRN
Status: DISCONTINUED | OUTPATIENT
Start: 2025-03-17 | End: 2025-03-22 | Stop reason: HOSPADM

## 2025-03-17 RX ORDER — IOPAMIDOL 755 MG/ML
67 INJECTION, SOLUTION INTRAVASCULAR ONCE
Status: COMPLETED | OUTPATIENT
Start: 2025-03-17 | End: 2025-03-17

## 2025-03-17 RX ORDER — HYDROMORPHONE HYDROCHLORIDE 1 MG/ML
0.5 INJECTION, SOLUTION INTRAMUSCULAR; INTRAVENOUS; SUBCUTANEOUS EVERY 30 MIN PRN
Status: DISCONTINUED | OUTPATIENT
Start: 2025-03-17 | End: 2025-03-18

## 2025-03-17 RX ORDER — LABETALOL HYDROCHLORIDE 5 MG/ML
20 INJECTION, SOLUTION INTRAVENOUS ONCE
Status: COMPLETED | OUTPATIENT
Start: 2025-03-17 | End: 2025-03-17

## 2025-03-17 RX ORDER — ACETAMINOPHEN 325 MG/1
650 TABLET ORAL EVERY 4 HOURS PRN
Status: DISCONTINUED | OUTPATIENT
Start: 2025-03-17 | End: 2025-03-22 | Stop reason: HOSPADM

## 2025-03-17 RX ORDER — AMOXICILLIN 250 MG
2 CAPSULE ORAL 2 TIMES DAILY PRN
Status: DISCONTINUED | OUTPATIENT
Start: 2025-03-17 | End: 2025-03-18

## 2025-03-17 RX ORDER — DEXTROSE MONOHYDRATE 25 G/50ML
25-50 INJECTION, SOLUTION INTRAVENOUS
Status: DISCONTINUED | OUTPATIENT
Start: 2025-03-17 | End: 2025-03-22 | Stop reason: HOSPADM

## 2025-03-17 RX ORDER — AMOXICILLIN 250 MG
2 CAPSULE ORAL 2 TIMES DAILY PRN
Status: DISCONTINUED | OUTPATIENT
Start: 2025-03-17 | End: 2025-03-17

## 2025-03-17 RX ADMIN — ONDANSETRON 4 MG: 2 INJECTION INTRAMUSCULAR; INTRAVENOUS at 21:30

## 2025-03-17 RX ADMIN — LABETALOL HYDROCHLORIDE 20 MG: 5 INJECTION, SOLUTION INTRAVENOUS at 20:17

## 2025-03-17 RX ADMIN — NICARDIPINE HYDROCHLORIDE 2.5 MG/HR: 0.2 INJECTION INTRAVENOUS at 20:47

## 2025-03-17 RX ADMIN — ACETAMINOPHEN 650 MG: 325 TABLET, FILM COATED ORAL at 22:14

## 2025-03-17 RX ADMIN — HYDROMORPHONE HYDROCHLORIDE 0.5 MG: 1 INJECTION, SOLUTION INTRAMUSCULAR; INTRAVENOUS; SUBCUTANEOUS at 20:50

## 2025-03-17 RX ADMIN — SODIUM CHLORIDE 90 ML: 9 INJECTION, SOLUTION INTRAVENOUS at 20:02

## 2025-03-17 RX ADMIN — PROCHLORPERAZINE EDISYLATE 10 MG: 5 INJECTION INTRAMUSCULAR; INTRAVENOUS at 22:14

## 2025-03-17 RX ADMIN — IOPAMIDOL 67 ML: 755 INJECTION, SOLUTION INTRAVENOUS at 20:02

## 2025-03-17 RX ADMIN — LEVETIRACETAM 500 MG: 500 TABLET, FILM COATED ORAL at 22:14

## 2025-03-17 ASSESSMENT — ACTIVITIES OF DAILY LIVING (ADL)
ADLS_ACUITY_SCORE: 41

## 2025-03-17 NOTE — LETTER
[facility] [address] [facility]  [address] [facility] [address]  Phone: [phone]    March 22, 2025        Azucena Guo  [address]  [facility] [address]          To whom it may concern:    RE: Azucena Guo    Patient was seen and treated at the [facility] for a medical condition. Based on my clinical evaluation, Ms. Guo is advised not to return to work for the next three weeks 3/22-4/13. A gradual reintroduction of physical activity is recommended thereafter.       Please contact me for questions or concerns.      Sincerely,      Neda Kohler MD  Neurology

## 2025-03-18 ENCOUNTER — APPOINTMENT (OUTPATIENT)
Dept: CARDIOLOGY | Facility: CLINIC | Age: 65
End: 2025-03-18
Payer: COMMERCIAL

## 2025-03-18 ENCOUNTER — APPOINTMENT (OUTPATIENT)
Dept: MRI IMAGING | Facility: CLINIC | Age: 65
End: 2025-03-18
Attending: PHYSICIAN ASSISTANT
Payer: COMMERCIAL

## 2025-03-18 ENCOUNTER — APPOINTMENT (OUTPATIENT)
Dept: OCCUPATIONAL THERAPY | Facility: CLINIC | Age: 65
End: 2025-03-18
Payer: COMMERCIAL

## 2025-03-18 ENCOUNTER — APPOINTMENT (OUTPATIENT)
Dept: GENERAL RADIOLOGY | Facility: CLINIC | Age: 65
End: 2025-03-18
Attending: PHYSICIAN ASSISTANT
Payer: COMMERCIAL

## 2025-03-18 ENCOUNTER — APPOINTMENT (OUTPATIENT)
Dept: MRI IMAGING | Facility: CLINIC | Age: 65
End: 2025-03-18
Payer: COMMERCIAL

## 2025-03-18 ENCOUNTER — APPOINTMENT (OUTPATIENT)
Dept: CT IMAGING | Facility: CLINIC | Age: 65
End: 2025-03-18
Payer: COMMERCIAL

## 2025-03-18 LAB
ANION GAP SERPL CALCULATED.3IONS-SCNC: 12 MMOL/L (ref 7–15)
ATRIAL RATE - MUSE: 82 BPM
BUN SERPL-MCNC: 8.7 MG/DL (ref 8–23)
CALCIUM SERPL-MCNC: 9.1 MG/DL (ref 8.8–10.4)
CHLORIDE SERPL-SCNC: 103 MMOL/L (ref 98–107)
CHOLEST SERPL-MCNC: 152 MG/DL
CREAT SERPL-MCNC: 0.38 MG/DL (ref 0.51–0.95)
DIASTOLIC BLOOD PRESSURE - MUSE: NORMAL MMHG
EGFRCR SERPLBLD CKD-EPI 2021: >90 ML/MIN/1.73M2
ERYTHROCYTE [DISTWIDTH] IN BLOOD BY AUTOMATED COUNT: 13.2 % (ref 10–15)
EST. AVERAGE GLUCOSE BLD GHB EST-MCNC: 163 MG/DL
GLUCOSE BLDC GLUCOMTR-MCNC: 135 MG/DL (ref 70–99)
GLUCOSE BLDC GLUCOMTR-MCNC: 147 MG/DL (ref 70–99)
GLUCOSE BLDC GLUCOMTR-MCNC: 151 MG/DL (ref 70–99)
GLUCOSE BLDC GLUCOMTR-MCNC: 156 MG/DL (ref 70–99)
GLUCOSE BLDC GLUCOMTR-MCNC: 157 MG/DL (ref 70–99)
GLUCOSE BLDC GLUCOMTR-MCNC: 181 MG/DL (ref 70–99)
GLUCOSE SERPL-MCNC: 157 MG/DL (ref 70–99)
HBA1C MFR BLD: 7.3 %
HCO3 SERPL-SCNC: 23 MMOL/L (ref 22–29)
HCT VFR BLD AUTO: 36.5 % (ref 35–47)
HDLC SERPL-MCNC: 46 MG/DL
HGB BLD-MCNC: 12 G/DL (ref 11.7–15.7)
INTERPRETATION ECG - MUSE: NORMAL
LDLC SERPL CALC-MCNC: 76 MG/DL
LVEF ECHO: NORMAL
MAGNESIUM SERPL-MCNC: 1.9 MG/DL (ref 1.7–2.3)
MCH RBC QN AUTO: 29.4 PG (ref 26.5–33)
MCHC RBC AUTO-ENTMCNC: 32.9 G/DL (ref 31.5–36.5)
MCV RBC AUTO: 90 FL (ref 78–100)
NONHDLC SERPL-MCNC: 106 MG/DL
P AXIS - MUSE: 59 DEGREES
PHOSPHATE SERPL-MCNC: 4.5 MG/DL (ref 2.5–4.5)
PLATELET # BLD AUTO: 294 10E3/UL (ref 150–450)
POTASSIUM SERPL-SCNC: 3.9 MMOL/L (ref 3.4–5.3)
PR INTERVAL - MUSE: 148 MS
QRS DURATION - MUSE: 96 MS
QT - MUSE: 388 MS
QTC - MUSE: 453 MS
R AXIS - MUSE: 37 DEGREES
RADIOLOGIST FLAGS: ABNORMAL
RBC # BLD AUTO: 4.08 10E6/UL (ref 3.8–5.2)
SODIUM SERPL-SCNC: 138 MMOL/L (ref 135–145)
SYSTOLIC BLOOD PRESSURE - MUSE: NORMAL MMHG
T AXIS - MUSE: 32 DEGREES
TRIGL SERPL-MCNC: 152 MG/DL
TSH SERPL DL<=0.005 MIU/L-ACNC: 0.81 UIU/ML (ref 0.3–4.2)
VENTRICULAR RATE- MUSE: 82 BPM
WBC # BLD AUTO: 7.1 10E3/UL (ref 4–11)

## 2025-03-18 PROCEDURE — 250N000013 HC RX MED GY IP 250 OP 250 PS 637

## 2025-03-18 PROCEDURE — 97535 SELF CARE MNGMENT TRAINING: CPT | Mod: GO

## 2025-03-18 PROCEDURE — 80048 BASIC METABOLIC PNL TOTAL CA: CPT | Performed by: PSYCHIATRY & NEUROLOGY

## 2025-03-18 PROCEDURE — 70450 CT HEAD/BRAIN W/O DYE: CPT | Mod: 76

## 2025-03-18 PROCEDURE — 93306 TTE W/DOPPLER COMPLETE: CPT | Mod: 26 | Performed by: STUDENT IN AN ORGANIZED HEALTH CARE EDUCATION/TRAINING PROGRAM

## 2025-03-18 PROCEDURE — 84100 ASSAY OF PHOSPHORUS: CPT

## 2025-03-18 PROCEDURE — 70545 MR ANGIOGRAPHY HEAD W/DYE: CPT

## 2025-03-18 PROCEDURE — 255N000002 HC RX 255 OP 636: Performed by: STUDENT IN AN ORGANIZED HEALTH CARE EDUCATION/TRAINING PROGRAM

## 2025-03-18 PROCEDURE — 99291 CRITICAL CARE FIRST HOUR: CPT | Mod: GC | Performed by: PSYCHIATRY & NEUROLOGY

## 2025-03-18 PROCEDURE — 97165 OT EVAL LOW COMPLEX 30 MIN: CPT | Mod: GO

## 2025-03-18 PROCEDURE — 85018 HEMOGLOBIN: CPT | Performed by: PSYCHIATRY & NEUROLOGY

## 2025-03-18 PROCEDURE — 71045 X-RAY EXAM CHEST 1 VIEW: CPT | Mod: 26 | Performed by: RADIOLOGY

## 2025-03-18 PROCEDURE — 83735 ASSAY OF MAGNESIUM: CPT

## 2025-03-18 PROCEDURE — A9585 GADOBUTROL INJECTION: HCPCS | Performed by: PHYSICIAN ASSISTANT

## 2025-03-18 PROCEDURE — 83036 HEMOGLOBIN GLYCOSYLATED A1C: CPT

## 2025-03-18 PROCEDURE — 999N000226 HC STATISTIC SLP IP EVAL DEFER

## 2025-03-18 PROCEDURE — 250N000013 HC RX MED GY IP 250 OP 250 PS 637: Performed by: PHYSICIAN ASSISTANT

## 2025-03-18 PROCEDURE — 255N000002 HC RX 255 OP 636: Performed by: PHYSICIAN ASSISTANT

## 2025-03-18 PROCEDURE — 250N000009 HC RX 250

## 2025-03-18 PROCEDURE — 250N000012 HC RX MED GY IP 250 OP 636 PS 637

## 2025-03-18 PROCEDURE — 71045 X-RAY EXAM CHEST 1 VIEW: CPT

## 2025-03-18 PROCEDURE — 250N000011 HC RX IP 250 OP 636

## 2025-03-18 PROCEDURE — 70553 MRI BRAIN STEM W/O & W/DYE: CPT

## 2025-03-18 PROCEDURE — 120N000005 HC R&B MS OVERFLOW UMMC

## 2025-03-18 PROCEDURE — 70553 MRI BRAIN STEM W/O & W/DYE: CPT | Mod: 26 | Performed by: RADIOLOGY

## 2025-03-18 PROCEDURE — 70450 CT HEAD/BRAIN W/O DYE: CPT | Mod: 26 | Performed by: RADIOLOGY

## 2025-03-18 PROCEDURE — 258N000003 HC RX IP 258 OP 636

## 2025-03-18 PROCEDURE — 70450 CT HEAD/BRAIN W/O DYE: CPT

## 2025-03-18 PROCEDURE — 999N000208 ECHOCARDIOGRAM COMPLETE

## 2025-03-18 RX ORDER — SCOPOLAMINE 1 MG/3D
1 PATCH, EXTENDED RELEASE TRANSDERMAL
Status: DISCONTINUED | OUTPATIENT
Start: 2025-03-18 | End: 2025-03-22 | Stop reason: HOSPADM

## 2025-03-18 RX ORDER — MAGNESIUM SULFATE HEPTAHYDRATE 40 MG/ML
2 INJECTION, SOLUTION INTRAVENOUS ONCE
Status: COMPLETED | OUTPATIENT
Start: 2025-03-18 | End: 2025-03-18

## 2025-03-18 RX ORDER — LISINOPRIL 2.5 MG/1
2.5 TABLET ORAL DAILY
Status: DISCONTINUED | OUTPATIENT
Start: 2025-03-18 | End: 2025-03-18

## 2025-03-18 RX ORDER — AMOXICILLIN 250 MG
2 CAPSULE ORAL AT BEDTIME
Status: DISCONTINUED | OUTPATIENT
Start: 2025-03-18 | End: 2025-03-19

## 2025-03-18 RX ORDER — AMOXICILLIN 250 MG
1 CAPSULE ORAL AT BEDTIME
Status: DISCONTINUED | OUTPATIENT
Start: 2025-03-18 | End: 2025-03-19

## 2025-03-18 RX ORDER — ACETAMINOPHEN 500 MG
500-1000 TABLET ORAL 2 TIMES DAILY PRN
COMMUNITY

## 2025-03-18 RX ORDER — LISINOPRIL 5 MG/1
5 TABLET ORAL DAILY
Status: DISCONTINUED | OUTPATIENT
Start: 2025-03-19 | End: 2025-03-22 | Stop reason: HOSPADM

## 2025-03-18 RX ORDER — GADOBUTROL 604.72 MG/ML
10 INJECTION INTRAVENOUS ONCE
Status: COMPLETED | OUTPATIENT
Start: 2025-03-18 | End: 2025-03-18

## 2025-03-18 RX ORDER — LISINOPRIL 2.5 MG/1
2.5 TABLET ORAL ONCE
Status: COMPLETED | OUTPATIENT
Start: 2025-03-18 | End: 2025-03-18

## 2025-03-18 RX ADMIN — SENNOSIDES AND DOCUSATE SODIUM 1 TABLET: 50; 8.6 TABLET ORAL at 22:02

## 2025-03-18 RX ADMIN — INSULIN ASPART 1 UNITS: 100 INJECTION, SOLUTION INTRAVENOUS; SUBCUTANEOUS at 15:05

## 2025-03-18 RX ADMIN — ACETAMINOPHEN 650 MG: 325 TABLET, FILM COATED ORAL at 08:30

## 2025-03-18 RX ADMIN — INSULIN ASPART 1 UNITS: 100 INJECTION, SOLUTION INTRAVENOUS; SUBCUTANEOUS at 04:26

## 2025-03-18 RX ADMIN — LISINOPRIL 2.5 MG: 2.5 TABLET ORAL at 09:58

## 2025-03-18 RX ADMIN — LEVETIRACETAM 500 MG: 500 TABLET, FILM COATED ORAL at 07:36

## 2025-03-18 RX ADMIN — ACETAMINOPHEN 650 MG: 325 TABLET, FILM COATED ORAL at 20:01

## 2025-03-18 RX ADMIN — POLYETHYLENE GLYCOL 3350 17 G: 17 POWDER, FOR SOLUTION ORAL at 07:36

## 2025-03-18 RX ADMIN — PROCHLORPERAZINE EDISYLATE 10 MG: 5 INJECTION INTRAMUSCULAR; INTRAVENOUS at 19:32

## 2025-03-18 RX ADMIN — SCOPOLAMINE 1 PATCH: 1.5 PATCH, EXTENDED RELEASE TRANSDERMAL at 22:03

## 2025-03-18 RX ADMIN — NICARDIPINE HYDROCHLORIDE 7.5 MG/HR: 0.2 INJECTION INTRAVENOUS at 10:53

## 2025-03-18 RX ADMIN — NICARDIPINE HYDROCHLORIDE 12.5 MG/HR: 0.2 INJECTION INTRAVENOUS at 14:56

## 2025-03-18 RX ADMIN — SODIUM CHLORIDE: 9 INJECTION, SOLUTION INTRAVENOUS at 15:00

## 2025-03-18 RX ADMIN — LISINOPRIL 2.5 MG: 2.5 TABLET ORAL at 22:02

## 2025-03-18 RX ADMIN — ACETAMINOPHEN 650 MG: 325 TABLET, FILM COATED ORAL at 03:04

## 2025-03-18 RX ADMIN — SODIUM CHLORIDE: 9 INJECTION, SOLUTION INTRAVENOUS at 00:37

## 2025-03-18 RX ADMIN — ACETAMINOPHEN 650 MG: 325 TABLET, FILM COATED ORAL at 15:42

## 2025-03-18 RX ADMIN — NICARDIPINE HYDROCHLORIDE 5 MG/HR: 0.2 INJECTION INTRAVENOUS at 20:01

## 2025-03-18 RX ADMIN — INSULIN ASPART 1 UNITS: 100 INJECTION, SOLUTION INTRAVENOUS; SUBCUTANEOUS at 07:39

## 2025-03-18 RX ADMIN — GADOBUTROL 10 ML: 604.72 INJECTION INTRAVENOUS at 17:40

## 2025-03-18 RX ADMIN — HUMAN ALBUMIN MICROSPHERES AND PERFLUTREN 6 ML: 10; .22 INJECTION, SOLUTION INTRAVENOUS at 09:50

## 2025-03-18 RX ADMIN — INSULIN ASPART 1 UNITS: 100 INJECTION, SOLUTION INTRAVENOUS; SUBCUTANEOUS at 20:15

## 2025-03-18 RX ADMIN — MAGNESIUM SULFATE HEPTAHYDRATE 2 G: 2 INJECTION, SOLUTION INTRAVENOUS at 08:04

## 2025-03-18 RX ADMIN — NICARDIPINE HYDROCHLORIDE 10 MG/HR: 0.2 INJECTION INTRAVENOUS at 01:03

## 2025-03-18 RX ADMIN — INSULIN ASPART 1 UNITS: 100 INJECTION, SOLUTION INTRAVENOUS; SUBCUTANEOUS at 02:22

## 2025-03-18 RX ADMIN — NICARDIPINE HYDROCHLORIDE 10 MG/HR: 0.2 INJECTION INTRAVENOUS at 04:48

## 2025-03-18 RX ADMIN — MAGNESIUM SULFATE HEPTAHYDRATE 2 G: 2 INJECTION, SOLUTION INTRAVENOUS at 20:02

## 2025-03-18 ASSESSMENT — ACTIVITIES OF DAILY LIVING (ADL)
ADLS_ACUITY_SCORE: 37
DEPENDENT_IADLS:: INDEPENDENT
ADLS_ACUITY_SCORE: 44
ADLS_ACUITY_SCORE: 37
ADLS_ACUITY_SCORE: 37
ADLS_ACUITY_SCORE: 44
ADLS_ACUITY_SCORE: 18
PREVIOUS_RESPONSIBILITIES: MEAL PREP;HOUSEKEEPING;LAUNDRY;SHOPPING;YARDWORK;MEDICATION MANAGEMENT;DRIVING;WORK
ADLS_ACUITY_SCORE: 44
ADLS_ACUITY_SCORE: 37
ADLS_ACUITY_SCORE: 37
ADLS_ACUITY_SCORE: 44
ADLS_ACUITY_SCORE: 37
ADLS_ACUITY_SCORE: 44
ADLS_ACUITY_SCORE: 44
ADLS_ACUITY_SCORE: 37
ADLS_ACUITY_SCORE: 18
ADLS_ACUITY_SCORE: 37
ADLS_ACUITY_SCORE: 18
ADLS_ACUITY_SCORE: 44
ADLS_ACUITY_SCORE: 44
ADLS_ACUITY_SCORE: 37
ADLS_ACUITY_SCORE: 37

## 2025-03-18 NOTE — PROGRESS NOTES
03/18/25 1000   Appointment Info   Signing Clinician's Name / Credentials (OT) CATARINO Gonzales   Student Supervision Direct supervision provided   Living Environment   People in Home spouse   Current Living Arrangements house   Home Accessibility stairs to enter home;stairs within home   Number of Stairs, Main Entrance 3   Stair Railings, Main Entrance railing on right side (ascending)   Number of Stairs, Within Home, Primary greater than 10 stairs   Stair Railings, Within Home, Primary railing on right side (ascending)   Transportation Anticipated family or friend will provide   Living Environment Comments Pt lives with spouse in house with 3 MEHRDAD and 12 stairs within home. Pt has a walk-in shower.   Self-Care   Usual Activity Tolerance good   Current Activity Tolerance moderate   Regular Exercise No   Equipment Currently Used at Home none   Fall history within last six months yes   Number of times patient has fallen within last six months 1   Activity/Exercise/Self-Care Comment Pt reports being IND with all ADLs at baseline. Pt is ambulating at home with no AD and no concerns of balance. 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   Instrumental Activities of Daily Living (IADL)   Previous Responsibilities meal prep;housekeeping;laundry;shopping;yardwork;medication management;driving;work   IADL Comments Pt reports being IND with all IADLs at baseline. Pt reports working as a  provider, drives self, manages medications.   General Information   Onset of Illness/Injury or Date of Surgery 03/17/25   Referring Physician Desiree Borrero MD   Patient/Family Therapy Goal Statement (OT) Return to home   Additional Occupational Profile Info/Pertinent History of Current Problem Per chart, Azucena Guo is a 64-year-old right-handed female with a past medical history of hypertension, diabetes who takes aspirin for her heart health presents today with headache and vision problems worse in the  left eye. CT head obtained in the ED demonstrated a 3.9 x 5.9 x 4.5 cm right occipital intracerebral hemorrhage with an associated right temporo-occipital convexity subdural 0.4 cm in thickness.  CTA did not show any vascular malformations or active extravasation.   Existing Precautions/Restrictions fall   Limitations/Impairments visual   Left Upper Extremity (Weight-bearing Status) full weight-bearing (FWB)   Right Upper Extremity (Weight-bearing Status) full weight-bearing (FWB)   Left Lower Extremity (Weight-bearing Status) full weight-bearing (FWB)   Right Lower Extremity (Weight-bearing Status) full weight-bearing (FWB)   General Observations and Info Reason for consult: ICH   Cognitive Status Examination   Orientation Status orientation to person, place and time   Cognitive Status Comments Cognition appears intact, will monitor   Visual Perception   Visual Impairment/Limitations corrective lenses full-time;peripheral vision impaired left;visual/perceptual impairments present   Impact of Vision Impairment on Function (Vision) Pt reports bilateral blurry/spotty vision changes. L eye is most affected when looking in peripherals. R eye is most affected when looking towards midline. Will monito   Sensory   Sensory Comments Not formally assessed, will monitor   Pain Assessment   Patient Currently in Pain No   Posture   Posture not impaired   Posture Comments Sitting EOB   Range of Motion Comprehensive   Comment, General Range of Motion B UE AROM appears WFL during functional reching, will monitor; not formally assessed   Strength Comprehensive (MMT)   Comment, General Manual Muscle Testing (MMT) Assessment B UE/LE grossly deconditioned, not formally assessed.   Muscle Tone Assessment   Muscle Tone Quick Adds No deficits were identified   Coordination   Coordination Comments Not formally assessed on this date, will continue to montor with visual changes.   Bed Mobility   Bed Mobility sit-supine   Sit-Supine  Tanner (Bed Mobility) supervision   Comment (Bed Mobility) Per clinical judgement   Transfers   Transfers sit-stand transfer;toilet transfer   Sit-Stand Transfer   Sit-Stand Tanner (Transfers) supervision   Sit/Stand Transfer Comments From EOB   Toilet Transfer   Type (Toilet Transfer) sit-stand   Tanner Level (Toilet Transfer) contact guard   Toilet Transfer Comments UE support on grab bars   Balance   Balance Assessment sitting static balance   Sitting Balance: Static supervision   Balance Comments Sitting EOB   Activities of Daily Living   BADL Assessment/Intervention bathing;lower body dressing;grooming;toileting   Bathing Assessment/Intervention   Tanner Level (Bathing) set up;supervision   Comment, (Bathing) Per clinical judgement   Lower Body Dressing Assessment/Training   Position (Lower Body Dressing) edge of bed sitting   Comment, (Lower Body Dressing) Per clinical judgement   Tanner Level (Lower Body Dressing) supervision   Grooming Assessment/Training   Tanner Level (Grooming) supervision;set up   Comment, (Grooming) Per clinical judgement   Toileting   Comment, (Toileting) Per clinical judgement   Tanner Level (Toileting) supervision   Clinical Impression   Criteria for Skilled Therapeutic Interventions Met (OT) Yes, treatment indicated   OT Diagnosis Decreased IND in ADL/safety, functional mobility, actvity tolerance, environmental navigation, and visual changes.   OT Problem List-Impairments impacting ADL problems related to;activity tolerance impaired;balance;coordination;mobility;range of motion (ROM);strength;vision   Assessment of Occupational Performance 5 or more Performance Deficits   Identified Performance Deficits Functional mobility, activity tolerance, higher level balance, coordination, vision, IADL tasks, work, driving   Planned Therapy Interventions (OT) ADL retraining;IADL retraining;balance training;bed mobility training;fine motor coordination  training;strengthening;visual perception;progressive activity/exercise   Clinical Decision Making Complexity (OT) problem focused assessment/low complexity   Risk & Benefits of therapy have been explained evaluation/treatment results reviewed;care plan/treatment goals reviewed;risks/benefits reviewed;participants voiced agreement with care plan;participants included;patient;spouse/significant other;daughter   Clinical Impression Comments Pt will benefit from skilled OT services during IP stay to progress IND in ADL/safety, neuro re-education, and return to PLOF.   OT Total Evaluation Time   OT Eval, Low Complexity Minutes (65724) 5   OT Goals   Therapy Frequency (OT) 6 times/week   OT Predicted Duration/Target Date for Goal Attainment 04/18/25   OT Goals Hygiene/Grooming;Lower Body Dressing;Lower Body Bathing;Toilet Transfer/Toileting;OT Goal 1;OT Goal 2;Upper Body Bathing;Home Management;Cognition   OT: Hygiene/Grooming independent   OT: Lower Body Dressing Independent   OT: Upper Body Bathing Independent   OT: Lower Body Bathing Independent   OT: Toilet Transfer/Toileting Independent   OT: Home Management Modified independent;with light demand household tasks;ambulatory level   OT: Cognitive Patient/caregiver will verbalize understanding of cognitive assessment results/recommendations as needed for safe discharge planning   OT: Goal 1 Pt will be able to recall 3/3 visual scanning strategies prior to dc home.   OT: Goal 2 OT to complete stair goal if PT defers   OT Discharge Planning   OT Plan OT: monitor visual changes, visual scanning strategies, functional mobility, higher level balance, assess PT needs   OT Discharge Recommendation (DC Rec) home with assist;other (see comments)  (OP neuro opthamology)   OT Rationale for DC Rec Pt below baseline due to vision changes, actvity tolerance, and functional mobility. Pt is moving around with SBA-CGA on this date. Anticipate once medically ready pt will be able to dc  home with assist for functional mobility. Pt may benefit from OP neuro opthamology to better assess/treat visual changes.   OT Brief overview of current status SBA-CGA no AD in-room mobility   OT Total Distance Amb During Session (feet) 10   Total Session Time   Timed Code Treatment Minutes 23   Total Session Time (sum of timed and untimed services) 28

## 2025-03-18 NOTE — H&P
Neurocritical Care History & Physical    Reason for critical care admission: right occipital ICH  Admitting Team: SHERYL  Date of Service:  03/17/2025  Date of Admission:  3/17/2025  Hospital Day: 1    Assessment/Plan  Azucena Guo is a 64 year old right-handed female with history of hypertension, hyperlipidemia, type 2 diabetes, right carotid stenosis, and cystocele with urinary urgency who presented to the ED after she had sudden onset vision changes and headache. On exam, the patient was found to have left temporal homonymous hemianopia. She was found to have 3.9x5.9x4.5 cm acute IPH in right occipital lobe, with adjacent right temporo-occipital convexity subdural hematoma measuring up to 0.4cm in thickness and 0.7cm leftward shift of midline structures. CTA H/N did not show evidence of stenosis or vascular lesion underlying hemorrhage, no active contrast extravasation. Etiology of hemorrhage most likely in setting of hypertension, less likely vascular malformation or aneurysm, no venous abnormality on CTA H/N on discussion with radiology.     Neuro  # Right occipital IPH most likely 2/2 hypertension with 0.7 cm leftward midline shift  # Right temporo-occipital SDH  - Neurochecks every 1 hrs  - SBP goal 130-150  - hold ASA 81mg  - HOB > 30   - PT/OT/SLP  - NSGY consulted and following  - received 1 unit of platelets  - repeat head CT at 6 hours  - MRI brain  - keppra 500mg BID per NSGY    #Right carotid stenosis  - Carotid US in 5/2024 showed 50-69% stenosis  - hold PTA aspirin    #Analgesics & sedation  RASS goal: 0  - PRN acetaminophen and oxycodone for headache    CV  #Hypertension  - Cardiac monitoring  - SBP goal 130-150  - nicardipine infusion  - PRN Labetalol and Hydralazine  - hold PTA Lisinopril 2.5mg     #HLD  - hold PTA simvastatin 40mg   - repeat lipid panel    Resp  #EMILIE  Oxygen/vent: RA  -Continuous pulse ox  -Maintain O2 saturations greater than 92%    GI  # Nausea   Diet: NPO  Last BM: PTA  GI  prophylaxis: not indicated  - Bowel regimen: scheduled Miralax, PRN senna  - PRN zofran and compazine for nausea    Renal/  # hx of cystocele, urinary urgency  -Daily BMP  -IV fluids: NS 75ml/hr   -Electrolyte replacement protocol    Endo  #Type II DM  -Hgb A1c   -Monitor glucose levels  - hold PTA jardiance and PTA metformin  - sliding scale insulin     Heme  #EMILIE  -Daily CBC  -Hgb goal >7, plt goal >100k  -Transfuse to meet Hgb and plt goals    ID  #[email or web address]  -Daily CBC  -Follow temperature curve    ICU Checklist  Lines/tubes/drains: PIV x2, art line placed 3/17  FEN: NPO until stability scan, mIVF at 75ml/hr  PPX: DVT - SCDs; GI - not indicated.  Code: Full code, confirmed on admission  Dispo: ICU - NCC    Clinically Significant Risk Factors Present on Admission                 # Drug Induced Platelet Defect: home medication list includes an antiplatelet medication   # Hypertension: Noted on problem list          # DMII: A1C = N/A within past 6 months                 The patient was discussed with the NCC attending, Dr. Dash.    Desiree Borrero MD  Neurology Resident, PGY-2    Note: Chart documentation done in part with Dragon Voice Recognition software. Although reviewed after completion, some word and grammatical errors may remain.      History of Present Illness:  Azucena Guo is a 64 year old right-handed female with history of hypertension, hyperlipidemia, type 2 diabetes, right carotid stenosis, and cystocele with urinary urgency who presented to the ED after she had sudden onset vision changes and headache.     She was walking out of [employer] this evening and noted that her her car had a flat tire, shortly after this she had a new onset headache that became severe very quickly and she noted that she was having trouble seeing on her left side.  She called EMS due to the symptoms.  The symptoms started at 6:10 PM.  Prior to this she was feeling like her normal self.     No speech changes, trouble  swallowing, weakness, or sensation changes.  No difficulty walking.  She has never had symptoms like these before.  No stroke history or seizure history.     She does not smoke tobacco.     Her blood pressure on arrival was -190. Following CT, given labetalol and nicardipine started given finding of hemorrhage.      Per chart review, the patient has history of ocular migraine, but only has them every once in a while.    Allergies   Allergen Reactions    Sulfa Antibiotics Hives    Measles Mumps And Rubella Vaccine Live (Obsolete)      Redness         Current Medications:  Current Facility-Administered Medications   Medication Dose Route Frequency Provider Last Rate Last Admin    levETIRAcetam (KEPPRA) tablet 500 mg  500 mg Oral or Feeding Tube BID Jaime Keith MD   500 mg at 03/17/25 2214    [START ON 3/18/2025] polyethylene glycol (MIRALAX) Packet 17 g  17 g Oral or Feeding Tube Daily Desiree Borrero MD           PRN Medications:  Current Facility-Administered Medications   Medication Dose Route Frequency Provider Last Rate Last Admin    acetaminophen (TYLENOL) tablet 650 mg  650 mg Oral or Feeding Tube Q4H PRN Desiree Borrero MD   650 mg at 03/17/25 2214    hydrALAZINE (APRESOLINE) injection 10-20 mg  10-20 mg Intravenous Q30 Min PRN Jaime Keith MD        HYDROmorphone (PF) (DILAUDID) injection 0.5 mg  0.5 mg Intravenous Q30 Min PRN Jones Chowdary MD   0.5 mg at 03/17/25 2050    labetalol (NORMODYNE/TRANDATE) injection 10-20 mg  10-20 mg Intravenous Q10 Min PRN Jaime Keith MD        naloxone (NARCAN) injection 0.2 mg  0.2 mg Intravenous Q2 Min PRN Madhav Dash MD        Or    naloxone (NARCAN) injection 0.4 mg  0.4 mg Intravenous Q2 Min PRN Madhav Dash MD        Or    naloxone (NARCAN) injection 0.2 mg  0.2 mg Intramuscular Q2 Min PRN Madhav Dash MD        Or    naloxone (NARCAN) injection 0.4 mg  0.4 mg Intramuscular Q2 Min PRN Madhav Dash MD         ondansetron (ZOFRAN ODT) ODT tab 4 mg  4 mg Oral Q6H PRN Desiree Borrero MD        Or    ondansetron (ZOFRAN) injection 4 mg  4 mg Intravenous Q6H PRN Desiree Borrero MD   4 mg at 03/17/25 2130    oxyCODONE (ROXICODONE) tablet 5 mg  5 mg Oral or Feeding Tube Q4H PRN Jaime Keith MD        prochlorperazine (COMPAZINE) injection 10 mg  10 mg Intravenous Q6H PRN Desiree Borrero MD   10 mg at 03/17/25 2214    Or    prochlorperazine (COMPAZINE) tablet 10 mg  10 mg Oral Q6H PRN Desiree Borrero MD        senna-docusate (SENOKOT-S/PERICOLACE) 8.6-50 MG per tablet 1 tablet  1 tablet Oral or Feeding Tube BID PRN Abner Cardenasn, RPH        Or    senna-docusate (SENOKOT-S/PERICOLACE) 8.6-50 MG per tablet 2 tablet  2 tablet Oral or Feeding Tube BID PRN Waibel Jovana, RPH           Infusions:  Current Facility-Administered Medications   Medication Dose Route Frequency Provider Last Rate Last Admin    niCARdipine 40 mg in 200 mL NS (CARDENE) infusion  0.5-15 mg/hr Intravenous Continuous Desiree Borrero MD 62.5 mL/hr at 03/17/25 2243 12.5 mg/hr at 03/17/25 2243    [START ON 3/18/2025] sodium chloride 0.9 % infusion   Intravenous Continuous Desiree Borrero MD           Physical Examination:  Vitals: BP (!) 149/68   Pulse 77   Temp 97.8  F (36.6  C)   Resp 16   LMP  (LMP Unknown)   SpO2 96%   General:  patient lying in bed without any acute distress, intermittently tearful  HEENT:  normocephalic/atraumatic  Cardio:  RRR  Pulmonary:  no respiratory distress  Abdomen:  non-distended  Extremities:  no edema  Skin:  intact, warm/dry      Neuro Exam  Mental Status:  alert, oriented x 3, follows commands, speech clear and fluent, naming and repetition normal  Cranial Nerves:  PERRL, EOMI with normal smooth pursuit, facial sensation intact and symmetric, facial movements symmetric, hearing not formally tested but intact to conversation, palate elevation symmetric and uvula midline, no dysarthria,  shoulder shrug strong bilaterally, tongue protrusion midline, left temporal homonymous hemianopia  Motor:  normal muscle tone and bulk, no abnormal movements, able to move all limbs spontaneously, strength 5/5 throughout upper and lower extremities, no pronator drift  Reflexes:  no clonus  Sensory:  light touch sensation intact and symmetric throughout upper and lower extremities, no extinction on double simultaneous stimulation   Coordination:  normal finger-to-nose and heel-to-shin bilaterally without dysmetria  Station/Gait:  deferred    NIHSS  1a. Level of Consciousness 0-->Alert, keenly responsive   1b. LOC Questions 0-->Answers both questions correctly   1c. LOC Commands 0-->Performs both tasks correctly   2.   Best Gaze 0-->Normal   3.   Visual 2-->Complete hemianopia   4.   Facial Palsy 0-->Normal symmetrical movements   5a. Motor Arm, Left 0-->No drift, limb holds 90 (or 45) degrees for full 10 secs   5b. Motor Arm, Right 0-->No drift, limb holds 90 (or 45) degrees for full 10 secs   6a. Motor Leg, Left 0-->No drift, leg holds 30 degree position for full 5 secs   6b. Motor Leg, right 0-->No drift, leg holds 30 degree position for full 5 secs   7.   Limb Ataxia 0-->Absent   8.   Sensory 0-->Normal, no sensory loss   9.   Best Language 0-->No aphasia, normal   10. Dysarthria 0-->Normal   11. Extinction and Inattention  0-->No abnormality   Total 2 (03/17/25 2000)       ICH Score (at arrival)  Scoring Tool Score   Age >= 80 years 1 point No   GCS score  3-4   5-12   13-15   2 point  1 point  0 point GCS 13-15   Hematoma volume, cm 3 >= 30 1 point Yes   Intraventricular extension 1 point No   Infratentorial location 1 point No   Total 1     Labs and Imaging:    Results for orders placed or performed during the hospital encounter of 03/17/25 (from the past 24 hours)   CBC with Platelets & Differential    Narrative    The following orders were created for panel order CBC with Platelets & Differential.  Procedure                                Abnormality         Status                     ---------                               -----------         ------                     CBC with platelets and ...[[id number]]                      Final result                 Please view results for these tests on the individual orders.   Basic metabolic panel   Result Value Ref Range    Sodium 137 135 - 145 mmol/L    Potassium 4.0 3.4 - 5.3 mmol/L    Chloride 100 98 - 107 mmol/L    Carbon Dioxide (CO2) 22 22 - 29 mmol/L    Anion Gap 15 7 - 15 mmol/L    Urea Nitrogen 11.7 8.0 - 23.0 mg/dL    Creatinine 0.43 (L) 0.51 - 0.95 mg/dL    GFR Estimate >90 >60 mL/min/1.73m2    Calcium 10.1 8.8 - 10.4 mg/dL    Glucose 136 (H) 70 - 99 mg/dL   INR   Result Value Ref Range    INR 0.93 0.85 - 1.15   Partial thromboplastin time   Result Value Ref Range    aPTT 29 22 - 38 Seconds   Troponin T, High Sensitivity   Result Value Ref Range    Troponin T, High Sensitivity 10 <=14 ng/L   [place] Draw    Narrative    The following orders were created for panel order [place] Draw.  Procedure                               Abnormality         Status                     ---------                               -----------         ------                     Extra Red Top Tube[[id number]]                              In process                   Please view results for these tests on the individual orders.   CBC with platelets and differential   Result Value Ref Range    WBC Count 7.2 4.0 - 11.0 10e3/uL    RBC Count 4.54 3.80 - 5.20 10e6/uL    Hemoglobin 13.5 11.7 - 15.7 g/dL    Hematocrit 40.5 35.0 - 47.0 %    MCV 89 78 - 100 fL    MCH 29.7 26.5 - 33.0 pg    MCHC 33.3 31.5 - 36.5 g/dL    RDW 13.4 10.0 - 15.0 %    Platelet Count 308 150 - 450 10e3/uL    % Neutrophils 59 %    % Lymphocytes 32 %    % Monocytes 7 %    % Eosinophils 1 %    % Basophils 1 %    % Immature Granulocytes 0 %    NRBCs per 100 WBC 0 <1 /100    Absolute Neutrophils 4.3 1.6 - 8.3 10e3/uL    Absolute  Lymphocytes 2.3 0.8 - 5.3 10e3/uL    Absolute Monocytes 0.5 0.0 - 1.3 10e3/uL    Absolute Eosinophils 0.1 0.0 - 0.7 10e3/uL    Absolute Basophils 0.1 0.0 - 0.2 10e3/uL    Absolute Immature Granulocytes 0.0 <=0.4 10e3/uL    Absolute NRBCs 0.0 10e3/uL   ABO and Rh   Result Value Ref Range    ABO/RH(D) A POS     SPECIMEN EXPIRATION DATE [id number]    Magnesium   Result Value Ref Range    Magnesium 2.0 1.7 - 2.3 mg/dL   Phosphorus   Result Value Ref Range    Phosphorus 4.2 2.5 - 4.5 mg/dL   EKG 12-lead, tracing only   Result Value Ref Range    Systolic Blood Pressure  mmHg    Diastolic Blood Pressure  mmHg    Ventricular Rate 82 BPM    Atrial Rate 82 BPM    HI Interval 148 ms    QRS Duration 96 ms     ms    QTc 453 ms    P Axis 59 degrees    R AXIS 37 degrees    T Axis 32 degrees    Interpretation ECG Sinus rhythm  Normal ECG      CTA Head Neck w Contrast    Narrative    EXAM: CTA HEAD NECK W CONTRAST, CT HEAD W/O CONTRAST  LOCATION: [facility]  DATE: 3/17/2025    INDICATION: Headache, weakness, Code Stroke, evaluate for LVO. PLEASE READ IMMEDIATELY.  COMPARISON: CT head 12/30/2008  CONTRAST: 67cc of isovue 370  TECHNIQUE: Head and neck CT angiogram with IV contrast. Noncontrast head CT followed by axial helical CT images of the head and neck vessels obtained during the arterial phase of intravenous contrast administration. Axial 2D reconstructed images and   multiplanar 3D MIP reconstructed images of the head and neck vessels were performed by the technologist. Dose reduction techniques were used. All stenosis measurements made according to NASCET criteria unless otherwise specified.    FINDINGS:   NONCONTRAST HEAD CT:   INTRACRANIAL CONTENTS: There is a 3.9 x 5.9 x 4.5 cm acute intraparenchymal hematoma involving the right occipital lobe. There is an adjacent temporal occipital convexity subdural hematoma measuring up to 0.4 cm in thickness. There is also a right    parafalcine subdural hematoma measuring up to 0.2 cm in thickness. There is subdural blood along the right tentorial leaflet measuring 0.5 cm in thickness. Altogether, this results in 0.7 cm leftward shift of the midline structures. No CT evidence of   acute infarct.      VISUALIZED ORBITS/SINUSES/MASTOIDS: No intraorbital abnormality. No paranasal sinus mucosal disease. No middle ear or mastoid effusion.    BONES/SOFT TISSUES: No acute abnormality.    HEAD CTA:  ANTERIOR CIRCULATION: No stenosis/occlusion, aneurysm, or high flow vascular malformation. Standard [place] anatomy.    POSTERIOR CIRCULATION: No stenosis/occlusion, aneurysm, or high flow vascular malformation. Balanced vertebral arteries supply a normal basilar artery.     DURAL VENOUS SINUSES: Expected enhancement of the major dural venous sinuses.    NECK CTA:   RIGHT CAROTID: There is 20% stenosis of the proximal right ICA.    LEFT CAROTID: No measurable stenosis or dissection.    VERTEBRAL ARTERIES: No focal stenosis or dissection. Balanced vertebral arteries.    AORTIC ARCH: Classic aortic arch anatomy with no significant stenosis at the origin of the great vessels.    NONVASCULAR STRUCTURES: Moderate spinal canal stenosis at C6-C7.      Impression    IMPRESSION:   HEAD CT:  1.  There is a 3.9 x 5.9 x 4.5 cm acute intraparenchymal hematoma involving the right occipital lobe.  2.  There is an adjacent right temporo-occipital convexity subdural hematoma measuring up to 0.4 cm in thickness as well as subdural blood layering along the right tentorial leaflet measuring 0.5 cm and a right parafalcine component measuring 0.2 cm in   thickness.  3.  There is 0.7 cm leftward shift of the midline structures.    HEAD CTA:  1.  Patent intracranial arterial vasculature without flow-limiting stenosis.  2.  No vascular lesion underlying the right occipital hemorrhage. No active contrast extravasation.    NECK CTA:  Patent cervical arterial vasculature  without flow-limiting stenosis.    Findings discussed with Dr. Chowdary on 3/17/2025 at 2034   CT Head w/o Contrast    Narrative    EXAM: CTA HEAD NECK W CONTRAST, CT HEAD W/O CONTRAST  LOCATION: [facility]  DATE: 3/17/2025    INDICATION: Headache, weakness, Code Stroke, evaluate for LVO. PLEASE READ IMMEDIATELY.  COMPARISON: CT head 12/30/2008  CONTRAST: 67cc of isovue 370  TECHNIQUE: Head and neck CT angiogram with IV contrast. Noncontrast head CT followed by axial helical CT images of the head and neck vessels obtained during the arterial phase of intravenous contrast administration. Axial 2D reconstructed images and   multiplanar 3D MIP reconstructed images of the head and neck vessels were performed by the technologist. Dose reduction techniques were used. All stenosis measurements made according to NASCET criteria unless otherwise specified.    FINDINGS:   NONCONTRAST HEAD CT:   INTRACRANIAL CONTENTS: There is a 3.9 x 5.9 x 4.5 cm acute intraparenchymal hematoma involving the right occipital lobe. There is an adjacent temporal occipital convexity subdural hematoma measuring up to 0.4 cm in thickness. There is also a right   parafalcine subdural hematoma measuring up to 0.2 cm in thickness. There is subdural blood along the right tentorial leaflet measuring 0.5 cm in thickness. Altogether, this results in 0.7 cm leftward shift of the midline structures. No CT evidence of   acute infarct.      VISUALIZED ORBITS/SINUSES/MASTOIDS: No intraorbital abnormality. No paranasal sinus mucosal disease. No middle ear or mastoid effusion.    BONES/SOFT TISSUES: No acute abnormality.    HEAD CTA:  ANTERIOR CIRCULATION: No stenosis/occlusion, aneurysm, or high flow vascular malformation. Standard [place] anatomy.    POSTERIOR CIRCULATION: No stenosis/occlusion, aneurysm, or high flow vascular malformation. Balanced vertebral arteries supply a normal basilar artery.      DURAL VENOUS SINUSES: Expected enhancement of the major dural venous sinuses.    NECK CTA:   RIGHT CAROTID: There is 20% stenosis of the proximal right ICA.    LEFT CAROTID: No measurable stenosis or dissection.    VERTEBRAL ARTERIES: No focal stenosis or dissection. Balanced vertebral arteries.    AORTIC ARCH: Classic aortic arch anatomy with no significant stenosis at the origin of the great vessels.    NONVASCULAR STRUCTURES: Moderate spinal canal stenosis at C6-C7.      Impression    IMPRESSION:   HEAD CT:  1.  There is a 3.9 x 5.9 x 4.5 cm acute intraparenchymal hematoma involving the right occipital lobe.  2.  There is an adjacent right temporo-occipital convexity subdural hematoma measuring up to 0.4 cm in thickness as well as subdural blood layering along the right tentorial leaflet measuring 0.5 cm and a right parafalcine component measuring 0.2 cm in   thickness.  3.  There is 0.7 cm leftward shift of the midline structures.    HEAD CTA:  1.  Patent intracranial arterial vasculature without flow-limiting stenosis.  2.  No vascular lesion underlying the right occipital hemorrhage. No active contrast extravasation.    NECK CTA:  Patent cervical arterial vasculature without flow-limiting stenosis.    Findings discussed with Dr. Chowdary on 3/17/2025 at 2034   Prepare pheresed platelets (unit)   Result Value Ref Range    Blood Component Type Platelets     Product Code U2023D13     Unit Status Transfused     Unit Number [id number]     CODING SYSTEM [id number]     ISSUE DATE AND TIME [id number]     UNIT ABO/RH AB+     UNIT TYPE ISBT 8400    Glucose by meter   Result Value Ref Range    GLUCOSE BY METER POCT 137 (H) 70 - 99 mg/dL   Troponin T, High Sensitivity   Result Value Ref Range    Troponin T, High Sensitivity 7 <=14 ng/L       All relevant imaging and laboratory values personally reviewed.

## 2025-03-18 NOTE — PHARMACY
Pharmacy Stroke Code Response    Pharmacist responded as part of the Stroke Code Team activation to patient care area E01. The Stroke Team determined that the patient was not a candidate for IV thrombolytic therapy and the pharmacy team was dismissed at 2000.    Marylou Rose PharmD, BCPS

## 2025-03-18 NOTE — PROGRESS NOTES
Neurocritical Care Progress Note    Reason for critical care admission: right occipital ICH  Admitting Team: SHERYL  Date of Service:  03/18/2025  Date of Admission:  3/17/2025  Hospital Day: 2    Assessment/Plan  Azucena Guo is a 64 year old right-handed female with history of hypertension, hyperlipidemia, type 2 diabetes, right carotid stenosis, and cystocele with urinary urgency who presented to the ED after she had sudden onset vision changes and headache. On exam, the patient was found to have left temporal homonymous hemianopia. She was found to have 3.9x5.9x4.5 cm acute IPH in right occipital lobe, with adjacent right temporo-occipital convexity subdural hematoma measuring up to 0.4cm in thickness and 0.7cm leftward shift of midline structures. CTA H/N did not show evidence of stenosis or vascular lesion underlying hemorrhage, no active contrast extravasation. Etiology of hemorrhage most likely in setting of hypertension, less likely vascular malformation or aneurysm, no venous abnormality on CTA H/N on discussion with radiology.     24 hour events/changes today:  -patient admitted to the neuro ICU overnight  -CTH this AM roughly stable   -Discontinue Keppra- not indicated  -Discontinue oxycodone  -Headache cocktails- IV mag 2g as needed  -Resume PTA lisinopril  -Schedule Bowel meds  -CXR    Neuro  # Right occipital IPH most likely 2/2 hypertension with 0.7 cm leftward midline shift  # Right temporo-occipital SDH  - Neurochecks every 1 hrs  - SBP goal 130-150  - hold ASA 81mg  - HOB > 30   - PT/OT/SLP  - NSGY consulted and following  - repeat head CT at 6 hours  - MRI brain w/wo and MRV  - keppra 500mg BID recommended by NSGY was discontinued on 3/18     #Right carotid stenosis  - Carotid US in 5/2024 showed 50-69% stenosis  - hold PTA aspirin     #Analgesics & sedation  RASS goal: 0  - PRN acetaminophen and PRN headache cocktails for headache (mag as needed as well)     CV  #Hypertension  - Cardiac  monitoring  - SBP goal 130-150  - nicardipine infusion  - PRN Labetalol and Hydralazine  - Resume PTA Lisinopril 2.5mg      #HLD  - Resume PTA simvastatin 40mg   - LDL 76     Resp  #EMILIE  Oxygen/vent: RA  -Continuous pulse ox  -Maintain O2 saturations greater than 92%  -Obtain CXR 3/18.     GI  # Nausea   Diet: Regular  Last BM: PTA  GI prophylaxis: not indicated  - Bowel regimen: scheduled Miralax, scheduled senna  - PRN zofran and compazine for nausea     Renal/  # hx of cystocele, urinary urgency  -Daily BMP  -IV fluids: NS 75ml/hr (can discontinue when tolerating diet  -Electrolyte replacement protocol     Endo  #Type II DM  -Hgb A1c   -Monitor glucose levels  - hold PTA jardiance and PTA metformin  - sliding scale insulin      Heme  #EMILIE  -Daily CBC  -Hgb goal >7, plt goal >100k  -Transfuse to meet Hgb and plt goals     ID  #EMILIE  -Daily CBC  -Follow temperature curve     ICU Checklist  Lines/tubes/drains: PIV x2, art line placed 3/17  FEN: Regular diet  PPX: DVT - SCDs; GI - not indicated.  Code: Full code, confirmed on admission  Dispo: ICU - NCC           Clinically Significant Risk Factors Present on Admission                 # Drug Induced Platelet Defect: home medication list includes an antiplatelet medication   # Hypertension: Noted on problem list          # DMII: A1C = 7.3 % (Ref range: <5.7 %) within past 6 months        # Financial/Environmental Concerns: none           The patient was discussed with the NCC attending, Dr. Dash.     Jones Turk MD  Neurology Resident, PGY-3     Note: Chart documentation done in part with Dragon Voice Recognition software. Although reviewed after completion, some word and grammatical errors may remain.    24 Hour Vital Signs Summary:  Temp: 98.2  F (36.8  C) Temp  Min: 97.7  F (36.5  C)  Max: 99.3  F (37.4  C)  Resp: 17 Resp  Min: 16  Max: 20  SpO2: 96 % SpO2  Min: 90 %  Max: 99 %  Pulse: 84 Pulse  Min: 76  Max: 89    No data recorded  BP: 116/51 Systolic  (24hrs), Av , Min:116 , Max:191   Diastolic (24hrs), Av, Min:51, Max:105      Respiratory monitoring:   Resp: 17    I/O last 3 completed shifts:  In: 1073.1 [I.V.:857.85; IV Piggyback:4]  Out: 500 [Urine:500]    Current Medications:  Current Facility-Administered Medications   Medication Dose Route Frequency Provider Last Rate Last Admin    insulin aspart (NovoLOG) injection (RAPID ACTING)  1-4 Units Subcutaneous Q4H Desiree Borrero MD   1 Units at 25 0739    lisinopril (ZESTRIL) tablet 2.5 mg  2.5 mg Oral or Feeding Tube Daily Sangeetha Kelley PA-C   2.5 mg at 25 0958    polyethylene glycol (MIRALAX) Packet 17 g  17 g Oral or Feeding Tube Daily Desiree Borrero MD   17 g at 25 0736    senna-docusate (SENOKOT-S/PERICOLACE) 8.6-50 MG per tablet 1 tablet  1 tablet Oral or Feeding Tube At Bedtime Sangeetha Kelley PA-C        Or    senna-docusate (SENOKOT-S/PERICOLACE) 8.6-50 MG per tablet 2 tablet  2 tablet Oral or Feeding Tube At Bedtime Sangeetha Kelley PA-C           PRN Medications:  Current Facility-Administered Medications   Medication Dose Route Frequency Provider Last Rate Last Admin    acetaminophen (TYLENOL) tablet 650 mg  650 mg Oral or Feeding Tube Q4H PRN Desiree Borrero MD   650 mg at 25 0830    glucose gel 15-30 g  15-30 g Oral Q15 Min PRN Desiree Borrero MD        Or    dextrose 50 % injection 25-50 mL  25-50 mL Intravenous Q15 Min PRN Desiree Borrero MD        Or    glucagon injection 1 mg  1 mg Subcutaneous Q15 Min PRN Desiree Borrero MD        hydrALAZINE (APRESOLINE) injection 10-20 mg  10-20 mg Intravenous Q30 Min PRN Jaime Keith MD        labetalol (NORMODYNE/TRANDATE) injection 10-20 mg  10-20 mg Intravenous Q10 Min PRN Jaime Keith MD        naloxone (NARCAN) injection 0.2 mg  0.2 mg Intravenous Q2 Min PRN Madhav Dash MD        Or    naloxone (NARCAN) injection 0.4 mg  0.4 mg Intravenous Q2 Min PRN Madhav Dash MD        Or     naloxone (NARCAN) injection 0.2 mg  0.2 mg Intramuscular Q2 Min PRN Madhav Dash MD        Or    naloxone (NARCAN) injection 0.4 mg  0.4 mg Intramuscular Q2 Min PRN Madhav Dash MD        ondansetron (ZOFRAN ODT) ODT tab 4 mg  4 mg Oral Q6H PRN Desiree Borrero MD        Or    ondansetron (ZOFRAN) injection 4 mg  4 mg Intravenous Q6H PRN Desiree Borrero MD   4 mg at 03/17/25 2130    prochlorperazine (COMPAZINE) injection 10 mg  10 mg Intravenous Q6H PRN Desiree Borrero MD   10 mg at 03/17/25 2214    Or    prochlorperazine (COMPAZINE) tablet 10 mg  10 mg Oral Q6H PRN Desiree Borrero MD           Infusions:  Current Facility-Administered Medications   Medication Dose Route Frequency Provider Last Rate Last Admin    niCARdipine 40 mg in 200 mL NS (CARDENE) infusion  0.5-15 mg/hr Intravenous Continuous Desiree Borrero MD 37.5 mL/hr at 03/18/25 1053 7.5 mg/hr at 03/18/25 1053    sodium chloride 0.9 % infusion   Intravenous Continuous Desiree Borrero MD 75 mL/hr at 03/18/25 1200 Rate Verify at 03/18/25 1200       Allergies   Allergen Reactions    Sulfa Antibiotics Hives    Measles Mumps And Rubella Vaccine Live (Obsolete)      Redness         Physical Examination:  Vitals: /51   Pulse 84   Temp 98.2  F (36.8  C)   Resp 17   Wt 123.8 kg (272 lb 14.9 oz)   LMP  (LMP Unknown)   SpO2 96%   BMI 42.75 kg/m    General:  patient lying in bed without any acute distress  HEENT:  normocephalic/atraumatic  Cardio:  RRR  Pulmonary:  no respiratory distress  Abdomen:  non-distended  Extremities:  no edema  Skin:  intact, warm/dry      Neuro Exam  Mental Status:  alert, oriented x 3, follows commands, speech clear and fluent, naming and repetition normal  Cranial Nerves:  PERRL, EOMI with normal smooth pursuit, facial sensation intact and symmetric, facial movements symmetric, hearing not formally tested but intact to conversation, palate elevation symmetric and uvula midline, no  dysarthria, shoulder shrug strong bilaterally, tongue protrusion midline, left temporal homonymous hemianopia  Motor:  normal muscle tone and bulk, no abnormal movements, able to move all limbs spontaneously, strength 5/5 throughout upper and lower extremities, no pronator drift  Reflexes:  no clonus  Sensory:  light touch sensation intact and symmetric throughout upper and lower extremities, no extinction on double simultaneous stimulation   Coordination:  normal finger-to-nose and heel-to-shin bilaterally without dysmetria  Station/Gait:  deferred      NIHSS  1a. Level of Consciousness 0-->Alert, keenly responsive   1b. LOC Questions 0-->Answers both questions correctly   1c. LOC Commands 0-->Performs both tasks correctly   2.   Best Gaze 0-->Normal   3.   Visual 2-->Complete hemianopia   4.   Facial Palsy 0-->Normal symmetrical movements   5a. Motor Arm, Left 0-->No drift, limb holds 90 (or 45) degrees for full 10 secs   5b. Motor Arm, Right 0-->No drift, limb holds 90 (or 45) degrees for full 10 secs   6a. Motor Leg, Left 0-->No drift, leg holds 30 degree position for full 5 secs   6b. Motor Leg, right 0-->No drift, leg holds 30 degree position for full 5 secs   7.   Limb Ataxia 0-->Absent   8.   Sensory 0-->Normal, no sensory loss   9.   Best Language 0-->No aphasia, normal   10. Dysarthria 0-->Normal   11. Extinction and Inattention  0-->No abnormality   Total 2       Labs and Imaging:    All relevant imaging and laboratory values personally reviewed.

## 2025-03-18 NOTE — CONSULTS
Care Management Initial Consult    General Information  Assessment completed with: Patient, Children, pt, Megan and Jimena muro  Type of CM/SW Visit: Initial Assessment    Primary Care Provider verified and updated as needed: Yes   Readmission within the last 30 days: no previous admission in last 30 days         Advance Care Planning: Advance Care Planning Reviewed: no concerns identified        Communication Assessment  Patient's communication style: spoken language (English or Bilingual)    Hearing Difficulty or Deaf: no   Wear Glasses or Blind: yes    Cognitive  Cognitive/Neuro/Behavioral: .WDL except  Level of Consciousness: alert  Arousal Level: opens eyes spontaneously  Orientation: oriented x 4  Mood/Behavior: calm, cooperative  Best Language: 0 - No aphasia  Speech: clear, spontaneous, logical    Living Environment:   People in home: spouse  Stephane  Current living Arrangements: house      Able to return to prior arrangements: yes    Family/Social Support:  Care provided by: self  Provides care for: no one  Marital Status:   Support system: , Children  Stephane       Description of Support System: Supportive, Involved       Current Resources:   Patient receiving home care services: No     Community Resources: None  Equipment currently used at home: none  Supplies currently used at home:      Employment/Financial:  Employment Status: employed full-time        Financial Concerns: none   Referral to Financial Worker: No     Does the patient's insurance plan have a 3 day qualifying hospital stay waiver?  No    Lifestyle & Psychosocial Needs:  Social Drivers of Health     Food Insecurity: Low Risk  (3/18/2025)    Food Insecurity     Within the past 12 months, did you worry that your food would run out before you got money to buy more?: No     Within the past 12 months, did the food you bought just not last and you didn t have money to get more?: No   Depression: Not at risk (3/18/2024)    PHQ-2      PHQ-2 Score: 0   Housing Stability: Low Risk  (3/18/2025)    Housing Stability     Do you have housing? : Yes     Are you worried about losing your housing?: No   Tobacco Use: Medium Risk (2/22/2025)    Patient History     Smoking Tobacco Use: Former     Smokeless Tobacco Use: Never     Passive Exposure: Not on file   Financial Resource Strain: Low Risk  (3/18/2025)    Financial Resource Strain     Within the past 12 months, have you or your family members you live with been unable to get utilities (heat, electricity) when it was really needed?: No   Alcohol Use: Not on file   Transportation Needs: Low Risk  (3/18/2025)    Transportation Needs     Within the past 12 months, has lack of transportation kept you from medical appointments, getting your medicines, non-medical meetings or appointments, work, or from getting things that you need?: No   Physical Activity: Insufficiently Active (3/15/2024)    Exercise Vital Sign     Days of Exercise per Week: 3 days     Minutes of Exercise per Session: 30 min   Interpersonal Safety: Low Risk  (3/18/2025)    Interpersonal Safety     Do you feel physically and emotionally safe where you currently live?: Yes     Within the past 12 months, have you been hit, slapped, kicked or otherwise physically hurt by someone?: No     Within the past 12 months, have you been humiliated or emotionally abused in other ways by your partner or ex-partner?: No   Stress: No Stress Concern Present (3/15/2024)    [employer] [place] [employer] - Occupational Stress Questionnaire     Feeling of Stress : Only a little   Social Connections: Unknown (3/15/2024)    Social Connection and Isolation Panel [NHANES]     Frequency of Communication with Friends and Family: Not on file     Frequency of Social Gatherings with Friends and Family: Twice a week     Attends Pentec[employer]al Services: Not on file     Active Member of Clubs or Organizations: Not on file     Attends Club or Organization Meetings: Not  on file     Marital Status: Not on file   Health Literacy: Not on file       Functional Status:  Prior to admission patient needed assistance:   Dependent ADLs:: Independent  Dependent IADLs:: Independent       Mental Health Status:  Mental Health Status: No Current Concerns       Chemical Dependency Status:  Chemical Dependency Status: No Current Concerns         Values/Beliefs:  Spiritual, Cultural Beliefs, Adventism Practices, Values that affect care:    Description of Beliefs that Will Affect Care: Jew          Discussed  Partnership in Safe Discharge Planning  document with patient/family: Yes, discussed with pt and Jimena muro    Additional Information:  Care management consulted to complete assessment and assess discharge needs for stroke admission.   RNCC met with pt and Jimena muro to introduce RNCC role and complete assessment.  Pt lives with spouse.  Pt has 3 Childrens.  They all live locally.  Pt was ind. with all cares, mobility, driving and working prior hospitalization.  Pt was not receiving any home care or community service prior hospitalization.    RNCC discussed about TCU vs home with home care vs home with OP rehab discharge options.  RNCC informed pt and dtr that RNCC/SW will cont. to follow the plan of care and assist with the discharge planning.  Pt and dtr agreed.    RNCC shared info. about discount parking rates options.    Next Steps:   Discharge needs are not known at this time.  RNCC/SW will cont to follow the plan of care.      Olivier Willis RN, PHN, BSN  4A and 4E/ ICU  Care Coordinator  Phone: [phone]  Pager: [phone]      SEARCHABLE in [facility] - search CARE COORDINATOR       [place] [employer] [place] ([id number]) Saturday & Sunday; ([phone]) [facility] Recognized Holidays     Units: 5A Onc Vocera & 5C Vocera Pager: [phone]    Units: 6B Vocera & 6C Vocera  Pager: [phone]    Units: 7A SOT RNCC Vocera, 7B Med Surg Vocera, & 7C Med Surg Vocera  Pager:  [phone]    Units: 6A Vocera & 4A CVICU Vocera, 4C MICU Vocera, and 4E SICU Vocera   Pager: [phone]    Units: 5 Ortho Vocera & 5 Med Surg Vocera  Pager: [phone]    Units: 6 Med Surg Vocera & 8 Med Surg Vocera  Pager [phone]

## 2025-03-18 NOTE — PLAN OF CARE
Goal Outcome Evaluation:  Pt is A&Ox4, RA on oxymask @ 2L when sleeping. VSS. Pt neurologically intact except with the loss of vision in her left eye and blurriness on the R eye. CT,MRI complete. Reg diet. Voids to the bathroom with 1 person assist. No major events this shift.      Problem: Adult Inpatient Plan of Care  Goal: Plan of Care Review  Description: The Plan of Care Review/Shift note should be completed every shift.  The Outcome Evaluation is a brief statement about your assessment that the patient is improving, declining, or no change.  This information will be displayed automatically on your shift  note.  Outcome: Progressing  Goal: Absence of Hospital-Acquired Illness or Injury  Outcome: Progressing  Intervention: Identify and Manage Fall Risk  Recent Flowsheet Documentation  Taken 3/18/2025 1600 by Doris Sol RN  Safety Promotion/Fall Prevention:   activity supervised   assistive device/personal items within reach   clutter free environment maintained   increase visualization of patient   patient and family education   room near nurse's station   treat reversible contributory factors  Taken 3/18/2025 1200 by Doris Sol RN  Safety Promotion/Fall Prevention:   activity supervised   assistive device/personal items within reach   clutter free environment maintained   increase visualization of patient   patient and family education   room near nurse's station   treat reversible contributory factors  Taken 3/18/2025 0800 by Doris Sol RN  Safety Promotion/Fall Prevention:   activity supervised   assistive device/personal items within reach   clutter free environment maintained   increase visualization of patient   patient and family education   room near nurse's station   treat reversible contributory factors  Intervention: Prevent Skin Injury  Recent Flowsheet Documentation  Taken 3/18/2025 1600 by Doris Sol RN  Body Position: position changed independently  Skin Protection:   incontinence pads  utilized   pulse oximeter probe site changed   silicone foam dressing in place  Taken 3/18/2025 1200 by Doris Sol RN  Skin Protection:   incontinence pads utilized   pulse oximeter probe site changed   silicone foam dressing in place  Taken 3/18/2025 1000 by Doris Sol RN  Body Position: position changed independently  Taken 3/18/2025 0800 by Doris Sol RN  Body Position: position changed independently  Skin Protection:   incontinence pads utilized   pulse oximeter probe site changed   silicone foam dressing in place  Intervention: Prevent and Manage VTE (Venous Thromboembolism) Risk  Recent Flowsheet Documentation  Taken 3/18/2025 1600 by Doris Sol RN  VTE Prevention/Management: SCDs off (sequential compression devices)  Taken 3/18/2025 1200 by Doris Sol RN  VTE Prevention/Management: SCDs off (sequential compression devices)  Taken 3/18/2025 0800 by Doris Sol RN  VTE Prevention/Management: SCDs off (sequential compression devices)  Intervention: Prevent Infection  Recent Flowsheet Documentation  Taken 3/18/2025 1600 by Doris Sol RN  Infection Prevention:   environmental surveillance performed   equipment surfaces disinfected   hand hygiene promoted   personal protective equipment utilized   rest/sleep promoted   single patient room provided   visitors restricted/screened  Taken 3/18/2025 1200 by Doris Sol RN  Infection Prevention:   environmental surveillance performed   equipment surfaces disinfected   hand hygiene promoted   personal protective equipment utilized   rest/sleep promoted   single patient room provided   visitors restricted/screened  Taken 3/18/2025 0800 by Doris Sol RN  Infection Prevention:   environmental surveillance performed   equipment surfaces disinfected   hand hygiene promoted   personal protective equipment utilized   rest/sleep promoted   single patient room provided   visitors restricted/screened     Problem: Stroke, Intracerebral Hemorrhage  Goal:  Optimal Coping  Outcome: Progressing  Intervention: Support Psychosocial Response to Stroke  Recent Flowsheet Documentation  Taken 3/18/2025 1600 by Doris Sol, RN  Family/Support System Care: presence promoted  Taken 3/18/2025 1200 by Doris Sol, RN  Family/Support System Care: presence promoted  Taken 3/18/2025 0800 by Doris Sol, RN  Family/Support System Care: presence promoted

## 2025-03-18 NOTE — PROCEDURES
[facility]  Arterial line Procedure Note    Name of Procedure: Left-Sided Arterial Line Placement    Date of Procedure: March 18, 2025    Description of Procedure  Consent for the procedure was obtained from the patient after its risks and benefits were thoroughly explained. The patient was placed in the supine position and her left wrist was hyperextended. The puncture site was then prepped and draped in the usual sterile fashion and 2 mL of 1% Lidocaine was infiltrated into skin and subcutaneous tissue overlying the radial artery. A 20-gauge needle was then advanced through the patient's skin and subcutaneous tissue and entered into the patient's radial artery. Strong pulsatile blood flow was immediately observed coming from the needle. A guidewire was then advanced through the needle. The needle was subsequently removed over the guidewire, after which an arterial catheter was advanced over the guidewire. The guidewire was then removed and the catheter was attached to a transducer. It was subsequently sutured into place. After cleaning the site of entry, the patient's wrist was relaxed and a sterile dressing was applied. There were not any immediate complications and the patient tolerated the procedure well.    Findings: A good waveform was observed on the monitor and the arterial line blood pressure readings matched those obtained via the blood pressure cuff.    Dr. Salazar, was present through out the procedure.    Jaime Keith MD  Neurosurgery Resident

## 2025-03-18 NOTE — PROGRESS NOTES
[facility], [place]     Neurosurgery Progress Note:  03/18/2025      Interval History: Exam stable. A line placed. On nicardipine gtt. 1u platelets given. CT head with slight worsening of the bleed- final read pending.    Assessment:  Azucena Guo is a 64-year-old right-handed female with a past medical history of hypertension, diabetes who takes aspirin for her heart health presents today with headache and vision problems worse in the left eye. CT head obtained in the ED demonstrated a 3.9 x 5.9 x 4.5 cm right occipital intracerebral hemorrhage with an associated right temporo-occipital convexity subdural 0.4 cm in thickness.  CTA did not show any vascular malformations or active extravasation.     She is now PBD 1    Clinically Significant Risk Factors Present on Admission                 # Drug Induced Platelet Defect: home medication list includes an antiplatelet medication   # Hypertension: Noted on problem list          # DMII: A1C = N/A within past 6 months            # Compression of brain and # Cerebral edema       Plan:  No neurosurgical intervention indicated at this time   CT head with slight worsening of the bleed- final read pending.  MRI w/wo when able  Hold all antiplatelets and anticoagulants  1u platelets  HOB > 30 degrees  Q1H neuro exams   Keppra 7d for seizure ppx  Maintain SBP < 140  Echo  Keep NPO until stability scan  Platelets > 100,000  INR < 1.5  Hemoglobin > 8  DVT: SCDs while in bed  Neurosurgery will follow closely    -----------------------------------  Jaime Keith MD  Neurosurgery resident  Pager [phone]  -----------------------------------    Gen: Appears comfortable, NAD  Neurologic:  Alert & Oriented to person, place, time, and situation  Follows commands briskly  Speech fluent, spontaneous. No aphasia or dysarthria.  No gaze preference. No apparent hemineglect.  Left hemianopsia, worse on the left  PERRL, EOMI  Face symmetric with sensation intact to  light touch  Palate elevates symmetrically, uvula midline, tongue protrudes midline  Trapezii muscles 5/5 bilaterally  No pronator drift     Del Tr Bi WE WF Gr   R 5 5 5 5 5 5   L 5 5 5 5 5 5    HF KE KF DF PF EHL   R 5 5 5 5 5 5   L 5 5 5 5 5 5     Reflexes 2+ throughout    Sensation intact and symmetric to light touch throughout    Objective:   Temp:  [97.8  F (36.6  C)-97.9  F (36.6  C)] 97.8  F (36.6  C)  Pulse:  [76-88] 77  Resp:  [16-20] 16  BP: (120-191)/() 149/68  MAP:  [93 mmHg-97 mmHg] 93 mmHg  Arterial Line BP: (118-138)/(69-78) 138/69  SpO2:  [92 %-97 %] 96 %  No intake/output data recorded.        LABS:  Recent Labs   Lab 03/18/25 0220 03/17/25 2104 03/17/25 1956   NA  --   --  137   POTASSIUM  --   --  4.0   CHLORIDE  --   --  100   CO2  --   --  22   ANIONGAP  --   --  15   * 137* 136*   BUN  --   --  11.7   CR  --   --  0.43*   ALLY  --   --  10.1       Recent Labs   Lab 03/17/25 1956   WBC 7.2   RBC 4.54   HGB 13.5   HCT 40.5   MCV 89   MCH 29.7   MCHC 33.3   RDW 13.4          IMAGING:  Recent Results (from the past 24 hours)   CTA Head Neck w Contrast    Narrative    EXAM: CTA HEAD NECK W CONTRAST, CT HEAD W/O CONTRAST  LOCATION: [facility]  DATE: 3/17/2025    INDICATION: Headache, weakness, Code Stroke, evaluate for LVO. PLEASE READ IMMEDIATELY.  COMPARISON: CT head 12/30/2008  CONTRAST: 67cc of isovue 370  TECHNIQUE: Head and neck CT angiogram with IV contrast. Noncontrast head CT followed by axial helical CT images of the head and neck vessels obtained during the arterial phase of intravenous contrast administration. Axial 2D reconstructed images and   multiplanar 3D MIP reconstructed images of the head and neck vessels were performed by the technologist. Dose reduction techniques were used. All stenosis measurements made according to NASCET criteria unless otherwise specified.    FINDINGS:   NONCONTRAST HEAD CT:   INTRACRANIAL  CONTENTS: There is a 3.9 x 5.9 x 4.5 cm acute intraparenchymal hematoma involving the right occipital lobe. There is an adjacent temporal occipital convexity subdural hematoma measuring up to 0.4 cm in thickness. There is also a right   parafalcine subdural hematoma measuring up to 0.2 cm in thickness. There is subdural blood along the right tentorial leaflet measuring 0.5 cm in thickness. Altogether, this results in 0.7 cm leftward shift of the midline structures. No CT evidence of   acute infarct.      VISUALIZED ORBITS/SINUSES/MASTOIDS: No intraorbital abnormality. No paranasal sinus mucosal disease. No middle ear or mastoid effusion.    BONES/SOFT TISSUES: No acute abnormality.    HEAD CTA:  ANTERIOR CIRCULATION: No stenosis/occlusion, aneurysm, or high flow vascular malformation. Standard [place] anatomy.    POSTERIOR CIRCULATION: No stenosis/occlusion, aneurysm, or high flow vascular malformation. Balanced vertebral arteries supply a normal basilar artery.     DURAL VENOUS SINUSES: Expected enhancement of the major dural venous sinuses.    NECK CTA:   RIGHT CAROTID: There is 20% stenosis of the proximal right ICA.    LEFT CAROTID: No measurable stenosis or dissection.    VERTEBRAL ARTERIES: No focal stenosis or dissection. Balanced vertebral arteries.    AORTIC ARCH: Classic aortic arch anatomy with no significant stenosis at the origin of the great vessels.    NONVASCULAR STRUCTURES: Moderate spinal canal stenosis at C6-C7.      Impression    IMPRESSION:   HEAD CT:  1.  There is a 3.9 x 5.9 x 4.5 cm acute intraparenchymal hematoma involving the right occipital lobe.  2.  There is an adjacent right temporo-occipital convexity subdural hematoma measuring up to 0.4 cm in thickness as well as subdural blood layering along the right tentorial leaflet measuring 0.5 cm and a right parafalcine component measuring 0.2 cm in   thickness.  3.  There is 0.7 cm leftward shift of the midline structures.    HEAD  CTA:  1.  Patent intracranial arterial vasculature without flow-limiting stenosis.  2.  No vascular lesion underlying the right occipital hemorrhage. No active contrast extravasation.    NECK CTA:  Patent cervical arterial vasculature without flow-limiting stenosis.    Findings discussed with Dr. Chowdary on 3/17/2025 at 2034   CT Head w/o Contrast    Narrative    EXAM: CTA HEAD NECK W CONTRAST, CT HEAD W/O CONTRAST  LOCATION: [facility]  DATE: 3/17/2025    INDICATION: Headache, weakness, Code Stroke, evaluate for LVO. PLEASE READ IMMEDIATELY.  COMPARISON: CT head 12/30/2008  CONTRAST: 67cc of isovue 370  TECHNIQUE: Head and neck CT angiogram with IV contrast. Noncontrast head CT followed by axial helical CT images of the head and neck vessels obtained during the arterial phase of intravenous contrast administration. Axial 2D reconstructed images and   multiplanar 3D MIP reconstructed images of the head and neck vessels were performed by the technologist. Dose reduction techniques were used. All stenosis measurements made according to NASCET criteria unless otherwise specified.    FINDINGS:   NONCONTRAST HEAD CT:   INTRACRANIAL CONTENTS: There is a 3.9 x 5.9 x 4.5 cm acute intraparenchymal hematoma involving the right occipital lobe. There is an adjacent temporal occipital convexity subdural hematoma measuring up to 0.4 cm in thickness. There is also a right   parafalcine subdural hematoma measuring up to 0.2 cm in thickness. There is subdural blood along the right tentorial leaflet measuring 0.5 cm in thickness. Altogether, this results in 0.7 cm leftward shift of the midline structures. No CT evidence of   acute infarct.      VISUALIZED ORBITS/SINUSES/MASTOIDS: No intraorbital abnormality. No paranasal sinus mucosal disease. No middle ear or mastoid effusion.    BONES/SOFT TISSUES: No acute abnormality.    HEAD CTA:  ANTERIOR CIRCULATION: No stenosis/occlusion, aneurysm,  or high flow vascular malformation. Standard [place] anatomy.    POSTERIOR CIRCULATION: No stenosis/occlusion, aneurysm, or high flow vascular malformation. Balanced vertebral arteries supply a normal basilar artery.     DURAL VENOUS SINUSES: Expected enhancement of the major dural venous sinuses.    NECK CTA:   RIGHT CAROTID: There is 20% stenosis of the proximal right ICA.    LEFT CAROTID: No measurable stenosis or dissection.    VERTEBRAL ARTERIES: No focal stenosis or dissection. Balanced vertebral arteries.    AORTIC ARCH: Classic aortic arch anatomy with no significant stenosis at the origin of the great vessels.    NONVASCULAR STRUCTURES: Moderate spinal canal stenosis at C6-C7.      Impression    IMPRESSION:   HEAD CT:  1.  There is a 3.9 x 5.9 x 4.5 cm acute intraparenchymal hematoma involving the right occipital lobe.  2.  There is an adjacent right temporo-occipital convexity subdural hematoma measuring up to 0.4 cm in thickness as well as subdural blood layering along the right tentorial leaflet measuring 0.5 cm and a right parafalcine component measuring 0.2 cm in   thickness.  3.  There is 0.7 cm leftward shift of the midline structures.    HEAD CTA:  1.  Patent intracranial arterial vasculature without flow-limiting stenosis.  2.  No vascular lesion underlying the right occipital hemorrhage. No active contrast extravasation.    NECK CTA:  Patent cervical arterial vasculature without flow-limiting stenosis.    Findings discussed with Dr. Chowdary on 3/17/2025 at 2034       Please contact neurosurgery resident on call with questions.    Dial * * [phone], enter [phone] when prompted.

## 2025-03-18 NOTE — CONSULTS
[facility]     Stroke Code Note          History of Present Illness     Chief Complaint: Stroke Symptoms    Azucena Guo is a 64 year old right-handed female with history of hypertension, hyperlipidemia, type 2 diabetes, right carotid stenosis, and midline cystocele with urinary urgency who presented to the ED after she had sudden onset vision changes and headache.    She was walking out of [place] this evening and noted that her her car had a flat tire, shortly after this she had a new onset headache that became severe very quickly and she noted that she was having trouble seeing on her left side.  She called EMS due to the symptoms.  The symptoms started at 6:10 PM.  Prior to this she was feeling like her normal self.    No speech changes, trouble swallowing, weakness, or sensation changes.  No difficulty walking.  She has never had symptoms like these before.  No stroke history or seizure history.    She does not smoke tobacco.    Her blood pressure on arrival was -190. Following CT, given labetalol and nicardipine started given finding of hemorrhage.     Per chart review, the patient has history of ocular migraine, but only has them every once in a while.         Past Medical History     Stroke risk factors: hypertension, hyperlipidemia, type 2 diabetes    Preadmission antithrombotic regimen: aspirin 81mg     Modified Jim Hogg Score (Pre-morbid)  0-No deficits                   Assessment and Plan       1. Right occipital lobe IPH with 0.7 cm leftward midline shift  2. Right temporo-occipital convexity SDH  Azucena Guo is a 64 year old right-handed female with history of hypertension, hyperlipidemia, type 2 diabetes, right carotid stenosis, and midline cystocele with urinary urgency who presented to the ED after she had sudden onset vision changes and headache. On exam, the patient was found to have left temporal homonymous hemianopia. No other  focal deficits, she is awake, alert, no respiratory distress and able to provide history. CT head showed 3.9x5.9x4.5 cm acute IPH in right occipital lobe, with adjacent right temporo-occipital convexity subdural hematoma measuring up to 0.4cm in thickness and 0.7cm leftward shift of midline structures. CTA H/N did not show evidence of stenosis or vascular lesion underlying hemorrhage, no active contrast extravasation. Etiology of hemorrhage most likely in setting of hypertension, less likely vascular malformation or aneurysm, no venous abnormality on CTA H/N on discussion with radiology.  We will plan to further evaluate with MR brain as well.      Intravenous Thrombolysis  Not given due to:   - active bleeding     Endovascular Treatment  Not initiated due to absence of proximal vessel occlusion     Plan:  Acute Hemorrhagic Stroke Recommendations  - Neurochecks and Vital Signs every 1hr  - Head of bed elevated  - Telemetry, EKG  - Imaging: repeat CT scan in 6 hours  - Bedside Glucose Monitoring  - Nutrition: NPO until repeat CT scan  - A1c, Troponin x 3  - PT/OT/SLP  - Stroke Education  - Euthermia, Euglycemia  - SBP goal 130-150  - NSGY consult  - admit to Neuro ICU, NCC primary  - given 20mg of labetalol, nicardipine infusion started  ___________________________________________________________________    The patient was discussed with stroke neurology attending, Dr. Archuleta.     YARIEL HOLGUIN MD  Neurology Resident  Vocera: stroke first call resident    Note: Chart documentation done in part with Dragon Voice Recognition software. Although reviewed after completion, some word and grammatical errors may remain.    ___________________________________________________________________        Imaging/Labs   (personally reviewed)    CT Head/CTA H/N 3/17/25  IMPRESSION:   HEAD CT:  1.  There is a 3.9 x 5.9 x 4.5 cm acute intraparenchymal hematoma involving the right occipital lobe.  2.  There is an adjacent right  temporo-occipital convexity subdural hematoma measuring up to 0.4 cm in thickness as well as subdural blood layering along the right tentorial leaflet measuring 0.5 cm and a right parafalcine component measuring 0.2 cm in   thickness.  3.  There is 0.7 cm leftward shift of the midline structures.     HEAD CTA:  1.  Patent intracranial arterial vasculature without flow-limiting stenosis.  2.  No vascular lesion underlying the right occipital hemorrhage. No active contrast extravasation.     NECK CTA:  Patent cervical arterial vasculature without flow-limiting stenosis.         Physical Examination     BP: (!) 147/81   Pulse: 80   Resp: 20           SpO2: 96 %   O2 Device: None (Room air)        Wt Readings from Last 2 Encounters:   09/26/24 125.1 kg (275 lb 14.4 oz)   03/18/24 124.6 kg (274 lb 11.2 oz)       General Exam  General:  patient lying in bed without any acute distress, intermittently tearful  HEENT:  normocephalic/atraumatic  Cardio:  RRR  Pulmonary:  no respiratory distress  Abdomen:  non-distended  Extremities:  no edema  Skin:  intact, warm/dry     Neuro Exam  Mental Status:  alert, oriented x 3, follows commands, speech clear and fluent, naming and repetition normal  Cranial Nerves:  PERRL, EOMI with normal smooth pursuit, facial sensation intact and symmetric, facial movements symmetric, hearing not formally tested but intact to conversation, palate elevation symmetric and uvula midline, no dysarthria, shoulder shrug strong bilaterally, tongue protrusion midline, left temporal homonymous hemianopia  Motor:  normal muscle tone and bulk, no abnormal movements, able to move all limbs spontaneously, strength 5/5 throughout upper and lower extremities, no pronator drift  Reflexes:  no clonus  Sensory:  light touch sensation intact and symmetric throughout upper and lower extremities, no extinction on double simultaneous stimulation   Coordination:  normal finger-to-nose and heel-to-shin bilaterally without  dysmetria  Station/Gait:  deferred        Stroke Scales       NIHSS  1a. Level of Consciousness 0-->Alert, keenly responsive   1b. LOC Questions 0-->Answers both questions correctly   1c. LOC Commands 0-->Performs both tasks correctly   2.   Best Gaze 0-->Normal   3.   Visual 2-->Complete hemianopia   4.   Facial Palsy 0-->Normal symmetrical movements   5a. Motor Arm, Left 0-->No drift, limb holds 90 (or 45) degrees for full 10 secs   5b. Motor Arm, Right 0-->No drift, limb holds 90 (or 45) degrees for full 10 secs   6a. Motor Leg, Left 0-->No drift, leg holds 30 degree position for full 5 secs   6b. Motor Leg, right 0-->No drift, leg holds 30 degree position for full 5 secs   7.   Limb Ataxia 0-->Absent   8.   Sensory 0-->Normal, no sensory loss   9.   Best Language 0-->No aphasia, normal   10. Dysarthria 0-->Normal   11. Extinction and Inattention  0-->No abnormality   Total 2 (03/17/25 2000)            Labs     CBC  Lab Results   Component Value Date    HGB 13.5 03/17/2025    HCT 40.5 03/17/2025    WBC 7.2 03/17/2025     03/17/2025       BMP  Lab Results   Component Value Date     (L) 11/05/2024    POTASSIUM 4.3 11/05/2024    CHLORIDE 100 11/05/2024    CO2 23 11/05/2024    BUN 13.2 11/05/2024    CR 0.39 (L) 11/05/2024     (H) 11/05/2024    ALLY 9.1 11/05/2024       INR  INR   Date Value Ref Range Status   03/17/2025 0.93 0.85 - 1.15 Final   12/31/2008 1.01 0.86 - 1.14 Final       Data   Stroke Code Data  (for stroke code without tele)  Stroke code activated 03/17/25 1950   First stroke provider response 03/17/25 1952   Last known normal 03/17/25 1810   Time of discovery (or onset of symptoms)        Head CT read by Stroke Neuro Provider 03/17/25 2005   Was stroke code de-escalated? No              Clinically Significant Risk Factors Present on Admission                 # Drug Induced Platelet Defect: home medication list includes an antiplatelet medication   # Hypertension: Noted on problem  list          # DMII: A1C = N/A within past 6 months                 Time Spent on this Encounter

## 2025-03-18 NOTE — CONSULTS
[facility]       NEUROSURGERY CONSULTATION NOTE    This consultation was requested by Dr. Dash from the ED service.    Reason for Consultation: Right occipital ICH    Assessment:  Azucena Guo is a 64-year-old right-handed female with a past medical history of hypertension, diabetes who takes aspirin for her heart health presents today with headache and vision problems worse in the left eye. CT head obtained in the ED demonstrated a 3.9 x 5.9 x 4.5 cm right occipital intracerebral hemorrhage with an associated right temporo-occipital convexity subdural 0.4 cm in thickness.  CTA did not show any vascular malformations or active extravasation. Will admit to neuroICU for conservative management, with a plan to repeat CT in 6 hours with CTV and MRI.     Clinically Significant Risk Factors Present on Admission                 # Drug Induced Platelet Defect: home medication list includes an antiplatelet medication   # Hypertension: Noted on problem list          # DMII: A1C = N/A within past 6 months            # Compression of brain and # Cerebral edema       RECOMMENDATIONS:  No neurosurgical intervention indicated at this time   Repeat head CT in 6 hours from the time of first acquisition  Will add CTV at that time  MRI w/wo when able  Hold all antiplatelets and anticoagulants  1u platelets  HOB > 30 degrees  Q1H neuro exams   Keppra 7d for seizure ppx  Maintain SBP < 140  Keep NPO until stability scan  Platelets > 100,000  INR < 1.5  Hemoglobin > 8  DVT: SCDs while in bed  Neurosurgery will follow closely    Jaime Keith MD  Neurosurgery Resident  Pager- [phone]    The patient was discussed with Dr. Salazar, neurosurgery chief resident, and Dr. Albarran, neurosurgery staff.    Please contact neurosurgery resident on call with questions.    Dial * * [phone], enter [phone] when prompted.      HPI:  Azucena Guo is a 64-year-old right-handed female with a past medical history of  hypertension, diabetes who takes aspirin for her heart health presents today with headache and vision problems worse in the left eye.  She reports that she was about her usual course of the day when she started developing severe right-sided continuous headache.  The headache was not resolving.  It radiates to his entire right side of her head, and she describes it as if somebody is poking her eye with a sharp object.  She is tearful due to the current situation but she reports no tearing in her right eye at the time of onset of headache.  She also reports vision problems in bilateral eyes more so in her left eye.  This prompted her to present to the ED for further evaluation.    No recent fevers, chills, nausea, vomiting, chest pain, shortness of breath, and denies weakness, LOC, numbness/weakness/paresthesias in extremities, changes in sensation, taste, smell, nor trouble speaking or other neurologic symptoms.  CT head obtained in the ED demonstrated a 3.9 x 5.9 x 4.5 cm right occipital intracerebral hemorrhage with an associated right temporo-occipital convexity subdural 0.4 cm in thickness.  CTA did not show any vascular malformations.  Her blood pressure at the time of presentation was 190/84.  She took aspirin this morning. She is a former smoker    PAST MEDICAL HISTORY:   Past Medical History:   Diagnosis Date    Arthritis     Benign essential hypertension 12/06/2019    Diabetes mellitus (H)     History of 2019 novel coronavirus disease (COVID-19) 03/14/2022    positive end of dec 2021 ( breakthrough covid, had a headache 1 day & fatigue 3 days)     Nail discoloration 12/11/2023       PAST SURGICAL HISTORY:   Past Surgical History:   Procedure Laterality Date    ABDOMEN SURGERY  1994    lap cholecystomy    BIOPSY      CHOLECYSTECTOMY      1994    COLONOSCOPY  2011    ANW: sigmoid polyp removed    COLONOSCOPY  2016    no report, abstracted information stated, normal recheck in 5 yr given hx of polyps        FAMILY HISTORY:   Family History   Problem Relation Age of Onset    Heart Disease Mother     Diabetes Mother             Cancer Mother         lymphoma on hodgkins    Obesity Mother     Coronary Artery Disease Mother     Heart Disease Father     Coronary Artery Disease Father     Hypertension Father     Hyperlipidemia Father     Obesity Sister     Diabetes Sister     Heart Failure Sister     Coronary Artery Disease Sister     Obesity Sister     Diabetes Sister     Depression Sister     Coronary Artery Disease Sister         Haf triple bypass surgery Mar 2023    Thyroid Cancer Sister         thyroid surgery 2023    Depression Sister     Obesity Sister     Diabetes Brother             Obesity Brother     Substance Abuse Son         Alcohol    Breast Cancer Other        SOCIAL HISTORY:   Social History     Tobacco Use    Smoking status: Former     Current packs/day: 0.00     Average packs/day: 1 pack/day for 15.0 years (15.0 ttl pk-yrs)     Types: Cigarettes     Start date: 1978     Quit date: 1991     Years since quittin.2    Smokeless tobacco: Never   Substance Use Topics    Alcohol use: Yes     Comment: occasionally       MEDICATIONS:  No current outpatient medications on file.       Allergies:  Allergies   Allergen Reactions    Sulfa Antibiotics Hives    Measles Mumps And Rubella Vaccine Live (Obsolete)      Redness         ROS: 10 point ROS of systems including Constitutional, Eyes, Respiratory, Cardiovascular, Gastroenterology, Genitourinary, Integumentary, Muscularskeletal, Psychiatric were all negative except for pertinent positives noted in my HPI.    Physical exam:   Blood pressure (!) 147/81, pulse 80, resp. rate 20, SpO2 96%, not currently breastfeeding.  General: awake and alert  HEENT: normocephalic  PULM: breathing comfortably on room air  NEUROLOGIC:  -- Awake; Alert; oriented x 3  -- Follows commands briskly  -- +repetition, calculation, and naming  -- Speech  fluent, spontaneous. No aphasia or dysarthria.  -- no gaze preference. No apparent hemineglect.  Cranial Nerves:  -- Left hemianopsia , PERRL 3-2mm bilat and brisk, extraocular movements intact  -- face symmetrical, tongue midline  -- sensory V1-V3 intact bilaterally  -- palate elevates symmetrically, uvula midline  -- hearing grossly intact bilat  -- Trapezii 5/5 strength bilat symmetric  -- Cerebellar: Finger nose finger without dysmetria, intact rapid alternating motions bilaterally    Motor:  Normal bulk / tone; no tremor, rigidity, or bradykinesia.  No muscle wasting or fasciculations  No Pronator Drift     Delt Bi Tri Hand Flexion/  Extension Iliopsoas Quadriceps Hamstrings Tibialis Anterior Gastroc    C5 C6 C7 C8/T1 L2 L3 L4-S1 L4 S1   R 5 5 5 5 5 5 5 5 5   L 5 5 5 5 5 5 5 5 5     Sensory:  intact to LT x 4 extremities       Reflexes: no clonus       Bi Tri BR Anneliese Pat Ach Bab     C5-6 C7-8 C6 UMN L2-4 S1 UMN   R 2+ 2+ 2+ Norm 2+ 2+ Norm   L 2+ 2+ 2+ Norm 2+ 2+ Norm      Gait: Deferred      IMAGING:  Recent Results (from the past 24 hours)   CTA Head Neck w Contrast    Narrative    EXAM: CTA HEAD NECK W CONTRAST, CT HEAD W/O CONTRAST  LOCATION: [facility]  DATE: 3/17/2025    INDICATION: Headache, weakness, Code Stroke, evaluate for LVO. PLEASE READ IMMEDIATELY.  COMPARISON: CT head 12/30/2008  CONTRAST: 67cc of isovue 370  TECHNIQUE: Head and neck CT angiogram with IV contrast. Noncontrast head CT followed by axial helical CT images of the head and neck vessels obtained during the arterial phase of intravenous contrast administration. Axial 2D reconstructed images and   multiplanar 3D MIP reconstructed images of the head and neck vessels were performed by the technologist. Dose reduction techniques were used. All stenosis measurements made according to NASCET criteria unless otherwise specified.    FINDINGS:   NONCONTRAST HEAD CT:   INTRACRANIAL CONTENTS: There is  a 3.9 x 5.9 x 4.5 cm acute intraparenchymal hematoma involving the right occipital lobe. There is an adjacent temporal occipital convexity subdural hematoma measuring up to 0.4 cm in thickness. There is also a right   parafalcine subdural hematoma measuring up to 0.2 cm in thickness. There is subdural blood along the right tentorial leaflet measuring 0.5 cm in thickness. Altogether, this results in 0.7 cm leftward shift of the midline structures. No CT evidence of   acute infarct.      VISUALIZED ORBITS/SINUSES/MASTOIDS: No intraorbital abnormality. No paranasal sinus mucosal disease. No middle ear or mastoid effusion.    BONES/SOFT TISSUES: No acute abnormality.    HEAD CTA:  ANTERIOR CIRCULATION: No stenosis/occlusion, aneurysm, or high flow vascular malformation. Standard [place] anatomy.    POSTERIOR CIRCULATION: No stenosis/occlusion, aneurysm, or high flow vascular malformation. Balanced vertebral arteries supply a normal basilar artery.     DURAL VENOUS SINUSES: Expected enhancement of the major dural venous sinuses.    NECK CTA:   RIGHT CAROTID: There is 20% stenosis of the proximal right ICA.    LEFT CAROTID: No measurable stenosis or dissection.    VERTEBRAL ARTERIES: No focal stenosis or dissection. Balanced vertebral arteries.    AORTIC ARCH: Classic aortic arch anatomy with no significant stenosis at the origin of the great vessels.    NONVASCULAR STRUCTURES: Moderate spinal canal stenosis at C6-C7.      Impression    IMPRESSION:   HEAD CT:  1.  There is a 3.9 x 5.9 x 4.5 cm acute intraparenchymal hematoma involving the right occipital lobe.  2.  There is an adjacent right temporo-occipital convexity subdural hematoma measuring up to 0.4 cm in thickness as well as subdural blood layering along the right tentorial leaflet measuring 0.5 cm and a right parafalcine component measuring 0.2 cm in   thickness.  3.  There is 0.7 cm leftward shift of the midline structures.    HEAD CTA:  1.  Patent  intracranial arterial vasculature without flow-limiting stenosis.  2.  No vascular lesion underlying the right occipital hemorrhage. No active contrast extravasation.    NECK CTA:  Patent cervical arterial vasculature without flow-limiting stenosis.    Findings discussed with Dr. Chowdary on 3/17/2025 at 2034   CT Head w/o Contrast    Narrative    EXAM: CTA HEAD NECK W CONTRAST, CT HEAD W/O CONTRAST  LOCATION: [facility]  DATE: 3/17/2025    INDICATION: Headache, weakness, Code Stroke, evaluate for LVO. PLEASE READ IMMEDIATELY.  COMPARISON: CT head 12/30/2008  CONTRAST: 67cc of isovue 370  TECHNIQUE: Head and neck CT angiogram with IV contrast. Noncontrast head CT followed by axial helical CT images of the head and neck vessels obtained during the arterial phase of intravenous contrast administration. Axial 2D reconstructed images and   multiplanar 3D MIP reconstructed images of the head and neck vessels were performed by the technologist. Dose reduction techniques were used. All stenosis measurements made according to NASCET criteria unless otherwise specified.    FINDINGS:   NONCONTRAST HEAD CT:   INTRACRANIAL CONTENTS: There is a 3.9 x 5.9 x 4.5 cm acute intraparenchymal hematoma involving the right occipital lobe. There is an adjacent temporal occipital convexity subdural hematoma measuring up to 0.4 cm in thickness. There is also a right   parafalcine subdural hematoma measuring up to 0.2 cm in thickness. There is subdural blood along the right tentorial leaflet measuring 0.5 cm in thickness. Altogether, this results in 0.7 cm leftward shift of the midline structures. No CT evidence of   acute infarct.      VISUALIZED ORBITS/SINUSES/MASTOIDS: No intraorbital abnormality. No paranasal sinus mucosal disease. No middle ear or mastoid effusion.    BONES/SOFT TISSUES: No acute abnormality.    HEAD CTA:  ANTERIOR CIRCULATION: No stenosis/occlusion, aneurysm, or high flow  vascular malformation. Standard [place] anatomy.    POSTERIOR CIRCULATION: No stenosis/occlusion, aneurysm, or high flow vascular malformation. Balanced vertebral arteries supply a normal basilar artery.     DURAL VENOUS SINUSES: Expected enhancement of the major dural venous sinuses.    NECK CTA:   RIGHT CAROTID: There is 20% stenosis of the proximal right ICA.    LEFT CAROTID: No measurable stenosis or dissection.    VERTEBRAL ARTERIES: No focal stenosis or dissection. Balanced vertebral arteries.    AORTIC ARCH: Classic aortic arch anatomy with no significant stenosis at the origin of the great vessels.    NONVASCULAR STRUCTURES: Moderate spinal canal stenosis at C6-C7.      Impression    IMPRESSION:   HEAD CT:  1.  There is a 3.9 x 5.9 x 4.5 cm acute intraparenchymal hematoma involving the right occipital lobe.  2.  There is an adjacent right temporo-occipital convexity subdural hematoma measuring up to 0.4 cm in thickness as well as subdural blood layering along the right tentorial leaflet measuring 0.5 cm and a right parafalcine component measuring 0.2 cm in   thickness.  3.  There is 0.7 cm leftward shift of the midline structures.    HEAD CTA:  1.  Patent intracranial arterial vasculature without flow-limiting stenosis.  2.  No vascular lesion underlying the right occipital hemorrhage. No active contrast extravasation.    NECK CTA:  Patent cervical arterial vasculature without flow-limiting stenosis.    Findings discussed with Dr. Chowdary on 3/17/2025 at 2034         LABS:   Last Comprehensive Metabolic Panel:  Lab Results   Component Value Date     (L) 11/05/2024    POTASSIUM 4.3 11/05/2024    CHLORIDE 100 11/05/2024    CO2 23 11/05/2024    ANIONGAP 11 11/05/2024     (H) 11/05/2024    BUN 13.2 11/05/2024    CR 0.39 (L) 11/05/2024    GFRESTIMATED >90 11/05/2024    ALLY 9.1 11/05/2024         Lab Results   Component Value Date    WBC 7.2 03/17/2025    WBC 4.1 06/09/2021     Lab Results    Component Value Date    RBC 4.54 03/17/2025    RBC 4.23 06/09/2021     Lab Results   Component Value Date    HGB 13.5 03/17/2025    HGB 12.9 06/09/2021     Lab Results   Component Value Date    HCT 40.5 03/17/2025    HCT 37.3 06/09/2021     Lab Results   Component Value Date    MCV 89 03/17/2025    MCV 88 06/09/2021     Lab Results   Component Value Date    MCH 29.7 03/17/2025    MCH 30.5 06/09/2021     Lab Results   Component Value Date    MCHC 33.3 03/17/2025    MCHC 34.6 06/09/2021     Lab Results   Component Value Date    RDW 13.4 03/17/2025    RDW 12.6 06/09/2021     Lab Results   Component Value Date     03/17/2025     06/09/2021     INR   Date Value Ref Range Status   03/17/2025 0.93 0.85 - 1.15 Final   12/31/2008 1.01 0.86 - 1.14 Final

## 2025-03-18 NOTE — PHARMACY-ADMISSION MEDICATION HISTORY
Pharmacist Admission Medication History    Admission medication history is complete. The information provided in this note is only as accurate as the sources available at the time of the update.    Information Source(s): Patient and CareEverywhere/SureScripts via in-person    Pertinent Information: Updated med list and last doses per patient's testimony. The patient is a reliable historian.    Patient takes minoxidil 1/4 tablet daily.     Changes made to PTA medication list:  Added: Acetaminophen  Deleted: None  Changed:   Vitamin D 1000 units daily to 2000 units daily     Allergies reviewed with patient and updates made in EHR: no    Medication History Completed By: Aiden Lizarraga RPH 3/18/2025 11:00 AM    PTA Med List   Medication Sig Last Dose/Taking    acetaminophen (TYLENOL) 500 MG tablet Take 500-1,000 mg by mouth 2 times daily as needed for mild pain (arthritis). Taking As Needed    aspirin 81 MG tablet Take 1 tablet by mouth daily. 3/17/2025 Morning    empagliflozin (JARDIANCE) 10 MG TABS tablet Take 1 tablet (10 mg) by mouth daily. 3/17/2025 Morning    lisinopril (ZESTRIL) 2.5 MG tablet Take 1 tablet (2.5 mg) by mouth daily. 3/16/2025 Bedtime    metFORMIN (GLUCOPHAGE) 500 MG tablet TAKE 2 TABLETS(1000 MG) BY MOUTH TWICE DAILY WITH MEALS 3/17/2025 Morning    minoxidil (LONITEN) 2.5 MG tablet 1/4 tablet daily 3/17/2025 Morning    Multiple Vitamin (MULTI-VITAMIN PO) Take 1 tablet by mouth daily. 3/17/2025 Morning    simvastatin (ZOCOR) 40 MG tablet TAKE 1 TABLET(40 MG) BY MOUTH AT BEDTIME 3/16/2025 Bedtime    vitamin D3 (CHOLECALCIFEROL) 50 mcg (2000 units) tablet Take 1 tablet by mouth daily. 3/17/2025 Morning

## 2025-03-18 NOTE — PLAN OF CARE
Goal Outcome Evaluation:    Overall Patient Progress: no change    Major Shift Events  Pt arrived on unit @ 2100 from ED s/p Code Stroke. Upon arrival pt neurologically intact ex. Vision changes. Monitored w/ serial neurological exams overnight w/o major changes. Blood consent obtained by MD, and platlets administered overnight to aid clotting. CT obtained @ 0200 demonstrating continued development of R-ICH, per NCT MD. Plan to trend another CT scan @ 0830 to monitor development.     Neuro: A&Ox4, following commands, PERRLA 3mm, L-vision absent, R-vision blurred, MSK: 5/5, sensation intact, pronator drift -  Cv: SR 70-80, Sys < 140, pulses +2, afebrile  Resp: RA (awake) 3 L Oxiplus (Asleep), LS: clear bilaterally, SpO2 >92%  GI/: NPO, Purewick w/ AUO, BM-, BS+, hyperglycemia  Skin: R-toe bruising, scattered abrasions, blanchable erythema coccyx     Drips: Nicardipine 2.5 - 12.5 mg/hr, NS 75 mL/hr  Sedation: None     Plan: Follow POC. Monitor closely, notify MD of acute changes.  For vital signs and complete assessments, please see documentation flowsheets.

## 2025-03-18 NOTE — PROGRESS NOTES
Stroke Code Nurse-Responder Note    Stroke Code Page Time Received: 1950    Arrival Time to Stroke Code: 1954    Stroke Code Team interventions: labs, vital signs, focus assessment, teaching regarding interventions, CT/CTA, meds    ED/Bedside Nurse providing handoff: Herbert Nicolas RN    Time left for CT: 1956    Time arrived to next location (ED/Unit/IR): 2007    ED/Bedside Nurse given handoff (name/time): AKSHAT Askew, RN

## 2025-03-18 NOTE — PROGRESS NOTES
CLINICAL NUTRITION SERVICES - ASSESSMENT NOTE    RECOMMENDATIONS FOR MDs/PROVIDERS TO ORDER:  - ADAT per SLP as medically able   - If unable to ADAT, may need to consider FTTFs. Please consult Nutrition Services Adult IP Consult with reason Registered Dietitian to Order TF per Medical Nutrition Therapy Guidelines if EN becomes POC.     Malnutrition Status:    - Unable to assess all parameters of NFPE - will follow     Registered Dietitian Interventions:  - None currently while nutrition POC pending    Future/Additional Recommendations:  - SLP/diet vs FT/TFs    - If EN indicated, once enteral access placed and verified for use, recommend Pivot 1.5 Avelino (or equivalent) @ goal of 55ml/hr (1320ml/day) provides: 1980 kcals (26 kcals/kg), 123 g PRO (1.6 gm/kg), 990 ml free H20, 227 g CHO, and 9 g fiber daily.   - Initiate @ 15 ml/hr and advance by 10 ml q6hr as tolerated  - Do not start or advance until lytes (Mg++,K+) WNL and phos>2.0  - Recommend 30-60 ml q4hr fluid flushes for tube patency. Additional fluids and/or adjustments per MD.    - Order multivitamin/mineral (15 ml/day via FT) to help ensure micronutrient needs being met with suspected hypermetabolic demands and potential interruptions to TF infusions.  - Elevated HOB if gastric feeds         REASON FOR ASSESSMENT  Provider order - eval for malnutrition     Medical History: Pt with history of hypertension, hyperlipidemia, type 2 diabetes, right carotid stenosis, and midline cystocele with urinary urgency who presented to the ED after she had sudden onset vision changes and headache. Pt with IPH and SDH    SUBJECTIVE INFORMATION  From chart review; notes     NUTRITION HISTORY  Likely WNL PTA in the setting of acute event     CURRENT NUTRITION ORDERS  Diet: NPO    CURRENT INTAKE/TOLERANCE  NA - NPO    LABS  Nutrition-relevant labs:   T (H)  A1C: 7.3 (H)  Glucose: 157     MEDICATIONS  Nutrition-relevant medications:   Miralax  Senokot   NaCl @ 75 ml/hr  "    ANTHROPOMETRICS  Height: 0 cm (Data Unavailable)   Ht Readings from Last 2 Encounters:   09/26/24 1.702 m (5' 7\")   03/18/24 1.702 m (5' 7\")   67\"  Most Recent Weight: 123.8 kg (272 lb 14.9 oz)  IBW: 61 kg  BMI (kg/m ): Obesity Class III BMI > 40  Weight History:   Wt Readings from Last 8 Encounters:   03/17/25 123.8 kg (272 lb 14.9 oz)   09/26/24 125.1 kg (275 lb 14.4 oz)   03/18/24 124.6 kg (274 lb 11.2 oz)   12/11/23 121 kg (266 lb 11.2 oz)   03/16/23 115.2 kg (254 lb)   12/05/22 113.4 kg (250 lb)   11/14/22 112.2 kg (247 lb 4.8 oz)   03/14/22 103.5 kg (228 lb 1.9 oz)   No weight loss noted     Dosing Weight: 77 kg, based on adjusted wt  Using admit weight of 124 kg and IBW of 61 kg.     ASSESSED NUTRITION NEEDS  Estimated Energy Needs: 0055-7582 kcals/day (25 - 30 kcals/kg)  Justification: Maintenance  Estimated Protein Needs: 116-154 grams protein/day (1.5 - 2 grams of pro/kg)  Justification: Increased needs  Estimated Fluid Needs: (1 mL/kcal)  Justification: Maintenance and Per provider pending fluid status    SYSTEM FINDINGS    From Chart  GI/: NPO, BM-, BS+, hyperglycemia  Skin: R-toe bruising, scattered abrasions, blanchable erythema coccyx    MALNUTRITION  % Intake: Unable to assess  % Weight Loss: None noted  Subcutaneous Fat Loss: Unable to assess  Muscle Loss: Unable to assess  Fluid Accumulation/Edema: None noted  Malnutrition Diagnosis: Unable to determine due to unable to assess all parameters of NFPE  Malnutrition Present on Admission: Unable to assess    NUTRITION DIAGNOSIS  Inadequate oral intake related to diet status/NPO as evidenced by pt meeting 0% nutrition needs currently     INTERVENTIONS  Enteral nutrition management - recommendations made pending nutrition POC    Goals  Diet adv v nutrition support within 24-48 hours of admission to ICU.     Monitoring/Evaluation  Progress toward goals will be monitored and evaluated per policy.     Clay Barr, RD, LD, [facility]  Neuro ICU Dietitian; " "6A Neuro  Vocera \"4E Clinical Dietitian\"  Weekend/holiday \"Weekend Clinical Dietitian\"    "

## 2025-03-18 NOTE — ED TRIAGE NOTES
Patient presents with decreased vision that started at 1810 Pm. Patient was changing her tires and then started to realize she couldn't see. Foggy vision in her R eye and no peripheral vision in her L eye. She also reports sharp pain behind her eyes. Blood sugar taken by . Patient takes low dose aspirin prophylactic      Tier one stroke code was called and at 1955 patient went to Ct. Got labs from EMS line

## 2025-03-18 NOTE — PLAN OF CARE
Goal Outcome Evaluation:      Plan of Care Reviewed With: patient, child    Overall Patient Progress: no changeOverall Patient Progress: no change    Outcome Evaluation: Discharge needs are not known at this time.  RNCC will cont to follow the plan of care.

## 2025-03-18 NOTE — PLAN OF CARE
Speech pathology deferral - SLP order received. Pt passed a nurse swallow screen and a regular diet order was placed. Per discussion with pt and her daughter, there are no concerns re: speech, language, or swallow function.     SLP evaluation deferred, no services warranted. Will complete order, please re consult should concerns arise.

## 2025-03-19 ENCOUNTER — APPOINTMENT (OUTPATIENT)
Dept: CT IMAGING | Facility: CLINIC | Age: 65
End: 2025-03-19
Attending: NURSE PRACTITIONER
Payer: COMMERCIAL

## 2025-03-19 ENCOUNTER — APPOINTMENT (OUTPATIENT)
Dept: OCCUPATIONAL THERAPY | Facility: CLINIC | Age: 65
End: 2025-03-19
Payer: COMMERCIAL

## 2025-03-19 LAB
ANION GAP SERPL CALCULATED.3IONS-SCNC: 12 MMOL/L (ref 7–15)
BUN SERPL-MCNC: 6.1 MG/DL (ref 8–23)
CA-I BLD-MCNC: 4.5 MG/DL (ref 4.4–5.2)
CALCIUM SERPL-MCNC: 8.7 MG/DL (ref 8.8–10.4)
CHLORIDE SERPL-SCNC: 103 MMOL/L (ref 98–107)
CREAT SERPL-MCNC: 0.36 MG/DL (ref 0.51–0.95)
EGFRCR SERPLBLD CKD-EPI 2021: >90 ML/MIN/1.73M2
ERYTHROCYTE [DISTWIDTH] IN BLOOD BY AUTOMATED COUNT: 13.4 % (ref 10–15)
GLUCOSE BLDC GLUCOMTR-MCNC: 138 MG/DL (ref 70–99)
GLUCOSE BLDC GLUCOMTR-MCNC: 150 MG/DL (ref 70–99)
GLUCOSE BLDC GLUCOMTR-MCNC: 152 MG/DL (ref 70–99)
GLUCOSE BLDC GLUCOMTR-MCNC: 154 MG/DL (ref 70–99)
GLUCOSE BLDC GLUCOMTR-MCNC: 163 MG/DL (ref 70–99)
GLUCOSE BLDC GLUCOMTR-MCNC: 165 MG/DL (ref 70–99)
GLUCOSE BLDC GLUCOMTR-MCNC: 173 MG/DL (ref 70–99)
GLUCOSE SERPL-MCNC: 162 MG/DL (ref 70–99)
HCO3 SERPL-SCNC: 22 MMOL/L (ref 22–29)
HCT VFR BLD AUTO: 35.9 % (ref 35–47)
HGB BLD-MCNC: 11.7 G/DL (ref 11.7–15.7)
MAGNESIUM SERPL-MCNC: 2.3 MG/DL (ref 1.7–2.3)
MCH RBC QN AUTO: 29.6 PG (ref 26.5–33)
MCHC RBC AUTO-ENTMCNC: 32.6 G/DL (ref 31.5–36.5)
MCV RBC AUTO: 91 FL (ref 78–100)
PHOSPHATE SERPL-MCNC: 2.4 MG/DL (ref 2.5–4.5)
PLATELET # BLD AUTO: 277 10E3/UL (ref 150–450)
POTASSIUM SERPL-SCNC: 3.6 MMOL/L (ref 3.4–5.3)
RBC # BLD AUTO: 3.95 10E6/UL (ref 3.8–5.2)
SODIUM SERPL-SCNC: 137 MMOL/L (ref 135–145)
WBC # BLD AUTO: 6.8 10E3/UL (ref 4–11)

## 2025-03-19 PROCEDURE — 83735 ASSAY OF MAGNESIUM: CPT

## 2025-03-19 PROCEDURE — 258N000003 HC RX IP 258 OP 636: Performed by: PSYCHIATRY & NEUROLOGY

## 2025-03-19 PROCEDURE — 120N000002 HC R&B MED SURG/OB UMMC

## 2025-03-19 PROCEDURE — 250N000011 HC RX IP 250 OP 636

## 2025-03-19 PROCEDURE — 70450 CT HEAD/BRAIN W/O DYE: CPT | Mod: 26 | Performed by: RADIOLOGY

## 2025-03-19 PROCEDURE — 250N000009 HC RX 250: Performed by: PSYCHIATRY & NEUROLOGY

## 2025-03-19 PROCEDURE — 258N000003 HC RX IP 258 OP 636

## 2025-03-19 PROCEDURE — 84100 ASSAY OF PHOSPHORUS: CPT

## 2025-03-19 PROCEDURE — 250N000013 HC RX MED GY IP 250 OP 250 PS 637: Performed by: NURSE PRACTITIONER

## 2025-03-19 PROCEDURE — 250N000013 HC RX MED GY IP 250 OP 250 PS 637

## 2025-03-19 PROCEDURE — 250N000013 HC RX MED GY IP 250 OP 250 PS 637: Performed by: PSYCHIATRY & NEUROLOGY

## 2025-03-19 PROCEDURE — 70450 CT HEAD/BRAIN W/O DYE: CPT

## 2025-03-19 PROCEDURE — 258N000003 HC RX IP 258 OP 636: Performed by: NURSE PRACTITIONER

## 2025-03-19 PROCEDURE — 85027 COMPLETE CBC AUTOMATED: CPT | Performed by: PSYCHIATRY & NEUROLOGY

## 2025-03-19 PROCEDURE — 80048 BASIC METABOLIC PNL TOTAL CA: CPT | Performed by: PSYCHIATRY & NEUROLOGY

## 2025-03-19 PROCEDURE — 97535 SELF CARE MNGMENT TRAINING: CPT | Mod: GO

## 2025-03-19 PROCEDURE — 82330 ASSAY OF CALCIUM: CPT | Performed by: NURSE PRACTITIONER

## 2025-03-19 PROCEDURE — 99291 CRITICAL CARE FIRST HOUR: CPT | Mod: GC | Performed by: PSYCHIATRY & NEUROLOGY

## 2025-03-19 RX ORDER — AMOXICILLIN 250 MG
1 CAPSULE ORAL 2 TIMES DAILY
Status: DISCONTINUED | OUTPATIENT
Start: 2025-03-19 | End: 2025-03-22 | Stop reason: HOSPADM

## 2025-03-19 RX ORDER — POTASSIUM CHLORIDE 750 MG/1
20 TABLET, EXTENDED RELEASE ORAL ONCE
Status: COMPLETED | OUTPATIENT
Start: 2025-03-19 | End: 2025-03-19

## 2025-03-19 RX ORDER — POTASSIUM PHOS IN 0.9 % NACL 15MMOL/250
15 PLASTIC BAG, INJECTION (ML) INTRAVENOUS ONCE
Status: COMPLETED | OUTPATIENT
Start: 2025-03-19 | End: 2025-03-19

## 2025-03-19 RX ORDER — AMOXICILLIN 250 MG
2 CAPSULE ORAL 2 TIMES DAILY
Status: DISCONTINUED | OUTPATIENT
Start: 2025-03-19 | End: 2025-03-22 | Stop reason: HOSPADM

## 2025-03-19 RX ORDER — DIPHENHYDRAMINE HYDROCHLORIDE 50 MG/ML
25 INJECTION, SOLUTION INTRAMUSCULAR; INTRAVENOUS ONCE
Status: COMPLETED | OUTPATIENT
Start: 2025-03-19 | End: 2025-03-19

## 2025-03-19 RX ORDER — METOCLOPRAMIDE HYDROCHLORIDE 5 MG/ML
10 INJECTION INTRAMUSCULAR; INTRAVENOUS ONCE
Status: COMPLETED | OUTPATIENT
Start: 2025-03-19 | End: 2025-03-19

## 2025-03-19 RX ORDER — SODIUM CHLORIDE 9 MG/ML
INJECTION, SOLUTION INTRAVENOUS CONTINUOUS
Status: DISCONTINUED | OUTPATIENT
Start: 2025-03-19 | End: 2025-03-20

## 2025-03-19 RX ADMIN — ONDANSETRON 4 MG: 4 TABLET, ORALLY DISINTEGRATING ORAL at 21:40

## 2025-03-19 RX ADMIN — INSULIN ASPART 1 UNITS: 100 INJECTION, SOLUTION INTRAVENOUS; SUBCUTANEOUS at 15:35

## 2025-03-19 RX ADMIN — INSULIN ASPART 1 UNITS: 100 INJECTION, SOLUTION INTRAVENOUS; SUBCUTANEOUS at 23:38

## 2025-03-19 RX ADMIN — ONDANSETRON 4 MG: 4 TABLET, ORALLY DISINTEGRATING ORAL at 13:17

## 2025-03-19 RX ADMIN — SODIUM CHLORIDE: 9 INJECTION, SOLUTION INTRAVENOUS at 06:54

## 2025-03-19 RX ADMIN — LISINOPRIL 5 MG: 5 TABLET ORAL at 10:10

## 2025-03-19 RX ADMIN — METOCLOPRAMIDE 10 MG: 5 INJECTION, SOLUTION INTRAMUSCULAR; INTRAVENOUS at 11:15

## 2025-03-19 RX ADMIN — LABETALOL HYDROCHLORIDE 10 MG: 5 INJECTION, SOLUTION INTRAVENOUS at 01:23

## 2025-03-19 RX ADMIN — POTASSIUM CHLORIDE 20 MEQ: 750 TABLET, EXTENDED RELEASE ORAL at 05:44

## 2025-03-19 RX ADMIN — LABETALOL HYDROCHLORIDE 20 MG: 5 INJECTION, SOLUTION INTRAVENOUS at 05:44

## 2025-03-19 RX ADMIN — ACETAMINOPHEN 650 MG: 325 TABLET, FILM COATED ORAL at 21:40

## 2025-03-19 RX ADMIN — ONDANSETRON 4 MG: 2 INJECTION INTRAMUSCULAR; INTRAVENOUS at 07:29

## 2025-03-19 RX ADMIN — INSULIN ASPART 1 UNITS: 100 INJECTION, SOLUTION INTRAVENOUS; SUBCUTANEOUS at 04:03

## 2025-03-19 RX ADMIN — PROCHLORPERAZINE EDISYLATE 10 MG: 5 INJECTION INTRAMUSCULAR; INTRAVENOUS at 08:54

## 2025-03-19 RX ADMIN — LABETALOL HYDROCHLORIDE 20 MG: 5 INJECTION, SOLUTION INTRAVENOUS at 12:39

## 2025-03-19 RX ADMIN — POTASSIUM PHOSPHATE, MONOBASIC POTASSIUM PHOSPHATE, DIBASIC 15 MMOL: 224; 236 INJECTION, SOLUTION, CONCENTRATE INTRAVENOUS at 06:57

## 2025-03-19 RX ADMIN — SENNOSIDES AND DOCUSATE SODIUM 2 TABLET: 50; 8.6 TABLET ORAL at 20:02

## 2025-03-19 RX ADMIN — HYDRALAZINE HYDROCHLORIDE 20 MG: 20 INJECTION INTRAMUSCULAR; INTRAVENOUS at 01:59

## 2025-03-19 RX ADMIN — LABETALOL HYDROCHLORIDE 20 MG: 5 INJECTION, SOLUTION INTRAVENOUS at 02:28

## 2025-03-19 RX ADMIN — DIPHENHYDRAMINE HYDROCHLORIDE 25 MG: 50 INJECTION, SOLUTION INTRAMUSCULAR; INTRAVENOUS at 11:15

## 2025-03-19 RX ADMIN — INSULIN ASPART 1 UNITS: 100 INJECTION, SOLUTION INTRAVENOUS; SUBCUTANEOUS at 11:31

## 2025-03-19 RX ADMIN — ACETAMINOPHEN 650 MG: 325 TABLET, FILM COATED ORAL at 02:13

## 2025-03-19 RX ADMIN — INSULIN ASPART 1 UNITS: 100 INJECTION, SOLUTION INTRAVENOUS; SUBCUTANEOUS at 09:10

## 2025-03-19 RX ADMIN — SODIUM CHLORIDE, PRESERVATIVE FREE: 5 INJECTION INTRAVENOUS at 21:42

## 2025-03-19 RX ADMIN — ACETAMINOPHEN 650 MG: 325 TABLET, FILM COATED ORAL at 10:10

## 2025-03-19 RX ADMIN — INSULIN ASPART 1 UNITS: 100 INJECTION, SOLUTION INTRAVENOUS; SUBCUTANEOUS at 20:05

## 2025-03-19 RX ADMIN — HYDRALAZINE HYDROCHLORIDE 20 MG: 20 INJECTION INTRAMUSCULAR; INTRAVENOUS at 06:27

## 2025-03-19 ASSESSMENT — ACTIVITIES OF DAILY LIVING (ADL)
ADLS_ACUITY_SCORE: 37
ADLS_ACUITY_SCORE: 38
ADLS_ACUITY_SCORE: 38
ADLS_ACUITY_SCORE: 37
ADLS_ACUITY_SCORE: 38
ADLS_ACUITY_SCORE: 37
ADLS_ACUITY_SCORE: 37
ADLS_ACUITY_SCORE: 38
ADLS_ACUITY_SCORE: 37
ADLS_ACUITY_SCORE: 37
ADLS_ACUITY_SCORE: 38
ADLS_ACUITY_SCORE: 37
ADLS_ACUITY_SCORE: 37
ADLS_ACUITY_SCORE: 38
ADLS_ACUITY_SCORE: 37
ADLS_ACUITY_SCORE: 38
ADLS_ACUITY_SCORE: 37
ADLS_ACUITY_SCORE: 37
ADLS_ACUITY_SCORE: 38

## 2025-03-19 NOTE — PLAN OF CARE
Goal Outcome Evaluation:    Plan of Care Reviewed With: patient  Overall Patient Progress: no change     Major Shift Events  Overnight pt remained in stable condition w/ issues of nausea, HA, and BP management. At start of shift pt demonstrated emesis (300 mL) s/p eating dinner. Per , pt drank x2 bubbly drinks x1 glass of water, and ate 25% of meal. Antiemetic given which resolved nausea. Pt continues to demonstrate ongoing HA overnight which was managed w/ tylenol, compazine, magnesium, and scopolamine patch w/ good effect, Pt able to be titrated off of nicardipine, but continues to require PRN antihypertensives to maintain Sys < 140 BP goals.     Neuro: A&Ox4, following commands, PERRLA, sensation intact, MSK: 5/5, blind L-eye, blurry R-eye  Cv: SR (70-80), SYS < 140 w/ PRN's, pulses +2, afebrile  Resp: 3L Oxiplus, LS: clear bilaterally, SpO2 >92%, no secretions noted  GI/: Regular diet w/ intermittent nausea, BM-, Voids independently, BG on Low insulin resistance sliding scale  Skin: R-foot ecchymosis, BLE cracked/dry heel/toes     Drips: NS 75 mL/hr  Sedation: None     Plan: Follow POC. Monitor closely, notify MD of acute changes.  For vital signs and complete assessments, please see documentation flowsheets.

## 2025-03-19 NOTE — PROGRESS NOTES
Neurocritical Care Progress Note    Reason for critical care admission: right occipital ICH  Admitting Team: SHERYL  Date of Service:  03/19/2025  Date of Admission:  3/17/2025  Hospital Day: 3    Assessment/Plan  Azucena Guo is a 64 year old right-handed female with history of hypertension, hyperlipidemia, type 2 diabetes, right carotid stenosis, and cystocele with urinary urgency who presented to the ED after she had sudden onset vision changes and headache. On exam, the patient was found to have left temporal homonymous hemianopia. She was found to have 3.9x5.9x4.5 cm acute IPH in right occipital lobe, with adjacent right temporo-occipital convexity subdural hematoma measuring up to 0.4cm in thickness and 0.7cm leftward shift of midline structures. CTA H/N did not show evidence of stenosis or vascular lesion underlying hemorrhage, no active contrast extravasation. Etiology of hemorrhage most likely in setting of hypertension, less likely vascular malformation or aneurysm, no venous abnormality on CTA H/N on discussion with radiology.     24 hour events/changes today:  -CTH this morning   -Discontinue pta asa  -Lisinopril changed to 5mg  -Senna BID  -Stop MIVF  -Ionized calcium    Neuro  # Right occipital IPH most likely 2/2 hypertension with 0.7 cm leftward midline shift  # Right temporo-occipital SDH  - Neurochecks every 1 hrs  - SBP goal 130-150  - hold ASA 81mg  - HOB > 30   - PT/OT/SLP  - NSGY consulted and following  - CTH this AM     #Right carotid stenosis  - Carotid US in 5/2024 showed 50-69% stenosis; CTA H&N showed only 20%  - Discontinue PTA aspirin (pt clarified she is taking this for primary prevention)     #Analgesics & sedation  RASS goal: 0  - PRN acetaminophen and PRN headache cocktails for headache (mag as needed as well)     CV  #Hypertension  - Cardiac monitoring  - SBP goal 130-150  - nicardipine infusion  - PRN Labetalol and Hydralazine  - Increase Lisinopril to 5mg      #HLD  - Resume PTA  simvastatin 40mg   - LDL 76     Resp  #EMILIE  Oxygen/vent: RA  -Continuous pulse ox  -Maintain O2 saturations greater than 92%  -Obtained CXR 3/18. -> mild pulmonary edema      GI  # Nausea   Diet: Regular  Last BM: PTA  GI prophylaxis: not indicated  - Bowel regimen: scheduled Miralax, increase senna to bid  - PRN zofran and compazine for nausea     Renal/  #Hypocalcemia   #hx of cystocele, urinary urgency  -Daily BMP  -IV fluids: Discontinue   -Electrolyte replacement protocol  -obtain ionized calcium      Endo  #Type II DM  -Hgb A1c   -Monitor glucose levels  - hold PTA jardiance and PTA metformin  - sliding scale insulin      Heme  #EMILIE  -Daily CBC  -Hgb goal >7, plt goal >100k  -Transfuse to meet Hgb and plt goals     ID  #EMILIE  -Daily CBC  -Follow temperature curve     ICU Checklist  Lines/tubes/drains: PIV x2, art line placed 3/17 (ok to remove if CTH is stable)   FEN: Regular diet  PPX: DVT - SCDs (will consider initiation of VTE prophylaxis if CTH is stable on 3/19); GI - not indicated.  Code: Full code, confirmed on admission  Dispo: ICU - NCC           Clinically Significant Risk Factors                   # Hypertension: Noted on problem list           # DMII: A1C = 7.3 % (Ref range: <5.7 %) within past 6 months, PRESENT ON ADMISSION        # Financial/Environmental Concerns: none            The patient was discussed with the NCC attending, Dr. Dash.     Jones Turk MD  Neurology Resident, PGY-3     Note: Chart documentation done in part with Dragon Voice Recognition software. Although reviewed after completion, some word and grammatical errors may remain.    24 Hour Vital Signs Summary:  Temp: 98.2  F (36.8  C) Temp  Min: 98.2  F (36.8  C)  Max: 99.3  F (37.4  C)  Resp: 16 Resp  Min: 14  Max: 19  SpO2: 95 % SpO2  Min: 93 %  Max: 99 %  Pulse: 73 Pulse  Min: 73  Max: 103    No data recorded  BP: (!) 140/32 Systolic (24hrs), Av , Min:116 , Max:140   Diastolic (24hrs), Av, Min:32,  Max:51      Respiratory monitoring:   Resp: 16    I/O last 3 completed shifts:  In: 2260.75 [P.O.:880; I.V.:1371.75; IV Piggyback:9]  Out: 2925 [Urine:2625; Emesis/NG output:300]    Current Medications:  Current Facility-Administered Medications   Medication Dose Route Frequency Provider Last Rate Last Admin    insulin aspart (NovoLOG) injection (RAPID ACTING)  1-4 Units Subcutaneous Q4H Desiree Borrero MD   1 Units at 03/19/25 0403    lisinopril (ZESTRIL) tablet 5 mg  5 mg Oral or Feeding Tube Daily Desiree Borrero MD        polyethylene glycol (MIRALAX) Packet 17 g  17 g Oral or Feeding Tube Daily Desiree Borrero MD   17 g at 03/18/25 0736    Potassium Phosphate 15 mmol in NS intermittent infusion 15 mmol  15 mmol Intravenous Once Madhav Dash MD   15 mmol at 03/19/25 0657    scopolamine (TRANSDERM) 72 hr patch 1 patch  1 patch Transdermal Q72H Jaime Keith MD   1 patch at 03/18/25 2203    And    scopolamine (TRANSDERM-SCOP) Patch in Place   Transdermal Q8H Jaime Keith MD        senna-docusate (SENOKOT-S/PERICOLACE) 8.6-50 MG per tablet 1 tablet  1 tablet Oral or Feeding Tube At Bedtime Sangeetha Kelley PA-C   1 tablet at 03/18/25 2202    Or    senna-docusate (SENOKOT-S/PERICOLACE) 8.6-50 MG per tablet 2 tablet  2 tablet Oral or Feeding Tube At Bedtime Sangeetha Kelley PA-C           PRN Medications:  Current Facility-Administered Medications   Medication Dose Route Frequency Provider Last Rate Last Admin    acetaminophen (TYLENOL) tablet 650 mg  650 mg Oral or Feeding Tube Q4H PRN Desiree Borrero MD   650 mg at 03/19/25 0213    glucose gel 15-30 g  15-30 g Oral Q15 Min PRN Desiree Borrero MD        Or    dextrose 50 % injection 25-50 mL  25-50 mL Intravenous Q15 Min PRN Desiree Borrero MD        Or    glucagon injection 1 mg  1 mg Subcutaneous Q15 Min PRN Desiree Borrero MD        hydrALAZINE (APRESOLINE) injection 10-20 mg  10-20 mg Intravenous Q30 Min PRN Jaime Keith,  MD   20 mg at 03/19/25 0627    labetalol (NORMODYNE/TRANDATE) injection 10-20 mg  10-20 mg Intravenous Q10 Min PRN Jaime Keith MD   20 mg at 03/19/25 0544    naloxone (NARCAN) injection 0.2 mg  0.2 mg Intravenous Q2 Min PRN Madhav Dash MD        Or    naloxone (NARCAN) injection 0.4 mg  0.4 mg Intravenous Q2 Min PRN Madhav Dash MD        Or    naloxone (NARCAN) injection 0.2 mg  0.2 mg Intramuscular Q2 Min PRN Madhav Dash MD        Or    naloxone (NARCAN) injection 0.4 mg  0.4 mg Intramuscular Q2 Min PRN Madhav Dash MD        ondansetron (ZOFRAN ODT) ODT tab 4 mg  4 mg Oral Q6H PRN Desiree Borrero MD        Or    ondansetron (ZOFRAN) injection 4 mg  4 mg Intravenous Q6H PRN Desiree Borrero MD   4 mg at 03/19/25 0729    prochlorperazine (COMPAZINE) injection 10 mg  10 mg Intravenous Q6H PRN Desiree Borrero MD   10 mg at 03/18/25 1932    Or    prochlorperazine (COMPAZINE) tablet 10 mg  10 mg Oral Q6H PRN Desiree Borrero MD           Infusions:  Current Facility-Administered Medications   Medication Dose Route Frequency Provider Last Rate Last Admin    niCARdipine 40 mg in 200 mL NS (CARDENE) infusion  0.5-15 mg/hr Intravenous Continuous Desiree Borrero MD   Stopped at 03/18/25 2200    sodium chloride 0.9 % infusion   Intravenous Continuous Desiree Borrero MD 75 mL/hr at 03/19/25 0654 New Bag at 03/19/25 0654       Allergies   Allergen Reactions    Sulfa Antibiotics Hives    Measles Mumps And Rubella Vaccine Live (Obsolete)      Redness         Physical Examination:  Vitals: BP (!) 140/32   Pulse 73   Temp 98.2  F (36.8  C) (Oral)   Resp 16   Wt 123.4 kg (272 lb 0.8 oz)   LMP  (LMP Unknown)   SpO2 95%   BMI 42.61 kg/m    General:  patient lying in bed without any acute distress  HEENT:  normocephalic/atraumatic  Cardio:  RRR  Pulmonary:  no respiratory distress  Abdomen:  non-distended  Extremities:  no edema  Skin:  intact, warm/dry      Neuro  Exam  Mental Status:  alert, oriented x 3, follows commands, speech clear and fluent, naming and repetition normal  Cranial Nerves:  PERRL, EOMI with normal smooth pursuit, facial sensation intact and symmetric, facial movements symmetric, hearing not formally tested but intact to conversation, palate elevation symmetric and uvula midline, no dysarthria, shoulder shrug strong bilaterally, tongue protrusion midline, left temporal homonymous hemianopia  Motor:  normal muscle tone and bulk, no abnormal movements, able to move all limbs spontaneously, strength 5/5 throughout upper and lower extremities, no pronator drift  Reflexes:  no clonus  Sensory:  light touch sensation intact and symmetric throughout upper and lower extremities, no extinction on double simultaneous stimulation   Coordination:  normal finger-to-nose and heel-to-shin bilaterally without dysmetria  Station/Gait:  deferred      NIHSS  1a. Level of Consciousness 0-->Alert, keenly responsive   1b. LOC Questions 0-->Answers both questions correctly   1c. LOC Commands 0-->Performs both tasks correctly   2.   Best Gaze 0-->Normal   3.   Visual 2-->Complete hemianopia   4.   Facial Palsy 0-->Normal symmetrical movements   5a. Motor Arm, Left 0-->No drift, limb holds 90 (or 45) degrees for full 10 secs   5b. Motor Arm, Right 0-->No drift, limb holds 90 (or 45) degrees for full 10 secs   6a. Motor Leg, Left 0-->No drift, leg holds 30 degree position for full 5 secs   6b. Motor Leg, right 0-->No drift, leg holds 30 degree position for full 5 secs   7.   Limb Ataxia 0-->Absent   8.   Sensory 0-->Normal, no sensory loss   9.   Best Language 0-->No aphasia, normal   10. Dysarthria 0-->Normal   11. Extinction and Inattention  0-->No abnormality   Total 2       Labs and Imaging:    All relevant imaging and laboratory values personally reviewed.

## 2025-03-19 NOTE — PLAN OF CARE
Goal Outcome Evaluation:    Neuro: A&Ox4. Calm, cooperative. Blurry vision in the R eye, cut in the L eye with no vision. Pupils equal, reactive. 1 assist. Head CT completed.  CV: HR 70-80s. [id number]. SBP goal <140. Labetalol given x1. Afebrile.   Pulm: RA, clear lungs.  GI: Regular diet. Emesis x1 this AM, intermittent nausea throughout the day; zofran x2, compazine, benadryl & Reglan given. Pt eating small meals this evening.   : Voiding independently.   IV/Gtt: PIVs. Art line removed at 1800. NS 50/hr.    Family at bedside.   Plan: Transfer to  when bed available.    Melany Malhotra RN       Plan of Care Reviewed With: patient, child, spouse  Overall Patient Progress: no changeOverall Patient Progress: no change  Outcome Evaluation: Patient very nauseas today, PRNs given. Continue cares at this time.

## 2025-03-19 NOTE — PROGRESS NOTES
[facility], [place]     Neurosurgery Progress Note:  03/19/2025      Interval History: Exam stable. CT 3/18 9AM stable. MRI brain and MRV with stable hemorrhage, and no thrombosis. 1x emesis overnight, but improved with IV meds and scopolamine.    Assessment:  Azucena Guo is a 64-year-old right-handed female with a past medical history of hypertension, diabetes who takes aspirin for her heart health presents today with headache and vision problems worse in the left eye. CT head obtained in the ED demonstrated a 3.9 x 5.9 x 4.5 cm right occipital intracerebral hemorrhage with an associated right temporo-occipital convexity subdural 0.4 cm in thickness.  CTA did not show any vascular malformations or active extravasation.     She is now PBD 2    Clinically Significant Risk Factors                   # Hypertension: Noted on problem list           # DMII: A1C = 7.3 % (Ref range: <5.7 %) within past 6 months, PRESENT ON ADMISSION      # Financial/Environmental Concerns: none       # Compression of brain and # Cerebral edema       Plan:  No neurosurgical intervention indicated at this time   CT head 3/18 9AM stable.  MRI w/wo complete  Hold all antiplatelets and anticoagulants  HOB > 30 degrees  Q4H neuro exams   Keppra 7d for seizure ppx, not indicated per NCC  Maintain SBP < 140  OK for diet  Platelets > 100,000  INR < 1.5  Hemoglobin > 8  DVT: SCDs while in bed  Will consider SQH 48 hours after the most recent stability scan, 3/20  Please obtain out the door head CT  OK for floor  Neurosurgery will follow closely    Follow up plan:  Follow up with neurosurgery FESTUS in 2 weeks with Head CT- will have arrange this      -----------------------------------  Jaime Keith MD  Neurosurgery resident  Pager [phone]  -----------------------------------    Gen: Appears comfortable, NAD  Neurologic:  Alert & Oriented to person, place, time, and situation  Follows commands briskly  Speech fluent,  spontaneous. No aphasia or dysarthria.  No gaze preference. No apparent hemineglect.  Left hemianopsia, worse on the left  PERRL, EOMI  Face symmetric with sensation intact to light touch  Palate elevates symmetrically, uvula midline, tongue protrudes midline  Trapezii muscles 5/5 bilaterally  No pronator drift     Del Tr Bi WE WF Gr   R 5 5 5 5 5 5   L 5 5 5 5 5 5    HF KE KF DF PF EHL   R 5 5 5 5 5 5   L 5 5 5 5 5 5     Reflexes 2+ throughout    Sensation intact and symmetric to light touch throughout    Objective:   Temp:  [98.2  F (36.8  C)-99.3  F (37.4  C)] 98.2  F (36.8  C)  Pulse:  [] 73  Resp:  [14-19] 16  BP: (116)/(51) 116/51  MAP:  [67 mmHg-101 mmHg] 69 mmHg  Arterial Line BP: (111-151)/(46-70) 125/46  SpO2:  [91 %-99 %] 95 %  I/O last 3 completed shifts:  In: 3051.72 [P.O.:640; I.V.:2198.47; IV Piggyback:2]  Out: 1900 [Urine:1600; Emesis/NG output:300]        LABS:  Recent Labs   Lab 03/19/25  0350 03/19/25  0101 03/18/25  0422 03/18/25  0420 03/17/25  2104 03/17/25 1956     --   --  138  --  137   POTASSIUM 3.6  --   --  3.9  --  4.0   CHLORIDE 103  --   --  103  --  100   CO2 22  --   --  23  --  22   ANIONGAP 12  --   --  12  --  15   *  162* 138*   < > 157*   < > 136*   BUN 6.1*  --   --  8.7  --  11.7   CR 0.36*  --   --  0.38*  --  0.43*   ALLY 8.7*  --   --  9.1  --  10.1    < > = values in this interval not displayed.       Recent Labs   Lab 03/19/25  0350   WBC 6.8   RBC 3.95   HGB 11.7   HCT 35.9   MCV 91   MCH 29.6   MCHC 32.6   RDW 13.4          IMAGING:  Recent Results (from the past 24 hours)   CT Head w/o Contrast    Narrative    EXAM: CT HEAD W/O CONTRAST  3/18/2025 8:52 AM     HISTORY:  monitor right occipital IPH       COMPARISON: Head CT earlier today 0249 hours 3/18/2025    TECHNIQUE: Using multidetector thin collimation helical acquisition  technique, axial, coronal and sagittal CT images from the skull base  to the vertex were obtained without intravenous  contrast.   (topogram) image(s) also obtained and reviewed.    FINDINGS:  No change in large right temporooccipital intraparenchymal hemorrhage.  Diffuse sulcal effacement throughout the the majority of the right  cerebral hemisphere and partial effacement of the right lateral  ventricle. 6 mm of leftward midline shift is not significantly  changed. There is subdural hemorrhage along the posterior falx and  right tentorium measuring up to 4 mm thickness, unchanged. Clear basal  cisterns.    The bony calvaria and the bones of the skull base are normal. Right  maxillary sinus mucus retention cyst. The remainder of the paranasal  sinuses and mastoid air cells are clear. Grossly normal orbits.       Impression    IMPRESSION:   1. Unchanged right temporooccipital intraparenchymal hemorrhage with  surrounding edema.  2. Stable subdural hemorrhage along the posterior falx and right  tentorium.    I have personally reviewed the examination and initial interpretation  and I agree with the findings.    ANUP RODRIGUEZ MD         SYSTEM ID:  [id number]   XR Chest Port 1 View    Narrative    Portable chest    INDICATION: Oxygen requirements increasing    COMPARISON: 3/28/2014    FINDINGS: Heart size and shape normal. Atherosclerotic calcification  of the aortic knob. Degenerative spurring in the thoracic spine. Some  patchy densities in the lungs are present. No effusion. No  consolidation.      Impression    IMPRESSION: Mild edema/atelectasis in the lungs without radiographic  sign of infection or effusion.    AL HAQUE MD         SYSTEM ID:  [id number]   Echo Complete   Result Value    LVEF  60-65%    Narrative    [id number]  [id number]  [id number]  [id number]     [facility],[place]  Echocardiography Laboratory  [address] [place] [id number]     Name: JEFF BARRIENTOS  MRN: [id number]  : 1960  Study Date: 2025 09:05 AM  Age: 64 yrs  Gender: Female  Patient  Location: [facility]  Reason For Study: Murmur  Ordering Physician: RAFFI ESPANA  Performed By: Stephanie Worrell     BSA: 2.3 m2  Height: 67 in  Weight: 272 lb  HR: 79  BP: 147/81 mmHg  ______________________________________________________________________________  Procedure  Echocardiogram with two-dimensional, color and spectral Doppler. Contrast  Optison. Optison (NDC #3515-0192-13) given intravenously. Patient was given 6  ml mixture of 3 ml Optison and 6 ml saline. 3 ml wasted.  ______________________________________________________________________________  Interpretation Summary  Left ventricular size, wall motion and function are normal. The ejection  fraction is 60-65%.  Right ventricular function, chamber size, wall motion, and thickness are  normal.  No significant valvular abnormalities present.  The inferior vena cava was normal in size with preserved respiratory  variability.  No pericardial effusion is present.  ______________________________________________________________________________  Left Ventricle  Left ventricular size, wall motion and function are normal. The ejection  fraction is 60-65%. Left ventricular wall thickness is normal. Left  ventricular diastolic function is normal.     Right Ventricle  Right ventricular function, chamber size, wall motion, and thickness are  normal.     Atria  Both atria appear normal.     Mitral Valve  On Doppler interrogation, there is no significant stenosis or regurgitation.  Mild mitral annular calcification is present. Mitral leaflet thickness is  increased .     Aortic Valve  On Doppler interrogation, there is no significant stenosis or regurgitation.  The aortic valve is tricuspid. Moderate aortic valve calcification is present.     Tricuspid Valve  The tricuspid valve is normal. The peak velocity of the tricuspid regurgitant  jet is not obtainable. Pulmonary artery systolic pressure cannot be assessed.     Pulmonic Valve  The valve leaflets are not well  visualized. On Doppler interrogation, there is  no significant stenosis or regurgitation.     Vessels  The aorta root is normal. The thoracic aorta is normal. The inferior vena cava  was normal in size with preserved respiratory variability. Ascending aorta 4  cm.     Pericardium  No pericardial effusion is present.     Miscellaneous  No significant valvular abnormalities present.     Compared to Previous Study  There is no prior study for direct comparison.  ______________________________________________________________________________  MMode/2D Measurements & Calculations  IVSd: 0.99 cm  LVIDd: 5.4 cm  LVIDs: 3.5 cm  LVPWd: 0.91 cm  FS: 35.6 %  LV mass(C)d: 195.2 grams  LV mass(C)dI: 84.7 grams/m2  Ao root diam: 3.4 cm  asc Aorta Diam: 4.0 cm  LVOT diam: 2.2 cm  LVOT area: 3.9 cm2  Ao root diam index Ht(cm/m): 2.0  Ao root diam index BSA (cm/m2): 1.5  Asc Ao diam index BSA (cm/m2): 1.7  Asc Ao diam index Ht(cm/m): 2.4  RWT: 0.33  TAPSE: 2.6 cm     Doppler Measurements & Calculations  MV E max zac: 89.9 cm/sec  MV A max zac: 72.6 cm/sec  MV E/A: 1.2  MV dec time: 0.20 sec  Ao V2 max: 197.0 cm/sec  Ao max PG: 15.5 mmHg  Ao V2 mean: 130.0 cm/sec  Ao mean P.0 mmHg  Ao V2 VTI: 40.0 cm  PA acc time: 0.12 sec  E/E' av.0  Lateral E/e': 9.6  Medial E/e': 12.3  RV S Zac: 12.3 cm/sec     ______________________________________________________________________________  Report approved by: Dr Stephane Stout on 2025 10:26 AM         MR Brain w/o & w Contrast    Impression    RESIDENT PRELIMINARY INTERPRETATION  IMPRESSION:  1. Similar right occipital and posterior temporal lobe  intraparenchymal hemorrhages with surrounding edema and 8 mm of  leftward midline shift. No underlying enhancing lesion or vascular  abnormality.  2. Thin subdural hematoma along the right parietooccipital convexity,  along the cerebellar convexities, and the posterior falx and right  tentorium.  3. MRV demonstrates patent dural venous  sinuses and major deep  intracranial veins.   MRV Brain with Contrast    Impression    RESIDENT PRELIMINARY INTERPRETATION  IMPRESSION:  1. Similar right occipital and posterior temporal lobe  intraparenchymal hemorrhages with surrounding edema and 8 mm of  leftward midline shift. No underlying enhancing lesion or vascular  abnormality.  2. Thin subdural hematoma along the right parietooccipital convexity,  along the cerebellar convexities, and the posterior falx and right  tentorium.  3. MRV demonstrates patent dural venous sinuses and major deep  intracranial veins.       Please contact neurosurgery resident on call with questions.    Dial * * [phone], enter [phone] when prompted.

## 2025-03-20 ENCOUNTER — APPOINTMENT (OUTPATIENT)
Dept: OCCUPATIONAL THERAPY | Facility: CLINIC | Age: 65
End: 2025-03-20
Payer: COMMERCIAL

## 2025-03-20 VITALS
HEART RATE: 65 BPM | BODY MASS INDEX: 42.61 KG/M2 | OXYGEN SATURATION: 93 % | TEMPERATURE: 98.2 F | WEIGHT: 272.05 LBS | SYSTOLIC BLOOD PRESSURE: 152 MMHG | DIASTOLIC BLOOD PRESSURE: 67 MMHG | RESPIRATION RATE: 16 BRPM

## 2025-03-20 DIAGNOSIS — E11.29 TYPE 2 DIABETES MELLITUS WITH DIABETIC MICROALBUMINURIA, WITHOUT LONG-TERM CURRENT USE OF INSULIN (H): ICD-10-CM

## 2025-03-20 DIAGNOSIS — R80.9 TYPE 2 DIABETES MELLITUS WITH DIABETIC MICROALBUMINURIA, WITHOUT LONG-TERM CURRENT USE OF INSULIN (H): ICD-10-CM

## 2025-03-20 LAB
ANION GAP SERPL CALCULATED.3IONS-SCNC: 12 MMOL/L (ref 7–15)
BUN SERPL-MCNC: 6.4 MG/DL (ref 8–23)
CALCIUM SERPL-MCNC: 8.9 MG/DL (ref 8.8–10.4)
CHLORIDE SERPL-SCNC: 101 MMOL/L (ref 98–107)
CREAT SERPL-MCNC: 0.4 MG/DL (ref 0.51–0.95)
EGFRCR SERPLBLD CKD-EPI 2021: >90 ML/MIN/1.73M2
ERYTHROCYTE [DISTWIDTH] IN BLOOD BY AUTOMATED COUNT: 13.7 % (ref 10–15)
GLUCOSE BLDC GLUCOMTR-MCNC: 150 MG/DL (ref 70–99)
GLUCOSE BLDC GLUCOMTR-MCNC: 153 MG/DL (ref 70–99)
GLUCOSE BLDC GLUCOMTR-MCNC: 153 MG/DL (ref 70–99)
GLUCOSE BLDC GLUCOMTR-MCNC: 162 MG/DL (ref 70–99)
GLUCOSE BLDC GLUCOMTR-MCNC: 166 MG/DL (ref 70–99)
GLUCOSE SERPL-MCNC: 174 MG/DL (ref 70–99)
HCO3 SERPL-SCNC: 24 MMOL/L (ref 22–29)
HCT VFR BLD AUTO: 38.6 % (ref 35–47)
HGB BLD-MCNC: 12.4 G/DL (ref 11.7–15.7)
MAGNESIUM SERPL-MCNC: 2 MG/DL (ref 1.7–2.3)
MCH RBC QN AUTO: 30 PG (ref 26.5–33)
MCHC RBC AUTO-ENTMCNC: 32.1 G/DL (ref 31.5–36.5)
MCV RBC AUTO: 94 FL (ref 78–100)
PHOSPHATE SERPL-MCNC: 2.3 MG/DL (ref 2.5–4.5)
PLATELET # BLD AUTO: 282 10E3/UL (ref 150–450)
POTASSIUM SERPL-SCNC: 4 MMOL/L (ref 3.4–5.3)
RBC # BLD AUTO: 4.13 10E6/UL (ref 3.8–5.2)
SODIUM SERPL-SCNC: 137 MMOL/L (ref 135–145)
WBC # BLD AUTO: 6.9 10E3/UL (ref 4–11)

## 2025-03-20 PROCEDURE — 36415 COLL VENOUS BLD VENIPUNCTURE: CPT | Performed by: PSYCHIATRY & NEUROLOGY

## 2025-03-20 PROCEDURE — 250N000013 HC RX MED GY IP 250 OP 250 PS 637

## 2025-03-20 PROCEDURE — 80048 BASIC METABOLIC PNL TOTAL CA: CPT | Performed by: PSYCHIATRY & NEUROLOGY

## 2025-03-20 PROCEDURE — 97530 THERAPEUTIC ACTIVITIES: CPT | Mod: GO | Performed by: OCCUPATIONAL THERAPIST

## 2025-03-20 PROCEDURE — 250N000011 HC RX IP 250 OP 636: Performed by: STUDENT IN AN ORGANIZED HEALTH CARE EDUCATION/TRAINING PROGRAM

## 2025-03-20 PROCEDURE — 250N000013 HC RX MED GY IP 250 OP 250 PS 637: Performed by: NURSE PRACTITIONER

## 2025-03-20 PROCEDURE — 120N000002 HC R&B MED SURG/OB UMMC

## 2025-03-20 PROCEDURE — 999N000147 HC STATISTIC PT IP EVAL DEFER

## 2025-03-20 PROCEDURE — 99232 SBSQ HOSP IP/OBS MODERATE 35: CPT | Mod: GC | Performed by: STUDENT IN AN ORGANIZED HEALTH CARE EDUCATION/TRAINING PROGRAM

## 2025-03-20 PROCEDURE — 83735 ASSAY OF MAGNESIUM: CPT

## 2025-03-20 PROCEDURE — 250N000013 HC RX MED GY IP 250 OP 250 PS 637: Performed by: STUDENT IN AN ORGANIZED HEALTH CARE EDUCATION/TRAINING PROGRAM

## 2025-03-20 PROCEDURE — 84100 ASSAY OF PHOSPHORUS: CPT

## 2025-03-20 PROCEDURE — 85027 COMPLETE CBC AUTOMATED: CPT | Performed by: PSYCHIATRY & NEUROLOGY

## 2025-03-20 RX ORDER — MAGNESIUM OXIDE 400 MG/1
400 TABLET ORAL EVERY 4 HOURS
Status: COMPLETED | OUTPATIENT
Start: 2025-03-20 | End: 2025-03-21

## 2025-03-20 RX ORDER — HEPARIN SODIUM 10000 [USP'U]/ML
7500 INJECTION, SOLUTION INTRAVENOUS; SUBCUTANEOUS EVERY 8 HOURS
Status: DISCONTINUED | OUTPATIENT
Start: 2025-03-20 | End: 2025-03-22 | Stop reason: HOSPADM

## 2025-03-20 RX ORDER — EMPAGLIFLOZIN 10 MG/1
10 TABLET, FILM COATED ORAL DAILY
Qty: 30 TABLET | Refills: 0 | Status: SHIPPED | OUTPATIENT
Start: 2025-03-20

## 2025-03-20 RX ORDER — HEPARIN SODIUM 5000 [USP'U]/.5ML
5000 INJECTION, SOLUTION INTRAVENOUS; SUBCUTANEOUS EVERY 8 HOURS
Status: DISCONTINUED | OUTPATIENT
Start: 2025-03-20 | End: 2025-03-20

## 2025-03-20 RX ORDER — SODIUM CHLORIDE 9 MG/ML
INJECTION, SOLUTION INTRAVENOUS CONTINUOUS
Status: DISCONTINUED | OUTPATIENT
Start: 2025-03-21 | End: 2025-03-22 | Stop reason: HOSPADM

## 2025-03-20 RX ADMIN — ACETAMINOPHEN 650 MG: 325 TABLET, FILM COATED ORAL at 07:42

## 2025-03-20 RX ADMIN — ACETAMINOPHEN 650 MG: 325 TABLET, FILM COATED ORAL at 16:56

## 2025-03-20 RX ADMIN — INSULIN ASPART 1 UNITS: 100 INJECTION, SOLUTION INTRAVENOUS; SUBCUTANEOUS at 17:42

## 2025-03-20 RX ADMIN — INSULIN ASPART 1 UNITS: 100 INJECTION, SOLUTION INTRAVENOUS; SUBCUTANEOUS at 21:00

## 2025-03-20 RX ADMIN — POTASSIUM & SODIUM PHOSPHATES POWDER PACK 280-160-250 MG 1 PACKET: 280-160-250 PACK at 20:55

## 2025-03-20 RX ADMIN — MAGNESIUM OXIDE TAB 400 MG (241.3 MG ELEMENTAL MG) 400 MG: 400 (241.3 MG) TAB at 20:55

## 2025-03-20 RX ADMIN — SENNOSIDES AND DOCUSATE SODIUM 1 TABLET: 50; 8.6 TABLET ORAL at 20:55

## 2025-03-20 RX ADMIN — INSULIN ASPART 1 UNITS: 100 INJECTION, SOLUTION INTRAVENOUS; SUBCUTANEOUS at 04:18

## 2025-03-20 RX ADMIN — POLYETHYLENE GLYCOL 3350 17 G: 17 POWDER, FOR SOLUTION ORAL at 07:42

## 2025-03-20 RX ADMIN — INSULIN ASPART 1 UNITS: 100 INJECTION, SOLUTION INTRAVENOUS; SUBCUTANEOUS at 07:42

## 2025-03-20 RX ADMIN — SENNOSIDES AND DOCUSATE SODIUM 2 TABLET: 50; 8.6 TABLET ORAL at 07:42

## 2025-03-20 RX ADMIN — ACETAMINOPHEN 650 MG: 325 TABLET, FILM COATED ORAL at 20:55

## 2025-03-20 RX ADMIN — LISINOPRIL 5 MG: 5 TABLET ORAL at 07:42

## 2025-03-20 RX ADMIN — ACETAMINOPHEN 650 MG: 325 TABLET, FILM COATED ORAL at 12:30

## 2025-03-20 RX ADMIN — HEPARIN SODIUM 7500 UNITS: 5000 INJECTION, SOLUTION INTRAVENOUS; SUBCUTANEOUS at 17:43

## 2025-03-20 ASSESSMENT — ACTIVITIES OF DAILY LIVING (ADL)
ADLS_ACUITY_SCORE: 37
ADLS_ACUITY_SCORE: 38
ADLS_ACUITY_SCORE: 37
ADLS_ACUITY_SCORE: 38
ADLS_ACUITY_SCORE: 38
ADLS_ACUITY_SCORE: 37

## 2025-03-20 NOTE — PLAN OF CARE
Goal Outcome Evaluation:      Plan of Care Reviewed With: patient    Overall Patient Progress: no changeOverall Patient Progress: no change    Outcome Evaluation: Transferred from  @ 2130. Nauseous- PO  zofran given X1    Status: Admitted 3/17 for right occipital ICH. PMHx HTN, HLD, T2DM, right carotid stenosis, and cystocele with urinary urgency   Vitals: VSS on RA. Cont pulse ox. CCM  Neuros: AOX4. Strengths 5/5 t/o. Left hemianopsia; R blurry vision. Pupils equal and reactive  IV: PIV SL  Labs/Electrolytes: Q4 BG checks  Resp: WDL on RA  Diet: Regular. Carb counts. Poor appetite d/t nausea  : VDSP to BR. Strict I/Os  GI: LBM 3/17 (PTA) per pt. Nauseous- PO Zofran given X1  Skin: See admit note below  Pain: Managed with PRN tylenol  Activity: SBA GB. Bed alarm on  Plan: Continue to monitor and follow POC    Arrived from:  @ 21:30  Belongings/meds: Meds transferred. Clothes, glasses  2 RN Skin Assessment Completed by: Denise WARNER and Eleonora RUTLEDGE  Skin Findings: Blanchable redness to buttocks- barrier cream applied

## 2025-03-20 NOTE — PLAN OF CARE
Status: Admitted 3/17 for right occipital ICH. PMHx HTN, HLD, T2DM, right carotid stenosis, and cystocele with urinary urgency   Vitals: htn within parameters  Neuros: bilateral left hemianopsia, denied blurry vision this shift  IV: L PIV SL'd, fluids to start at 0000 on 3/21 for IR procedure tomorrow  Labs/Electrolytes: WNL, continue with BG checks  Resp: WNL  Diet: tolerating reg diet without difficulty  : voiding without difficulty, continent  GI: No BM this shift, last BM prior to admission  Skin: RUE old PIV site redness, ice/heat alternating. Bruising throughout. Coccyx redness remains blanchable  Pain: HA managed effectively with PO tylenol  Activity: up with SBA or family  Social: family visited patient today  Plan: IR tomorrow for cerebral angiogram  Updates this shift: pt pleasant, cooperative

## 2025-03-20 NOTE — PROGRESS NOTES
[facility]    Vascular Neurology Progress Note    Interval History     No acute events overnight. Patient states she had some nausea and only had a few bites of food. Mild headache, improved from yesterday. Tylenol has been effective .She had been able to ambulate independently to the bathroom and to her recliner without difficulty, does not feel off-balanced. We reviewed CTH imaging at bedside with her. Discussed starting subcutaneous heparin given last stability scan was reassuring.     Hospital Course     Chief complaint: Stroke Symptoms    63yo RHD female with hypertension, hyperlipidemia, type 2 diabetes, cystocele with urinary urgency presented with vision changes (left temporal homonymous hemianopia,) and headache, found to have acute IPH right occipital lobe with adjacent right SDH with leftward shift. Etiology thought to be hypertension. Monitoring patient clinically and radiographically this admission. Blood pressures controlled with anti hypertensives. Repeat CTH day 3 stable. Neurosurgery involved in cares. During hospitalization, has been reporting intermittent nausea and intermittent headache which we have been treating with medications. Discontinued PTA aspirin as patient reports she had been taking as primary prevention, with no other clear indication.      Assessment and Plan   64 year old right-handed female with history of hypertension, hyperlipidemia, type 2 diabetes, right carotid stenosis, and cystocele with urinary urgency who presented to the ED after she had sudden onset vision changes and headache. On exam, the patient was found to have left temporal homonymous hemianopia. She was found to have 3.9x5.9x4.5 cm acute IPH in right occipital lobe, with adjacent right temporo-occipital convexity subdural hematoma measuring up to 0.4cm in thickness and 0.7cm leftward shift of midline structures. CTA H/N did not show evidence of stenosis or  vascular lesion underlying hemorrhage, no active contrast extravasation. Etiology of hemorrhage thought to be most likely in setting of hypertension. No radiographic evidence thus far of vascular malformation or aneurysm, no venous abnormality. However, the location of IPH is cortical which is unusual for HTN bleeds, and no other evidence of hypertensive micro hemorrhages or CAA on SWI sequence that would support the HTN or CAA hypotheses. Patient reports that her blood pressures were well controlled, on minimal anti hypertensive regimen (lisinopril 2.5mg daily). Given this unique situation, we decided to involve our neuroIR colleagues for consideration for a diagnostic angiogram to help more definitively rule out any subtle vascular malformations.    #Rt temporo-occipital ICH w 0.7 cm leftward midline shift, ICH score 1 (>30 ml)  #Brain compression  #Cerebral edema, improving  #Right temporo-occipital SDH    Etiology is unclear, though Mri brain completed and thought to be less likely CAA, most likely hypertensive at this point. However would benefit from DSA to further evaluation for subtle vascular malformations . TTE: EF 60-65%, no valvular abn, judy atria are normal  -q4h neurochecks  -sbp goal 130-150  -discontinued PTA aspirin (taking for primary prevention)  -appreciate neurosurgery recommendations  -PT/OT  -Daily CBC  -Hgb goal >7, plt goal >100k  -Transfuse to meet Hgb and plt goals  -Neuro IR consulted. DSA planned for 3/21 8AM. NPO at midnight  Analgesia: tylenol prn    #Right carotid stenosis  - Carotid US in 5/2024 showed 50-69% stenosis; CTA H&N showed only 20%  - Discontinue PTA aspirin (pt clarified she is taking this for primary prevention)    #Malignant HTN; h/o HTN , resolved  Weaned off nicardipine drip in ICU  -lisinopril 5mg daily    #hx of type 2 diabetes, A1c 7.3  -ISS  -holding PTA metformin and jardiance, ok to resume at discharge    #hyperlipidemia  LDL 76, PTA simvastatin 40mg  daily    #hypophosphatemia  #hypocalcemia  Ionized calcium 4.5   -daily BMP, mg, phos. High intensity repletion protocol  -monitor electrolytes    #nausea  #poor po intake  -bowel regimen: scheduled miralax, senna BID  -prn zofran and compazine for symptom management  -Mf while NPO    #mild pulmonary edema  Per CXR 3/18. No fevers, leukocytosis, sick symptoms, SOB concerning for new infectious cause.   -CTM , stable on RA currently    DVT Prophylaxis: starting subcutaneous heparin, will confirm timeline with NSGY  Patient is full code, confirmed on admission.      Seen and discussed with vascular neurology attending, Dr. Ignacio.  Neda Kohlre MD  Neurology Pgy-2     Physical Examination     Temp: 99.8  F (37.7  C) Temp src: Oral BP: (!) 146/65 Pulse: 67   Resp: 18 SpO2: 99 % O2 Device: None (Room air)    General: sitting up in recliner, watching TV in NAD.  Mental Status:  alert, oriented x 3, follows commands, speech clear and fluent, naming and repetition normal  Cranial Nerves:  PERRL, EOMI with normal smooth pursuit, facial sensation intact and symmetric, facial movements symmetric, hearing not formally tested but intact to conversation, palate elevation symmetric and uvula midline, no dysarthria, shoulder shrug strong bilaterally, tongue protrusion midline, left temporal homonymous hemianopia  Motor:  normal muscle tone and bulk, no abnormal movements, able to move all limbs spontaneously, strength 5/5 throughout upper and lower extremities, no pronator drift  Reflexes:  no clonus  Sensory:  light touch sensation intact and symmetric throughout upper and lower extremities, no extinction on double simultaneous stimulation   Coordination:  normal finger-to-nose and heel-to-shin bilaterally without dysmetria    Stroke Scales       NIHSS  1a. Level of Consciousness 0-->Alert, keenly responsive   1b. LOC Questions 0-->Answers both questions correctly   1c. LOC Commands 0-->Performs both tasks correctly   2.    Best Gaze 0-->Normal   3.   Visual 2-->Complete hemianopia   4.   Facial Palsy 0-->Normal symmetrical movements   5a. Motor Arm, Left 0-->No drift, limb holds 90 (or 45) degrees for full 10 secs   5b. Motor Arm, Right 0-->No drift, limb holds 90 (or 45) degrees for full 10 secs   6a. Motor Leg, Left 0-->No drift, leg holds 30 degree position for full 5 secs   6b. Motor Leg, right 0-->No drift, leg holds 30 degree position for full 5 secs   7.   Limb Ataxia 0-->Absent   8.   Sensory 0-->Normal, no sensory loss   9.   Best Language 0-->No aphasia, normal   10. Dysarthria 0-->Normal   11. Extinction and Inattention  0-->No abnormality   Total 2 (03/20/25 0998)       Imaging/Labs        MRI/Head CT MRI B 3/18/25:   IMPRESSION:  1. Similar right occipital and posterior temporal lobe  intraparenchymal hemorrhages with surrounding edema and 8 mm of  leftward midline shift. No underlying enhancing lesion or vascular  abnormality.  2. Thin subdural hematoma along the right parietooccipital convexity,  along the cerebellar convexities, and the posterior falx and right  tentorium.  3. MRV demonstrates patent dural venous sinuses and major deep  intracranial veins. No thrombosis identified.  MRV B 3/18/25:  No thrombosis or stenosis of the major intracranial dural sinuses or deep cerebral veins. No pathologic filling defect on the postcontrast MRV images.  HEAD CT 3/17/2025:  1.  There is a 3.9 x 5.9 x 4.5 cm acute intraparenchymal hematoma involving the right occipital lobe.  2.  There is an adjacent right temporo-occipital convexity subdural hematoma measuring up to 0.4 cm in thickness as well as subdural blood layering along the right tentorial leaflet measuring 0.5 cm and a right parafalcine component measuring 0.2 cm in   thickness.  3.  There is 0.7 cm leftward shift of the midline structures.    Head CT 3/19/2025  IMPRESSION:   1. Stable large intraparenchymal hemorrhage in the right posterior  temporal and occipital  lobes with decreased surrounding vasogenic  edema. Persistent 7 mm of leftward midline shift with no significant  interval change from prior. No new acute intracranial hemorrhage or  acute loss of gray-white matter differentiation.  2. Stable subdural hemorrhage along the falx and right tentorium and  stable trace subdural hemorrhage along the right parieto-occipital  convexity.  3. Continued sulcal effacement in the right cerebral hemisphere with  persistent effacement of the right lateral ventricle. No acute  hydrocephalus.   Intracranial Vasculature HEAD CTA:  1.  Patent intracranial arterial vasculature without flow-limiting stenosis.  2.  No vascular lesion underlying the right occipital hemorrhage. No active contrast extravasation.   Cervical Vasculature NECK CTA:  Patent cervical arterial vasculature without flow-limiting stenosis.     Echocardiogram TTE 3/18/2025  Interpretation Summary  Left ventricular size, wall motion and function are normal. The ejection fraction is 60-65%. Right ventricular function, chamber size, wall motion, and thickness are normal. No significant valvular abnormalities present. The inferior vena cava was normal in size with preserved respiratory variability.  No pericardial effusion is present.   EKG/Telemetry SR     LDL  3/17/2025: 76 mg/dL   A1C  3/18/2025: 7.3 %   Troponin No lab value available in past 48 hrs     Other labs reviewed by me today:  Ionized calcium 4.5

## 2025-03-20 NOTE — PLAN OF CARE
6A Defer Physical Therapy - Per chart review and discussion with Occupational Therapy, Pt ambulating with supervision and no AD, no concerns for safety or balance at discharge. Greatest deficits noted are with vision. Plan for OT to continue to follow to address progression of functional IND as needed. Pt with no skilled inpatient PT needs, Will complete consult and defer evaluation, please reconsult as appropriate if patient has decline in functional mobility requiring further skilled inpatient PT needs. Defer Discharge recommendations to Occupational Therapy.

## 2025-03-20 NOTE — PROGRESS NOTES
Transferred to: Unit 6A  at 2115  Belongings: sent upstairs w/ pt  Gibbs removed? No: N/A  Central line removed? NA  Chart and medications sent with patient Yes  Family notified: No: pt will notify    Major Shift Events: Assumed care [id number], no changes in pt condition in that time.  Plan: Continue plan of care.  For vital signs and complete assessments, please see documentation flowsheets.

## 2025-03-21 ENCOUNTER — APPOINTMENT (OUTPATIENT)
Dept: ULTRASOUND IMAGING | Facility: CLINIC | Age: 65
End: 2025-03-21
Payer: COMMERCIAL

## 2025-03-21 ENCOUNTER — APPOINTMENT (OUTPATIENT)
Dept: OCCUPATIONAL THERAPY | Facility: CLINIC | Age: 65
End: 2025-03-21
Payer: COMMERCIAL

## 2025-03-21 ENCOUNTER — APPOINTMENT (OUTPATIENT)
Dept: CT IMAGING | Facility: CLINIC | Age: 65
End: 2025-03-21
Payer: COMMERCIAL

## 2025-03-21 ENCOUNTER — APPOINTMENT (OUTPATIENT)
Dept: INTERVENTIONAL RADIOLOGY/VASCULAR | Facility: CLINIC | Age: 65
End: 2025-03-21
Attending: STUDENT IN AN ORGANIZED HEALTH CARE EDUCATION/TRAINING PROGRAM
Payer: COMMERCIAL

## 2025-03-21 LAB
ANION GAP SERPL CALCULATED.3IONS-SCNC: 12 MMOL/L (ref 7–15)
ATRIAL RATE - MUSE: 71 BPM
BUN SERPL-MCNC: 4.7 MG/DL (ref 8–23)
CALCIUM SERPL-MCNC: 8.8 MG/DL (ref 8.8–10.4)
CHLORIDE SERPL-SCNC: 102 MMOL/L (ref 98–107)
CREAT SERPL-MCNC: 0.38 MG/DL (ref 0.51–0.95)
DIASTOLIC BLOOD PRESSURE - MUSE: NORMAL MMHG
EGFRCR SERPLBLD CKD-EPI 2021: >90 ML/MIN/1.73M2
ERYTHROCYTE [DISTWIDTH] IN BLOOD BY AUTOMATED COUNT: 13.5 % (ref 10–15)
GLUCOSE BLDC GLUCOMTR-MCNC: 154 MG/DL (ref 70–99)
GLUCOSE BLDC GLUCOMTR-MCNC: 159 MG/DL (ref 70–99)
GLUCOSE BLDC GLUCOMTR-MCNC: 166 MG/DL (ref 70–99)
GLUCOSE BLDC GLUCOMTR-MCNC: 187 MG/DL (ref 70–99)
GLUCOSE BLDC GLUCOMTR-MCNC: 204 MG/DL (ref 70–99)
GLUCOSE SERPL-MCNC: 179 MG/DL (ref 70–99)
HCO3 SERPL-SCNC: 25 MMOL/L (ref 22–29)
HCT VFR BLD AUTO: 38.3 % (ref 35–47)
HGB BLD-MCNC: 12.5 G/DL (ref 11.7–15.7)
INTERPRETATION ECG - MUSE: NORMAL
MAGNESIUM SERPL-MCNC: 2.1 MG/DL (ref 1.7–2.3)
MCH RBC QN AUTO: 30.3 PG (ref 26.5–33)
MCHC RBC AUTO-ENTMCNC: 32.6 G/DL (ref 31.5–36.5)
MCV RBC AUTO: 93 FL (ref 78–100)
P AXIS - MUSE: 3 DEGREES
PHOSPHATE SERPL-MCNC: 3.5 MG/DL (ref 2.5–4.5)
PLATELET # BLD AUTO: 287 10E3/UL (ref 150–450)
POTASSIUM SERPL-SCNC: 3.8 MMOL/L (ref 3.4–5.3)
PR INTERVAL - MUSE: 156 MS
QRS DURATION - MUSE: 92 MS
QT - MUSE: 410 MS
QTC - MUSE: 445 MS
R AXIS - MUSE: 29 DEGREES
RBC # BLD AUTO: 4.13 10E6/UL (ref 3.8–5.2)
SODIUM SERPL-SCNC: 139 MMOL/L (ref 135–145)
SYSTOLIC BLOOD PRESSURE - MUSE: NORMAL MMHG
T AXIS - MUSE: 38 DEGREES
VENTRICULAR RATE- MUSE: 71 BPM
WBC # BLD AUTO: 5.7 10E3/UL (ref 4–11)

## 2025-03-21 PROCEDURE — 93010 ELECTROCARDIOGRAM REPORT: CPT | Performed by: INTERNAL MEDICINE

## 2025-03-21 PROCEDURE — C1769 GUIDE WIRE: HCPCS

## 2025-03-21 PROCEDURE — 272N000566 HC SHEATH CR3

## 2025-03-21 PROCEDURE — 120N000002 HC R&B MED SURG/OB UMMC

## 2025-03-21 PROCEDURE — 76937 US GUIDE VASCULAR ACCESS: CPT

## 2025-03-21 PROCEDURE — 36226 PLACE CATH VERTEBRAL ART: CPT | Mod: LT | Performed by: RADIOLOGY

## 2025-03-21 PROCEDURE — 99152 MOD SED SAME PHYS/QHP 5/>YRS: CPT | Mod: GC | Performed by: RADIOLOGY

## 2025-03-21 PROCEDURE — 80048 BASIC METABOLIC PNL TOTAL CA: CPT | Performed by: PSYCHIATRY & NEUROLOGY

## 2025-03-21 PROCEDURE — 85027 COMPLETE CBC AUTOMATED: CPT | Performed by: PSYCHIATRY & NEUROLOGY

## 2025-03-21 PROCEDURE — 250N000013 HC RX MED GY IP 250 OP 250 PS 637: Performed by: NURSE PRACTITIONER

## 2025-03-21 PROCEDURE — 272N000192 HC ACCESSORY CR2

## 2025-03-21 PROCEDURE — 250N000011 HC RX IP 250 OP 636: Performed by: STUDENT IN AN ORGANIZED HEALTH CARE EDUCATION/TRAINING PROGRAM

## 2025-03-21 PROCEDURE — 93005 ELECTROCARDIOGRAM TRACING: CPT

## 2025-03-21 PROCEDURE — 93971 EXTREMITY STUDY: CPT | Mod: 26 | Performed by: RADIOLOGY

## 2025-03-21 PROCEDURE — 250N000009 HC RX 250: Performed by: STUDENT IN AN ORGANIZED HEALTH CARE EDUCATION/TRAINING PROGRAM

## 2025-03-21 PROCEDURE — 70450 CT HEAD/BRAIN W/O DYE: CPT | Mod: 26 | Performed by: RADIOLOGY

## 2025-03-21 PROCEDURE — 36415 COLL VENOUS BLD VENIPUNCTURE: CPT | Performed by: PSYCHIATRY & NEUROLOGY

## 2025-03-21 PROCEDURE — 250N000011 HC RX IP 250 OP 636

## 2025-03-21 PROCEDURE — 36226 PLACE CATH VERTEBRAL ART: CPT | Mod: LT

## 2025-03-21 PROCEDURE — 84100 ASSAY OF PHOSPHORUS: CPT

## 2025-03-21 PROCEDURE — 83735 ASSAY OF MAGNESIUM: CPT

## 2025-03-21 PROCEDURE — 93971 EXTREMITY STUDY: CPT | Mod: RT

## 2025-03-21 PROCEDURE — 36223 PLACE CATH CAROTID/INOM ART: CPT | Mod: 50

## 2025-03-21 PROCEDURE — 99152 MOD SED SAME PHYS/QHP 5/>YRS: CPT

## 2025-03-21 PROCEDURE — 76937 US GUIDE VASCULAR ACCESS: CPT | Mod: 26 | Performed by: RADIOLOGY

## 2025-03-21 PROCEDURE — 70450 CT HEAD/BRAIN W/O DYE: CPT

## 2025-03-21 PROCEDURE — 250N000009 HC RX 250

## 2025-03-21 PROCEDURE — B31Q1ZZ FLUOROSCOPY OF CERVICO-CEREBRAL ARCH USING LOW OSMOLAR CONTRAST: ICD-10-PCS | Performed by: RADIOLOGY

## 2025-03-21 PROCEDURE — 250N000013 HC RX MED GY IP 250 OP 250 PS 637: Performed by: STUDENT IN AN ORGANIZED HEALTH CARE EDUCATION/TRAINING PROGRAM

## 2025-03-21 PROCEDURE — 97535 SELF CARE MNGMENT TRAINING: CPT | Mod: GO

## 2025-03-21 PROCEDURE — 255N000002 HC RX 255 OP 636: Performed by: STUDENT IN AN ORGANIZED HEALTH CARE EDUCATION/TRAINING PROGRAM

## 2025-03-21 PROCEDURE — 36223 PLACE CATH CAROTID/INOM ART: CPT | Mod: 50 | Performed by: RADIOLOGY

## 2025-03-21 PROCEDURE — 258N000003 HC RX IP 258 OP 636

## 2025-03-21 PROCEDURE — 272N000506 HC NEEDLE CR6

## 2025-03-21 PROCEDURE — 999N000248 HC STATISTIC IV INSERT WITH US BY RN

## 2025-03-21 PROCEDURE — 250N000013 HC RX MED GY IP 250 OP 250 PS 637

## 2025-03-21 PROCEDURE — 99232 SBSQ HOSP IP/OBS MODERATE 35: CPT | Mod: GC | Performed by: STUDENT IN AN ORGANIZED HEALTH CARE EDUCATION/TRAINING PROGRAM

## 2025-03-21 PROCEDURE — C1887 CATHETER, GUIDING: HCPCS

## 2025-03-21 PROCEDURE — 99153 MOD SED SAME PHYS/QHP EA: CPT

## 2025-03-21 PROCEDURE — C1760 CLOSURE DEV, VASC: HCPCS

## 2025-03-21 RX ORDER — AMOXICILLIN 500 MG/1
500 CAPSULE ORAL EVERY 12 HOURS SCHEDULED
Status: DISCONTINUED | OUTPATIENT
Start: 2025-03-21 | End: 2025-03-21

## 2025-03-21 RX ORDER — ACETAMINOPHEN 325 MG/1
650 TABLET ORAL
Status: DISCONTINUED | OUTPATIENT
Start: 2025-03-21 | End: 2025-03-22 | Stop reason: HOSPADM

## 2025-03-21 RX ORDER — FENTANYL CITRATE 50 UG/ML
25-50 INJECTION, SOLUTION INTRAMUSCULAR; INTRAVENOUS EVERY 5 MIN PRN
Status: DISCONTINUED | OUTPATIENT
Start: 2025-03-21 | End: 2025-03-21

## 2025-03-21 RX ORDER — HEPARIN SODIUM 200 [USP'U]/100ML
1 INJECTION, SOLUTION INTRAVENOUS EVERY 5 MIN PRN
Status: DISCONTINUED | OUTPATIENT
Start: 2025-03-21 | End: 2025-03-21

## 2025-03-21 RX ORDER — IODIXANOL 320 MG/ML
150 INJECTION, SOLUTION INTRAVASCULAR ONCE
Status: COMPLETED | OUTPATIENT
Start: 2025-03-21 | End: 2025-03-21

## 2025-03-21 RX ORDER — DOXYCYCLINE 100 MG/1
100 CAPSULE ORAL EVERY 12 HOURS SCHEDULED
Status: DISCONTINUED | OUTPATIENT
Start: 2025-03-21 | End: 2025-03-22 | Stop reason: HOSPADM

## 2025-03-21 RX ORDER — NALOXONE HYDROCHLORIDE 0.4 MG/ML
0.2 INJECTION, SOLUTION INTRAMUSCULAR; INTRAVENOUS; SUBCUTANEOUS
Status: DISCONTINUED | OUTPATIENT
Start: 2025-03-21 | End: 2025-03-21

## 2025-03-21 RX ORDER — ONDANSETRON 2 MG/ML
4 INJECTION INTRAMUSCULAR; INTRAVENOUS
Status: COMPLETED | OUTPATIENT
Start: 2025-03-21 | End: 2025-03-21

## 2025-03-21 RX ORDER — LIDOCAINE 40 MG/G
CREAM TOPICAL
Status: DISCONTINUED | OUTPATIENT
Start: 2025-03-21 | End: 2025-03-22 | Stop reason: HOSPADM

## 2025-03-21 RX ORDER — NALOXONE HYDROCHLORIDE 0.4 MG/ML
0.4 INJECTION, SOLUTION INTRAMUSCULAR; INTRAVENOUS; SUBCUTANEOUS
Status: DISCONTINUED | OUTPATIENT
Start: 2025-03-21 | End: 2025-03-21

## 2025-03-21 RX ORDER — SIMVASTATIN 20 MG
40 TABLET ORAL EVERY EVENING
Status: DISCONTINUED | OUTPATIENT
Start: 2025-03-21 | End: 2025-03-22 | Stop reason: HOSPADM

## 2025-03-21 RX ORDER — FLUMAZENIL 0.1 MG/ML
0.2 INJECTION, SOLUTION INTRAVENOUS
Status: DISCONTINUED | OUTPATIENT
Start: 2025-03-21 | End: 2025-03-21

## 2025-03-21 RX ORDER — SODIUM CHLORIDE 9 MG/ML
INJECTION, SOLUTION INTRAVENOUS CONTINUOUS
Status: DISCONTINUED | OUTPATIENT
Start: 2025-03-21 | End: 2025-03-22 | Stop reason: HOSPADM

## 2025-03-21 RX ORDER — MAGNESIUM SULFATE HEPTAHYDRATE 40 MG/ML
2 INJECTION, SOLUTION INTRAVENOUS ONCE
Status: COMPLETED | OUTPATIENT
Start: 2025-03-21 | End: 2025-03-21

## 2025-03-21 RX ORDER — ONDANSETRON 2 MG/ML
4 INJECTION INTRAMUSCULAR; INTRAVENOUS
Status: DISCONTINUED | OUTPATIENT
Start: 2025-03-21 | End: 2025-03-21

## 2025-03-21 RX ADMIN — MIDAZOLAM 0.5 MG: 1 INJECTION INTRAMUSCULAR; INTRAVENOUS at 08:43

## 2025-03-21 RX ADMIN — INSULIN ASPART 1 UNITS: 100 INJECTION, SOLUTION INTRAVENOUS; SUBCUTANEOUS at 04:45

## 2025-03-21 RX ADMIN — INSULIN ASPART 1 UNITS: 100 INJECTION, SOLUTION INTRAVENOUS; SUBCUTANEOUS at 12:11

## 2025-03-21 RX ADMIN — PROCHLORPERAZINE EDISYLATE 10 MG: 5 INJECTION INTRAMUSCULAR; INTRAVENOUS at 02:17

## 2025-03-21 RX ADMIN — IODIXANOL 55 ML: 320 INJECTION, SOLUTION INTRAVASCULAR at 09:02

## 2025-03-21 RX ADMIN — INSULIN ASPART 1 UNITS: 100 INJECTION, SOLUTION INTRAVENOUS; SUBCUTANEOUS at 16:23

## 2025-03-21 RX ADMIN — DOXYCYCLINE HYCLATE 100 MG: 100 CAPSULE ORAL at 20:55

## 2025-03-21 RX ADMIN — INSULIN ASPART 1 UNITS: 100 INJECTION, SOLUTION INTRAVENOUS; SUBCUTANEOUS at 00:22

## 2025-03-21 RX ADMIN — MAGNESIUM SULFATE HEPTAHYDRATE 2 G: 2 INJECTION, SOLUTION INTRAVENOUS at 02:17

## 2025-03-21 RX ADMIN — HEPARIN SODIUM 7500 UNITS: 5000 INJECTION, SOLUTION INTRAVENOUS; SUBCUTANEOUS at 00:17

## 2025-03-21 RX ADMIN — MIDAZOLAM 0.5 MG: 1 INJECTION INTRAMUSCULAR; INTRAVENOUS at 09:12

## 2025-03-21 RX ADMIN — FENTANYL CITRATE 25 MCG: 50 INJECTION, SOLUTION INTRAMUSCULAR; INTRAVENOUS at 08:43

## 2025-03-21 RX ADMIN — HEPARIN SODIUM 7500 UNITS: 5000 INJECTION, SOLUTION INTRAVENOUS; SUBCUTANEOUS at 16:23

## 2025-03-21 RX ADMIN — MIDAZOLAM 0.5 MG: 1 INJECTION INTRAMUSCULAR; INTRAVENOUS at 09:07

## 2025-03-21 RX ADMIN — MIDAZOLAM 0.5 MG: 1 INJECTION INTRAMUSCULAR; INTRAVENOUS at 08:50

## 2025-03-21 RX ADMIN — SENNOSIDES AND DOCUSATE SODIUM 2 TABLET: 50; 8.6 TABLET ORAL at 20:55

## 2025-03-21 RX ADMIN — FENTANYL CITRATE 25 MCG: 50 INJECTION, SOLUTION INTRAMUSCULAR; INTRAVENOUS at 08:50

## 2025-03-21 RX ADMIN — FENTANYL CITRATE 25 MCG: 50 INJECTION, SOLUTION INTRAMUSCULAR; INTRAVENOUS at 09:07

## 2025-03-21 RX ADMIN — SIMVASTATIN 40 MG: 20 TABLET, FILM COATED ORAL at 20:55

## 2025-03-21 RX ADMIN — ACETAMINOPHEN 650 MG: 325 TABLET, FILM COATED ORAL at 16:24

## 2025-03-21 RX ADMIN — ACETAMINOPHEN 650 MG: 325 TABLET, FILM COATED ORAL at 11:24

## 2025-03-21 RX ADMIN — LIDOCAINE HYDROCHLORIDE 10 ML: 10 INJECTION, SOLUTION EPIDURAL; INFILTRATION; INTRACAUDAL; PERINEURAL at 08:50

## 2025-03-21 RX ADMIN — SODIUM CHLORIDE: 0.9 INJECTION, SOLUTION INTRAVENOUS at 00:54

## 2025-03-21 RX ADMIN — ACETAMINOPHEN 650 MG: 325 TABLET, FILM COATED ORAL at 01:03

## 2025-03-21 RX ADMIN — LISINOPRIL 5 MG: 5 TABLET ORAL at 11:24

## 2025-03-21 RX ADMIN — DOXYCYCLINE HYCLATE 100 MG: 100 CAPSULE ORAL at 13:53

## 2025-03-21 RX ADMIN — ONDANSETRON 4 MG: 4 TABLET, ORALLY DISINTEGRATING ORAL at 12:42

## 2025-03-21 RX ADMIN — POTASSIUM & SODIUM PHOSPHATES POWDER PACK 280-160-250 MG 1 PACKET: 280-160-250 PACK at 00:16

## 2025-03-21 RX ADMIN — HEPARIN SODIUM 1 BAG: 200 INJECTION, SOLUTION INTRAVENOUS at 08:50

## 2025-03-21 RX ADMIN — ONDANSETRON 4 MG: 2 INJECTION, SOLUTION INTRAMUSCULAR; INTRAVENOUS at 08:43

## 2025-03-21 RX ADMIN — SCOPOLAMINE 1 PATCH: 1.5 PATCH, EXTENDED RELEASE TRANSDERMAL at 20:57

## 2025-03-21 RX ADMIN — ACETAMINOPHEN 650 MG: 325 TABLET, FILM COATED ORAL at 21:07

## 2025-03-21 RX ADMIN — MAGNESIUM OXIDE TAB 400 MG (241.3 MG ELEMENTAL MG) 400 MG: 400 (241.3 MG) TAB at 00:17

## 2025-03-21 RX ADMIN — FENTANYL CITRATE 25 MCG: 50 INJECTION, SOLUTION INTRAMUSCULAR; INTRAVENOUS at 09:12

## 2025-03-21 ASSESSMENT — ACTIVITIES OF DAILY LIVING (ADL)
ADLS_ACUITY_SCORE: 38
ADLS_ACUITY_SCORE: 39
ADLS_ACUITY_SCORE: 39
ADLS_ACUITY_SCORE: 38
ADLS_ACUITY_SCORE: 39
ADLS_ACUITY_SCORE: 38
ADLS_ACUITY_SCORE: 39
ADLS_ACUITY_SCORE: 38
ADLS_ACUITY_SCORE: 38
ADLS_ACUITY_SCORE: 39
ADLS_ACUITY_SCORE: 38
ADLS_ACUITY_SCORE: 38
ADLS_ACUITY_SCORE: 39
ADLS_ACUITY_SCORE: 38
ADLS_ACUITY_SCORE: 38
ADLS_ACUITY_SCORE: 39
ADLS_ACUITY_SCORE: 39

## 2025-03-21 NOTE — IR NOTE
Patient Name: Azucena Guo  Medical Record Number: [id number]  Today's Date: 3/21/2025    Procedure: Diagnostic Carotid Cerebral Angiogram  Proceduralist: Dr. JENNIFER Leal, Dr. MONROE Hurley  Pathology present: No    Procedure Start: 0840  Procedure end: 0915    Sedation medications administered: Midazolam 2 mg, Fentanyl 100 mcg  Other medications administered: 4mg zofran     Report given to: Aracely  : No    Other Notes: Pt arrived to IR room 3 from . Consent reviewed. Pt denies any questions or concerns regarding procedure. Pt positioned supine and monitored per protocol.     Right Groin Access:  5Fr sheath  5Fr angioseal deployed at 0915.  Bedrest x 2 hours.  Ambulation time: 1115      Pt tolerated procedure without any noted complications. Pt transferred back to .

## 2025-03-21 NOTE — PROVIDER NOTIFICATION
Provider Notified: Desiree Borrero   Concern: I just posted an old PIV site that looks like it extravasated, its warm to the touch and 8/10 dull pain.  Reponse summary: Provider will come by soon  Time Notified: 2220  Time of Response: 2234

## 2025-03-21 NOTE — PROGRESS NOTES
Care Management Follow Up    Length of Stay (days): 4    Expected Discharge Date: 03/22/2025     Concerns to be Addressed: discharge planning   Patient plan of care discussed at interdisciplinary rounds: Yes    Anticipated Discharge Disposition: Home  Anticipated Discharge Services:  Outpatient OT  Anticipated Discharge DME:  None    Patient/family educated on Medicare website which has current facility and service quality ratings:  NA  Education Provided on the Discharge Plan:  Yes  Patient/Family in Agreement with the Plan:  Yes    Referrals Placed by CM/SW:  NA  Private pay costs discussed: Not applicable    Discussed  Partnership in Safe Discharge Planning  document with patient/family: Not at this time     Handoff Completed: No, handoff not indicated or clinically appropriate at this time    Additional Information:  Patient status reviewed, discussed in discharge rounds. OT is recommending home with outpatient occupational therapy and PT deferred. Outpatient OT orders requested from provider. No discharge needs identified.     Next Steps:   No further care management intervention anticipated at this time.  Please re-consult if further needs arise.  Care management signing off.      Tiff Leyva RN, BSN  6A RN Care Coordinator  Ph: [phone]   [place]: 6A Neuro RNCC

## 2025-03-21 NOTE — PROGRESS NOTES
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 Her BP was high on arrival, around 190 systolics. She is presenting for a diagnostic cerebral angiogram.    Medical History:  Reviewed    Surgical History:  Reviewed    Family History:  Reviewed    Social History:  Reviewed    Allergies:  Reviewed    Is there a contrast allergy?  No    Medications:  I have reviewed this patient's current medications  Current Facility-Administered Medications   Medication Dose Route Frequency Provider Last Rate Last Admin    acetaminophen (TYLENOL) tablet 650 mg  650 mg Oral or Feeding Tube Q4H PRN Desiree Borrero MD   650 mg at 03/21/25 0103    glucose gel 15-30 g  15-30 g Oral Q15 Min PRN Desiree Borrero MD        Or    dextrose 50 % injection 25-50 mL  25-50 mL Intravenous Q15 Min PRN Desiree Borrero MD        Or    glucagon injection 1 mg  1 mg Subcutaneous Q15 Min PRN Desiree Borrero MD        heparin ANTICOAGULANT injection 7,500 Units  7,500 Units Subcutaneous Q8H Devendra Hassan MD   7,500 Units at 03/21/25 0017    hydrALAZINE (APRESOLINE) injection 10-20 mg  10-20 mg Intravenous Q30 Min PRN Desiree Borrero MD   20 mg at 03/19/25 0627    insulin aspart (NovoLOG) injection (RAPID ACTING)  1-4 Units Subcutaneous Q4H Desiree Borrero MD   1 Units at 03/21/25 0445    labetalol (NORMODYNE/TRANDATE) injection 10-20 mg  10-20 mg Intravenous Q10 Min PRN Desiree Borrero MD   20 mg at 03/19/25 1239    lisinopril (ZESTRIL) tablet 5 mg  5 mg Oral or Feeding Tube Daily Desiree Borrero MD   5 mg at 03/20/25 0742    naloxone (NARCAN) injection 0.2 mg  0.2 mg  Intravenous Q2 Min PRN Madhav Dash MD        Or    naloxone (NARCAN) injection 0.4 mg  0.4 mg Intravenous Q2 Min PRN Madhav Dash MD        Or    naloxone (NARCAN) injection 0.2 mg  0.2 mg Intramuscular Q2 Min PRN Madhav Dash MD        Or    naloxone (NARCAN) injection 0.4 mg  0.4 mg Intramuscular Q2 Min PRN Madhav Dash MD        ondansetron (ZOFRAN ODT) ODT tab 4 mg  4 mg Oral Q6H PRN Desiree Borrero MD   4 mg at 03/19/25 2140    Or    ondansetron (ZOFRAN) injection 4 mg  4 mg Intravenous Q6H PRN Desiree Borrero MD   4 mg at 03/19/25 0729    polyethylene glycol (MIRALAX) Packet 17 g  17 g Oral or Feeding Tube Daily Desiree Borrero MD   17 g at 03/20/25 0742    prochlorperazine (COMPAZINE) injection 10 mg  10 mg Intravenous Q6H PRN eDsiree Borrero MD   10 mg at 03/21/25 0217    Or    prochlorperazine (COMPAZINE) tablet 10 mg  10 mg Oral Q6H PRN Desiree Borrero MD        scopolamine (TRANSDERM) 72 hr patch 1 patch  1 patch Transdermal Q72H Jaime Keith MD   1 patch at 03/18/25 2203    And    scopolamine (TRANSDERM-SCOP) Patch in Place   Transdermal Q8H Jaime Keith MD        senna-docusate (SENOKOT-S/PERICOLACE) 8.6-50 MG per tablet 1 tablet  1 tablet Oral or Feeding Tube BID Daisha Owens APRN CNP   1 tablet at 03/20/25 2055    Or    senna-docusate (SENOKOT-S/PERICOLACE) 8.6-50 MG per tablet 2 tablet  2 tablet Oral or Feeding Tube BID Daisha Owens APRN CNP   2 tablet at 03/20/25 0742    sodium chloride 0.9 % infusion   Intravenous Continuous Neda Kohler MD 75 mL/hr at 03/21/25 0054 New Bag at 03/21/25 0054   .    ROS:  The 5 point Review of Systems is negative other than noted in the HPI or here.     PHYSICAL EXAMINATION  Vital Signs:  B/P: 142/74,  T: 98.4,  P: 64,  R: 16    Cardio:  RRR  Pulmonary:  no respiratory distress  Abdomen:  soft    Neurologic  Mental Status:  fully alert, attentive and oriented, follows commands, speech clear and  fluent  Cranial Nerves:  PERRL, EOMI with normal smooth pursuit, facial sensation intact and symmetric, facial movements symmetric, hearing not formally tested but intact to conversation, palate elevation symmetric and uvula midline, no dysarthria, shoulder shrug strong bilaterally, tongue protrusion midline, L homonymous hemianopia  Motor:  no abnormal movements, no pronator drift  Sensory:   intact to light touch  Coordination:   finger to nose without ataxia    Pre-procedure National Institutes of Health Stroke Scale:   Not applicable    LABS  (most recent Cr, BUN, GFR, PLT, INR, PTT within the past 7 days):  Recent Labs   Lab 03/20/25  0659 03/18/25  0420 03/17/25  1956   CR 0.40*   < > 0.43*   BUN 6.4*   < > 11.7   GFRESTIMATED >90   < > >90      < > 308   INR  --   --  0.93   PTT  --   --  29    < > = values in this interval not displayed.        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 Her BP was high on arrival, around 190 systolics. She is presenting for a diagnostic cerebral angiogram.    PLAN: diagnostic cerebral angiogram        PRE-PROCEDURE SEDATION ASSESSMENT     Pre-Procedure Sedation Assessment done at 0700.    Expected Level:  Moderate Sedation    Indication:  Sedation is required to allow for neurointerventional procedure.    Consent obtained from patient after discussing the risks, benefits and alternatives.     PO Intake:  Appropriately NPO for procedure    ASA Class:  Class 2 - MILD SYSTEMIC DISEASE, NO ACUTE PROBLEMS, NO FUNCTIONAL LIMITATIONS.    Mallampati:  Grade 2:  Soft palate, base of uvula, tonsillar pillars, and portion of posterior pharyngeal wall visible    History and physical reviewed and no  updates needed. I have reviewed the lab findings, diagnostic data, medications, and the plan for sedation. I have determined this patient to be an appropriate candidate for the planned sedation and procedure and have reassessed the patient IMMEDIATELY PRIOR to sedation and procedure.    Patient was discussed with the Attending, Dr. Hurley, who agrees with the plan.    Emily Leal MD   Fellow, Endovascular Surgical Neuroradiology

## 2025-03-21 NOTE — PROCEDURES
[facility]     Endovascular Surgical Neuroradiology Post-Procedure Note    Pre-Procedure Diagnosis: right occipito-temporal hemorrhage  Post-Procedure Diagnosis: same    Procedure:   Diagnostic cervicocerebral angiography    Reason for delay:  Not applicable    Findings:   No evidence of vascular malformation, dural AV fistula or aneurysm to explain the etiology of hemorrhage    Plan:    Bedrest 2 hours  Repeat diagnostic cerebral angiogram in 1 month  Continue inpatient care    Primary Surgeon: Dr. Denis Hurley  Secondary Surgeon: Not applicable  Secondary Surgeon Review: None  Fellow: mEily Leal MD  Additional Assistants: Yareli Adrian MD PhD    Prior to the start of the procedure and with procedural staff participation, I verbally confirmed: the patient s identity using two indicators, relevant allergies, that the procedure was appropriate and matched the consent or emergent situation, and that the correct equipment/implants were available. Immediately prior to starting the procedure I conducted the Time Out with the procedural staff and re-confirmed the patient s name, procedure, and site/side. (The Joint Commission universal protocol was followed.)  Yes    PRU value: Not applicable    Anesthesia: Conscious Sedation  Medications:   Midazolam 2 mg IV, Fentanyl 100 mcg IV, lidocaine 1% topical 10mL, zofran 4mg IV  Puncture site: Right Femoral Artery    Fluoroscopy time (minutes): 11.2  Radiation dose (mGy):  727.2     Contrast amount (mL):  55      Estimated blood loss (mL): 10    Closure: 6 F Angio-seal    Bedrest start time 0915 and 2 hours bedrest completed at 1115h    Disposition: Will be followed in hospital by the Neuro Critical Care/Stroke team.        Sedation Post-Procedure Summary    The patient was reevaluated immediately before administering moderate or deep sedation or anesthesia.    Sedatives: see above    Vital signs and pulse oximetry  were monitored and remained stable throughout the procedure, and sedation was maintained until the procedure was complete.  The patient was monitored by staff until sedation discharge criteria were met.    Patient tolerance: Patient tolerated the procedure well with no immediate complications.    Time of sedation in minutes: 35 minutes from beginning to end of physician one to one monitoring.    Emily Leal MD  Fellow, Endovascular Surgical Neuroradiology    Use [employer] to contact: Neuro IR Fellow ([place])

## 2025-03-21 NOTE — PROGRESS NOTES
[facility]    Vascular Neurology Progress Note    Interval History     Patient had headache overnight, relieved by compazine and magnesium.   PIV infiltrated site on right arm, patient endorsing some pain overnight. Site outlined with skin marker, appears to be slightly expanding over last few days. Ultrasound ordered.    Hospital Course     Chief complaint: Stroke Symptoms      63yo RHD female with hypertension, hyperlipidemia, type 2 diabetes, cystocele with urinary urgency presented with vision changes (left temporal homonymous hemianopia,) and headache, found to have acute IPH right occipital lobe with adjacent right SDH with leftward shift. Etiology thought to be hypertension. Monitoring patient clinically and radiographically this admission. Blood pressures controlled with anti hypertensives. Repeat CTH day 3 stable. Neurosurgery involved in cares. During hospitalization, has been reporting intermittent nausea and intermittent headache which we have been treating with medications. Discontinued PTA aspirin as patient reports she had been taking as primary prevention, with no other clear indication. Right arm PIV infiltration site now with induration, starting on oral antibiotics for cellulitis. DSA completed 3/21 did not demonstrate any vascular malformation.    Assessment and Plan     64 year old right-handed female with history of hypertension, hyperlipidemia, type 2 diabetes, right carotid stenosis, and cystocele with urinary urgency who presented to the ED after she had sudden onset vision changes and headache. On exam, the patient was found to have left temporal homonymous hemianopia. She was found to have 3.9x5.9x4.5 cm acute IPH in right occipital lobe, with adjacent right temporo-occipital convexity subdural hematoma measuring up to 0.4cm in thickness and 0.7cm leftward shift of midline structures. CTA H/N did not show evidence of stenosis or vascular  lesion underlying hemorrhage, no active contrast extravasation. Etiology of hemorrhage thought to be most likely in setting of hypertension. No radiographic evidence thus far of vascular malformation or aneurysm, no venous abnormality. However, the location of IPH is cortical which is unusual for HTN bleeds, and no other evidence of hypertensive micro hemorrhages or CAA on SWI sequence that would support the HTN or CAA hypotheses. Patient reports that her blood pressures were well controlled, on minimal anti hypertensive regimen (lisinopril 2.5mg daily). Given this unique situation, we decided to involve our neuroIR colleagues for consideration for a diagnostic angiogram to help more definitively rule out any subtle vascular malformations.     #Rt temporo-occipital ICH w 0.7 cm leftward midline shift, ICH score 1 (>30 ml)  #Brain compression  #Cerebral edema, improving  #Right temporo-occipital SDH    Etiology is unclear, though Mri brain completed and thought to be less likely CAA, most likely hypertensive at this point. However would benefit from DSA to further evaluation for subtle vascular malformations . TTE: EF 60-65%, no valvular abn, judy atria are normal. DSA by neuroIR completed 3/21, did not find any vascular abnormality.    -q4h neurochecks  -sbp goal <150, goal of normotension  -discontinued PTA aspirin (taking for primary prevention)  -appreciate neurosurgery recommendations  -OT evaluated- recommending home with outpatient OT (will be arranged by us)  -Daily CBC  -Hgb goal >7, plt goal >100k  -Transfuse to meet Hgb and plt goals  -Neuro IR recommends repeat DSA in one month.   -repeat CTH today post DSA and initiation of subcutaneous heparin.  Analgesia: tylenol prn   -follow up in outpatient stroke clinic in 6-8 weeks    #Right internal carotid stenosis, approx 30% stenosis  - Carotid US in 5/2024 showed 50-69% stenosis; CTA H&N showed only 20%. DSA demonstrates 30% stenosis  - Discontinue PTA aspirin  (pt clarified she is taking this for primary prevention)    #Malignant HTN; h/o HTN , resolved  Sbp<150, with goal normotension. Weaned off nicardipine drip in ICU  -lisinopril 5mg daily     #hx of type 2 diabetes, A1c 7.3  -medium dose sliding scale, checks at meals and bedtime  -holding PTA metformin and jardiance, ok to resume at discharge     #hyperlipidemia  LDL 76, PTA simvastatin 40mg daily     #hypophosphatemia, resolved  Ionized calcium 4.5   -daily BMP, mg, phos. High intensity repletion protocol  -monitor electrolytes     #nausea, intermittent  #poor po intake, resolved  -bowel regimen: scheduled miralax, senna BID  -prn zofran and compazine for symptom management       #mild pulmonary edema  Per CXR 3/18. No fevers, leukocytosis, sick symptoms, SOB concerning for new infectious cause.   -CTM , stable on RA currently     DVT Prophylaxis: subcutaneous heparin, repeat CTH  Patient is full code, confirmed on admission.    Anticipate discharge home tomorrow.   Seen and discussed with vascular neurology attending, Dr. Ignacio.  Neda Kohler MD  Neurology Pgy-2     Physical Examination     Temp: 98.2  F (36.8  C) Temp src: Oral BP: (!) 143/64 Pulse: 62   Resp: 20 SpO2: 92 % O2 Device: None (Room air) Oxygen Delivery: 2 LPM    General: sitting up in recliner, watching TV in NAD.  Mental Status:  alert, oriented x 3, follows commands, speech clear and fluent, naming and repetition normal  Cranial Nerves:  PERRL, EOMI with normal smooth pursuit, facial sensation intact and symmetric, facial movements symmetric, hearing not formally tested but intact to conversation, palate elevation symmetric and uvula midline, no dysarthria, shoulder shrug strong bilaterally, tongue protrusion midline, left temporal homonymous hemianopia though appears improved, less deficit in right eye  Motor:  normal muscle tone and bulk, no abnormal movements, able to move all limbs spontaneously, strength 5/5 throughout upper and lower  extremities, no pronator drift  Reflexes:  no clonus  Sensory:  light touch sensation intact and symmetric throughout upper and lower extremities, no extinction on double simultaneous stimulation   Coordination:  normal finger-to-nose and heel-to-shin bilaterally without dysmetria    Stroke Scales       NIHSS  1a. Level of Consciousness 0-->Alert, keenly responsive   1b. LOC Questions 0-->Answers both questions correctly   1c. LOC Commands 0-->Performs both tasks correctly   2.   Best Gaze 0-->Normal   3.   Visual 2-->Complete hemianopia   4.   Facial Palsy 0-->Normal symmetrical movements   5a. Motor Arm, Left 0-->No drift, limb holds 90 (or 45) degrees for full 10 secs   5b. Motor Arm, Right 0-->No drift, limb holds 90 (or 45) degrees for full 10 secs   6a. Motor Leg, Left 0-->No drift, leg holds 30 degree position for full 5 secs   6b. Motor Leg, right 0-->No drift, leg holds 30 degree position for full 5 secs   7.   Limb Ataxia 0-->Absent   8.   Sensory 0-->Normal, no sensory loss   9.   Best Language 0-->No aphasia, normal   10. Dysarthria 0-->Normal   11. Extinction and Inattention  0-->No abnormality   Total 2 (03/20/25 0924)       Imaging/Labs     MRI/Head CT MRI B 3/18/25:   IMPRESSION:  1. Similar right occipital and posterior temporal lobe  intraparenchymal hemorrhages with surrounding edema and 8 mm of  leftward midline shift. No underlying enhancing lesion or vascular  abnormality.  2. Thin subdural hematoma along the right parietooccipital convexity,  along the cerebellar convexities, and the posterior falx and right  tentorium.  3. MRV demonstrates patent dural venous sinuses and major deep  intracranial veins. No thrombosis identified.  MRV B 3/18/25:  No thrombosis or stenosis of the major intracranial dural sinuses or deep cerebral veins. No pathologic filling defect on the postcontrast MRV images.  HEAD CT 3/17/2025:  1.  There is a 3.9 x 5.9 x 4.5 cm acute intraparenchymal hematoma involving the  right occipital lobe.  2.  There is an adjacent right temporo-occipital convexity subdural hematoma measuring up to 0.4 cm in thickness as well as subdural blood layering along the right tentorial leaflet measuring 0.5 cm and a right parafalcine component measuring 0.2 cm in   thickness.  3.  There is 0.7 cm leftward shift of the midline structures.     Head CT 3/19/2025  IMPRESSION:   1. Stable large intraparenchymal hemorrhage in the right posterior  temporal and occipital lobes with decreased surrounding vasogenic  edema. Persistent 7 mm of leftward midline shift with no significant  interval change from prior. No new acute intracranial hemorrhage or  acute loss of gray-white matter differentiation.  2. Stable subdural hemorrhage along the falx and right tentorium and  stable trace subdural hemorrhage along the right parieto-occipital  convexity.  3. Continued sulcal effacement in the right cerebral hemisphere with  persistent effacement of the right lateral ventricle. No acute  hydrocephalus.   Intracranial Vasculature HEAD CTA:  1.  Patent intracranial arterial vasculature without flow-limiting stenosis.  2.  No vascular lesion underlying the right occipital hemorrhage. No active contrast extravasation.   Cervical Vasculature NECK CTA:  Patent cervical arterial vasculature without flow-limiting stenosis.      Echocardiogram TTE 3/18/2025  Interpretation Summary  Left ventricular size, wall motion and function are normal. The ejection fraction is 60-65%. Right ventricular function, chamber size, wall motion, and thickness are normal. No significant valvular abnormalities present. The inferior vena cava was normal in size with preserved respiratory variability.  No pericardial effusion is present.   EKG/Telemetry SR      LDL  3/17/2025: 76 mg/dL   A1C  3/18/2025: 7.3 %   Troponin No lab value available in past 48 hrs     Right upper extremity ultrasound   IMPRESSION:  No evidence of right upper extremity deep  venous thrombosis.    IR Angiogram  Impression:  *  No evidence of vascular malformation, dural AV fistula or aneurysm  to explain the etiology of hemorrhage.   *  R internal carotid cervical segment stenosis, measuring  approximately 30% per NASCET criteria.

## 2025-03-21 NOTE — PLAN OF CARE
Status: Admitted 3/17 for right occipital ICH. PMHx HTN, HLD, T2DM, right carotid stenosis, and cystocele with urinary urgency   Vitals: HTN within params. HR got up to 135 - MD aware. 7 beats of SVT overnight. 2L NC overnight. CCM.   Neuros: A&Ox4. 5/5 throughout. Bilateral left hemianopsia.   IV: PIV running 75 ml/hr NS for procedure.   Labs/Electrolytes: K+ and mag replaced. Q4h BG checks.   Resp: WNL  Diet: NPO at midnight for procedure. Compazine and mag given per provide for HA.   : voids spont.   GI: LBM 3/17 PTA.   Skin: RUE old PIV site swollen/tender - marked, MD notified. Bruising throughout.   Pain: HA managed with PRN tylenol, mag and compazine - per provider.   Activity: SBA or up with family.   Plan: IR in am for cerebral angiogram   Updates this shift: MD assessed extravasation site - ordered US for am. EKG completed for SVT.         Goal Outcome Evaluation:      Plan of Care Reviewed With: patient    Overall Patient Progress: improvingOverall Patient Progress: improving

## 2025-03-21 NOTE — PROVIDER NOTIFICATION
Provider Notified: Desiree Borrero   Concern: Pt had 7 beats of SVT and HR went up 135  Reponse summary: Ordered EKG  Time Notified: 0107  Time of Response: 4048

## 2025-03-21 NOTE — INTERIM SUMMARY
Brief Neurology Note:     I was notified that the patient had redness around old PIV site, suspected to have been extravasated, with warmth the touch and 8/10 dull pain. A border was drawn around area by staff. Per report, there has been an area of redness since removed of PIV a couple of days ago and was noted to be red, have been applying cold and warm compresses.     I assessed the patient and she had swelling in her right forearm. The area was red and warm to the touch, area was firm. There was slight increase in redness around border drawn. The patient said it was painful to the touch.     Additionally, the patient had reported a headache with minimal relief with tylenol. Her neurologic exam was stable with left homonymous hemianopia. No other symptoms.     I was then notified around 0430 that the patient had tachycardia to 130, no chest pain. I ordered an EKG and at time of EKG, the patient was back in sinus rhythm at rate of 71.     Plan:   - continue cold/warm compresses to area  - US in AM to assess right arm further  - IV magnesium and compazine x1 for headache     Desiree Borrero MD  Neurology Resident, PGY-2

## 2025-03-21 NOTE — PROGRESS NOTES
SP[facility]UAL [facility] - Progress Note  [place] [facility]    Referral Source: Routine Visit per YES to Admission Question    I attempted to see Megan who was busy with several family members who were visiting.   I oriented Megan to Spirit[facility] and offered to visit her on Monday if she is still here.    Plan: I will try to visit patient on 3/24. [facility] Services remain available on request. Please place a standard consult order on Epic.      Susan Avina,   Chaplain Resident    [facility] routine referrals *[phone]   [facility] available 24/7 for emergent requests/referrals, either by paging the on-call  or by entering an ASAP/STAT consult in Epic (this will also page the on-call ).

## 2025-03-21 NOTE — PLAN OF CARE
Goal Outcome Evaluation:      Plan of Care Reviewed With: patient    Overall Patient Progress: improvingOverall Patient Progress: improving    Outcome Evaluation: Dx angio completed today    Status: Admitted 3/17 for right occipital ICH. PMHx HTN, HLD, T2DM, right carotid stenosis, and cystocele with urinary urgency. Completed a diagnostic angio 3/21 to r/o vessel malformation  Vitals: HTN int out of SBP <150. Int tachycardic. 2L NC while sleeping. CCM in place.  Neuros: A&Ox4. 5/5 throughout. Bilateral left hemianopsia. RUE sl ataxic  IV: PIV SL, adequate PO intake  Labs/Electrolytes: ACHS BG  Resp: WNL  Diet: Regular diet, nausea x1, zofran effective.   : voids spont.   GI: LBM 3/17 PTA.   Skin: RUE old PIV site swollen/tender - marked, MD notified. Bruising throughout. R groin site with no bruising, no swelling, no bleeding. Intact RLE pedal pulses.   Pain: HA with repositioning, pt stating Tylenol has been effective this shift.  Activity: SBA or up with family.   Plan: Plan for discharge home with family when medically ready.  Updates this shift: US completed for RUE, running theory is cellulitis. Doxycycline added for this.     Arrived from: IR  Belongings/meds: unchanged, all remained in the room  2 RN Skin Assessment Completed by: Aracely WEAVER and Ashwini PETERSON  Skin Findings: R groin site, blanchable redness to alejandro-area, under breasts, RUE extravasation, marked.  Non-intact findings documented (yes/no/NA): yes

## 2025-03-22 ENCOUNTER — ORDERS ONLY (AUTO-RELEASED) (OUTPATIENT)
Dept: NURSING | Facility: CLINIC | Age: 65
End: 2025-03-22
Payer: COMMERCIAL

## 2025-03-22 VITALS
SYSTOLIC BLOOD PRESSURE: 121 MMHG | HEART RATE: 67 BPM | OXYGEN SATURATION: 95 % | BODY MASS INDEX: 42.61 KG/M2 | WEIGHT: 272.05 LBS | TEMPERATURE: 98.8 F | RESPIRATION RATE: 17 BRPM | DIASTOLIC BLOOD PRESSURE: 59 MMHG

## 2025-03-22 DIAGNOSIS — I62.9 INTRACRANIAL HEMORRHAGE (H): ICD-10-CM

## 2025-03-22 LAB
ANION GAP SERPL CALCULATED.3IONS-SCNC: 13 MMOL/L (ref 7–15)
BUN SERPL-MCNC: 6.4 MG/DL (ref 8–23)
CALCIUM SERPL-MCNC: 9.3 MG/DL (ref 8.8–10.4)
CHLORIDE SERPL-SCNC: 100 MMOL/L (ref 98–107)
CREAT SERPL-MCNC: 0.38 MG/DL (ref 0.51–0.95)
EGFRCR SERPLBLD CKD-EPI 2021: >90 ML/MIN/1.73M2
ERYTHROCYTE [DISTWIDTH] IN BLOOD BY AUTOMATED COUNT: 13.4 % (ref 10–15)
GLUCOSE BLDC GLUCOMTR-MCNC: 176 MG/DL (ref 70–99)
GLUCOSE BLDC GLUCOMTR-MCNC: 180 MG/DL (ref 70–99)
GLUCOSE BLDC GLUCOMTR-MCNC: 207 MG/DL (ref 70–99)
GLUCOSE SERPL-MCNC: 194 MG/DL (ref 70–99)
HCO3 SERPL-SCNC: 23 MMOL/L (ref 22–29)
HCT VFR BLD AUTO: 36.4 % (ref 35–47)
HGB BLD-MCNC: 12.1 G/DL (ref 11.7–15.7)
MAGNESIUM SERPL-MCNC: 1.8 MG/DL (ref 1.7–2.3)
MCH RBC QN AUTO: 29.8 PG (ref 26.5–33)
MCHC RBC AUTO-ENTMCNC: 33.2 G/DL (ref 31.5–36.5)
MCV RBC AUTO: 90 FL (ref 78–100)
PHOSPHATE SERPL-MCNC: 3.3 MG/DL (ref 2.5–4.5)
PLATELET # BLD AUTO: 286 10E3/UL (ref 150–450)
POTASSIUM SERPL-SCNC: 3.8 MMOL/L (ref 3.4–5.3)
RBC # BLD AUTO: 4.06 10E6/UL (ref 3.8–5.2)
SODIUM SERPL-SCNC: 136 MMOL/L (ref 135–145)
WBC # BLD AUTO: 5.8 10E3/UL (ref 4–11)

## 2025-03-22 PROCEDURE — 250N000013 HC RX MED GY IP 250 OP 250 PS 637: Performed by: STUDENT IN AN ORGANIZED HEALTH CARE EDUCATION/TRAINING PROGRAM

## 2025-03-22 PROCEDURE — 250N000011 HC RX IP 250 OP 636: Performed by: STUDENT IN AN ORGANIZED HEALTH CARE EDUCATION/TRAINING PROGRAM

## 2025-03-22 PROCEDURE — 250N000013 HC RX MED GY IP 250 OP 250 PS 637

## 2025-03-22 PROCEDURE — 84100 ASSAY OF PHOSPHORUS: CPT

## 2025-03-22 PROCEDURE — 250N000013 HC RX MED GY IP 250 OP 250 PS 637: Performed by: NURSE PRACTITIONER

## 2025-03-22 PROCEDURE — 83735 ASSAY OF MAGNESIUM: CPT

## 2025-03-22 PROCEDURE — 85027 COMPLETE CBC AUTOMATED: CPT | Performed by: PSYCHIATRY & NEUROLOGY

## 2025-03-22 PROCEDURE — 36415 COLL VENOUS BLD VENIPUNCTURE: CPT | Performed by: PSYCHIATRY & NEUROLOGY

## 2025-03-22 PROCEDURE — 80048 BASIC METABOLIC PNL TOTAL CA: CPT | Performed by: PSYCHIATRY & NEUROLOGY

## 2025-03-22 RX ORDER — LISINOPRIL 5 MG/1
5 TABLET ORAL DAILY
Qty: 30 TABLET | Refills: 3 | Status: SHIPPED | OUTPATIENT
Start: 2025-03-23 | End: 2025-07-21

## 2025-03-22 RX ORDER — MAGNESIUM SULFATE HEPTAHYDRATE 40 MG/ML
2 INJECTION, SOLUTION INTRAVENOUS ONCE
Status: COMPLETED | OUTPATIENT
Start: 2025-03-22 | End: 2025-03-22

## 2025-03-22 RX ORDER — DOXYCYCLINE 100 MG/1
100 CAPSULE ORAL EVERY 12 HOURS
Qty: 11 CAPSULE | Refills: 0 | Status: SHIPPED | OUTPATIENT
Start: 2025-03-22 | End: 2025-03-28

## 2025-03-22 RX ORDER — POTASSIUM CHLORIDE 750 MG/1
20 TABLET, EXTENDED RELEASE ORAL ONCE
Status: COMPLETED | OUTPATIENT
Start: 2025-03-22 | End: 2025-03-22

## 2025-03-22 RX ORDER — ONDANSETRON 4 MG/1
4 TABLET, ORALLY DISINTEGRATING ORAL EVERY 6 HOURS PRN
Qty: 28 TABLET | Refills: 0 | Status: SHIPPED | OUTPATIENT
Start: 2025-03-22 | End: 2025-03-29

## 2025-03-22 RX ADMIN — ACETAMINOPHEN 650 MG: 325 TABLET, FILM COATED ORAL at 07:53

## 2025-03-22 RX ADMIN — HEPARIN SODIUM 7500 UNITS: 5000 INJECTION, SOLUTION INTRAVENOUS; SUBCUTANEOUS at 08:11

## 2025-03-22 RX ADMIN — DOXYCYCLINE HYCLATE 100 MG: 100 CAPSULE ORAL at 07:53

## 2025-03-22 RX ADMIN — POTASSIUM CHLORIDE 20 MEQ: 750 TABLET, EXTENDED RELEASE ORAL at 10:27

## 2025-03-22 RX ADMIN — SENNOSIDES AND DOCUSATE SODIUM 2 TABLET: 50; 8.6 TABLET ORAL at 07:53

## 2025-03-22 RX ADMIN — LISINOPRIL 5 MG: 5 TABLET ORAL at 07:53

## 2025-03-22 RX ADMIN — MAGNESIUM SULFATE HEPTAHYDRATE 2 G: 2 INJECTION, SOLUTION INTRAVENOUS at 10:27

## 2025-03-22 RX ADMIN — POLYETHYLENE GLYCOL 3350 17 G: 17 POWDER, FOR SOLUTION ORAL at 07:56

## 2025-03-22 RX ADMIN — ACETAMINOPHEN 650 MG: 325 TABLET, FILM COATED ORAL at 16:07

## 2025-03-22 RX ADMIN — HEPARIN SODIUM 7500 UNITS: 5000 INJECTION, SOLUTION INTRAVENOUS; SUBCUTANEOUS at 00:35

## 2025-03-22 ASSESSMENT — ACTIVITIES OF DAILY LIVING (ADL)
ADLS_ACUITY_SCORE: 39
ADLS_ACUITY_SCORE: 38
ADLS_ACUITY_SCORE: 39
ADLS_ACUITY_SCORE: 38
ADLS_ACUITY_SCORE: 39
ADLS_ACUITY_SCORE: 38
ADLS_ACUITY_SCORE: 39
ADLS_ACUITY_SCORE: 39

## 2025-03-22 NOTE — PLAN OF CARE
Discharge time/date: 3/22/25 4:10pm  Walked or Wheelchair: wheelchair  PIV removed: yes  Reviewed AVS with patient: yes  Medication due times added to AVS in EPIC: yes  Verbalized understanding of discharge with teachback: yes  Medications retrieved from pharmacy: yes  Supplies sent home: N/A  Belongings from security with patient: N/A     Stroke and discharge education completed. Family and patient questions answered and sent to discharge pharmacy.

## 2025-03-22 NOTE — PLAN OF CARE
Goal Outcome Evaluation:      Plan of Care Reviewed With: patient    Overall Patient Progress: improvingOverall Patient Progress: improving    Outcome Evaluation: to discharge home. Stroke education to be completed @ 1500 with discharge.    Status: Admitted 3/17 for right occipital ICH. PMHx HTN, HLD, T2DM, right carotid stenosis, and cystocele with urinary urgency. Completed a diagnostic angio 3/21 to r/o vessel malformation.  Vitals: VSS on RA ex hypertensive within parameters. SBP<150.   Neuros: A&OX4, but forgetful. Baseline neuropathy to bottom of feet. L neglect and cut, improving per patient.  IV: PIV removed per order.   Labs/Electrolytes: BG checks ACHS. Mag and potassium replaced per order, redraws ordered for tmw AM.  Resp: on RA. Denies SOB.   Diet: regular diet, family bringing food this shift.   : VDSP  GI: LBM 7/17. Taking scheduled bowel meds and passing gas.  Skin: R groin site with tegaderm. blanchable redness to alejandro-area, under breasts, RUE extravasation, marked - no extension (swollen, warm, painful - receiving PO abx for suspected cellulitis)   Pain: HA managed with PRN tylenol.   Activity: SBA. Okay to walk halls with family.   Social: multiple family members @ bedside, supportive.  Plan: To discharge home today with family.   Updates this shift: stroke education completed and ready to discharge home.   Education completed: Stroke education completed by writer this shift.

## 2025-03-22 NOTE — DISCHARGE SUMMARY
[facility]    Neurology Stroke Discharge Summary    Date of Admission: 3/17/2025  Date of Discharge: 03/22/2025    Disposition: Discharged to home  Primary Care Physician: Maggi Elkins      Admission Diagnosis:   # Right occipital IPH most likely 2/2 hypertension with 0.7 cm leftward midline shift  # Right temporo-occipital SDH  #Right carotid stenosis  #Hypertension  #HLD  # Nausea   # hx of cystocele, urinary urgency  #Type II DM    Discharge Diagnosis:   #Rt temporo-occipital ICH w 0.7 cm leftward midline shift, ICH score 1 (>30 ml)  #Brain compression  #Cerebral edema, improving  #Right temporo-occipital SDH   # left temporal homonymous hemianopia, stable  #Right internal carotid stenosis, approx 30% stenosis   #Malignant HTN; h/o HTN , resolved  #hx of type 2 diabetes, A1c 7.3  #hyperlipidemia  #Hyperphosphatemia, resolved  #nausea, intermittent  #poor po intake, resolved  #mild pulmonary edema          Problem Leading to Hospitalization (from HPI):   64 year old right-handed female with history of hypertension, hyperlipidemia, type 2 diabetes, right carotid stenosis, and cystocele with urinary urgency who presented to the ED after she had sudden onset vision changes and headache. On exam, the patient was found to have left temporal homonymous hemianopia. S  Please see H&P dated 3/17/2025 for further details about presentation.    Brief Hospital Course:   Patient presented with vision changes and acute onset headache.      She was found to have 3.9x5.9x4.5 cm acute IPH in right occipital lobe, with adjacent right temporo-occipital convexity subdural hematoma measuring up to 0.4cm in thickness and 0.7cm leftward shift of midline structures.     IV thrombolysis was contraindicated due to active head bleed.      Work-up as stated below under Pertinent Investigations.    Etiology is thought to be hypertension.      Rehab evaluation: OT. Vision therapy    Smoking  Cessation: patient is not a smoker, former    BP Long-term Goal: <130/80    Antithrombotic/Anticoagulant Agent: not ordered due to contraindication IPH    Statins: continue PTA simvastatin 40mg    Hgb A1C Goal: < 7.0, on sliding scale insulin while inpatient. metformin, semaglutide ok to resume at discharge. PCP to assist with long term management of diabetes    Complications: None.     Problem list:   #Rt temporo-occipital ICH w 0.7 cm leftward midline shift, ICH score 1 (>30 ml)  #Brain compression  #Cerebral edema, improving  #Right temporo-occipital SDH    Etiology unclear, though leading differential is uncontrolled hypertension though patient had reported her blood pressures were relatively well controlled at home. Mri brain completed and thought to be less likely CAA.Underwent DSA to further evaluation for subtle vascular anomalies. DSA by neuroIR completed 3/21, and did not indentify any vascular abnormality.   -sbp goal <150, goal of normotension maintained   -discontinued PTA aspirin (taking for primary prevention)  -appreciate neurosurgery recommendations  -recommending home with outpatient OT (will be arranged by us)  -Neuro IR recommends repeat DSA in one month.    -scheduling will reach out upon discharge.   -repeat CTH 3/21 stable, with slight reduction of midline shift  Analgesia: tylenol prn   -follow up in outpatient stroke clinic in 6-8 weeks   -follow up in outpatient neurosurgery clinic (consultant will arrange)    #Right internal carotid stenosis, approx 30% stenosis  Carotid US in 5/2024 showed 50-69% stenosis; CTA H&N showed only 20%. DSA demonstrates 30% stenosis. Discontinued PTA aspirin (pt clarified she is taking this for primary prevention)     #Malignant HTN; h/o HTN , resolved  #hyperlipidemia  TTE: EF 60-65%, no valvular abn, judy atria are normal.   Sbp<150, with goal normotension. Weaned off nicardipine drip in ICU  -lisinopril 5mg daily  - LDL 76, PTA simvastatin 40mg daily     #hx  of type 2 diabetes, A1c 7.3  medium dose sliding scale, checks at meals and bedtime while inpatient  -holding PTA metformin and jardiance, ok to resume at discharge     #hypophosphatemia, resolved  Ionized calcium 4.5      #nausea, intermittent  #poor po intake, resolved  -bowel regimen: scheduled miralax, senna BID     #mild pulmonary edema  Per CXR 3/18. No fevers, leukocytosis, sick symptoms, SOB concerning for new infectious cause. stable on RA     DVT Prophylaxis: subcutaneous heparin, repeat CTH stable.  Patient is full code, confirmed on admission.     PERTINENT INVESTIGATIONS    Labs  Lipid Panel:   Recent Labs   Lab Test 03/17/25  2232   CHOL 152   HDL 46*   LDL 76   TRIG 152*     A1C:   Lab Results   Component Value Date    A1C 7.3 03/18/2025    A1C 5.5 06/09/2021     INR:   Recent Labs   Lab 03/17/25 1956   INR 0.93      Coag Panel / Hypercoag Workup: Not indicated  Pending test results: none    Echo: Interpretation Summary  Left ventricular size, wall motion and function are normal. The ejection  fraction is 60-65%.  Right ventricular function, chamber size, wall motion, and thickness are  normal.  No significant valvular abnormalities present.  The inferior vena cava was normal in size with preserved respiratory  variability.  No pericardial effusion is present.  Imaging:   MRI/Head CT MRI B 3/18/25:   IMPRESSION:  1. Similar right occipital and posterior temporal lobe  intraparenchymal hemorrhages with surrounding edema and 8 mm of  leftward midline shift. No underlying enhancing lesion or vascular  abnormality.  2. Thin subdural hematoma along the right parietooccipital convexity,  along the cerebellar convexities, and the posterior falx and right  tentorium.  3. MRV demonstrates patent dural venous sinuses and major deep  intracranial veins. No thrombosis identified.  MRV B 3/18/25:  No thrombosis or stenosis of the major intracranial dural sinuses or deep cerebral veins. No pathologic filling  defect on the postcontrast MRV images.  HEAD CT 3/17/2025:  1.  There is a 3.9 x 5.9 x 4.5 cm acute intraparenchymal hematoma involving the right occipital lobe.  2.  There is an adjacent right temporo-occipital convexity subdural hematoma measuring up to 0.4 cm in thickness as well as subdural blood layering along the right tentorial leaflet measuring 0.5 cm and a right parafalcine component measuring 0.2 cm in   thickness.  3.  There is 0.7 cm leftward shift of the midline structures.     Head CT 3/19/2025  IMPRESSION:   1. Stable large intraparenchymal hemorrhage in the right posterior  temporal and occipital lobes with decreased surrounding vasogenic  edema. Persistent 7 mm of leftward midline shift with no significant  interval change from prior. No new acute intracranial hemorrhage or  acute loss of gray-white matter differentiation.  2. Stable subdural hemorrhage along the falx and right tentorium and  stable trace subdural hemorrhage along the right parieto-occipital  convexity.  3. Continued sulcal effacement in the right cerebral hemisphere with  persistent effacement of the right lateral ventricle. No acute  hydrocephalus.   Intracranial Vasculature HEAD CTA:  1.  Patent intracranial arterial vasculature without flow-limiting stenosis.  2.  No vascular lesion underlying the right occipital hemorrhage. No active contrast extravasation.   Cervical Vasculature NECK CTA:  Patent cervical arterial vasculature without flow-limiting stenosis.     Endovascular procedure:  Diagnostic Cerebral Angiogram  Right groin access  Impression:  *  No evidence of vascular malformation, dural AV fistula or aneurysm  to explain the etiology of hemorrhage.   *  R internal carotid cervical segment stenosis, measuring  approximately 30% per NASCET criteria.     Cardiac Monitoring: Patient had > 24 hrs of cardiac monitor while in hospital.    Findings: No atrial fibrillation was found. and Patient discharged with referral for 30  day home cardiac monitor    Sleep Apnea Screen:   Questions/Answers      1. Prior to your stroke, have you been told that you snore? Yes.    2. Prior to your stroke, have you been told that you struggle to breath while you are sleeping? yes    3. Prior to your stroke, do you feel tired and sleepy even after getting a normal night of sleep? No.    Sleep Apnea Screen Findings: Patient has 2 or more symptoms of sleep apnea.  Outpatient sleep study is recommended.    PHQ-9 Depression Screen Score: 2    Education discussed with: patient and family on blood pressure management, cholesterol management, medical management, exercise recommendation, follow-up recommendations/plan, and results (MRI brain, CTA, DSA, serum studies).    During daily rounds, the plan of care was discussed and developed with patient.  Plan of care includes: diagnostic stroke workup, secondary stroke prevention screening .    PHYSICAL EXAMINATION  Vital Signs:  B/P: 132/75, T: 98.6, P: 66, R: 18  General: sitting up in recliner, watching TV in NAD.  Mental Status:  alert, oriented x 3, follows commands, speech clear and fluent, naming and repetition normal  Cranial Nerves:  PERRL, EOMI with normal smooth pursuit, facial sensation intact and symmetric, facial movements symmetric, hearing not formally tested but intact to conversation, palate elevation symmetric and uvula midline, no dysarthria, shoulder shrug strong bilaterally, tongue protrusion midline, left temporal homonymous hemianopia though appears improved, less deficit in right eye  Motor:  normal muscle tone and bulk, no abnormal movements, able to move all limbs spontaneously, strength 5/5 throughout upper and lower extremities, no pronator drift  Reflexes:  no clonus  Sensory:  light touch sensation intact and symmetric throughout upper and lower extremities, no extinction on double simultaneous stimulation   Coordination:  normal finger-to-nose and heel-to-shin bilaterally without  dysmetria       National Institutes of Health Stroke Scale (on day of discharge)  NIHSS Total Score: 2    Modified Percy Score (Discharge)  2-Slight disability; unable to carry out all previous activities, but able to look after own affairs    Medications    Discharge Medication List as of 3/22/2025  2:40 PM        START taking these medications    Details   doxycycline hyclate (VIBRAMYCIN) 100 MG capsule Take 1 capsule (100 mg) by mouth every 12 hours for 11 doses. Administer at least 2 hours before or after aluminum, calcium, iron, zinc or magnesium containing products., Disp-11 capsule, R-0, E-Prescribe      ondansetron (ZOFRAN ODT) 4 MG ODT tab Take 1 tablet (4 mg) by mouth every 6 hours as needed for nausea or vomiting., Disp-28 tablet, R-0, E-Prescribe           CONTINUE these medications which have CHANGED    Details   lisinopril (ZESTRIL) 5 MG tablet Take 1 tablet (5 mg) by mouth or Feeding Tube daily., Disp-30 tablet, R-3, E-Prescribe           CONTINUE these medications which have NOT CHANGED    Details   acetaminophen (TYLENOL) 500 MG tablet Take 500-1,000 mg by mouth 2 times daily as needed for mild pain (arthritis)., Historical      empagliflozin (JARDIANCE) 10 MG TABS tablet TAKE 1 TABLET(10 MG) BY MOUTH DAILY, Disp-30 tablet, R-0, E-PrescribeDue for a diabetes follow-up with PCP for further refills      metFORMIN (GLUCOPHAGE) 500 MG tablet TAKE 2 TABLETS(1000 MG) BY MOUTH TWICE DAILY WITH MEALS, Disp-360 tablet, R-0, E-Prescribe++DO NOT STACK ORDERS++ 360 with 1 refill already sent on 9/26/24      minoxidil (LONITEN) 2.5 MG tablet 1/4 tablet daily, Disp-60 tablet, R-3, E-Prescribe      Multiple Vitamin (MULTI-VITAMIN PO) Take 1 tablet by mouth daily., Historical      simvastatin (ZOCOR) 40 MG tablet TAKE 1 TABLET(40 MG) BY MOUTH AT BEDTIME, Disp-90 tablet, R-2, E-Prescribe      vitamin D3 (CHOLECALCIFEROL) 50 mcg (2000 units) tablet Take 1 tablet by mouth daily., Historical           STOP taking  these medications       aspirin 81 MG tablet Comments:   Reason for Stopping:               Additional recommendations and follow up:       CT Head w/o contrast*     Occupational Therapy  Referral      Adult Neurosurgery  Referral      Sleep Study Referral      Reason for your hospital stay    You were in the hospital for evaluation of headache and vision changes. We found a head bleed in the right, back part of your brain which affects your vision. Your blood pressure was high so we increased your home dose of lisinopril. Continue to take as prescribed. Repeat scans of your head show that the bleeding is stable and swelling has gone down. We expect the swelling and bleeding to continue to improve with time. You will follow up with neurology in outpatient setting. You will see the neuroendovascular team in one month to repeat a diagnostic angiogram once your brain has healed. The diagnostic angiogram performed this hospitalization did not find any vascular anomaly but there was also blood products that made visualization challenging. We recommend keeping a log of blood pressures (check at same time daily eg. every morning before breakfast) to bring to your appointments. You will have vision therapy with occupational therapy. Please do not drive until cleared from physician and therapists. I will also make a referral for outpatient sleep study to evaluate for possible sleep apnea. Please set up an appointment with your primary care provider in 1-2 weeks for post hospitalization evaluation. We started you on a week of oral antibiotics for a skin infection on your right arm. Please present to Emergency department for evaluation if you feel like the skin infection is getting worse eg. Spreading redness, more swelling, pain, drainage at the site or you develop other worrisome sick symptoms including persistent fevers, vomiting, chills.     Activity    Your activity upon discharge: Excused from work  (involves lifting) for 3 weeks. gradual return to normal physical activity as tolerated     Full Code     Diet    Follow this diet upon discharge: Current Diet:Orders Placed This Encounter      Regular Diet Adult     Stroke Hospital Follow Up    Please be aware that coverage of these services is subject to the terms and limitations of your health insurance plan.  Call member services at your health plan with any benefit or coverage questions.  [employer] will call you to coordinate care as prescribed by your provider. If you don t hear from a representative within 2 business days, please call [phone].       Hospital Follow-up with Existing Primary Care Provider (PCP)    Please see details below          [facility] Mail Out       Patient was seen and discussed with the Attending, Dr. Ignacio.    Neda Kohler MD  [facility] Stroke

## 2025-03-22 NOTE — PLAN OF CARE
Goal Outcome Evaluation:      Plan of Care Reviewed With: patient    Overall Patient Progress: improvingOverall Patient Progress: improving    Outcome Evaluation: Groin site and neuros stable. CT head completed.    Status: Admitted 3/17 for right occipital ICH. PMHx HTN, HLD, T2DM, right carotid stenosis, and cystocele with urinary urgency. Completed a diagnostic angio 3/21 to r/o vessel malformation   Vitals: VSS on RA. SBP<150.  Neuros: A&Ox4. 5/5. Bilat L hemianopsia Slight RUE ataxia  IV: PIV SL  Labs/Electrolytes: ACHS, 0200  Resp: LSC, denies SOB  Diet: Regular diet. Intermit nausea, declined anti-emetics   : VDSP  GI: LBM 3/17, passing flatus. Bowel meds given  Skin: R groin site, blanchable redness to alejandro-area, under breasts, RUE extravasation, marked - no extension (swollen, warm, painful - receiving PO abx for suspected cellulitis)  Pain: HA managed w/PRN tylenol  Activity: SBA or up with family  Plan: Plan to discharge home w/fam. Cont to monitor and follow POC  Updates this shift: CCM discontinued. CT head completed.  Education completed: Need stroke edu

## 2025-03-22 NOTE — PLAN OF CARE
Your risk factors for stroke or TIA (transient ischemic attack):     Your Risk Factors Your Results Goals   [x] High blood pressure BP: 132/75 (25 0715) Less than 120/80   [x] Cholesterol          Total 3/17/2025: 152 mg/dL   Less than 150    Triglycerides   3/17/2025: 152 mg/dL Less than 150    LDL 3/17/2025: 76 mg/dL    Less than 70    HDL 3/17/2025: 46 mg/dL         Greater than 40 (men)  Greater than 50 (women)   [x] Diabetes                A1C 3/18/2025: 7.3 % Less than 5.7   [] Atrial fibrillation No atrial fibrillation noted on cardiac monitor Manage per physician orders   [] Smoking/tobacco use   Tobacco Use      Smoking status: Former        Packs/day: 0.00        Years: 1 pack/day for 15.0 years (15.0 ttl pk-yrs)        Types: Cigarettes        Start date: 1978        Quit date: 1991        Years since quittin.2      Smokeless tobacco: Never   Quit smoking and tobacco   [x] Overweight Body mass index is 42.61 kg/m .  Less than 25     Other risk factors include: carotid (neck) artery disease, other heart diseases, prior stroke or TIA, poor diet, lack of exercise, and excessive alcohol consumption.     [x] Written stroke educational materials given to patient and significant other including:   - Learning about BE FAST: Stroke Warning Signs and Learning about Risk Factors for Stroke (Healthwise)   - Understanding Stroke: Key Resources After a Stroke (FOD #051610)       Know the warning signs and symptoms of stroke: BE FAST     B = Balance loss   E = Eyesight changes   F = Facial droop or numbness   A = Arm or leg weakness   S = Speech difficulty, slurred speech   T = Time to call 911 for help     Patient and family questions answered.

## 2025-03-23 ASSESSMENT — PATIENT HEALTH QUESTIONNAIRE - PHQ9: SUM OF ALL RESPONSES TO PHQ QUESTIONS 1-9: 2

## 2025-03-23 NOTE — PLAN OF CARE
Occupational Therapy Discharge Summary    Reason for therapy discharge:    Discharged to home with outpatient therapy.    Progress towards therapy goal(s). See goals on Care Plan in [facility] electronic health record for goal details.  Goals partially met.  Barriers to achieving goals:   discharge from facility.    Therapy recommendation(s):    Continued therapy is recommended.  Rationale/Recommendations:  decreased ADL I.    Goal Outcome Evaluation:

## 2025-03-24 ENCOUNTER — PATIENT OUTREACH (OUTPATIENT)
Dept: FAMILY MEDICINE | Facility: CLINIC | Age: 65
End: 2025-03-24
Payer: COMMERCIAL

## 2025-03-24 ENCOUNTER — THERAPY VISIT (OUTPATIENT)
Dept: OCCUPATIONAL THERAPY | Facility: CLINIC | Age: 65
End: 2025-03-24
Payer: COMMERCIAL

## 2025-03-24 ENCOUNTER — TELEPHONE (OUTPATIENT)
Dept: RADIOLOGY | Facility: CLINIC | Age: 65
End: 2025-03-24
Payer: COMMERCIAL

## 2025-03-24 DIAGNOSIS — H53.9 VISION CHANGES: Primary | ICD-10-CM

## 2025-03-24 DIAGNOSIS — I62.9 INTRACRANIAL HEMORRHAGE (H): ICD-10-CM

## 2025-03-24 PROCEDURE — 97165 OT EVAL LOW COMPLEX 30 MIN: CPT | Mod: GO

## 2025-03-24 PROCEDURE — 97535 SELF CARE MNGMENT TRAINING: CPT | Mod: GO

## 2025-03-24 NOTE — TELEPHONE ENCOUNTER
I called patient in regards to IR procedure with Dr. Denis Hurley    Procedure Date: 4/23/2025    Arrival: 0700    Loction: [facility] IR Suite    Anesthesia Type: IV Sedation    Patient was informed that they will be at the hospital 5/6 hours and that they will need a . Patient verbalized understanding.    Notes:         Feliz Stover on 3/24/2025 at 12:38 PM

## 2025-03-24 NOTE — PROGRESS NOTES
OCCUPATIONAL THERAPY EVALUATION  Type of Visit: Evaluation       Fall Risk Screen:  Fall screen completed by: OT  Have you fallen 2 or more times in the past year?: No  Have you fallen and had an injury in the past year?: No  Is patient a fall risk?: Department fall risk interventions implemented    Subjective        Presenting condition or subjective complaint: stroke eye sight  Date of onset: 03/21/25 (order date)    Relevant medical history: Diabetes; Overweight; Stroke; Vision problems hypertension, hyperlipidemia, type 2 diabetes, right carotid stenosis, and cystocele with urinary urgency  Dates & types of surgery:      Prior diagnostic imaging/testing results: MRI; CT scan     Prior therapy history for the same diagnosis, illness or injury: No      Occupational Profile: Patient is a 64 year old right-hand dominant female who presented to ED 3/17/25 with headache and vision problems and found to have right occipital intracerebral hemorrhage most likely 2/2 hypertension with 0.7 cm leftward midline shift.  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 Patient presented to clinic accompanied by her , who was present and contributed to history. She reports left visual field cut primarily in left eye per report, impacting safety and independence with near and far tasks (reading, mobility, etc) She denies asymmetrical weakness, incoordination, sensory changes, or cognition. 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     Prior Level of Function  Transfers: Independent  Ambulation: Independent  ADL: Independent  IADL: Driving, Finances, Housekeeping, Laundry, Meal preparation, Medication management, Work    Living Environment  Social support: With a significant other or spouse   Type of home: House; 1 level   Stairs to enter the home: Yes   Is there a railing: Yes     Ramp: No   Stairs inside the  "home: Yes 8 Is there a railing: Yes     Help at home:    Equipment owned:       Employment: Yes day care  Hobbies/Interests:      Patient goals for therapy: see clearly    Pain assessment:  present- reports increased frequency of HA and nausea      Objective     Cognitive Status Examination  Orientation: Oriented to person, place and time   Level of Consciousness: Alert  Follows Commands and Answers Questions: 100% of the time  Personal Safety and Judgement: Intact  Memory:  not formally tested but feels \"foggy\", impacting cognitive efficiency  Attention:  not formally assessed  Organization/Problem Solving:  not issues reported  Executive Function:  not formally assessed    VISUAL SKILLS  Visual Acuity: Wears glasses  Visual Field: Suspect left visual field deficit; further testing recommended  Recommend referral to neuro-ophthalmology to address visual field cut     Patient presents with left visual field cut, which was observed in clinic including challenges noticing left side obstacles in environment; initiates and confines search pattern to the right side of an array during peripersonal visual task; requires cues to increase visual scanning towards left side during peripersonal and extrapersonal visual scanning tasks; prefers to walk with arm held assist or uses wall/furniture for guidance.   Functional challenges: reading (leisure, menus, etc), household mobility, community navigation, driving     Telephone Copy: 2 minutes; 4 errors; Omits left numbers and requires visual achor cue  BiV[employer] Simple Letter Cancellation: 1 min 28 seconds; 28/32 cancellations;; Utilized asymmetrical and unorganized search strategy initially and confines search to right side;  visual scanning improved with visual achor cue towards left side of page  Dynavion Mode A: 30     SENSATION:  denies changes in sensation    POSTURE:  rounded shoulders  RANGE OF MOTION: UE AROM WFL  STRENGTH: UE Strength WFL  MUSCLE TONE: WFL  COORDINATION:  " gross and fine motor coordination WFL; denies residual coordination deficits  BALANCE:  impaired with visual field cut and seeks outside support for stability with ambulation.     FUNCTIONAL MOBILITY  Assistive Device(s): None  Ambulation: Independent    BED MOBILITY: Independent    TRANSFERS: Independent    BATHING:  Mod I  Equipment: Grab bars; non-slip mat; would like a shower chair; cierra    UPPER BODY DRESSING: Independent  Equipment:  No AE utilized    LOWER BODY DRESSING: Independent  Equipment:  No AE utilized    TOILETING:  Mod I    GROOMING:  Mod I    EATING/SELF FEEDING:  Mod I      ACTIVITY TOLERANCE: increased fatigue since discharge    INSTRUMENTAL ACTIVITIES OF DAILY LIVING (IADL): impaired ease, efficiency, and IND in IADL's with left visual field cut  Meal Planning/Prep: Challenges with meal prep safety and independence due to visual field cut  Home/Financial Management: Challenges with household management due to visual field cut  Communication/Computer Use: Challenges with reading  Community Mobility: Pt is not currently driving and aware of needing to be cleared for driving. 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 She is currently off work.     Assessment & Plan   CLINICAL IMPRESSIONS  Medical Diagnosis: Intracranial hemorrhage (H) (I62.9)    Treatment Diagnosis: vision changes    Impression/Assessment: Patient is a 64 year old right-hand dominant female who presented due to right occipital intracerebral hemorrhage resulting in residual left visual field cut (left temporal homonymous hemianopia).  The following significant findings have been identified: Impaired activity tolerance and impaired visual perception .  These identified deficits interfere with their ability to perform work tasks, household chores, driving , household mobility, community mobility, medication  management, financial management, meal planning and preparation, and community or volunteer activities as compared to previous level of function.     Clinical Decision Making (Complexity):  Assessment of Occupational Performance: 3-5 Performance Deficits  Occupational Performance Limitations: functional mobility, hygiene and grooming, driving and community mobility, health management and maintenance, home establishment and management, meal preparation and cleanup, shopping, work, and reading  Clinical Decision Making (Complexity): Moderate complexity    PLAN OF CARE  Treatment Interventions:  Interventions: Community/Work Reintegration, Self-Care/Home Management, Therapeutic Activity    Long Term Goals   OT Goal 1  Goal Identifier: Vision - Peripersonal and Extrapersonal Space  Goal Description: Patient will demonstrate and/or verbalize 2 environmentally-based ADL modifications to improve visual-based ADL/IADL activities and complete visual scanning tasks (peripersonal and extrapersonal space) with at least 90% accuracy using compensatory strategies prn, for increased independence with functional mobility, navigating new environements, reading, etc)  Target Date: 05/07/25      Frequency of Treatment: 1x/week  Duration of Treatment: 8 weeks     Recommended Referrals to Other Professionals:  neuro-opthalmology  Education Assessment: Learner/Method: Patient;Significant Other;Listening;Demonstration     Risks and benefits of evaluation/treatment have been explained.   Patient/Family/caregiver agrees with Plan of Care.     Evaluation Time:    OT Eval, Low Complexity Minutes (50081): 20       Signing Clinician: VALENCIA Mayfield, CNS, CSRS

## 2025-03-24 NOTE — TELEPHONE ENCOUNTER
"  Transitions of Care Outreach  Chief Complaint   Patient presents with    Hospital F/U       Most Recent Admission Date: 3/17/2025   Most Recent Admission Diagnosis: Intracranial hemorrhage (H) - I62.9     Most Recent Discharge Date: 3/22/2025   Most Recent Discharge Diagnosis: Intracranial hemorrhage (H) - I62.9  Soft tissue infection - L08.9  Sleep apnea, unspecified type - G47.30     Transitions of Care Assessment    Discharge Assessment  How are you doing now that you are home?: \"lots of phone calls for appointments\"  How are your symptoms? (Red Flag symptoms escalate to triage hotline per guidelines): Other (HA and nausea still present. Not worsening)  Do you know how to contact your clinic care team if you have future questions or changes to your health status? : Yes  Does the patient have their discharge instructions? : Yes  Does the patient have questions regarding their discharge instructions? : Yes (see comment) (we reviewed red flag symptoms that would warrant return to ER)  Were you started on any new medications or were there changes to any of your previous medications? : Yes  Does the patient have all of their medications?: Yes  Do you have questions regarding any of your medications? : No  Do you have all of your needed medical supplies or equipment (DME)?  (i.e. oxygen tank, CPAP, cane, etc.): Yes    Follow up Plan     Discharge Follow-Up  Discharge follow up appointment scheduled in alignment with recommended follow up timeframe or Transitions of Risk Category? (Low = within 30 days; Moderate= within 14 days; High= within 7 days): Yes  Discharge Follow Up Appointment Date: 04/08/25  Discharge Follow Up Appointment Scheduled with?: Primary Care Provider    Future Appointments   Date Time Provider Department Center   4/2/2025  9:00 AM [facility] [place]   4/8/2025  3:00 PM Bina Huitron NP [facility]   4/18/2025 10:00 AM Ashwini Soto NP [facility]   4/23/2025  8:30 AM MARIA C [facility] " [facility]       Outpatient Plan as outlined on AVS reviewed with patient.    For any urgent concerns, please contact our 24 hour nurse triage line: [phone] ([phone])       Ynes Hung RN

## 2025-03-24 NOTE — TELEPHONE ENCOUNTER
Called patient regarding scheduling procedure. Patient and I found a potential date that would work for her schedule. Let patient know that I would call back with date and arrival time.        Feliz Stover on 3/24/2025 at 10:36 AM

## 2025-03-26 ENCOUNTER — OFFICE VISIT (OUTPATIENT)
Dept: SLEEP MEDICINE | Facility: CLINIC | Age: 65
End: 2025-03-26
Payer: COMMERCIAL

## 2025-03-26 VITALS
HEIGHT: 67 IN | SYSTOLIC BLOOD PRESSURE: 88 MMHG | HEART RATE: 77 BPM | BODY MASS INDEX: 40.97 KG/M2 | WEIGHT: 261 LBS | RESPIRATION RATE: 16 BRPM | DIASTOLIC BLOOD PRESSURE: 60 MMHG | OXYGEN SATURATION: 95 %

## 2025-03-26 DIAGNOSIS — G47.33 OBSTRUCTIVE SLEEP APNEA: Primary | ICD-10-CM

## 2025-03-26 DIAGNOSIS — G47.31 CSA (CENTRAL SLEEP APNEA): ICD-10-CM

## 2025-03-26 DIAGNOSIS — G47.30 SLEEP APNEA, UNSPECIFIED TYPE: ICD-10-CM

## 2025-03-26 PROCEDURE — 99203 OFFICE O/P NEW LOW 30 MIN: CPT | Performed by: STUDENT IN AN ORGANIZED HEALTH CARE EDUCATION/TRAINING PROGRAM

## 2025-03-26 PROCEDURE — 3078F DIAST BP <80 MM HG: CPT | Performed by: STUDENT IN AN ORGANIZED HEALTH CARE EDUCATION/TRAINING PROGRAM

## 2025-03-26 PROCEDURE — 1126F AMNT PAIN NOTED NONE PRSNT: CPT | Performed by: STUDENT IN AN ORGANIZED HEALTH CARE EDUCATION/TRAINING PROGRAM

## 2025-03-26 PROCEDURE — 3074F SYST BP LT 130 MM HG: CPT | Performed by: STUDENT IN AN ORGANIZED HEALTH CARE EDUCATION/TRAINING PROGRAM

## 2025-03-26 ASSESSMENT — SLEEP AND FATIGUE QUESTIONNAIRES
HOW LIKELY ARE YOU TO NOD OFF OR FALL ASLEEP WHILE SITTING QUIETLY AFTER LUNCH WITHOUT ALCOHOL: SLIGHT CHANCE OF DOZING
HOW LIKELY ARE YOU TO NOD OFF OR FALL ASLEEP IN A CAR, WHILE STOPPED FOR A FEW MINUTES IN TRAFFIC: WOULD NEVER DOZE
HOW LIKELY ARE YOU TO NOD OFF OR FALL ASLEEP WHEN YOU ARE A PASSENGER IN A CAR FOR AN HOUR WITHOUT A BREAK: WOULD NEVER DOZE
HOW LIKELY ARE YOU TO NOD OFF OR FALL ASLEEP WHILE WATCHING TV: SLIGHT CHANCE OF DOZING
HOW LIKELY ARE YOU TO NOD OFF OR FALL ASLEEP WHILE SITTING INACTIVE IN A PUBLIC PLACE: WOULD NEVER DOZE
HOW LIKELY ARE YOU TO NOD OFF OR FALL ASLEEP WHILE SITTING AND READING: SLIGHT CHANCE OF DOZING
HOW LIKELY ARE YOU TO NOD OFF OR FALL ASLEEP WHILE LYING DOWN TO REST IN THE AFTERNOON WHEN CIRCUMSTANCES PERMIT: SLIGHT CHANCE OF DOZING
HOW LIKELY ARE YOU TO NOD OFF OR FALL ASLEEP WHILE SITTING AND TALKING TO SOMEONE: WOULD NEVER DOZE

## 2025-03-26 NOTE — PATIENT INSTRUCTIONS
"MY INFORMATION ON SLEEP APNEA-  Azucena Guo    DOCTOR : Naomie Luong MD  SLEEP CENTER :      MY CONTACT NUMBER:    [facility]   [phone]  [facility]    Am I having a sleep study at a sleep center?  Make sure you have an appointment for the study before you leave!  [email or web address]/watch?[email or web address]    Am I having a home sleep study?  Watch the video for the device you are using:  /drop off device, Noxturnal T-3: https://www.Dataheroube.com/watch?v=yGGFBdELGhk  Disposable device sent out require phone/computer application, WatchPAT1: https://www.Dattch.com/watch?v=BCce_vbiwxE  The sleep lab will call you for this appointment   If you wish to reschedule the sleep study or contact the sleep lab scheduling call [phone]        Frequently asked questions:  1. What is Obstructive Sleep Apnea (TIKA)? TIKA is the most common type of sleep apnea. Apnea means, \"without breath.\"  Apnea is most often caused by narrowing or collapse of the upper airway as muscles relax during sleep.   Almost everyone has occasional apneas. Most people with sleep apnea have had brief interruptions at night frequently for many years.  The severity of sleep apnea is related to how frequent and severe the events are.   Apneas are any events where there is no breathing.  Hypopneas are any events where there is shallow breathing. Sleep studies will track and then add all of the apneas and hypopneas into a total and then divided this number by the total time asleep to obtain the apnea hypopnea index(AHI). 0-5 events/per hour = No Sleep Apnea; 5-15 events/per hour = Mild Sleep Apnea; 15-30 events/per hour = Moderate Sleep Apnea; Greater than 30 events/per hour = Severe Sleep Apnea.  Sleep apnea is typically worse during REM sleep due to complete muscle relaxation, including the muscles of the airway. Sleep apnea is also typically worse " during supine(sleeping on your back) sleep due to internal structures more easily falling on the top of the airway and external pressures more easily crushing the airway.  The respiratory disturbance index(RDI) includes apneas, hypopneas, and other respiratory events such as snoring that may disturb your sleep.  2. What are the consequences of TIKA? Symptoms include: feeling sleepy during the day, snoring loudly, gasping or stopping of breathing, trouble sleeping, and occasionally morning headaches or heartburn at night.  Sleepiness can be serious and even increase the risk of falling asleep while driving. Other health consequences may include development of high blood pressure and other cardiovascular disease in persons who are susceptible. Untreated TIKA  can contribute to heart disease, stroke and diabetes.   3. What are the treatment options? In most situations, sleep apnea is a lifelong disease that must be managed with daily therapy. Medications are not effective for sleep apnea and surgery is generally not considered until other therapies have been tried. Your treatment is your choice . Continuous Positive Airway (CPAP) works right away and is the therapy that is effective in nearly everyone. An oral device to hold your jaw forward is usually the next most reliable option. Other options include postioning devices (to keep you off your back), weight loss, and surgery including a tongue pacing device. There is more detail about some of these options below.    Important tips for using CPAP and similar devices   Know your equipment:  CPAP is continuous positive airway pressure that prevents obstructive sleep apnea by keeping the throat from collapsing while you are sleeping. In most cases, the device is  smart  and can slowly self-adjusts if your throat collapses and keeps a record every day of how well you are treated-this information is available to you and your care team.  BPAP is bilevel positive airway  pressure that keeps your throat open and also assists each breath with a pressure boost to maintain adequate breathing.  Special kinds of BPAP are used in patients who have inadequate breathing from lung or heart disease. In most cases, the device is  smart  and can slowly self-adjusts to assist breathing. Like CPAP, the device keeps a record of how well you are treated.  Your mask is your connection to the device. You get to choose what feels most comfortable and the staff will help to make sure if fits. Here: are some examples of the different masks that are available:       Key points to remember on your journey with sleep apnea:  Sleep study.  PAP devices often need to be adjusted during a sleep study to show that they are effective and adjusted right.  Good tips to remember: Try wearing just the mask during a quiet time during the day so your body adapts to wearing it. A humidifier is recommended for comfort in most cases to prevent drying of your nose and throat. Allergy medication from your provider may help you if you are having nasal congestion.  Getting settled-in. It takes more than one night for most of us to get used to wearing a mask. Try wearing just the mask during a quiet time during the day so your body adapts to wearing it. A humidifier is recommended for comfort in most cases. Our team will work with you carefully on the first day and will be in contact within 4 days and again at 2 and 4 weeks for advice and remote device adjustments. Your therapy is evaluated by the device each day.   Use it every night. The more you are able to sleep naturally for 7-8 hours, the more likely you will have good sleep and to prevent health risks or symptoms from sleep apnea. Even if you use it 4 hours it helps. Occasionally all of us are unable to use a medical therapy, in sleep apnea, it is not dangerous to miss one night.   Communicate. Call our skilled team on the number provided on the first day if your visit  for problems that make it difficult to wear the device. Over 2 out of 3 patients can learn to wear the device long-term with help from our team. Remember to call our team or your sleep providers if you are unable to wear the device as we may have other solutions for those who cannot adapt to mask CPAP therapy. It is recommended that you sleep your sleep provider within the first 3 months and yearly after that if you are not having problems.   Take care of your equipment. Make sure you clean your mask and tubing using directions every day and that your filter and mask are replaced as recommended or if they are not working.     BESIDES CPAP, WHAT OTHER THERAPIES ARE THERE?    Positioning Device  Positioning devices are generally used when sleep apnea is mild and only occurs on your back.This example shows a pillow that straps around the waist. It may be appropriate for those whose sleep study shows milder sleep apnea that occurs primarily when lying flat on one's back. Preliminary studies have shown benefit but effectiveness at home may need to be verified by a home sleep test. These devices are generally not covered by medical insurance.    Oral Appliance  What is oral appliance therapy?  An oral appliance device fits on your teeth at night like a retainer used after having braces. The device is made by a specialized dentist and requires several visits over 1-2 months before a manufactured device is made to fit your teeth and is adjusted to prevent your sleep apnea. Once an oral device is working properly, snoring should be improved. A home sleep test may be recommended at that time if to determine whether the sleep apnea is adequately treated.       Some things to remember:  -Oral devices are often, but not always, covered by your medical insurance. Be sure to check with your insurance provider.   -If you are referred for oral therapy, you will be given a list of specialized dentists to consider or you may choose to  visit the Web site of the American Academy of Dental Sleep Medicine  -Oral devices are less likely to work if you have severe sleep apnea or are extremely overweight.     More detailed information  An oral appliance is a small acrylic device that fits over the upper and lower teeth  (similar to a retainer or a mouth guard). This device slightly moves jaw forward, which moves the base of the tongue forward, opens the airway, improves breathing for effective treat snoring and obstructive sleep apnea in perhaps 7 out of 10 people .  The best working devices are custom-made by a dental device  after a mold is made of the teeth 1, 2, 3.  When is an oral appliance indicated?  Oral appliance therapy is recommended as a first-line treatment for patients with primary snoring, mild sleep apnea, and for patients with moderate sleep apnea who prefer appliance therapy to use of CPAP4, 5. Severity of sleep apnea is determined by sleep testing and is based on the number of respiratory events per hour of sleep.   How successful is oral appliance therapy?  The success rate of oral appliance therapy in patients with mild sleep apnea is 75-80% while in patients with moderate sleep apnea it is 50-70%. The chance of success in patients with severe sleep apnea is 40-50%. The research also shows that oral appliances have a beneficial effect on the cardiovascular health of TIKA patients at the same magnitude as CPAP therapy7.  Oral appliances should be a second-line treatment in cases of severe sleep apnea, but if not completely successful then a combination therapy utilizing CPAP plus oral appliance therapy may be effective. Oral appliances tend to be effective in a broad range of patients although studies show that the patients who have the highest success are females, younger patients, those with milder disease, and less severe obesity. 3, 6.   Finding a dentist that practices dental sleep medicine  Specific training is  available through the American Academy of Dental Sleep Medicine for dentists interested in working in the field of sleep. To find a dentist who is educated in the field of sleep and the use of oral appliances, near you, visit the Web site of the American Academy of Dental Sleep Medicine.    References  1. Jake et al. Objectively measured vs self-reported compliance during oral appliance therapy for sleep-disordered breathing. Chest 2013; 144(5): 3823-3541.  2. Madonna, et al. Objective measurement of compliance during oral appliance therapy for sleep-disordered breathing. Thorax 2013; 68(1): 91-96.  3. Julio Cesar et al. Mandibular advancement devices in 620 men and women with TIKA and snoring: tolerability and predictors of treatment success. Chest 2004; 125: 3170-6786.  4. Mackenzie et al. Oral appliances for snoring and TIKA: a review. Sleep 2006; 29: 244-262.  5. Jmibo, et al. Oral appliance treatment for TIKA: an update. J Clin Sleep Med 2014; 10(2): 215-227.  6. Canelo et al. Predictors of OSAH treatment outcome. J Dent Res 2007; 86: 3584-3522.      Weight Loss:    Weight loss is a long-term strategy that may improve sleep apnea in some patients.    Weight management is a personal decision.  If you are interested in exploring weight loss strategies, the following discussion covers the impact on weight loss on sleep apnea and the approaches that may be successful.    Weight loss decreases severity of sleep apnea in most people with obesity. For those with mild obesity who have developed snoring with weight gain, even 15-30 pound weight loss can improve and occasionally eliminate sleep apnea.  Structured and life-long dietary and health habits are necessary to lose weight and keep healthier weight levels.     Though there may be significant health benefits from weight loss, long-term weight loss is very difficult to achieve- studies show success with dietary management in less than 10% of people. In  addition, substantial weight loss may require years of dietary control and may be difficult if patients have severe obesity. In these cases, surgical management may be considered.  Finally, older individuals who have tolerated obesity without health complications may be less likely to benefit from weight loss strategies.    Your BMI is Body mass index is 40.88 kg/m .    Weight management plan: Discussed healthy diet and exercise guidelines    Surgery:    Surgery for obstructive sleep apnea is considered generally only when other therapies fail to work. Surgery may be discussed with you if you are having a difficult time tolerating CPAP and or when there is an abnormal structure that requires surgical correction.  Nose and throat surgeries often enlarge the airway to prevent collapse.  Most of these surgeries create pain for 1-2 weeks and up to half of the most common surgeries are not effective throughout life.  You should carefully discuss the benefits and drawbacks to surgery with your sleep provider and surgeon to determine if it is the best solution for you.   More information  Surgery for TIKA is directed at areas that are responsible for narrowing or complete obstruction of the airway during sleep.  There are a wide range of procedures available to enlarge and/or stabilize the airway to prevent blockage of breathing in the three major areas where it can occur: the palate, tongue, and nasal regions.  Successful surgical treatment depends on the accurate identification of the factors responsible for obstructive sleep apnea in each person.  A personalized approach is required because there is no single treatment that works well for everyone.  Because of anatomic variation, consultation with an examination by a sleep surgeon is a critical first step in determining what surgical options are best for each patient.  In some cases, examination during sedation may be recommended in order to guide the selection of  procedures.  Patients will be counseled about risks and benefits as well as the typical recovery course after surgery. Surgery is typically not a cure for a person s TIKA.  However, surgery will often significantly improve one s TIKA severity (termed  success rate ).  Even in the absence of a cure, surgery will decrease the cardiovascular risk associated with OSA7; improve overall quality of life8 (sleepiness, functionality, sleep quality, etc).      Palate Procedures:  Patients with TIKA often have narrowing of their airway in the region of their tonsils and uvula.  The goals of palate procedures are to widen the airway in this region as well as to help the tissues resist collapse.  Modern palate procedure techniques focus on tissue conservation and soft tissue rearrangement, rather than tissue removal.  Often the uvula is preserved in this procedure. Residual sleep apnea is common in patient after pharyngoplasty with an average reduction in sleep apnea events of 33%2.      Tongue Procedures:  ExamWhile patients are awake, the muscles that surround the throat are active and keep this region open for breathing. These muscles relax during sleep, allowing the tongue and other structures to collapse and block breathing.  There are several different tongue procedures available.  Selection of a tongue base procedure depends on characteristics seen on physical exam.  Generally, procedures are aimed at removing bulky tissues in this area or preventing the back of the tongue from falling back during sleep.  Success rates for tongue surgery range from 50-62%3.    Hypoglossal Nerve Stimulation:  Hypoglossal nerve stimulation has recently received approval from the [place] Food and Drug Administration for the treatment of obstructive sleep apnea.  This is based on research showing that the system was safe and effective in treating sleep apnea6.  Results showed that the median AHI score decreased 68%, from 29.3 to 9.0. This  therapy uses an implant system that senses breathing patterns and delivers mild stimulation to airway muscles, which keeps the airway open during sleep.  The system consists of three fully implanted components: a small generator (similar in size to a pacemaker), a breathing sensor, and a stimulation lead.  Using a small handheld remote, a patient turns the therapy on before bed and off upon awakening.    Candidates for this device must be greater than 22 years of age, have moderate to severe TIKA (AHI between 20-65), BMI less than 32, have tried CPAP/oral appliance without success, and have appropriate upper airway anatomy (determined by a sleep endoscopy performed by Dr. Hylton).    Hypoglossal Nerve Stimulation Pathway:    The sleep surgeon s office will work with the patient through the insurance prior-authorization process (including communications and appeals).    Nasal Procedures:  Nasal obstruction can interfere with nasal breathing during the day and night.  Studies have shown that relief of nasal obstruction can improve the ability of some patients to tolerate positive airway pressure therapy for obstructive sleep apnea1.  Treatment options include medications such as nasal saline, topical corticosteroid and antihistamine sprays, and oral medications such as antihistamines or decongestants. Non-surgical treatments can include external nasal dilators for selected patients. If these are not successful by themselves, surgery can improve the nasal airway either alone or in combination with these other options.      Combination Procedures:  Combination of surgical procedures and other treatments may be recommended, particularly if patients have more than one area of narrowing or persistent positional disease.  The success rate of combination surgery ranges from 66-80%2,3.    References  Ana STACY. The Role of the Nose in Snoring and Obstructive Sleep Apnoea: An Update.  Eur Arch Otorhinolaryngol. 2011; 268:  1365-73.   Bonnie SM; Bernard JA; Avinash JR; Pallanch JF; Marjorie MB; Maykel SG; Parish WALKER. Surgical modifications of the upper airway for obstructive sleep apnea in adults: a systematic review and meta-analysis. SLEEP 2010;33(10):1625-2387. Jerilyn IBARRA. Hypopharyngeal surgery in obstructive sleep apnea: an evidence-based medicine review.  Arch Otolaryngol Head Neck Surg. 2006 Feb;132(2):206-13.  John YH1, Michaela Y, Branden RAQUEL. The efficacy of anatomically based multilevel surgery for obstructive sleep apnea. Otolaryngol Head Neck Surg. 2003 Oct;129(4):327-35.  Jerilyn IBARRA, Goldberg A. Hypopharyngeal Surgery in Obstructive Sleep Apnea: An Evidence-Based Medicine Review. Arch Otolaryngol Head Neck Surg. 2006 Feb;132(2):206-13.  Meño DOWNEY et al. Upper-Airway Stimulation for Obstructive Sleep Apnea.  N Engl J Med. 2014 Jan 9;370(2):139-49.  Chitra Y et al. Increased Incidence of Cardiovascular Disease in Middle-aged Men with Obstructive Sleep Apnea. Am J Respir Crit Care Med; 2002 166: 159-165  Rodriguez EM et al. Studying Life Effects and Effectiveness of Palatopharyngoplasty (SLEEP) study: Subjective Outcomes of Isolated Uvulopalatopharyngoplasty. Otolaryngol Head Neck Surg. 2011; 144: 623-631.

## 2025-03-26 NOTE — NURSING NOTE
"Chief Complaint   Patient presents with    Sleep Problem     Stroke last week, snore, dry mouth, wakes multiple times during night, oxygen level decreases during sleep       Initial BP (!) 88/60   Pulse 77   Resp 16   Ht 1.702 m (5' 7\")   Wt 118.4 kg (261 lb)   LMP  (LMP Unknown)   SpO2 95%   BMI 40.88 kg/m   Estimated body mass index is 40.88 kg/m  as calculated from the following:    Height as of this encounter: 1.702 m (5' 7\").    Weight as of this encounter: 118.4 kg (261 lb).    Medication Reconciliation: complete    Neck circumference: 17 inches / 43 centimeters.  ESS: 4  DME: N/A      LASHAWN Baker      "

## 2025-03-27 ENCOUNTER — THERAPY VISIT (OUTPATIENT)
Dept: SLEEP MEDICINE | Facility: CLINIC | Age: 65
End: 2025-03-27
Attending: STUDENT IN AN ORGANIZED HEALTH CARE EDUCATION/TRAINING PROGRAM
Payer: COMMERCIAL

## 2025-03-27 DIAGNOSIS — G47.33 OBSTRUCTIVE SLEEP APNEA: ICD-10-CM

## 2025-03-27 DIAGNOSIS — G47.31 CSA (CENTRAL SLEEP APNEA): ICD-10-CM

## 2025-03-31 ENCOUNTER — PATIENT OUTREACH (OUTPATIENT)
Dept: NEUROSURGERY | Facility: CLINIC | Age: 65
End: 2025-03-31
Payer: COMMERCIAL
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
## 2025-03-31 NOTE — PATIENT INSTRUCTIONS
You are scheduled for a Diagnostic Cerebral Angiogram with Dr. Hurley on 4/23/25. Arrival Time: 7:00 am. Procedure Time: 8:30 am.    Please follow these instructions:    * Check in at the [facility]. The address is [facility] [place] [address]. Enter at door 2, closest to [address]. The phone number is [phone].     * Nothing to eat for 8 hours prior to arrival time. You may drink clear liquids (includes water, Jell-O, clear broth, apple juice or any non-carbonated beverage that you can see through) up until 2 hours prior to arrival time.     * Please note: if you are taking a GLP-1 agonist you must HOLD as follows:   Hold seven (7) days prior for once weekly injectable doses [semaglutide (Ozempic, Wegovy), dulaglutide  (Trulicity), exenatide ER (Bydureon), tirzepatide (Mounjaro)]   Hold the day before and day of for once daily injectable GLP-1 agonists [exenatide (Byetta), liraglutide (Saxenda,  Victoza)]   Hold seven (7) days for oral semaglutide (Rybelsus)     * HOLD Metformin the day of the procedure and for two days following the procedure; resume taking Metformin on 4/26/25.     * HOLD Jardiance 3 days prior to procedure.     * You may take your other medications with a sip of water the morning of the procedure.     * You will be discharged the same day. You must have a  home and someone that can stay with you through the night.     COVID-19 visitors policy: Adult surgical and procedural patients can have two visitors throughout the surgery process. The two visitors may include children of any age; please note, children cannot stay in the waiting room alone.    All discharge instructions will be given to the  or volunteer. Documentation for the post-operative plan will be given to the patient and . Patients are required to have someone to stay with them for 24 hours after their procedure.    If you have questions  regarding your procedure, please contact me at [phone], option 1.    If you need to cancel, reschedule or have procedure scheduling related questions, please call [facility] IR Procedure Scheduling at [phone].    Thank you,  Yanira Murphy, RN, CNRN, SCRN  Xiomara Son, RN, BSN  Stroke & Neuroend[facility] Care Coordinator    Cerebral Angiogram - What to Expect      You are scheduled for an angiogram, which is a procedure that uses x-rays to view the arteries (blood vessels that carry blood from the heart out to the body).     After you arrive, the nursing staff will take a short history and assessment before the procedure begins. Your physician will explain the procedure to you and your family, including the possible risks and side effects.  Be sure to ask questions and address any concerns you may have. You will then be asked to sign a consent form for the procedure.     During the procedure a small tube or catheter will be placed into one of your arteries (either the femoral artery in the groin, or the radial artery in the arm)  and maneuvered to the specific artery being studied.  Contrast medium (x-ray dye) will be injected into the blood vessel and x-rays will be taken of the area.    Once the procedure is completed the catheter will be removed and pressure held at the puncture site for 20 to 30 minutes to make sure the puncture site seals. An internal closure device may be used to secure the arterial puncture as well.     During the exam, you are routinely monitored by a heart monitor and an oxygen saturation monitor that lets us know how well you are breathing.  A blood pressure cuff will be on your arm.  An IV is started to provide you with medications and IV fluids during the procedure.    After the exam you will need to be on complete bed rest for 2 to 6 hours.  The nurse will monitor your vital signs, puncture site, pulses and temperature of the area that was punctured.     After you  are discharged do not drive until the next morning and an adult must be with you until then. You may resume your normal activities the day after the procedure.  Do not do any strenuous exercise or lifting for 48 hours after the procedure.     Preparation:   Laboratory tests will be obtained by your doctor the day of the exam (Basic Metabolic Panel, CBCP, PTT and INR).  Someone will need to drive you to and from the hospital.  Be sure to have someone who can stay with you through the night.   If you are allergic to x-ray contrast or iodine, contact your doctor or the nurse coordinator prior to the exam day for instructions.  If you are or may be pregnant contact your doctor or nurse coordinator prior to the day of the exam.  If you have diabetes, check with your doctor or the RN Care Coordinator to see if your insulin should be adjusted for the exam.  If you are taking a medication called Glucophage, Glucovance, or Metformin; these medications need to be held the day of the exam and for approximately 48 hours following the procedure.   If you are taking a GLP-1 agonist, please hold as instructed above.   If you are taking Coumadin (warfarin), Eliquis (apixaban), Xarelto (rivaroxaban), or any other blood thinners/anticoagulants, please contact the RN Care Coordinator at least 7 days before the exam for special instructions.  You should not have received barium (x-ray contrast) within 48 hours of this procedure.   Do not smoke for 24 hours prior to the procedure.  Do not eat for 8 hours prior to arrival time. You may drink clear liquids (water, ginger ale, etc) until 2 hours prior to arrival time.  You may brush your teeth and take your medications as directed with a sip of water.    Feel free to contact Yanira Murphy or Xiomara Son, our RN Care Coordinators, at [phone] with any questions or concerns. After business hours, call the  at [phone] and have the Neuro-Interventional Fellow paged.

## 2025-03-31 NOTE — TELEPHONE ENCOUNTER
Informed patient of procedure instructions. Confirmed date and time.     Reviewed medications/allergies.     Patient verbalized understanding. All questions answered.     Patient instructions sent via [employer].     Contact information provided and encouraged to call with questions/concerns.     Yanira Murphy RN 3/31/2025 3:09 PM

## 2025-04-01 DIAGNOSIS — E11.29 TYPE 2 DIABETES MELLITUS WITH DIABETIC MICROALBUMINURIA, WITHOUT LONG-TERM CURRENT USE OF INSULIN (H): ICD-10-CM

## 2025-04-01 DIAGNOSIS — R80.9 TYPE 2 DIABETES MELLITUS WITH DIABETIC MICROALBUMINURIA, WITHOUT LONG-TERM CURRENT USE OF INSULIN (H): ICD-10-CM

## 2025-04-04 ENCOUNTER — THERAPY VISIT (OUTPATIENT)
Dept: OCCUPATIONAL THERAPY | Facility: CLINIC | Age: 65
End: 2025-04-04
Payer: COMMERCIAL

## 2025-04-04 DIAGNOSIS — I62.9 INTRACRANIAL HEMORRHAGE (H): Primary | ICD-10-CM

## 2025-04-04 DIAGNOSIS — H53.9 VISION CHANGES: ICD-10-CM

## 2025-04-04 PROCEDURE — 97537 COMMUNITY/WORK REINTEGRATION: CPT | Mod: GO
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
## 2025-04-08 ENCOUNTER — OFFICE VISIT (OUTPATIENT)
Dept: FAMILY MEDICINE | Facility: CLINIC | Age: 65
End: 2025-04-08
Payer: COMMERCIAL

## 2025-04-08 VITALS
RESPIRATION RATE: 23 BRPM | SYSTOLIC BLOOD PRESSURE: 124 MMHG | TEMPERATURE: 97.3 F | DIASTOLIC BLOOD PRESSURE: 68 MMHG | BODY MASS INDEX: 40.61 KG/M2 | HEART RATE: 72 BPM | OXYGEN SATURATION: 97 % | WEIGHT: 259.3 LBS

## 2025-04-08 DIAGNOSIS — R80.9 TYPE 2 DIABETES MELLITUS WITH DIABETIC MICROALBUMINURIA, WITHOUT LONG-TERM CURRENT USE OF INSULIN (H): ICD-10-CM

## 2025-04-08 DIAGNOSIS — E11.29 TYPE 2 DIABETES MELLITUS WITH DIABETIC MICROALBUMINURIA, WITHOUT LONG-TERM CURRENT USE OF INSULIN (H): ICD-10-CM

## 2025-04-08 DIAGNOSIS — I62.9 INTRACRANIAL HEMORRHAGE (H): Primary | ICD-10-CM

## 2025-04-08 DIAGNOSIS — H53.9 VISION CHANGES: ICD-10-CM

## 2025-04-08 DIAGNOSIS — I10 BENIGN ESSENTIAL HYPERTENSION: ICD-10-CM

## 2025-04-08 PROCEDURE — 1111F DSCHRG MED/CURRENT MED MERGE: CPT | Performed by: NURSE PRACTITIONER

## 2025-04-08 PROCEDURE — 99215 OFFICE O/P EST HI 40 MIN: CPT | Performed by: NURSE PRACTITIONER

## 2025-04-08 PROCEDURE — 3074F SYST BP LT 130 MM HG: CPT | Performed by: NURSE PRACTITIONER

## 2025-04-08 PROCEDURE — 3078F DIAST BP <80 MM HG: CPT | Performed by: NURSE PRACTITIONER

## 2025-04-08 PROCEDURE — 1125F AMNT PAIN NOTED PAIN PRSNT: CPT | Performed by: NURSE PRACTITIONER

## 2025-04-08 RX ORDER — LISINOPRIL 10 MG/1
10 TABLET ORAL DAILY
Qty: 90 TABLET | Refills: 3 | Status: SHIPPED | OUTPATIENT
Start: 2025-04-08

## 2025-04-08 ASSESSMENT — PAIN SCALES - GENERAL: PAINLEVEL_OUTOF10: SEVERE PAIN (8)

## 2025-04-08 NOTE — PROGRESS NOTES
Assessment & Plan     (I62.9) Intracranial hemorrhage (H)  (primary encounter diagnosis)  Comment: stable  Plan: lisinopril (ZESTRIL) 10 MG tablet, Adult Eye          Referral        Blood pressure is not ideally controlled, so will increase Lisinopril from 5 mg to 10 mg.  She will check her blood pressure at home and update us in two weeks, schedule lab-only appointment in two weeks.     (E11.29,  R80.9) Type 2 diabetes mellitus with diabetic microalbuminuria, without long-term current use of insulin (H)  Comment: not at goal  Plan: metFORMIN (GLUCOPHAGE) 500 MG tablet,         tirzepatide (MOUNJARO) 2.5 MG/0.5ML SOAJ         auto-injector pen, tirzepatide (MOUNJARO) 5         MG/0.5ML SOAJ auto-injector pen        Discussed starting Mounjaro, including possible side effects.  She will wait to start until after her angiogram.  Follow up with PCP in 3 months.     (Z68.41) BMI 40.0-44.9, adult (H)  Comment:   Plan: See above.     (I10) Benign essential hypertension  Comment: not at goal  Plan: Basic metabolic panel  (Ca, Cl, CO2, Creat,         Gluc, K, Na, BUN)        See above.     (H53.9) Vision changes  Comment:   Plan: Adult Eye  Referral        Referral to neuro-ophthalmology given.  She will follow up with neurosurgery and neurology as scheduled.     The longitudinal plan of care for the diagnosis(es)/condition(s) as documented were addressed during this visit. Due to the added complexity in care, I will continue to support Megan in the subsequent management and with ongoing continuity of care.      I spent a total of 40 minutes on the day of the visit.   Time spent by me today doing chart review, history and exam, documentation and further activities per the note    MED REC REQUIRED  Post Medication Reconciliation Status: discharge medications reconciled and changed, per note/orders        Ayah Guzman is a 64 year old, presenting for the following health issues:  Hospital F/U  (DOS:40005/Neurology Stroke)        4/8/2025     2:43 PM   Additional Questions   Roomed by DEXTER Jackson   Accompanied by Self     HPI          Hospital Follow-up Visit:    Hospital/Nursing Home/IP Rehab Facility: [facility]  Most Recent Admission Date: 3/17/2025   Most Recent Admission Diagnosis: Intracranial hemorrhage (H) - I62.9     Most Recent Discharge Date: 3/22/2025   Most Recent Discharge Diagnosis: Intracranial hemorrhage (H) - I62.9  Soft tissue infection - L08.9  Sleep apnea, unspecified type - G47.30     Admission Summary  Azucena Guo is a(n) 64 year old female with PMH of hypertension, hyperlipidemia, type 2 diabetes  who presented on 3/17/2025 with acute onset headache and left hemianopia. CT showed a right occipital-temporal IPH with subdural hematoma along the right parietooccipital convexity, along the cerebellar convexities, and the posterior falx and right tentorium that evolved with expected stability on subsequent scans.  No tumors identified on MRI, CTA no clear AVM, questionable vessel abnormality on the right parietal cortical area,this may represent just superimposed vessels vs vessel anomaly. Today she is alert , oriented, left hemianopia, no other focal neurological deficits . Cause of the bleeding is probably due to hypertension (190s systolic on admission). However lobar bleedings are not the most typical locations for HTN bleeding. IR consulted for an diagnostic angio to rule out vessel malformation. Angiogram was negative, but plan to repeat in 1 month. BP currently bellow 150s. Not on any drips. Signs of cellulitis noted on left UE.     Plan/Recommendations:  Post angiogram care  Doxycycline  for left UE cellulitis.   Follow up with IR. Angiogram planned in 1 month  BP control by PCP        She has been checking her blood pressure at home and it has fluctuated (sometimes as high as 150 systolic).  She just discovered that she had been taking  2 tablets of minoxidil erroneously instead of Lisinopril) but changed it as of 3 days ago.    She had been nauseated the first week home, wonders if related to the antibiotic she was on (doxy).     She gets a throbbing headache when she stands up intermittently.     Vision changes - left peripheral vision distortions - may look like the couch is melting or the cat walking by; depth perception off with streets, etc.    She has an appointment with neurosurgery in two days, angiogram later this month and neurology.     She did see a sleep specialist recently (referred from hospital due to nocturnal desaturations) and they recommended that she start a GLP-1.          Was the patient in the ICU or did the patient experience delirium during hospitalization?  No  Do you have any other stressors you would like to discuss with your provider? No    Problems taking medications regularly:  None  Medication changes since discharge: None  Problems adhering to non-medication therapy:  None    Summary of hospitalization:  [facility] discharge summary reviewed  Diagnostic Tests/Treatments reviewed.  Follow up needed: none  Other Healthcare Providers Involved in Patient s Care:         None  Update since discharge: stable.         Plan of care communicated with patient                       Objective    /68 (BP Location: Right arm, Patient Position: Sitting, Cuff Size: Adult Regular)   Pulse 72   Temp 97.3  F (36.3  C) (Temporal)   Resp 23   Wt 117.6 kg (259 lb 4.8 oz)   LMP  (LMP Unknown)   SpO2 97%   BMI 40.61 kg/m    Body mass index is 40.61 kg/m .  Physical Exam   GENERAL: alert and no distress  PSYCH: mentation appears normal, affect normal/bright            Signed Electronically by: Bina Huitron NP

## 2025-04-08 NOTE — PROGRESS NOTES
{PROVIDER CHARTING PREFERENCE:669021}    Ayah Guzman is a 64 year old, presenting for the following health issues:  Hospital F/U (DOS:77032/Neurology Stroke)  {(!) Visit Details have not yet been documented.  Please enter Visit Details and then use this list to pull in documentation. (Optional):911648}  HPI      {MA/LPN/RN Pre-Provider Visit Orders- hCG/UA/Strep (Optional):186516}  {SUPERLIST (Optional):965079}  {additonal problems for provider to add (Optional):918692}    {ROS Picklists (Optional):646976}      Objective    LMP  (LMP Unknown)   There is no height or weight on file to calculate BMI.  Physical Exam   {Exam List (Optional):522872}    {Diagnostic Test Results (Optional):085027}        Signed Electronically by: Bina Huitron NP  {Email feedback regarding this note to [email or web address]   :719212}

## 2025-04-10 ENCOUNTER — OFFICE VISIT (OUTPATIENT)
Dept: NEUROSURGERY | Facility: CLINIC | Age: 65
End: 2025-04-10
Payer: COMMERCIAL

## 2025-04-10 ENCOUNTER — ANCILLARY PROCEDURE (OUTPATIENT)
Dept: CT IMAGING | Facility: CLINIC | Age: 65
End: 2025-04-10
Payer: COMMERCIAL

## 2025-04-10 VITALS — SYSTOLIC BLOOD PRESSURE: 106 MMHG | OXYGEN SATURATION: 94 % | DIASTOLIC BLOOD PRESSURE: 68 MMHG

## 2025-04-10 DIAGNOSIS — I62.9 INTRACRANIAL HEMORRHAGE (H): ICD-10-CM

## 2025-04-10 DIAGNOSIS — I62.9 INTRACRANIAL HEMORRHAGE (H): Primary | ICD-10-CM

## 2025-04-10 LAB — SLPCOMP: NORMAL

## 2025-04-10 PROCEDURE — 70450 CT HEAD/BRAIN W/O DYE: CPT | Performed by: RADIOLOGY
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
## 2025-04-10 NOTE — PATIENT INSTRUCTIONS
Do not do any heavy lifting (greater than 10 pounds) for 4-6 weeks. Avoid any activities that could result in trauma. Do not drive until cleared by Neurology.     If you should sustain new trauma or injury or note increased headache, weakness, speech or vision issues, confusion, or other neurologic changes please call 911 or present to your nearest emergency room for further evaluation.     Please continue to abstain from medications that may thin your blood.     Please follow-up with Ashwini Soto for Stroke follow-up on 4/18/2025.     Continue to monitor blood pressure and follow-up with your PCP as recommended.     Follow-up with IR for diagnostic angiogram on 4/23/2025 as scheduled     Will have patient return to Neurosurgery clinic in 4 weeks with repeat imaging.

## 2025-04-11 ENCOUNTER — THERAPY VISIT (OUTPATIENT)
Dept: OCCUPATIONAL THERAPY | Facility: CLINIC | Age: 65
End: 2025-04-11
Payer: COMMERCIAL

## 2025-04-11 DIAGNOSIS — H53.9 VISION CHANGES: ICD-10-CM

## 2025-04-11 DIAGNOSIS — I62.9 INTRACRANIAL HEMORRHAGE (H): Primary | ICD-10-CM

## 2025-04-11 PROCEDURE — 97530 THERAPEUTIC ACTIVITIES: CPT | Mod: GO

## 2025-04-11 PROCEDURE — 97537 COMMUNITY/WORK REINTEGRATION: CPT | Mod: GO

## 2025-04-16 DIAGNOSIS — E11.29 TYPE 2 DIABETES MELLITUS WITH DIABETIC MICROALBUMINURIA, WITHOUT LONG-TERM CURRENT USE OF INSULIN (H): ICD-10-CM

## 2025-04-16 DIAGNOSIS — R80.9 TYPE 2 DIABETES MELLITUS WITH DIABETIC MICROALBUMINURIA, WITHOUT LONG-TERM CURRENT USE OF INSULIN (H): ICD-10-CM

## 2025-04-16 RX ORDER — EMPAGLIFLOZIN 10 MG/1
10 TABLET, FILM COATED ORAL DAILY
Qty: 90 TABLET | Refills: 0 | Status: SHIPPED | OUTPATIENT
Start: 2025-04-16

## 2025-04-22 ENCOUNTER — TELEPHONE (OUTPATIENT)
Dept: OPHTHALMOLOGY | Facility: CLINIC | Age: 65
End: 2025-04-22
Payer: COMMERCIAL

## 2025-04-22 ENCOUNTER — TELEPHONE (OUTPATIENT)
Dept: NEUROSURGERY | Facility: CLINIC | Age: 65
End: 2025-04-22
Payer: COMMERCIAL

## 2025-04-22 NOTE — TELEPHONE ENCOUNTER
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

## 2025-04-22 NOTE — TELEPHONE ENCOUNTER
Patient scheduled for angio tomorrow, 4/23/25. Received message that patient did not hold Jardiance as instructed. Per Dr. Hurley reschedule angio.     Spoke with patient. Informed scheduling will reach out to reschedule. Patient verbalized understanding.    Yanira Murphy RN 4/22/2025 3:06 PM      Resident/Fellow

## 2025-04-22 NOTE — TELEPHONE ENCOUNTER
Called and spoke to Megan     Made her an appointment with Dr. Jeffery     Discussed     Wait time     Billing . Insurance     Parking / cost     Clinic location     Aracelis Paz Communication Facilitator on 4/22/2025 at 2:23 PM

## 2025-04-23 LAB — CV ZIO PRELIM RESULTS: NORMAL

## 2025-04-24 ENCOUNTER — LAB (OUTPATIENT)
Dept: LAB | Facility: CLINIC | Age: 65
End: 2025-04-24
Payer: COMMERCIAL

## 2025-04-24 DIAGNOSIS — I10 BENIGN ESSENTIAL HYPERTENSION: ICD-10-CM

## 2025-04-24 LAB
ANION GAP SERPL CALCULATED.3IONS-SCNC: 14 MMOL/L (ref 7–15)
BUN SERPL-MCNC: 10.7 MG/DL (ref 8–23)
CALCIUM SERPL-MCNC: 9.4 MG/DL (ref 8.8–10.4)
CHLORIDE SERPL-SCNC: 102 MMOL/L (ref 98–107)
CREAT SERPL-MCNC: 0.47 MG/DL (ref 0.51–0.95)
EGFRCR SERPLBLD CKD-EPI 2021: >90 ML/MIN/1.73M2
GLUCOSE SERPL-MCNC: 161 MG/DL (ref 70–99)
HCO3 SERPL-SCNC: 22 MMOL/L (ref 22–29)
POTASSIUM SERPL-SCNC: 4.3 MMOL/L (ref 3.4–5.3)
SODIUM SERPL-SCNC: 138 MMOL/L (ref 135–145)

## 2025-04-25 ENCOUNTER — THERAPY VISIT (OUTPATIENT)
Dept: OCCUPATIONAL THERAPY | Facility: CLINIC | Age: 65
End: 2025-04-25
Payer: COMMERCIAL

## 2025-04-25 DIAGNOSIS — H53.9 VISION CHANGES: ICD-10-CM

## 2025-04-25 DIAGNOSIS — I62.9 INTRACRANIAL HEMORRHAGE (H): Primary | ICD-10-CM

## 2025-04-25 PROCEDURE — 97530 THERAPEUTIC ACTIVITIES: CPT | Mod: GO

## 2025-04-25 NOTE — ED PROVIDER NOTES
ED Provider Note  [facility]      History     Chief Complaint   Patient presents with    Stroke Symptoms     HPI  Azucena Guo is a 64 year old female who presents to the ED today for acute onset of headache and left Heema hemianopia.  She has not been evaluated for this previously.  She denies any other neurological symptoms at this time.  She denies any nausea vomiting fevers or chills.  She denies any weakness in 1 arm or 1 leg or numbness or tingling.    Past Medical History  Past Medical History:   Diagnosis Date    Arthritis     Benign essential hypertension 2019    Diabetes mellitus (H)     History of 2019 novel coronavirus disease (COVID-19) 2022    positive end of dec 2021 ( breakthrough covid, had a headache 1 day & fatigue 3 days)     Nail discoloration 2023     Past Surgical History:   Procedure Laterality Date    ABDOMEN SURGERY      lap cholecystomy    BIOPSY      CHOLECYSTECTOMY          COLONOSCOPY      ANW: sigmoid polyp removed    COLONOSCOPY      no report, abstracted information stated, normal recheck in 5 yr given hx of polyps    IR CAROTID CEREBRAL ANGIOGRAM BILATERAL  3/21/2025     acetaminophen (TYLENOL) 500 MG tablet  minoxidil (LONITEN) 2.5 MG tablet  Multiple Vitamin (MULTI-VITAMIN PO)  simvastatin (ZOCOR) 40 MG tablet  vitamin D3 (CHOLECALCIFEROL) 50 mcg (2000 units) tablet  empagliflozin (JARDIANCE) 10 MG TABS tablet  lisinopril (ZESTRIL) 10 MG tablet  metFORMIN (GLUCOPHAGE) 500 MG tablet  tirzepatide (MOUNJARO) 2.5 MG/0.5ML SOAJ auto-injector pen  tirzepatide (MOUNJARO) 5 MG/0.5ML SOAJ auto-injector pen      Allergies   Allergen Reactions    Sulfa Antibiotics Hives    Measles Mumps And Rubella Vaccine Live (Obsolete)      Redness       Family History  Family History   Problem Relation Age of Onset    Heart Disease Mother     Diabetes Mother             Cancer Mother         lymphoma on hodgkins    Obesity Mother      Coronary Artery Disease Mother     Heart Disease Father     Coronary Artery Disease Father     Hypertension Father     Hyperlipidemia Father     Obesity Sister     Diabetes Sister     Heart Failure Sister     Coronary Artery Disease Sister     Obesity Sister     Diabetes Sister     Depression Sister     Coronary Artery Disease Sister         Haf triple bypass surgery Mar 2023    Thyroid Cancer Sister         thyroid surgery 2023    Depression Sister     Obesity Sister     Diabetes Brother             Obesity Brother     Substance Abuse Son         Alcohol    Breast Cancer Other      Social History   Social History     Tobacco Use    Smoking status: Former     Current packs/day: 0.00     Average packs/day: 1 pack/day for 15.0 years (15.0 ttl pk-yrs)     Types: Cigarettes     Start date: 1978     Quit date: 1991     Years since quittin.3    Smokeless tobacco: Never   Vaping Use    Vaping status: Never Used   Substance Use Topics    Alcohol use: Not Currently     Comment: occasionally    Drug use: No      Past medical history, past surgical history, medications, allergies, family history, and social history were reviewed with the patient. No additional pertinent items.   A medically appropriate review of systems was performed with pertinent positives and negatives noted in the HPI, and all other systems negative.    Physical Exam   BP: (!) 191/84  Pulse: 80  Temp: 97.8  F (36.6  C)  Resp: 18  Weight: 123.8 kg (272 lb 14.9 oz)  SpO2: 96 %  Physical Exam  Vitals and nursing note reviewed.   Constitutional:       General: She is not in acute distress.     Appearance: Normal appearance. She is not diaphoretic.   HENT:      Head: Atraumatic.      Mouth/Throat:      Mouth: Mucous membranes are moist.   Eyes:      General: No scleral icterus.     Conjunctiva/sclera: Conjunctivae normal.   Cardiovascular:      Rate and Rhythm: Normal rate.      Heart sounds: Normal heart sounds.   Pulmonary:       Effort: No respiratory distress.      Breath sounds: Normal breath sounds.   Abdominal:      General: Abdomen is flat.   Musculoskeletal:      Cervical back: Neck supple.   Skin:     General: Skin is warm.      Findings: No rash.   Neurological:      Mental Status: She is alert.           ED Course, Procedures, & Data      Procedures          Results for orders placed or performed during the hospital encounter of 03/17/25   CT Head w/o Contrast     Status: None    Narrative    EXAM: CTA HEAD NECK W CONTRAST, CT HEAD W/O CONTRAST  LOCATION: [facility]  DATE: 3/17/2025    INDICATION: Headache, weakness, Code Stroke, evaluate for LVO. PLEASE READ IMMEDIATELY.  COMPARISON: CT head 12/30/2008  CONTRAST: 67cc of isovue 370  TECHNIQUE: Head and neck CT angiogram with IV contrast. Noncontrast head CT followed by axial helical CT images of the head and neck vessels obtained during the arterial phase of intravenous contrast administration. Axial 2D reconstructed images and   multiplanar 3D MIP reconstructed images of the head and neck vessels were performed by the technologist. Dose reduction techniques were used. All stenosis measurements made according to NASCET criteria unless otherwise specified.    FINDINGS:   NONCONTRAST HEAD CT:   INTRACRANIAL CONTENTS: There is a 3.9 x 5.9 x 4.5 cm acute intraparenchymal hematoma involving the right occipital lobe. There is an adjacent temporal occipital convexity subdural hematoma measuring up to 0.4 cm in thickness. There is also a right   parafalcine subdural hematoma measuring up to 0.2 cm in thickness. There is subdural blood along the right tentorial leaflet measuring 0.5 cm in thickness. Altogether, this results in 0.7 cm leftward shift of the midline structures. No CT evidence of   acute infarct.      VISUALIZED ORBITS/SINUSES/MASTOIDS: No intraorbital abnormality. No paranasal sinus mucosal disease. No middle ear or mastoid  effusion.    BONES/SOFT TISSUES: No acute abnormality.    HEAD CTA:  ANTERIOR CIRCULATION: No stenosis/occlusion, aneurysm, or high flow vascular malformation. Standard [place] anatomy.    POSTERIOR CIRCULATION: No stenosis/occlusion, aneurysm, or high flow vascular malformation. Balanced vertebral arteries supply a normal basilar artery.     DURAL VENOUS SINUSES: Expected enhancement of the major dural venous sinuses.    NECK CTA:   RIGHT CAROTID: There is 20% stenosis of the proximal right ICA.    LEFT CAROTID: No measurable stenosis or dissection.    VERTEBRAL ARTERIES: No focal stenosis or dissection. Balanced vertebral arteries.    AORTIC ARCH: Classic aortic arch anatomy with no significant stenosis at the origin of the great vessels.    NONVASCULAR STRUCTURES: Moderate spinal canal stenosis at C6-C7.      Impression    IMPRESSION:   HEAD CT:  1.  There is a 3.9 x 5.9 x 4.5 cm acute intraparenchymal hematoma involving the right occipital lobe.  2.  There is an adjacent right temporo-occipital convexity subdural hematoma measuring up to 0.4 cm in thickness as well as subdural blood layering along the right tentorial leaflet measuring 0.5 cm and a right parafalcine component measuring 0.2 cm in   thickness.  3.  There is 0.7 cm leftward shift of the midline structures.    HEAD CTA:  1.  Patent intracranial arterial vasculature without flow-limiting stenosis.  2.  No vascular lesion underlying the right occipital hemorrhage. No active contrast extravasation.    NECK CTA:  Patent cervical arterial vasculature without flow-limiting stenosis.    Findings discussed with Dr. Chowdary on 3/17/2025 at 2034   CTA Head Neck w Contrast     Status: None    Narrative    EXAM: CTA HEAD NECK W CONTRAST, CT HEAD W/O CONTRAST  LOCATION: [facility]  DATE: 3/17/2025    INDICATION: Headache, weakness, Code Stroke, evaluate for LVO. PLEASE READ IMMEDIATELY.  COMPARISON: CT head  12/30/2008  CONTRAST: 67cc of isovue 370  TECHNIQUE: Head and neck CT angiogram with IV contrast. Noncontrast head CT followed by axial helical CT images of the head and neck vessels obtained during the arterial phase of intravenous contrast administration. Axial 2D reconstructed images and   multiplanar 3D MIP reconstructed images of the head and neck vessels were performed by the technologist. Dose reduction techniques were used. All stenosis measurements made according to NASCET criteria unless otherwise specified.    FINDINGS:   NONCONTRAST HEAD CT:   INTRACRANIAL CONTENTS: There is a 3.9 x 5.9 x 4.5 cm acute intraparenchymal hematoma involving the right occipital lobe. There is an adjacent temporal occipital convexity subdural hematoma measuring up to 0.4 cm in thickness. There is also a right   parafalcine subdural hematoma measuring up to 0.2 cm in thickness. There is subdural blood along the right tentorial leaflet measuring 0.5 cm in thickness. Altogether, this results in 0.7 cm leftward shift of the midline structures. No CT evidence of   acute infarct.      VISUALIZED ORBITS/SINUSES/MASTOIDS: No intraorbital abnormality. No paranasal sinus mucosal disease. No middle ear or mastoid effusion.    BONES/SOFT TISSUES: No acute abnormality.    HEAD CTA:  ANTERIOR CIRCULATION: No stenosis/occlusion, aneurysm, or high flow vascular malformation. Standard [place] of [place] anatomy.    POSTERIOR CIRCULATION: No stenosis/occlusion, aneurysm, or high flow vascular malformation. Balanced vertebral arteries supply a normal basilar artery.     DURAL VENOUS SINUSES: Expected enhancement of the major dural venous sinuses.    NECK CTA:   RIGHT CAROTID: There is 20% stenosis of the proximal right ICA.    LEFT CAROTID: No measurable stenosis or dissection.    VERTEBRAL ARTERIES: No focal stenosis or dissection. Balanced vertebral arteries.    AORTIC ARCH: Classic aortic arch anatomy with no significant stenosis at the origin  of the great vessels.    NONVASCULAR STRUCTURES: Moderate spinal canal stenosis at C6-C7.      Impression    IMPRESSION:   HEAD CT:  1.  There is a 3.9 x 5.9 x 4.5 cm acute intraparenchymal hematoma involving the right occipital lobe.  2.  There is an adjacent right temporo-occipital convexity subdural hematoma measuring up to 0.4 cm in thickness as well as subdural blood layering along the right tentorial leaflet measuring 0.5 cm and a right parafalcine component measuring 0.2 cm in   thickness.  3.  There is 0.7 cm leftward shift of the midline structures.    HEAD CTA:  1.  Patent intracranial arterial vasculature without flow-limiting stenosis.  2.  No vascular lesion underlying the right occipital hemorrhage. No active contrast extravasation.    NECK CTA:  Patent cervical arterial vasculature without flow-limiting stenosis.    Findings discussed with Dr. Chowdary on 3/17/2025 at 2034   MR Brain w/o & w Contrast     Status: None    Narrative    EXAM: MR BRAIN W/O & W CONTRAST, MRV BRAIN W CONTRAST  3/18/2025  5:44 PM     HISTORY: eval etiology of bleed       COMPARISON: Head CT same day.     TECHNIQUE: Multiplanar, multisequence MR imaging of the head without  intravenous contrast. 2D time-of-flight MR venogram (MRV) of the head  was performed without intravenous contrast. Postcontrast MRV of the  brain was also performed. 3-D reconstructions were performed, reviewed  and archived.    CONTRAST: gadobutrol (GADAVIST) injection 10 mL.    FINDINGS:  Right occipital lobe intraparenchymal hemorrhage measuring up to 5.7 x  3.6 cm in the axial plane. Small intraparenchymal hemorrhage in the  posterior right temporal lobe measuring 1.5 x 2.1 cm. Thin subdural  hematoma along the right parietal occipital convexity, and along the  posterior falx, right tentorial leaflet and trace subdural blood along  the bilateral cerebellar convexities. There is surrounding vasogenic  edema with mild effacement of the right lateral  ventricle and 8 mm of  leftward midline shift. Diffuse susceptibility artifact associated  with the areas of hemorrhage, no definite underlying enhancing  lesions.    Tiny scattered T2/FLAIR hyperintense foci in the periventricular white  matter, nonspecific.    The ventricles are proportionate to the cerebral sulci. Scattered  heterogeneous diffusion restriction associated with the right  occipital lobe intraparenchymal hemorrhage. No other focus of  diffusion restriction. Major intracranial vascular structures are  within normal limits.    No suspicious abnormality of the skull marrow signal. Layering fluid  in the right maxillary sinus. Mastoid air cells are clear. No focal  abnormality of the pituitary gland, sella, skull base and upper  cervical spinal structures on sagittal images. The orbits are normal.    MRV:  No thrombosis or stenosis of the major intracranial dural sinuses or  deep cerebral veins. No pathologic filling defect on the postcontrast  MRV images.      Impression    IMPRESSION:  1. Similar right occipital and posterior temporal lobe  intraparenchymal hemorrhages with surrounding edema and 8 mm of  leftward midline shift. No underlying enhancing lesion or vascular  abnormality.  2. Thin subdural hematoma along the right parietooccipital convexity,  along the cerebellar convexities, and the posterior falx and right  tentorium.  3. MRV demonstrates patent dural venous sinuses and major deep  intracranial veins. No thrombosis identified.    I have personally reviewed the examination and initial interpretation  and I agree with the findings.    VIRI RUSH MD         SYSTEM ID:  [id number]   CT Head w/o Contrast     Status: Abnormal   Result Value Ref Range    Radiologist flags Increased IPH, edema (Urgent)     Narrative    CT HEAD W/O CONTRAST 3/18/2025 2:59 AM    History: right occipital ICH     Comparison: 3/17/2025 at 2000 hours (approximately 7 hours prior (    Technique: Using multidetector  thin collimation helical acquisition  technique, axial, coronal and sagittal CT images from the skull base  to the vertex were obtained without intravenous contrast.   (topogram) image(s) also obtained and reviewed.    Findings:   Large right occipital intraparenchymal hyperattenuating material again  noted, which persists on virtual noncontrast exam, consistent with  hemorrhage. Further extension of the hemorrhage into the temporal lobe  when compared to prior, in total measuring approximately 5.9 x 3.1 x  5.3 cm, previously 5.7 x 2.9 x 4.8 cm when measured in an analogous  fashion. Diffuse sulcal effacement throughout the right cerebral  hemisphere with effacement of the right lateral ventricle, increased  from prior. No hydrocephalus or evidence of vascular entrapment. The    Approximately 7 mm of leftward shift, stable. The basal cisterns  remain clear. No cerebellar tonsillar ectopia.    Multifocal subdural hemorrhages, including along the left tentorial  leaflet which extends superiorly along the parasagittal falx and just  anterior to the intraperitoneal hemorrhage along the right  occipitotemporal convexity measuring up to 0.4 cm in disease 6/18)    No acute loss of gray-white differentiation.. The basal cisterns are  clear.    The bony calvaria and the bones of the skull base are normal. In  layering fluid in the right maxillary sinus, the remaining paranasal  sinuses are relatively clear. The mastoid air cells are clear.      Impression    Impression:  1. Increased right temporal occipital intraparenchymal hemorrhage,  centered in the right occipital lobe with further extension into the  temporal lobe and increasing surrounding vasogenic edema when compared  to the prior exam 7 hours earlier.   2. Diffuse sulcal effacement throughout the right cerebral convexity  with increased effacement of the right lateral ventricle. No evidence  of ventricular entrapment. Basal cisterns remain clear. No  cerebellar  tonsillar ectopia.  3. Stable multifocal subdural hemorrhages.    [Urgent Result: Increased IPH, edema ]    Finding was identified on 3/18/2025 4:28 AM.     Dr. Borrero was contacted by Dr. Wei at 3/18/2025 4:55 AM and  verbalized understanding of the urgent finding.     I have personally reviewed the examination and initial interpretation  and I agree with the findings.    VIRI RUSH MD         SYSTEM ID:  [id number]   CT Head w/o Contrast     Status: None    Narrative    EXAM: CT HEAD W/O CONTRAST  3/18/2025 8:52 AM     HISTORY:  monitor right occipital IPH       COMPARISON: Head CT earlier today 0249 hours 3/18/2025    TECHNIQUE: Using multidetector thin collimation helical acquisition  technique, axial, coronal and sagittal CT images from the skull base  to the vertex were obtained without intravenous contrast.   (topogram) image(s) also obtained and reviewed.    FINDINGS:  No change in large right temporooccipital intraparenchymal hemorrhage.  Diffuse sulcal effacement throughout the the majority of the right  cerebral hemisphere and partial effacement of the right lateral  ventricle. 6 mm of leftward midline shift is not significantly  changed. There is subdural hemorrhage along the posterior falx and  right tentorium measuring up to 4 mm thickness, unchanged. Clear basal  cisterns.    The bony calvaria and the bones of the skull base are normal. Right  maxillary sinus mucus retention cyst. The remainder of the paranasal  sinuses and mastoid air cells are clear. Grossly normal orbits.       Impression    IMPRESSION:   1. Unchanged right temporooccipital intraparenchymal hemorrhage with  surrounding edema.  2. Stable subdural hemorrhage along the posterior falx and right  tentorium.    I have personally reviewed the examination and initial interpretation  and I agree with the findings.    ANUP RODRIGUEZ MD         SYSTEM ID:  [id number]   MRV Brain with Contrast     Status: None     Narrative    EXAM: MR BRAIN W/O & W CONTRAST, MRV BRAIN W CONTRAST  3/18/2025  5:44 PM     HISTORY: eval etiology of bleed       COMPARISON: Head CT same day.     TECHNIQUE: Multiplanar, multisequence MR imaging of the head without  intravenous contrast. 2D time-of-flight MR venogram (MRV) of the head  was performed without intravenous contrast. Postcontrast MRV of the  brain was also performed. 3-D reconstructions were performed, reviewed  and archived.    CONTRAST: gadobutrol (GADAVIST) injection 10 mL.    FINDINGS:  Right occipital lobe intraparenchymal hemorrhage measuring up to 5.7 x  3.6 cm in the axial plane. Small intraparenchymal hemorrhage in the  posterior right temporal lobe measuring 1.5 x 2.1 cm. Thin subdural  hematoma along the right parietal occipital convexity, and along the  posterior falx, right tentorial leaflet and trace subdural blood along  the bilateral cerebellar convexities. There is surrounding vasogenic  edema with mild effacement of the right lateral ventricle and 8 mm of  leftward midline shift. Diffuse susceptibility artifact associated  with the areas of hemorrhage, no definite underlying enhancing  lesions.    Tiny scattered T2/FLAIR hyperintense foci in the periventricular white  matter, nonspecific.    The ventricles are proportionate to the cerebral sulci. Scattered  heterogeneous diffusion restriction associated with the right  occipital lobe intraparenchymal hemorrhage. No other focus of  diffusion restriction. Major intracranial vascular structures are  within normal limits.    No suspicious abnormality of the skull marrow signal. Layering fluid  in the right maxillary sinus. Mastoid air cells are clear. No focal  abnormality of the pituitary gland, sella, skull base and upper  cervical spinal structures on sagittal images. The orbits are normal.    MRV:  No thrombosis or stenosis of the major intracranial dural sinuses or  deep cerebral veins. No pathologic filling defect on  the postcontrast  MRV images.      Impression    IMPRESSION:  1. Similar right occipital and posterior temporal lobe  intraparenchymal hemorrhages with surrounding edema and 8 mm of  leftward midline shift. No underlying enhancing lesion or vascular  abnormality.  2. Thin subdural hematoma along the right parietooccipital convexity,  along the cerebellar convexities, and the posterior falx and right  tentorium.  3. MRV demonstrates patent dural venous sinuses and major deep  intracranial veins. No thrombosis identified.    I have personally reviewed the examination and initial interpretation  and I agree with the findings.    VIRI RUSH MD         SYSTEM ID:  [id number]   XR Chest Port 1 View     Status: None    Narrative    Portable chest    INDICATION: Oxygen requirements increasing    COMPARISON: 3/28/2014    FINDINGS: Heart size and shape normal. Atherosclerotic calcification  of the aortic knob. Degenerative spurring in the thoracic spine. Some  patchy densities in the lungs are present. No effusion. No  consolidation.      Impression    IMPRESSION: Mild edema/atelectasis in the lungs without radiographic  sign of infection or effusion.    AL HAQUE MD         SYSTEM ID:  [id number]   CT Head w/o Contrast     Status: None    Narrative    EXAM: CT HEAD W/O CONTRAST  3/19/2025 10:45 AM     HISTORY:  refractory nausea and headache s/p right occipital  IPH/subdural bleed       COMPARISON:  Multiple CTs of the head and MRIs of the brain dating  back to 3/17/2025.    TECHNIQUE: Using multidetector thin collimation helical acquisition  technique, axial, coronal and sagittal CT images from the skull base  to the vertex were obtained without intravenous contrast.   (topogram) image(s) also obtained and reviewed.    FINDINGS:  Stable size of the large intraparenchymal hemorrhage in the right  posterior temporal lobe and right occipital lobe since with continued  decrease of surrounding vasogenic edema.  Stable trace subdural  hemorrhage along the right parieto-occipital convexity. Stable  subdural hemorrhage along the falx and right tentorium. Stable trace  subdural hemorrhage along the right parieto-occipital convexity.  Diffuse sulcal effacement is present along the right cerebral  hemisphere similar to prior. Stable 7 mm right to left midline shift,  not significantly changed from prior.    No new acute intracranial hemorrhage. No areas of acute gray-white  matter differentiation loss. No acute hydrocephalus with persistent  effacement of the right lateral ventricle. The basal cisterns are  patent.    The bony calvaria in the bones of the skull base are normal. The  visualized portions of the paranasal sinuses and mastoid air cells are  clear with exception to a stable right mucous retention cyst. Orbits  are unremarkable.       Impression    IMPRESSION:   1. Stable large intraparenchymal hemorrhage in the right posterior  temporal and occipital lobes with decreased surrounding vasogenic  edema. Persistent 7 mm of leftward midline shift with no significant  interval change from prior. No new acute intracranial hemorrhage or  acute loss of gray-white matter differentiation.  2. Stable subdural hemorrhage along the falx and right tentorium and  stable trace subdural hemorrhage along the right parieto-occipital  convexity.  3. Continued sulcal effacement in the right cerebral hemisphere with  persistent effacement of the right lateral ventricle. No acute  hydrocephalus.    I have personally reviewed the examination and initial interpretation  and I agree with the findings.    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 Her BP was  high on arrival, around 190 systolics. She is presenting for a  diagnostic cerebral angiogram.     Indication for the procedure: To further evaluate etiology of the  right temporo-occipital intraparenchymal hemorrhage    : Denis Hurley MD  [place]: Emily Leal MD  Level of anesthesia/ Sedation : Local anesthesia and moderate sedation  Anesthesia/Sedation administered by: Independent trained observer  under attending supervision with continuous monitoring of the  patient's level of consciousness and physiologic status.   Contrast used: 55 ml of Visipaque 320  Fluoro time: 11.2 minutes, 727.2 mGy  Total intra-service sedation time: 37 minutes  Sedatives: Midazolam 2 mg IV, Fentanyl 100 mcg IV  Other medications: Lidocaine 1% 10 ml topical    Procedure:  Diagnostic cerebral angiography and interpretation of the images.  Ultrasound guided right common femoral arteriotomy with permanent  image of the anatomy stored in the medical record.  Diagnostic angiography of the right common femoral artery.  Selective catheterization and diagnostic cerebral angiography of the  right common carotid, left common carotid, left vertebral arteries.  Percutaneous closure of the right femoral arteriotomy using a 6F  Angio-seal closure device.    Consent: The risks and benefits of conventional diagnostic cerebral  angiography were discussed with the patient who agreed to proceed. The  risks discussed included stroke, arterial dissection, groin hematoma,  arteriovenous fistula of the groin and pseudoaneurysm of the femoral  artery. Subsequently, verbal and written informed consent was  obtained.    Technique: The patient was brought to the angiography suite and placed  in the supine position. Medications were administered by the radiology  nursing staff. The nursing staff independently monitored the patient's  vital signs during  the procedure.    The patient 's right groin was prepped and draped in the standard  fashion. The right common femoral artery was palpated. Ultrasound was  used to image the common femoral artery. The femoral artery was shown  to be patent. Lidocaine was injected locally and a small skin incision  was made over the femoral artery using an 11-blade scalpel. Using  real-time ultrasound guidance, a 19 gauge needle was placed into the  artery which was exchanged for a 5 [place] sheath over an Amplatz wire.  The sheath was connected to a continuous flush of heparinized saline.  A hard copy was stored in the patient's record.    A 5 [place] Terumo glide Pino 2 diagnostic catheter was advanced  through the sheath into the abdominal and thoracic aorta over a 0.035  inch diameter Terumo glidewire. Double flush technique was used  throughout the procedure. The diagnostic catheter was used to  selectively catheterize the left common carotid, right common carotid,  left vertebral arteries..    Angiography was performed of the cervical and cranial vessels as  listed above.    Interpretation of images:  Left common carotid artery injection: Cranial view   Under fluoroscopic guidance, the catheter was advanced over the  glidewire into the left common carotid artery. Biplane angiography was  performed over the cranium. Cranial view of the left common carotid  artery injection in the anteroposterior, lateral and oblique  projections demonstrates normal cervical, petrous, laceral, cavernous,  clinoid, ophthalmic, and communicating segments of the left internal  carotid artery. The ophthalmic artery is normal with subsequent normal  choroidal blush.  The left internal carotid artery bifurcates into the  left anterior cerebral artery and left middle cerebral artery.  The  left A1 is hypoplastic. The proximal segments and distal branches of  the left middle cerebral artery are normal. There is likely a left  fetal posterior cerebral  artery. The capillary and venous phases are  normal. The left external carotid artery and its branches are normal.  There is no evidence of arteriovenous malformation, fistula or  aneurysms on this injection.    Right common carotid artery injection: Cranial view   Under fluoroscopic guidance, the catheter was advanced over the  glidewire into the right common carotid artery. Biplane angiography  was performed over the cranium. Cranial view of the right common  carotid artery injection in the anteroposterior, lateral and oblique  projections demonstrates normal cervical, petrous, laceral, cavernous,  clinoid, opthalmic, and communicating segments of the right internal  carotid artery. The ophthalmic artery is normal with subsequent normal  choroidal blush. The right internal carotid artery bifurcates into the  right anterior cerebral artery and right middle cerebral artery. There  is also filling of the left anterior cerebral artery via the anterior  communicating artery. The proximal segments and distal branches of the  right and left anterior cerebral arteries, and right middle cerebral  artery are normal. There is likely a right fetal posterior cerebral  artery. There is significant paucity of blood vessels in the  temporo-occipital area corresponding to the intraparenchymal  hemorrhage. The capillary and venous phases are otherwise normal. The  right external carotid artery and its branches are normal. There are  no aneurysms are vascular malformations noted.    Right common carotid artery injection: Cervical view  Under fluoroscopic guidance, the catheter was advanced over the  glidewire into the right common carotid artery. Biplane angiography  was performed over the neck. Cervical view of the right common carotid  artery in the anteroposterior and lateral projections demonstrates a  normal right common carotid artery. The common carotid artery  bifurcation is at the C3-4 level. The right internal carotid  artery  demonstrates about 30% stenosis approximately 1cm distal to the  bifurcation, per NASCET criteria. The right external carotid artery  and its branches are normal.    Left vertebral artery injection: Cranial view  Under fluoroscopic guidance, the catheter was advanced over the  glidewire into the proximal left vertebral artery. Biplane angiography  was performed over the cranium. Cranial view of the left vertebral  artery in the anteroposterior and lateral projections demonstrates a  normal left vertebral artery. The left posterior inferior cerebellar  artery originates from the distal left vertebral artery. The basilar  artery is patent and bifurcates into bilateral posterior cerebral  arteries, however the left P1 appears to be possibly hypoplastic. The  bilateral anterior inferior cerebellar arteries appear normal. The  bilateral superior cerebellar arteries are normal; the left the  superior cerebellar artery appears to be duplicated. The capillary and  venous phases are normal. There are no aneurysms are vascular  malformations noted.    Right common femoral artery injection:  Pelvic view  Through the 5 [place] sheath, angiography was performed over the right  groin. Pelvic view of the right common femoral artery in the MAY  projection demonstrates a normal right common femoral artery,  superficial femoral artery, and deep femoral artery. The sheath is  located above the bifurcation and below the inferior epigastric  artery. There is no stenosis, dissection or pseudoaneurysm.    Upon completion of the procedure, the 5 [place] sheath was removed.  Hemostasis was obtained with a 6 [place] angioseal closure device. The  procedure was completed without complication. The patient was then  transferred to their room in stable condition.    Denis Hurley MD was present for the entire procedure.      Impression    Impression:  No evidence of vascular malformation, dural AV fistula or aneurysm to  explain the  etiology of hemorrhage.   R internal carotid cervical segment stenosis, measuring approximately  30% per NASCET criteria.    Plan:  Bedrest 2 hours  Repeat diagnostic cerebral angiogram in 1 month  Continue inpatient care    Emily Leal MD  Endovascular Surgical Neuroradiology Fellow  Pager: [phone]    I was present and scrubbed in for the entire procedure.    I have personally reviewed the examination and initial interpretation  and I agree with the findings.    CAROL RUEDA MD         SYSTEM ID:  [id number]   US Upper Extremity Venous Duplex Right     Status: None    Narrative    EXAMINATION: DOPPLER VENOUS ULTRASOUND OF THE RIGHT UPPER EXTREMITY,  3/21/2025 8:10 AM     COMPARISON: None.    HISTORY: swelling in arm, redness; eval for DVT    TECHNIQUE:  Gray-scale evaluation with compression, spectral flow and  color Doppler assessment of the deep venous system of the right upper  extremity.    FINDINGS:  Right: Normal blood flow and waveforms are demonstrated in the  internal jugular, innominate, subclavian, and axillary veins. There is  normal compressibility of the brachial, basilic and cephalic veins.      Impression    IMPRESSION:  No evidence of right upper extremity deep venous thrombosis.     I have personally reviewed the examination and initial interpretation  and I agree with the findings.    DUSTIN BEYER DO         SYSTEM ID:  [id number]   CT Head w/o Contrast     Status: None    Narrative    CT HEAD W/O CONTRAST 3/21/2025 10:40 PM    History: survelliance of right IPH, started subq heparin     Comparison: 3/19/2025    Technique: Using multidetector thin collimation helical acquisition  technique, axial, coronal and sagittal CT images from the skull base  to the vertex were obtained without intravenous contrast.   (topogram) image(s) also obtained and reviewed.    Findings:   Unchanged right temporal occipital intraparenchymal hemorrhage with  stable surrounding vasogenic  edema. There is continued diffuse sulcal  effacement throughout the right cerebral hemisphere and persistent  leftward midline shift measuring up to 6 mm, previously 7 mm. Similar  effacement of the right lateral ventricle. Hyperdense subdural  hematoma overlying the right tentorial leaflet measuring up to 4 mm,  unchanged. Additional trace subdural hematoma along the posterior falx  and adjacent to the hemorrhage along the posterior right  parieto-occipital lobe measuring up to 3 mm (9/25, 6/18). Unchanged  small focus of hypoattenuation in the subcortical posterior left  temporal lobe.    There is no new intracranial hemorrhage. No acute loss of gray-white  differentiation. No acute hydrocephalus. The basal cisterns are clear.    The bony calvaria and the bones of the skull base are normal. The  visualized portions of the paranasal sinuses and mastoid air cells are  clear. Orbits are nonfocal.      Impression    Impression:  1. Stable head CT as compared to 3/19/2025. Unchanged right  temporooccipital intracranial hemorrhage with surrounding edema.  Slightly improved leftward midline shift of 6 mm.  2. Stable volume of the subdural hemorrhage along the right tentorial  leaflet, posterior falx, and right parieto-occipital convexity.  3. Stable partial effacement of the right lateral ventricle. No  hydrocephalus.    I have personally reviewed the examination and initial interpretation  and I agree with the findings.    ANUP RODRIGUEZ MD         SYSTEM ID:  [id number]   Basic metabolic panel     Status: Abnormal   Result Value Ref Range    Sodium 137 135 - 145 mmol/L    Potassium 4.0 3.4 - 5.3 mmol/L    Chloride 100 98 - 107 mmol/L    Carbon Dioxide (CO2) 22 22 - 29 mmol/L    Anion Gap 15 7 - 15 mmol/L    Urea Nitrogen 11.7 8.0 - 23.0 mg/dL    Creatinine 0.43 (L) 0.51 - 0.95 mg/dL    GFR Estimate >90 >60 mL/min/1.73m2    Calcium 10.1 8.8 - 10.4 mg/dL    Glucose 136 (H) 70 - 99 mg/dL   INR     Status: Normal   Result  Value Ref Range    INR 0.93 0.85 - 1.15   Partial thromboplastin time     Status: Normal   Result Value Ref Range    aPTT 29 22 - 38 Seconds   Troponin T, High Sensitivity     Status: Normal   Result Value Ref Range    Troponin T, High Sensitivity 10 <=14 ng/L   [place] Draw     Status: None (In process)    Narrative    The following orders were created for panel order [place] [facility].  Procedure                               Abnormality         Status                     ---------                               -----------         ------                     Extra Red Top Tube[[id number]]                              In process                   Please view results for these tests on the individual orders.   CBC with platelets and differential     Status: None   Result Value Ref Range    WBC Count 7.2 4.0 - 11.0 10e3/uL    RBC Count 4.54 3.80 - 5.20 10e6/uL    Hemoglobin 13.5 11.7 - 15.7 g/dL    Hematocrit 40.5 35.0 - 47.0 %    MCV 89 78 - 100 fL    MCH 29.7 26.5 - 33.0 pg    MCHC 33.3 31.5 - 36.5 g/dL    RDW 13.4 10.0 - 15.0 %    Platelet Count 308 150 - 450 10e3/uL    % Neutrophils 59 %    % Lymphocytes 32 %    % Monocytes 7 %    % Eosinophils 1 %    % Basophils 1 %    % Immature Granulocytes 0 %    NRBCs per 100 WBC 0 <1 /100    Absolute Neutrophils 4.3 1.6 - 8.3 10e3/uL    Absolute Lymphocytes 2.3 0.8 - 5.3 10e3/uL    Absolute Monocytes 0.5 0.0 - 1.3 10e3/uL    Absolute Eosinophils 0.1 0.0 - 0.7 10e3/uL    Absolute Basophils 0.1 0.0 - 0.2 10e3/uL    Absolute Immature Granulocytes 0.0 <=0.4 10e3/uL    Absolute NRBCs 0.0 10e3/uL   Troponin T, High Sensitivity     Status: Normal   Result Value Ref Range    Troponin T, High Sensitivity 7 <=14 ng/L   Magnesium     Status: Normal   Result Value Ref Range    Magnesium 2.0 1.7 - 2.3 mg/dL   Phosphorus     Status: Normal   Result Value Ref Range    Phosphorus 4.2 2.5 - 4.5 mg/dL   Glucose by meter     Status: Abnormal   Result Value Ref Range    GLUCOSE BY METER POCT 137 (H)  70 - 99 mg/dL   Phosphorus     Status: Normal   Result Value Ref Range    Phosphorus 4.5 2.5 - 4.5 mg/dL   Magnesium     Status: Normal   Result Value Ref Range    Magnesium 1.9 1.7 - 2.3 mg/dL   Hemoglobin A1c     Status: Abnormal   Result Value Ref Range    Estimated Average Glucose 163 (H) <117 mg/dL    Hemoglobin A1C 7.3 (H) <5.7 %   Lipid panel reflex to direct LDL     Status: Abnormal   Result Value Ref Range    Cholesterol 152 <200 mg/dL    Triglycerides 152 (H) <150 mg/dL    Direct Measure HDL 46 (L) >=50 mg/dL    LDL Cholesterol Calculated 76 <100 mg/dL    Non HDL Cholesterol 106 <130 mg/dL    Narrative    Cholesterol  Desirable: < 200 mg/dL  Borderline High: 200 - 239 mg/dL  High: >= 240 mg/dL    Triglycerides  Normal: < 150 mg/dL  Borderline High: 150 - 199 mg/dL  High: 200-499 mg/dL  Very High: >= 500 mg/dL    Direct Measure HDL  Female: >= 50 mg/dL   Male: >= 40 mg/dL    LDL Cholesterol  Desirable: < 100 mg/dL  Above Desirable: 100 - 129 mg/dL   Borderline High: 130 - 159 mg/dL   High:  160 - 189 mg/dL   Very High: >= 190 mg/dL    Non HDL Cholesterol  Desirable: < 130 mg/dL  Above Desirable: 130 - 159 mg/dL  Borderline High: 160 - 189 mg/dL  High: 190 - 219 mg/dL  Very High: >= 220 mg/dL   TSH with free T4 reflex     Status: Normal   Result Value Ref Range    TSH 0.81 0.30 - 4.20 uIU/mL   Glucose by meter     Status: Abnormal   Result Value Ref Range    GLUCOSE BY METER POCT 147 (H) 70 - 99 mg/dL   Basic metabolic panel     Status: Abnormal   Result Value Ref Range    Sodium 138 135 - 145 mmol/L    Potassium 3.9 3.4 - 5.3 mmol/L    Chloride 103 98 - 107 mmol/L    Carbon Dioxide (CO2) 23 22 - 29 mmol/L    Anion Gap 12 7 - 15 mmol/L    Urea Nitrogen 8.7 8.0 - 23.0 mg/dL    Creatinine 0.38 (L) 0.51 - 0.95 mg/dL    GFR Estimate >90 >60 mL/min/1.73m2    Calcium 9.1 8.8 - 10.4 mg/dL    Glucose 157 (H) 70 - 99 mg/dL   CBC with platelets     Status: Normal   Result Value Ref Range    WBC Count 7.1 4.0 - 11.0  10e3/uL    RBC Count 4.08 3.80 - 5.20 10e6/uL    Hemoglobin 12.0 11.7 - 15.7 g/dL    Hematocrit 36.5 35.0 - 47.0 %    MCV 90 78 - 100 fL    MCH 29.4 26.5 - 33.0 pg    MCHC 32.9 31.5 - 36.5 g/dL    RDW 13.2 10.0 - 15.0 %    Platelet Count 294 150 - 450 10e3/uL   Glucose by meter     Status: Abnormal   Result Value Ref Range    GLUCOSE BY METER POCT 156 (H) 70 - 99 mg/dL   Glucose by meter     Status: Abnormal   Result Value Ref Range    GLUCOSE BY METER POCT 151 (H) 70 - 99 mg/dL   Glucose by meter     Status: Abnormal   Result Value Ref Range    GLUCOSE BY METER POCT 135 (H) 70 - 99 mg/dL   Glucose by meter     Status: Abnormal   Result Value Ref Range    GLUCOSE BY METER POCT 181 (H) 70 - 99 mg/dL   Glucose by meter     Status: Abnormal   Result Value Ref Range    GLUCOSE BY METER POCT 157 (H) 70 - 99 mg/dL   Phosphorus     Status: Abnormal   Result Value Ref Range    Phosphorus 2.4 (L) 2.5 - 4.5 mg/dL   Magnesium     Status: Normal   Result Value Ref Range    Magnesium 2.3 1.7 - 2.3 mg/dL   Basic metabolic panel     Status: Abnormal   Result Value Ref Range    Sodium 137 135 - 145 mmol/L    Potassium 3.6 3.4 - 5.3 mmol/L    Chloride 103 98 - 107 mmol/L    Carbon Dioxide (CO2) 22 22 - 29 mmol/L    Anion Gap 12 7 - 15 mmol/L    Urea Nitrogen 6.1 (L) 8.0 - 23.0 mg/dL    Creatinine 0.36 (L) 0.51 - 0.95 mg/dL    GFR Estimate >90 >60 mL/min/1.73m2    Calcium 8.7 (L) 8.8 - 10.4 mg/dL    Glucose 162 (H) 70 - 99 mg/dL   CBC with platelets     Status: Normal   Result Value Ref Range    WBC Count 6.8 4.0 - 11.0 10e3/uL    RBC Count 3.95 3.80 - 5.20 10e6/uL    Hemoglobin 11.7 11.7 - 15.7 g/dL    Hematocrit 35.9 35.0 - 47.0 %    MCV 91 78 - 100 fL    MCH 29.6 26.5 - 33.0 pg    MCHC 32.6 31.5 - 36.5 g/dL    RDW 13.4 10.0 - 15.0 %    Platelet Count 277 150 - 450 10e3/uL   Glucose by meter     Status: Abnormal   Result Value Ref Range    GLUCOSE BY METER POCT 138 (H) 70 - 99 mg/dL   Glucose by meter     Status: Abnormal   Result  Value Ref Range    GLUCOSE BY METER POCT 150 (H) 70 - 99 mg/dL   Ionized Calcium     Status: Normal   Result Value Ref Range    Calcium Ionized Whole Blood 4.5 4.4 - 5.2 mg/dL   Glucose by meter     Status: Abnormal   Result Value Ref Range    GLUCOSE BY METER POCT 173 (H) 70 - 99 mg/dL   Glucose by meter     Status: Abnormal   Result Value Ref Range    GLUCOSE BY METER POCT 152 (H) 70 - 99 mg/dL   Glucose by meter     Status: Abnormal   Result Value Ref Range    GLUCOSE BY METER POCT 163 (H) 70 - 99 mg/dL   Glucose by meter     Status: Abnormal   Result Value Ref Range    GLUCOSE BY METER POCT 165 (H) 70 - 99 mg/dL   Phosphorus     Status: Abnormal   Result Value Ref Range    Phosphorus 2.3 (L) 2.5 - 4.5 mg/dL   Magnesium     Status: Normal   Result Value Ref Range    Magnesium 2.0 1.7 - 2.3 mg/dL   Basic metabolic panel     Status: Abnormal   Result Value Ref Range    Sodium 137 135 - 145 mmol/L    Potassium 4.0 3.4 - 5.3 mmol/L    Chloride 101 98 - 107 mmol/L    Carbon Dioxide (CO2) 24 22 - 29 mmol/L    Anion Gap 12 7 - 15 mmol/L    Urea Nitrogen 6.4 (L) 8.0 - 23.0 mg/dL    Creatinine 0.40 (L) 0.51 - 0.95 mg/dL    GFR Estimate >90 >60 mL/min/1.73m2    Calcium 8.9 8.8 - 10.4 mg/dL    Glucose 174 (H) 70 - 99 mg/dL   CBC with platelets     Status: Normal   Result Value Ref Range    WBC Count 6.9 4.0 - 11.0 10e3/uL    RBC Count 4.13 3.80 - 5.20 10e6/uL    Hemoglobin 12.4 11.7 - 15.7 g/dL    Hematocrit 38.6 35.0 - 47.0 %    MCV 94 78 - 100 fL    MCH 30.0 26.5 - 33.0 pg    MCHC 32.1 31.5 - 36.5 g/dL    RDW 13.7 10.0 - 15.0 %    Platelet Count 282 150 - 450 10e3/uL   Glucose by meter     Status: Abnormal   Result Value Ref Range    GLUCOSE BY METER POCT 154 (H) 70 - 99 mg/dL   Glucose by meter     Status: Abnormal   Result Value Ref Range    GLUCOSE BY METER POCT 150 (H) 70 - 99 mg/dL   Glucose by meter     Status: Abnormal   Result Value Ref Range    GLUCOSE BY METER POCT 166 (H) 70 - 99 mg/dL   Glucose by meter      Status: Abnormal   Result Value Ref Range    GLUCOSE BY METER POCT 153 (H) 70 - 99 mg/dL   Glucose by meter     Status: Abnormal   Result Value Ref Range    GLUCOSE BY METER POCT 153 (H) 70 - 99 mg/dL   Glucose by meter     Status: Abnormal   Result Value Ref Range    GLUCOSE BY METER POCT 162 (H) 70 - 99 mg/dL   Phosphorus     Status: Normal   Result Value Ref Range    Phosphorus 3.5 2.5 - 4.5 mg/dL   Magnesium     Status: Normal   Result Value Ref Range    Magnesium 2.1 1.7 - 2.3 mg/dL   Basic metabolic panel     Status: Abnormal   Result Value Ref Range    Sodium 139 135 - 145 mmol/L    Potassium 3.8 3.4 - 5.3 mmol/L    Chloride 102 98 - 107 mmol/L    Carbon Dioxide (CO2) 25 22 - 29 mmol/L    Anion Gap 12 7 - 15 mmol/L    Urea Nitrogen 4.7 (L) 8.0 - 23.0 mg/dL    Creatinine 0.38 (L) 0.51 - 0.95 mg/dL    GFR Estimate >90 >60 mL/min/1.73m2    Calcium 8.8 8.8 - 10.4 mg/dL    Glucose 179 (H) 70 - 99 mg/dL   CBC with platelets     Status: Normal   Result Value Ref Range    WBC Count 5.7 4.0 - 11.0 10e3/uL    RBC Count 4.13 3.80 - 5.20 10e6/uL    Hemoglobin 12.5 11.7 - 15.7 g/dL    Hematocrit 38.3 35.0 - 47.0 %    MCV 93 78 - 100 fL    MCH 30.3 26.5 - 33.0 pg    MCHC 32.6 31.5 - 36.5 g/dL    RDW 13.5 10.0 - 15.0 %    Platelet Count 287 150 - 450 10e3/uL   Glucose by meter     Status: Abnormal   Result Value Ref Range    GLUCOSE BY METER POCT 154 (H) 70 - 99 mg/dL   Glucose by meter     Status: Abnormal   Result Value Ref Range    GLUCOSE BY METER POCT 166 (H) 70 - 99 mg/dL   Glucose by meter     Status: Abnormal   Result Value Ref Range    GLUCOSE BY METER POCT 159 (H) 70 - 99 mg/dL   Glucose by meter     Status: Abnormal   Result Value Ref Range    GLUCOSE BY METER POCT 204 (H) 70 - 99 mg/dL   Glucose by meter     Status: Abnormal   Result Value Ref Range    GLUCOSE BY METER POCT 187 (H) 70 - 99 mg/dL   Phosphorus     Status: Normal   Result Value Ref Range    Phosphorus 3.3 2.5 - 4.5 mg/dL   Magnesium     Status:  Normal   Result Value Ref Range    Magnesium 1.8 1.7 - 2.3 mg/dL   Basic metabolic panel     Status: Abnormal   Result Value Ref Range    Sodium 136 135 - 145 mmol/L    Potassium 3.8 3.4 - 5.3 mmol/L    Chloride 100 98 - 107 mmol/L    Carbon Dioxide (CO2) 23 22 - 29 mmol/L    Anion Gap 13 7 - 15 mmol/L    Urea Nitrogen 6.4 (L) 8.0 - 23.0 mg/dL    Creatinine 0.38 (L) 0.51 - 0.95 mg/dL    GFR Estimate >90 >60 mL/min/1.73m2    Calcium 9.3 8.8 - 10.4 mg/dL    Glucose 194 (H) 70 - 99 mg/dL   CBC with platelets     Status: Normal   Result Value Ref Range    WBC Count 5.8 4.0 - 11.0 10e3/uL    RBC Count 4.06 3.80 - 5.20 10e6/uL    Hemoglobin 12.1 11.7 - 15.7 g/dL    Hematocrit 36.4 35.0 - 47.0 %    MCV 90 78 - 100 fL    MCH 29.8 26.5 - 33.0 pg    MCHC 33.2 31.5 - 36.5 g/dL    RDW 13.4 10.0 - 15.0 %    Platelet Count 286 150 - 450 10e3/uL   Glucose by meter     Status: Abnormal   Result Value Ref Range    GLUCOSE BY METER POCT 176 (H) 70 - 99 mg/dL   Glucose by meter     Status: Abnormal   Result Value Ref Range    GLUCOSE BY METER POCT 180 (H) 70 - 99 mg/dL   Glucose by meter     Status: Abnormal   Result Value Ref Range    GLUCOSE BY METER POCT 207 (H) 70 - 99 mg/dL   EKG 12-lead, tracing only     Status: None   Result Value Ref Range    Systolic Blood Pressure  mmHg    Diastolic Blood Pressure  mmHg    Ventricular Rate 82 BPM    Atrial Rate 82 BPM    MD Interval 148 ms    QRS Duration 96 ms     ms    QTc 453 ms    P Axis 59 degrees    R AXIS 37 degrees    T Axis 32 degrees    Interpretation ECG       Sinus rhythm  Normal ECG  Unconfirmed report - interpretation of this ECG is computer generated - see medical record for final interpretation    Confirmed by - EMERGENCY ROOM, PHYSICIAN ([id number]),  TITUS CARVALHO ([id number]) on 3/18/2025 7:54:35 AM     EKG 12-lead, complete     Status: None   Result Value Ref Range    Systolic Blood Pressure  mmHg    Diastolic Blood Pressure  mmHg    Ventricular Rate 71 BPM    Atrial Rate  71 BPM    MD Interval 156 ms    QRS Duration 92 ms     ms    QTc 445 ms    P Axis 3 degrees    R AXIS 29 degrees    T Axis 38 degrees    Interpretation ECG       Sinus rhythm  Low voltage QRS  Borderline ECG  When compared with ECG of 17-Mar-2025 20:19,  No significant change was found  Confirmed by MD LELO, MATIAS ([id number]) on 3/21/2025 4:59:55 PM     Echo Complete     Status: None   Result Value Ref Range    LVEF  60-65%     [facility]    [id number]  [facility]  [id number]  [id number]     [facility],[place]  Echocardiography Laboratory  [address] [place] [id number]     Name: JEFF BARRIENTOS  MRN: [id number]  : 1960  Study Date: 2025 09:05 AM  Age: 64 yrs  Gender: Female  Patient Location: [facility]  Reason For Study: Murmur  Ordering Physician: RAFFI ESPANA  Performed By: Stephanie Worrell     BSA: 2.3 m2  Height: 67 in  Weight: 272 lb  HR: 79  BP: 147/81 mmHg  ______________________________________________________________________________  Procedure  Echocardiogram with two-dimensional, color and spectral Doppler. Contrast  Optison. Optison (NDC #4177-5757-18) given intravenously. Patient was given 6  ml mixture of 3 ml Optison and 6 ml saline. 3 ml wasted.  ______________________________________________________________________________  Interpretation Summary  Left ventricular size, wall motion and function are normal. The ejection  fraction is 60-65%.  Right ventricular function, chamber size, wall motion, and thickness are  normal.  No significant valvular abnormalities present.  The inferior vena cava was normal in size with preserved respiratory  variability.  No pericardial effusion is present.  ______________________________________________________________________________  Left Ventricle  Left ventricular size, wall motion and function are normal. The ejection  fraction is 60-65%. Left ventricular wall thickness is normal. Left  ventricular diastolic  function is normal.     Right Ventricle  Right ventricular function, chamber size, wall motion, and thickness are  normal.     Atria  Both atria appear normal.     Mitral Valve  On Doppler interrogation, there is no significant stenosis or regurgitation.  Mild mitral annular calcification is present. Mitral leaflet thickness is  increased .     Aortic Valve  On Doppler interrogation, there is no significant stenosis or regurgitation.  The aortic valve is tricuspid. Moderate aortic valve calcification is present.     Tricuspid Valve  The tricuspid valve is normal. The peak velocity of the tricuspid regurgitant  jet is not obtainable. Pulmonary artery systolic pressure cannot be assessed.     Pulmonic Valve  The valve leaflets are not well visualized. On Doppler interrogation, there is  no significant stenosis or regurgitation.     Vessels  The aorta root is normal. The thoracic aorta is normal. The inferior vena cava  was normal in size with preserved respiratory variability. Ascending aorta 4  cm.     Pericardium  No pericardial effusion is present.     Miscellaneous  No significant valvular abnormalities present.     Compared to Previous Study  There is no prior study for direct comparison.  ______________________________________________________________________________  MMode/2D Measurements & Calculations  IVSd: 0.99 cm  LVIDd: 5.4 cm  LVIDs: 3.5 cm  LVPWd: 0.91 cm  FS: 35.6 %  LV mass(C)d: 195.2 grams  LV mass(C)dI: 84.7 grams/m2  Ao root diam: 3.4 cm  asc Aorta Diam: 4.0 cm  LVOT diam: 2.2 cm  LVOT area: 3.9 cm2  Ao root diam index Ht(cm/m): 2.0  Ao root diam index BSA (cm/m2): 1.5  Asc Ao diam index BSA (cm/m2): 1.7  Asc Ao diam index Ht(cm/m): 2.4  RWT: 0.33  TAPSE: 2.6 cm     Doppler Measurements & Calculations  MV E max gisele: 89.9 cm/sec  MV A max gisele: 72.6 cm/sec  MV E/A: 1.2  MV dec time: 0.20 sec  Ao V2 max: 197.0 cm/sec  Ao max PG: 15.5 mmHg  Ao V2 mean: 130.0 cm/sec  Ao mean P.0 mmHg  Ao V2 VTI: 40.0  cm  PA acc time: 0.12 sec  E/E' av.0  Lateral E/e': 9.6  Medial E/e': 12.3  RV S Zac: 12.3 cm/sec     ______________________________________________________________________________  Report approved by: Dr Stephane Stout on 2025 10:26 AM         ABO and Rh     Status: None   Result Value Ref Range    ABO/RH(D) A POS     SPECIMEN EXPIRATION DATE [id number]    Prepare pheresed platelets (unit)     Status: None   Result Value Ref Range    Blood Component Type Platelets     Product Code M3319T80     Unit Status Transfused     Unit Number [id number]     CODING SYSTEM [id number]     ISSUE DATE AND TIME [id number]     UNIT ABO/RH AB+     UNIT TYPE ISBT 8400    CBC with Platelets & Differential     Status: None    Narrative    The following orders were created for panel order CBC with Platelets & Differential.  Procedure                               Abnormality         Status                     ---------                               -----------         ------                     CBC with platelets and ...[[id number]]                      Final result                 Please view results for these tests on the individual orders.     Medications   iopamidol (ISOVUE-370) solution 67 mL (67 mLs Intravenous $Given 3/17/25 2002)     And   sodium chloride 0.9 % bag for CT scan flush use (90 mLs As instructed $Given 3/17/25 2002)   labetalol (NORMODYNE/TRANDATE) injection 20 mg (20 mg Intravenous $Given 3/17/25 2017)   magnesium sulfate 2 g in 50 mL sterile water intermittent infusion (2 g Intravenous $New Bag 3/18/25 4699)   perflutren diluted 1mL to 2mL with saline (OPTISON) diluted injection 6 mL (6 mLs Intravenous $Given 3/18/25 3418)   gadobutrol (GADAVIST) injection 10 mL (10 mLs Intravenous $Given 3/18/25 7613)   magnesium sulfate 2 g in 50 mL sterile water intermittent infusion (2 g Intravenous $New Bag 3/18/25 2002)   lisinopril (ZESTRIL) tablet 2.5 mg (2.5 mg Oral $Given 3/18/25 2208)   potassium  chloride mukul ER (KLOR-CON M10) CR tablet 20 mEq (20 mEq Oral $Given 3/19/25 0544)   Potassium Phosphate 15 mmol in NS intermittent infusion 15 mmol (15 mmol Intravenous $New Bag 3/19/25 0657)   metoclopramide (REGLAN) injection 10 mg (10 mg Intravenous $Given 3/19/25 1115)   diphenhydrAMINE (BENADRYL) injection 25 mg (25 mg Intravenous $Given 3/19/25 1115)   potassium & sodium phosphates (NEUTRA-PHOS) Packet 1 packet (1 packet Oral or Feeding Tube Not Given 3/21/25 0451)   magnesium oxide (MAG-OX) tablet 400 mg (400 mg Oral $Given 3/21/25 0017)   magnesium sulfate 2 g in 50 mL sterile water intermittent infusion (2 g Intravenous $New Bag 3/21/25 0217)   lidocaine 1 % 1-30 mL (10 mLs Intradermal $Given by Other 3/21/25 0850)   ondansetron (ZOFRAN) injection 4 mg (4 mg Intravenous $Given 3/21/25 0843)   iodixanol (VISIPAQUE 320) injection 150 mL (55 mLs Intravenous $Given 3/21/25 0902)   potassium chloride mukul ER (KLOR-CON M10) CR tablet 20 mEq (20 mEq Oral $Given 3/22/25 1027)   magnesium sulfate 2 g in 50 mL sterile water intermittent infusion (2 g Intravenous $New Bag 3/22/25 1027)     Labs Ordered and Resulted from Time of ED Arrival to Time of ED Departure   INR - Normal       Result Value    INR 0.93     PARTIAL THROMBOPLASTIN TIME - Normal    aPTT 29     TROPONIN T, HIGH SENSITIVITY - Normal    Troponin T, High Sensitivity 10     CBC WITH PLATELETS AND DIFFERENTIAL    WBC Count 7.2      RBC Count 4.54      Hemoglobin 13.5      Hematocrit 40.5      MCV 89      MCH 29.7      MCHC 33.3      RDW 13.4      Platelet Count 308      % Neutrophils 59      % Lymphocytes 32      % Monocytes 7      % Eosinophils 1      % Basophils 1      % Immature Granulocytes 0      NRBCs per 100 WBC 0      Absolute Neutrophils 4.3      Absolute Lymphocytes 2.3      Absolute Monocytes 0.5      Absolute Eosinophils 0.1      Absolute Basophils 0.1      Absolute Immature Granulocytes 0.0      Absolute NRBCs 0.0     ABO AND RH    ABO/RH(D)  A POS      SPECIMEN EXPIRATION DATE [id number]       CT Head w/o Contrast   Final Result   Impression:   1. Stable head CT as compared to 3/19/2025. Unchanged right   temporooccipital intracranial hemorrhage with surrounding edema.   Slightly improved leftward midline shift of 6 mm.   2. Stable volume of the subdural hemorrhage along the right tentorial   leaflet, posterior falx, and right parieto-occipital convexity.   3. Stable partial effacement of the right lateral ventricle. No   hydrocephalus.      I have personally reviewed the examination and initial interpretation   and I agree with the findings.      ANUP RODRIGUEZ MD            SYSTEM ID:  [id number]      IR Carotid Cerebral Angiogram Bilateral   Final Result   Impression:   No evidence of vascular malformation, dural AV fistula or aneurysm to   explain the etiology of hemorrhage.    R internal carotid cervical segment stenosis, measuring approximately   30% per NASCET criteria.      Plan:   Bedrest 2 hours   Repeat diagnostic cerebral angiogram in 1 month   Continue inpatient care      Emily Leal MD   Endovascular Surgical Neuroradiology Fellow   Pager: [phone]      I was present and scrubbed in for the entire procedure.      I have personally reviewed the examination and initial interpretation   and I agree with the findings.      CAROL RUEDA MD            SYSTEM ID:  [id number]      US Upper Extremity Venous Duplex Right   Final Result   IMPRESSION:   No evidence of right upper extremity deep venous thrombosis.       I have personally reviewed the examination and initial interpretation   and I agree with the findings.      DUSTIN BEYER DO            SYSTEM ID:  [id number]      CT Head w/o Contrast   Final Result   IMPRESSION:    1. Stable large intraparenchymal hemorrhage in the right posterior   temporal and occipital lobes with decreased surrounding vasogenic   edema. Persistent 7 mm of leftward midline shift with no  significant   interval change from prior. No new acute intracranial hemorrhage or   acute loss of gray-white matter differentiation.   2. Stable subdural hemorrhage along the falx and right tentorium and   stable trace subdural hemorrhage along the right parieto-occipital   convexity.   3. Continued sulcal effacement in the right cerebral hemisphere with   persistent effacement of the right lateral ventricle. No acute   hydrocephalus.      I have personally reviewed the examination and initial interpretation   and I agree with the findings.      MAKI HEMPHILL MD            SYSTEM ID:  [id number]      MRV Brain with Contrast   Final Result   IMPRESSION:   1. Similar right occipital and posterior temporal lobe   intraparenchymal hemorrhages with surrounding edema and 8 mm of   leftward midline shift. No underlying enhancing lesion or vascular   abnormality.   2. Thin subdural hematoma along the right parietooccipital convexity,   along the cerebellar convexities, and the posterior falx and right   tentorium.   3. MRV demonstrates patent dural venous sinuses and major deep   intracranial veins. No thrombosis identified.      I have personally reviewed the examination and initial interpretation   and I agree with the findings.      VIRI RUSH MD            SYSTEM ID:  [id number]      MR Brain w/o & w Contrast   Final Result   IMPRESSION:   1. Similar right occipital and posterior temporal lobe   intraparenchymal hemorrhages with surrounding edema and 8 mm of   leftward midline shift. No underlying enhancing lesion or vascular   abnormality.   2. Thin subdural hematoma along the right parietooccipital convexity,   along the cerebellar convexities, and the posterior falx and right   tentorium.   3. MRV demonstrates patent dural venous sinuses and major deep   intracranial veins. No thrombosis identified.      I have personally reviewed the examination and initial interpretation   and I agree with the findings.      VIRI  MD VICTOR MANUEL            SYSTEM ID:  [id number]      Echo Complete   Final Result      XR Chest Port 1 View   Final Result   IMPRESSION: Mild edema/atelectasis in the lungs without radiographic   sign of infection or effusion.      AL HAQUE MD            SYSTEM ID:  [id number]      CT Head w/o Contrast   Final Result   IMPRESSION:    1. Unchanged right temporooccipital intraparenchymal hemorrhage with   surrounding edema.   2. Stable subdural hemorrhage along the posterior falx and right   tentorium.      I have personally reviewed the examination and initial interpretation   and I agree with the findings.      ANUP RODRIGUEZ MD            SYSTEM ID:  [id number]      CT Head w/o Contrast   Final Result   Abnormal   Impression:   1. Increased right temporal occipital intraparenchymal hemorrhage,   centered in the right occipital lobe with further extension into the   temporal lobe and increasing surrounding vasogenic edema when compared   to the prior exam 7 hours earlier.    2. Diffuse sulcal effacement throughout the right cerebral convexity   with increased effacement of the right lateral ventricle. No evidence   of ventricular entrapment. Basal cisterns remain clear. No cerebellar   tonsillar ectopia.   3. Stable multifocal subdural hemorrhages.      [Urgent Result: Increased IPH, edema ]      Finding was identified on 3/18/2025 4:28 AM.       Dr. Borrero was contacted by Dr. Wei at 3/18/2025 4:55 AM and   verbalized understanding of the urgent finding.       I have personally reviewed the examination and initial interpretation   and I agree with the findings.      VIRI RUSH MD            SYSTEM ID:  [id number]      CT Head w/o Contrast   Final Result   IMPRESSION:    HEAD CT:   1.  There is a 3.9 x 5.9 x 4.5 cm acute intraparenchymal hematoma involving the right occipital lobe.   2.  There is an adjacent right temporo-occipital convexity subdural hematoma measuring up to 0.4 cm in thickness as well as  subdural blood layering along the right tentorial leaflet measuring 0.5 cm and a right parafalcine component measuring 0.2 cm in    thickness.   3.  There is 0.7 cm leftward shift of the midline structures.      HEAD CTA:   1.  Patent intracranial arterial vasculature without flow-limiting stenosis.   2.  No vascular lesion underlying the right occipital hemorrhage. No active contrast extravasation.      NECK CTA:   Patent cervical arterial vasculature without flow-limiting stenosis.      Findings discussed with Dr. Chowdary on 3/17/2025 at 2034      CTA Head Neck w Contrast   Final Result   IMPRESSION:    HEAD CT:   1.  There is a 3.9 x 5.9 x 4.5 cm acute intraparenchymal hematoma involving the right occipital lobe.   2.  There is an adjacent right temporo-occipital convexity subdural hematoma measuring up to 0.4 cm in thickness as well as subdural blood layering along the right tentorial leaflet measuring 0.5 cm and a right parafalcine component measuring 0.2 cm in    thickness.   3.  There is 0.7 cm leftward shift of the midline structures.      HEAD CTA:   1.  Patent intracranial arterial vasculature without flow-limiting stenosis.   2.  No vascular lesion underlying the right occipital hemorrhage. No active contrast extravasation.      NECK CTA:   Patent cervical arterial vasculature without flow-limiting stenosis.      Findings discussed with Dr. Chowdary on 3/17/2025 at 2034             Critical Care Addendum  My initial assessment, based on my review of nursing observations, review of vital signs, focused history, and physical exam, established a high suspicion that Azucena Guo has ischemic or hemorrhagic stroke, which requires immediate intervention, and therefore she is critically ill.     After the initial assessment, the care team initiated multiple lab tests, initiated medication therapy with labetalol and nicardipine, and consulted with neurology and neurosurgery  to provide stabilization care. Due to  the critical nature of this patient, I reassessed nursing observations, vital signs, physical exam, review of cardiac rhythm monitor, interpretation of lab and radiology studies, and neurologic status multiple times prior to her disposition.     Time also spent performing documentation, reviewing test results, discussion with consultants, and coordination of care.     Critical care time (excluding teaching time and procedures): 67 minutes.       Assessment & Plan    Patient is a 64-year-old female who presents to the ED today with sudden onset headache and and visual changes in her right eye..  Stroke code was called.  Neurology arrived almost immediately and the patient went to CT scanner.    CT imaging showed the following:HEAD CT:  1.  There is a 3.9 x 5.9 x 4.5 cm acute intraparenchymal hematoma involving the right occipital lobe.  2.  There is an adjacent right temporo-occipital convexity subdural hematoma measuring up to 0.4 cm in thickness as well as subdural blood layering along the right tentorial leaflet measuring 0.5 cm and a right parafalcine component measuring 0.2 cm in   thickness.  3.  There is 0.7 cm leftward shift of the midline structures.     HEAD CTA:  1.  Patent intracranial arterial vasculature without flow-limiting stenosis.  2.  No vascular lesion underlying the right occipital hemorrhage. No active contrast extravasation.    Neurosurgery was also consulted at this time as well.  Both neurology and neurosurgery evaluated the patient.  She was started on nicardipine drip.  She will be admitted to the neurosurgical service.    I have reviewed the nursing notes. I have reviewed the findings, diagnosis, plan and need for follow up with the patient.    Discharge Medication List as of 3/22/2025  2:40 PM        START taking these medications    Details   doxycycline hyclate (VIBRAMYCIN) 100 MG capsule Take 1 capsule (100 mg) by mouth every 12 hours for 11 doses. Administer at least 2 hours before  or after aluminum, calcium, iron, zinc or magnesium containing products., Disp-11 capsule, R-0, E-Prescribe      ondansetron (ZOFRAN ODT) 4 MG ODT tab Take 1 tablet (4 mg) by mouth every 6 hours as needed for nausea or vomiting., Disp-28 tablet, R-0, E-Prescribe             Final diagnoses:   Intracranial hemorrhage (H)       Jones Chowdary MD  [facility] [address] [place]  3/17/2025     Jones Chowdary MD  04/25/25 9701

## 2025-04-27 ENCOUNTER — MYC REFILL (OUTPATIENT)
Dept: FAMILY MEDICINE | Facility: CLINIC | Age: 65
End: 2025-04-27
Payer: COMMERCIAL

## 2025-04-27 DIAGNOSIS — R80.9 TYPE 2 DIABETES MELLITUS WITH DIABETIC MICROALBUMINURIA, WITHOUT LONG-TERM CURRENT USE OF INSULIN (H): ICD-10-CM

## 2025-04-27 DIAGNOSIS — E11.29 TYPE 2 DIABETES MELLITUS WITH DIABETIC MICROALBUMINURIA, WITHOUT LONG-TERM CURRENT USE OF INSULIN (H): ICD-10-CM

## 2025-04-28 ENCOUNTER — PATIENT OUTREACH (OUTPATIENT)
Dept: NEUROSURGERY | Facility: CLINIC | Age: 65
End: 2025-04-28
Payer: COMMERCIAL

## 2025-04-28 NOTE — PATIENT INSTRUCTIONS
You are scheduled for a Diagnostic Cerebral Angiogram with Dr. Hurley on 5/5/25. Arrival Time: 6:30 am. Procedure Time: 8:00 am.    Please follow these instructions:    * Check in at the [facility] Waiting Room at the [facility]. The address is [address] [place] [address]. The phone number is [phone].     * Nothing to eat for 8 hours prior to arrival time. You may drink clear liquids (includes water, Jell-O, clear broth, apple juice or any non-carbonated beverage that you can see through) up until 2 hours prior to arrival time.      * Please note: if you are taking a GLP-1 agonist you must HOLD as follows:   Hold seven (7) days prior for once weekly injectable doses [semaglutide (Ozempic, Wegovy), dulaglutide  (Trulicity), exenatide ER (Bydureon), tirzepatide (Mounjaro)]   Hold the day before and day of for once daily injectable GLP-1 agonists [exenatide (Byetta), liraglutide (Saxenda,  Victoza)]   Hold seven (7) days for oral semaglutide (Rybelsus)      * HOLD Metformin the day of the procedure and for two days following the procedure; resume taking Metformin on 5/8/25.      * HOLD Jardiance 3 days prior to procedure.      * You may take your other medications with a sip of water the morning of the procedure.      * You will be discharged the same day. You must have a  home and someone that can stay with you through the night.      COVID-19 visitors policy: Adult surgical and procedural patients can have two visitors throughout the surgery process. The two visitors may include children of any age; please note, children cannot stay in the waiting room alone.     All discharge instructions will be given to the  or volunteer. Documentation for the post-operative plan will be given to the patient and . Patients are required to have someone to stay with them for 24 hours after their procedure.     If you have questions regarding your procedure,  please contact me at [phone], option 1.     If you need to cancel, reschedule or have procedure scheduling related questions, please call Neuroendovascular IR Procedure Scheduling at [phone].     Thank you,  Yanira Murphy, RN, CNRN, SCRN  Xiomara Son, RN, BSN  Stroke & Neuroendovascular Care Coordinator     Cerebral Angiogram - What to Expect        You are scheduled for an angiogram, which is a procedure that uses x-rays to view the arteries (blood vessels that carry blood from the heart out to the body).      After you arrive, the nursing staff will take a short history and assessment before the procedure begins. Your physician will explain the procedure to you and your family, including the possible risks and side effects.  Be sure to ask questions and address any concerns you may have. You will then be asked to sign a consent form for the procedure.      During the procedure a small tube or catheter will be placed into one of your arteries (either the femoral artery in the groin, or the radial artery in the arm)  and maneuvered to the specific artery being studied.  Contrast medium (x-ray dye) will be injected into the blood vessel and x-rays will be taken of the area.     Once the procedure is completed the catheter will be removed and pressure held at the puncture site for 20 to 30 minutes to make sure the puncture site seals. An internal closure device may be used to secure the arterial puncture as well.      During the exam, you are routinely monitored by a heart monitor and an oxygen saturation monitor that lets us know how well you are breathing.  A blood pressure cuff will be on your arm.  An IV is started to provide you with medications and IV fluids during the procedure.     After the exam you will need to be on complete bed rest for 2 to 6 hours.  The nurse will monitor your vital signs, puncture site, pulses and temperature of the area that was punctured.      After you are discharged  do not drive until the next morning and an adult must be with you until then. You may resume your normal activities the day after the procedure.  Do not do any strenuous exercise or lifting for 48 hours after the procedure.      Preparation:   Laboratory tests will be obtained by your doctor the day of the exam (Basic Metabolic Panel, CBCP, PTT and INR).  Someone will need to drive you to and from the hospital.  Be sure to have someone who can stay with you through the night.   If you are allergic to x-ray contrast or iodine, contact your doctor or the nurse coordinator prior to the exam day for instructions.  If you are or may be pregnant contact your doctor or nurse coordinator prior to the day of the exam.  If you have diabetes, check with your doctor or the RN Care Coordinator to see if your insulin should be adjusted for the exam.  If you are taking a medication called Glucophage, Glucovance, or Metformin; these medications need to be held the day of the exam and for approximately 48 hours following the procedure.   If you are taking a GLP-1 agonist, please hold as instructed above.   If you are taking Coumadin (warfarin), Eliquis (apixaban), Xarelto (rivaroxaban), or any other blood thinners/anticoagulants, please contact the RN Care Coordinator at least 7 days before the exam for special instructions.  You should not have received barium (x-ray contrast) within 48 hours of this procedure.   Do not smoke for 24 hours prior to the procedure.  Do not eat for 8 hours prior to arrival time. You may drink clear liquids (water, ginger ale, etc) until 2 hours prior to arrival time.  You may brush your teeth and take your medications as directed with a sip of water.     Feel free to contact Yanira Murphy or Xiomara Son, our RN Care Coordinators, at [phone] with any questions or concerns. After business hours, call the  at [phone] and have the Neuro-Interventional Fellow paged.

## 2025-04-28 NOTE — TELEPHONE ENCOUNTER
Informed patient of procedure instructions. Confirmed date and time. Patient verbalized understanding. All questions answered. Patient instructions sent via [facility]. Patient has my contact information and was encouraged to call with questions/concerns.      Yanira Murphy RN 4/28/2025 1:01 PM

## 2025-05-03 ENCOUNTER — HEALTH MAINTENANCE LETTER (OUTPATIENT)
Age: 65
End: 2025-05-03

## 2025-05-05 ENCOUNTER — HOSPITAL ENCOUNTER (OUTPATIENT)
Facility: CLINIC | Age: 65
Discharge: HOME | End: 2025-05-05
Attending: RADIOLOGY | Admitting: RADIOLOGY
Payer: COMMERCIAL

## 2025-05-05 ENCOUNTER — APPOINTMENT (OUTPATIENT)
Dept: MEDSURG UNIT | Facility: CLINIC | Age: 65
End: 2025-05-05
Attending: RADIOLOGY
Payer: COMMERCIAL

## 2025-05-05 ENCOUNTER — APPOINTMENT (OUTPATIENT)
Dept: INTERVENTIONAL RADIOLOGY/VASCULAR | Facility: CLINIC | Age: 65
End: 2025-05-05
Attending: RADIOLOGY
Payer: COMMERCIAL

## 2025-05-05 VITALS
SYSTOLIC BLOOD PRESSURE: 106 MMHG | HEIGHT: 67 IN | OXYGEN SATURATION: 96 % | DIASTOLIC BLOOD PRESSURE: 87 MMHG | TEMPERATURE: 98.2 F | RESPIRATION RATE: 16 BRPM | BODY MASS INDEX: 41.53 KG/M2 | HEART RATE: 60 BPM | WEIGHT: 264.6 LBS

## 2025-05-05 DIAGNOSIS — I62.9 INTRACRANIAL HEMORRHAGE (H): ICD-10-CM

## 2025-05-05 LAB
ANION GAP SERPL CALCULATED.3IONS-SCNC: 11 MMOL/L (ref 7–15)
APTT PPP: 28 SECONDS (ref 22–38)
BUN SERPL-MCNC: 10.1 MG/DL (ref 8–23)
CALCIUM SERPL-MCNC: 9.2 MG/DL (ref 8.8–10.4)
CHLORIDE SERPL-SCNC: 104 MMOL/L (ref 98–107)
CREAT SERPL-MCNC: 0.39 MG/DL (ref 0.51–0.95)
EGFRCR SERPLBLD CKD-EPI 2021: >90 ML/MIN/1.73M2
ERYTHROCYTE [DISTWIDTH] IN BLOOD BY AUTOMATED COUNT: 13.5 % (ref 10–15)
GLUCOSE BLDC GLUCOMTR-MCNC: 171 MG/DL (ref 70–99)
GLUCOSE SERPL-MCNC: 199 MG/DL (ref 70–99)
HCO3 SERPL-SCNC: 23 MMOL/L (ref 22–29)
HCT VFR BLD AUTO: 38.8 % (ref 35–47)
HGB BLD-MCNC: 12.9 G/DL (ref 11.7–15.7)
INR PPP: 0.99 (ref 0.85–1.15)
MCH RBC QN AUTO: 29.6 PG (ref 26.5–33)
MCHC RBC AUTO-ENTMCNC: 33.2 G/DL (ref 31.5–36.5)
MCV RBC AUTO: 89 FL (ref 78–100)
PLATELET # BLD AUTO: 266 10E3/UL (ref 150–450)
POTASSIUM SERPL-SCNC: 4.2 MMOL/L (ref 3.4–5.3)
PROTHROMBIN TIME: 13.1 SECONDS (ref 11.8–14.8)
RBC # BLD AUTO: 4.36 10E6/UL (ref 3.8–5.2)
SODIUM SERPL-SCNC: 138 MMOL/L (ref 135–145)
WBC # BLD AUTO: 4.7 10E3/UL (ref 4–11)

## 2025-05-05 PROCEDURE — 76937 US GUIDE VASCULAR ACCESS: CPT

## 2025-05-05 PROCEDURE — 36226 PLACE CATH VERTEBRAL ART: CPT | Mod: LT

## 2025-05-05 PROCEDURE — 99153 MOD SED SAME PHYS/QHP EA: CPT

## 2025-05-05 PROCEDURE — 36415 COLL VENOUS BLD VENIPUNCTURE: CPT | Performed by: STUDENT IN AN ORGANIZED HEALTH CARE EDUCATION/TRAINING PROGRAM

## 2025-05-05 PROCEDURE — 85730 THROMBOPLASTIN TIME PARTIAL: CPT | Performed by: STUDENT IN AN ORGANIZED HEALTH CARE EDUCATION/TRAINING PROGRAM

## 2025-05-05 PROCEDURE — 255N000002 HC RX 255 OP 636: Performed by: RADIOLOGY

## 2025-05-05 PROCEDURE — 85014 HEMATOCRIT: CPT | Performed by: STUDENT IN AN ORGANIZED HEALTH CARE EDUCATION/TRAINING PROGRAM

## 2025-05-05 PROCEDURE — 272N000566 HC SHEATH CR3

## 2025-05-05 PROCEDURE — C1769 GUIDE WIRE: HCPCS

## 2025-05-05 PROCEDURE — 36226 PLACE CATH VERTEBRAL ART: CPT | Mod: LT | Performed by: RADIOLOGY

## 2025-05-05 PROCEDURE — 36224 PLACE CATH CAROTD ART: CPT | Mod: LT | Performed by: RADIOLOGY

## 2025-05-05 PROCEDURE — 999N000133 HC STATISTIC PP CARE STAGE 2

## 2025-05-05 PROCEDURE — 99152 MOD SED SAME PHYS/QHP 5/>YRS: CPT

## 2025-05-05 PROCEDURE — 99152 MOD SED SAME PHYS/QHP 5/>YRS: CPT | Mod: GC | Performed by: RADIOLOGY

## 2025-05-05 PROCEDURE — 76937 US GUIDE VASCULAR ACCESS: CPT | Mod: 26 | Performed by: RADIOLOGY

## 2025-05-05 PROCEDURE — 258N000003 HC RX IP 258 OP 636: Performed by: STUDENT IN AN ORGANIZED HEALTH CARE EDUCATION/TRAINING PROGRAM

## 2025-05-05 PROCEDURE — 272N000506 HC NEEDLE CR6

## 2025-05-05 PROCEDURE — 36223 PLACE CATH CAROTID/INOM ART: CPT | Mod: RT

## 2025-05-05 PROCEDURE — 999N000142 HC STATISTIC PROCEDURE PREP ONLY

## 2025-05-05 PROCEDURE — 36223 PLACE CATH CAROTID/INOM ART: CPT | Mod: XS | Performed by: RADIOLOGY

## 2025-05-05 PROCEDURE — 82962 GLUCOSE BLOOD TEST: CPT

## 2025-05-05 PROCEDURE — 85610 PROTHROMBIN TIME: CPT | Performed by: STUDENT IN AN ORGANIZED HEALTH CARE EDUCATION/TRAINING PROGRAM

## 2025-05-05 PROCEDURE — C1887 CATHETER, GUIDING: HCPCS

## 2025-05-05 PROCEDURE — 80048 BASIC METABOLIC PNL TOTAL CA: CPT | Performed by: STUDENT IN AN ORGANIZED HEALTH CARE EDUCATION/TRAINING PROGRAM

## 2025-05-05 PROCEDURE — 36224 PLACE CATH CAROTD ART: CPT

## 2025-05-05 PROCEDURE — 250N000011 HC RX IP 250 OP 636: Performed by: STUDENT IN AN ORGANIZED HEALTH CARE EDUCATION/TRAINING PROGRAM

## 2025-05-05 PROCEDURE — 250N000009 HC RX 250: Performed by: STUDENT IN AN ORGANIZED HEALTH CARE EDUCATION/TRAINING PROGRAM

## 2025-05-05 RX ORDER — NALOXONE HYDROCHLORIDE 0.4 MG/ML
0.2 INJECTION, SOLUTION INTRAMUSCULAR; INTRAVENOUS; SUBCUTANEOUS
Status: DISCONTINUED | OUTPATIENT
Start: 2025-05-05 | End: 2025-05-06 | Stop reason: HOSPADM

## 2025-05-05 RX ORDER — LIDOCAINE 40 MG/G
CREAM TOPICAL
Status: DISCONTINUED | OUTPATIENT
Start: 2025-05-05 | End: 2025-05-06 | Stop reason: HOSPADM

## 2025-05-05 RX ORDER — HEPARIN SODIUM 200 [USP'U]/100ML
1 INJECTION, SOLUTION INTRAVENOUS EVERY 5 MIN PRN
Status: DISCONTINUED | OUTPATIENT
Start: 2025-05-05 | End: 2025-05-06 | Stop reason: HOSPADM

## 2025-05-05 RX ORDER — ACETAMINOPHEN 325 MG/1
325 TABLET ORAL EVERY 4 HOURS PRN
Status: DISCONTINUED | OUTPATIENT
Start: 2025-05-05 | End: 2025-05-06 | Stop reason: HOSPADM

## 2025-05-05 RX ORDER — ONDANSETRON 4 MG/1
4 TABLET, ORALLY DISINTEGRATING ORAL EVERY 6 HOURS PRN
Status: DISCONTINUED | OUTPATIENT
Start: 2025-05-05 | End: 2025-05-06 | Stop reason: HOSPADM

## 2025-05-05 RX ORDER — FENTANYL CITRATE 50 UG/ML
25-50 INJECTION, SOLUTION INTRAMUSCULAR; INTRAVENOUS EVERY 5 MIN PRN
Status: DISCONTINUED | OUTPATIENT
Start: 2025-05-05 | End: 2025-05-06 | Stop reason: HOSPADM

## 2025-05-05 RX ORDER — SODIUM CHLORIDE 9 MG/ML
INJECTION, SOLUTION INTRAVENOUS CONTINUOUS
Status: DISCONTINUED | OUTPATIENT
Start: 2025-05-05 | End: 2025-05-06 | Stop reason: HOSPADM

## 2025-05-05 RX ORDER — FLUMAZENIL 0.1 MG/ML
0.2 INJECTION, SOLUTION INTRAVENOUS
Status: DISCONTINUED | OUTPATIENT
Start: 2025-05-05 | End: 2025-05-06 | Stop reason: HOSPADM

## 2025-05-05 RX ORDER — NALOXONE HYDROCHLORIDE 0.4 MG/ML
0.4 INJECTION, SOLUTION INTRAMUSCULAR; INTRAVENOUS; SUBCUTANEOUS
Status: DISCONTINUED | OUTPATIENT
Start: 2025-05-05 | End: 2025-05-06 | Stop reason: HOSPADM

## 2025-05-05 RX ORDER — ONDANSETRON 2 MG/ML
4 INJECTION INTRAMUSCULAR; INTRAVENOUS EVERY 6 HOURS PRN
Status: DISCONTINUED | OUTPATIENT
Start: 2025-05-05 | End: 2025-05-06 | Stop reason: HOSPADM

## 2025-05-05 RX ORDER — IODIXANOL 320 MG/ML
50 INJECTION, SOLUTION INTRAVASCULAR ONCE
Status: COMPLETED | OUTPATIENT
Start: 2025-05-05 | End: 2025-05-05

## 2025-05-05 RX ORDER — PROCHLORPERAZINE MALEATE 10 MG
10 TABLET ORAL EVERY 6 HOURS PRN
Status: DISCONTINUED | OUTPATIENT
Start: 2025-05-05 | End: 2025-05-06 | Stop reason: HOSPADM

## 2025-05-05 RX ADMIN — MIDAZOLAM 0.5 MG: 1 INJECTION INTRAMUSCULAR; INTRAVENOUS at 09:43

## 2025-05-05 RX ADMIN — MIDAZOLAM 1 MG: 1 INJECTION INTRAMUSCULAR; INTRAVENOUS at 09:24

## 2025-05-05 RX ADMIN — HEPARIN SODIUM IN SODIUM CHLORIDE 1 BAG: 200 INJECTION INTRAVENOUS at 09:15

## 2025-05-05 RX ADMIN — MIDAZOLAM 0.5 MG: 1 INJECTION INTRAMUSCULAR; INTRAVENOUS at 09:27

## 2025-05-05 RX ADMIN — LIDOCAINE HYDROCHLORIDE 30 ML: 10 INJECTION, SOLUTION EPIDURAL; INFILTRATION; INTRACAUDAL; PERINEURAL at 09:14

## 2025-05-05 RX ADMIN — FENTANYL CITRATE 25 MCG: 50 INJECTION, SOLUTION INTRAMUSCULAR; INTRAVENOUS at 09:27

## 2025-05-05 RX ADMIN — FENTANYL CITRATE 25 MCG: 50 INJECTION, SOLUTION INTRAMUSCULAR; INTRAVENOUS at 09:43

## 2025-05-05 RX ADMIN — FENTANYL CITRATE 50 MCG: 50 INJECTION, SOLUTION INTRAMUSCULAR; INTRAVENOUS at 09:24

## 2025-05-05 RX ADMIN — IODIXANOL 45 ML: 320 INJECTION, SOLUTION INTRAVASCULAR at 09:54

## 2025-05-05 RX ADMIN — SODIUM CHLORIDE: 0.9 INJECTION, SOLUTION INTRAVENOUS at 07:39

## 2025-05-05 RX ADMIN — MIDAZOLAM 0.5 MG: 1 INJECTION INTRAMUSCULAR; INTRAVENOUS at 09:16

## 2025-05-05 RX ADMIN — FENTANYL CITRATE 25 MCG: 50 INJECTION, SOLUTION INTRAMUSCULAR; INTRAVENOUS at 09:16

## 2025-05-05 ASSESSMENT — ACTIVITIES OF DAILY LIVING (ADL)
ADLS_ACUITY_SCORE: 57

## 2025-05-05 NOTE — DISCHARGE INSTRUCTIONS
[facility]         Interventional Radiology  Discharge Instructions Post Angiogram (NEURO)    AFTER YOU GO HOME  If you were given sedation, for the first 24 hours: we recommend that an adult stay with you, DO NOT drive or operate machinery at home or at work, DO NOT smoke, DO NOT make any important or legal decisions.  DO NOT drink alcoholic beverages the day of your procedure  DO relax and take it easy for 24 hours  DO drink plenty of fluids  DO resume your regular diet, unless otherwise instructed by your Primary Physician  DO NOT take a shower for at least 12 hours following your procedure. No tub bath, hot tubs, or swimming for 5 days. Do NOT scrub the procedure site vigorously for 5 days.  If you are taking a medication called Glucophage, Glucovance, or Metformin; these medications need to be held for approximately 48 hours following the procedure.       Care of groin site  It is normal to have a small bruise or lump at the site.  For the first 2 days, when you cough, sneeze or move your bowels, hold your hand over the puncture site and press gently.  Do NOT lift more than 10 pounds or do any strenuous exercise for at least 3 to 5 days.  Do not use lotion or powder near the puncture site for 3 days.  If you start bleeding from the site in your groin: lie down flat and press firmly on the site. Call your doctor as soon as you can.   Call 911 right away if you have: Heavy bleeding, bleeding that does not stop.      CALL THE PHYSICIAN IF:  You start bleeding from the procedure site.  You have numbness, coolness or change in color of the arm or leg of the puncture site  You experience changes in your vision, hearing, balance, coordination, speech, thinking or memory  You experience weakness in one or more extremity  You experience pain or redness at the puncture site  You develop nausea or vomiting  You develop hives or a rash or unexplained itching  You develop a temperature of 101 degrees F  or greater      Stroke - Call 911    Remember: BE FAST       BALANCE--Sudden loss of balance or coordination. Example: trouble walking     EYES--Sudden problem seeing out of one or both eyes     FACE--Sudden numbness or change to one side of the face. Examples: facial droop, uneven smile     ARM--Sudden numbness or weakness in one arm or leg     SPEECH--Sudden changes in speech or talking that doesn t make sense     TIME--if the person is having any of the symptoms above, CALL 911 immediately.      Symptoms may go away, then come back.     Sudden, worst headache of your life      ADDITIONAL INSTRUCTIONS  Instruction booklet given: Angio-seal    Procedure Physician: Dr. Denis Hurley and Dr. Asuncion Uribe   Date of procedure: 5/5/2025    If you have questions or concerns, call the RN Care Coordinators: Yanira Murphy or Xiomara Son at [phone], option 1, Monday-Friday 8:00 am to 4:30 pm. After business hours, call the  at [phone], option #4 for , and have the Neuro-Interventional Fellow paged. Someone is on call 24 hrs/day    [facility] toll free number: [phone] Monday-Friday 8:00 am to 4:30 pm  [facility] Emergency Dept: [phone]

## 2025-05-05 NOTE — PROGRESS NOTES
Patient arrives to 2A in room four. Patient arrives with . Patient's VSS, AOx4, . Prep completed including placement of PIV, skin prep, labs are complete. No intervention needed on labs. Will continue to assess pt until taken for procedure.
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
## 2025-05-05 NOTE — PROGRESS NOTES
Patient tolerated recovery stage well. VSS, right groin site clean/dry/intact, no hematoma, neuro checks at baseline, and patient denies pain. Patient tolerated PO food and fluids. Teaching was done and discharge instructions were given. Patient ambulated, voided, and PIV was removed. Patient discharged from the hospital via wheelchair to home with .

## 2025-05-05 NOTE — PROCEDURES
[facility]     Endovascular Surgical Neuroradiology Post-Procedure Note    Pre-Procedure Diagnosis: Right occipital hemorrhage  Post-Procedure Diagnosis: as above    Procedure:   Diagnostic cervicocerebral angiography    Reason for delay:  Not applicable    Findings: No intracranial aneurysm, arteriovenous malformation or fistula seen. Normal diagnostic cerebral angiogram. Right internal carotid artery cervical segment atherosclerosis with less than 50% stenosis per NASCET criteria.     Plan:    Bedrest 2 hour    Follow-up imaging: NA  Blood pressure goal: 100-160  Antithrombotic plan: NA  Neuro-check frequency: Q1h    Primary Surgeon: Dr. Denis Hurley  Secondary Surgeon: Not applicable  Secondary Surgeon Review: None  Fellow: Asuncion Uribe MD  Additional Assistants: none    Prior to the start of the procedure and with procedural staff participation, I verbally confirmed: the patient s identity using two indicators, relevant allergies, that the procedure was appropriate and matched the consent or emergent situation, and that the correct equipment/implants were available. Immediately prior to starting the procedure I conducted the Time Out with the procedural staff and re-confirmed the patient s name, procedure, and site/side. (The Joint Commission universal protocol was followed.)  Yes    PRU value: Not applicable    Anesthesia: Conscious Sedation  Medications: Lidocaine 10mL  Puncture site: Right Femoral Artery    Fluoroscopy time (minutes): 8.6  Radiation dose (mGy):  537.3     Contrast amount (mL):  45      Estimated blood loss (mL): 25    Closure: 6 F Angio-seal    Bedrest start time 0957 and 2 hours bedrest completed at 1157    Disposition: Home after recovery.        Sedation Post-Procedure Summary    The patient was reevaluated immediately before administering moderate or deep sedation or anesthesia.    Sedatives: Fentanyl 125mcg IV and Midazolam (Versed)  2.5mg IV    Vital signs and pulse oximetry were monitored and remained stable throughout the procedure, and sedation was maintained until the procedure was complete.  The patient was monitored by staff until sedation discharge criteria were met.    Patient tolerance: Patient tolerated the procedure well with no immediate complications.    Time of sedation in minutes: 25 minutes from beginning to end of physician one to one monitoring.    Asuncion Uribe MD    Use [employer] to contact: Neuro IR Fellow ([place])  Fellow's personal cell phone number provided to bedside RN.

## 2025-05-05 NOTE — PROGRESS NOTES
Patient returned to 2A s/p cerebral angiogram. VSS on RA and patient at baseline neuro. R groin site C/D/I without hematoma and patient denies pain. Will continue to monitor until discharge criteria is met.

## 2025-05-05 NOTE — IR NOTE
Patient Name: Azucena Guo  Medical Record Number: [id number]  Today's Date: 5/5/2025    Procedure: Dx Cerebral Angiogram  Proceduralist: MD Jojo Almodovar MD    Procedure Start: 0925  Procedure end: 0950    Sedation medications administered: 2.5 mg Versed and 125 mcg Fentanyl     5 fr sheath removed from RFA. Closed w/ Angioseal. 2 hour flat bedrest from [id number]    Report given to: ANNE RN    Other Notes: Pt arrived to IR room 3 from . Consent reviewed. Pt denies any questions or concerns regarding procedure. Pt positioned supine and monitored per protocol. Pt tolerated procedure without any noted complications. Pt transferred back to .

## 2025-05-06 ENCOUNTER — PATIENT OUTREACH (OUTPATIENT)
Dept: NEUROSURGERY | Facility: CLINIC | Age: 65
End: 2025-05-06
Payer: COMMERCIAL

## 2025-05-06 NOTE — PROGRESS NOTES
Endovascular Surgical Neuroradiology - Post Discharge Call    Admit: 5/5/25    Discharge: 5/5/25    Facility: [facility]    MD/Service: Dr. Hurley/VENKAT    Procedure Diagnosis: Right occipital hemorrhage     Procedure:   Diagnostic cervicocerebral angiography     Findings: No intracranial aneurysm, arteriovenous malformation or fistula seen. Normal diagnostic cerebral angiogram. Right internal carotid artery cervical segment atherosclerosis with less than 50% stenosis per NASCET criteria.      Plan:    [] Resume taking Metformin on 5/8/25.   [] Follow-up with vascular neuro     Spoke with patient. States she is doing well. Denies bleeding, drainage, pain, swelling, warmth, redness at puncture site. Eating and drinking ok.     Patient aware of the plan and in agreement. Patient has my contact information and was encouraged to call with questions/concerns.     Yanira Murphy RN 5/6/2025 11:29 AM

## 2025-05-06 NOTE — PROGRESS NOTES
"       Neurosurgery Clinic Note     Chief Complaint: hospital follow-up      DATE OF VISIT: 5/8/2025      HPI: Azucena Guo is a(n) 64 year old female with PMH of hypertension, hyperlipidemia, type 2 diabetes  who presented on 3/17/2025 with acute onset headache and left hemianopia. CT showed a right occipital-temporal IPH with subdural hematoma along the right parietooccipital convexity, along the cerebellar convexities, and the posterior falx and right tentorium that evolved with expected stability on subsequent scans.  No tumors identified on MRI, CTA no clear AVM, questionable vessel abnormality on the right parietal cortical area,this may represent just superimposed vessels vs vessel anomaly.   Diagnostic angiogram was negative for vessel malformation.   Patient was hypertensive upon admission, however blood pressure normalized throughout her course.  Given her history of intracranial hemorrhage, she presents today for follow-up.       Currently, states continues to left temporal headaches that tend to be worse when she stands.  She states she does not always experience a headache when standing and cannot predict when they will occur.  She states the headaches only last about 3 minutes, she has not required medication when this occurs.  She states she has been checking her BP when she experiences the headaches and her BP has not been elevated.  Is following with her PCP for BP management and recently had her lisinopril increased.   States her left sided hemianopsia has resolved, she states her peripheral vision is now intact.  Denies weakness, nausea, vomiting, dizziness, or confusion.  Gait has improved, states she does not feel as unsteady as she did while hospitalized.  States she has noted peripheral visual \"disturbances\" which occur daily, states she did not experience these episodes during her hospitalization.  States she remains alert during the episodes and remembers the event.  No history of seizures.  "  Had been taking ASA 81 mg prior to hospitalization and this has since been stopped.  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     Since our last visit, patient underwent cerebral angiogram on 2025 with Dr Hurley, no intracranial aneurysm, arteriovenous malformation or fistula seen. Normal diagnostic cerebral angiogram. Right internal carotid artery cervical segment atherosclerosis with less than 50% stenosis per NASCET criteria.        States headaches have resolved.  Left sided hemianopsia has resolved.  Has been monitoring her blood pressure and states it has been controlled.  States gait has improved, denies feeling unsteady on her feet.  Has returned to work in  and has been tolerating.          Review of Systems    Negative except in HPI     Past Medical History        Past Medical History:   Diagnosis Date    Arthritis      Benign essential hypertension 2019    Diabetes mellitus (H)      History of 2019 novel coronavirus disease (COVID-19) 2022     positive end of dec 2021 ( breakthrough covid, had a headache 1 day & fatigue 3 days)     Nail discoloration 2023            Past Surgical History         Past Surgical History:   Procedure Laterality Date    ABDOMEN SURGERY        lap cholecystomy    BIOPSY        CHOLECYSTECTOMY             COLONOSCOPY        ANW: sigmoid polyp removed    COLONOSCOPY        no report, abstracted information stated, normal recheck in 5 yr given hx of polyps    IR CAROTID CEREBRAL ANGIOGRAM BILATERAL   3/21/2025            Social History            Socioeconomic History    Marital status:    Tobacco Use    Smoking status: Former       Current packs/day: 0.00       Average packs/day: 1 pack/day for 15.0 years (15.0 ttl pk-yrs)       Types: Cigarettes       Start date: 1978       Quit date: 1991       Years since quittin.2    Smokeless tobacco: Never   Vaping Use    Vaping status: Never Used   Substance  and Sexual Activity    Alcohol use: Not Currently       Comment: occasionally    Drug use: No    Sexual activity: Not Currently       Partners: Male       Birth control/protection: Post-menopausal       Comment: post menopause - same partner for 40 years   Other Topics Concern    Parent/sibling w/ CABG, MI or angioplasty before 65F 55M? Yes       Comment: both parents, now    Social History Narrative     2024:  had a major tree injury in march, busy taking care of 3 kids under 2 in am and looking after  in evening. 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 3 grown kids close by            Dec 2023: lives with  & 2 cats. Got to visit [place]           3/2023: lives with  and two cats, daughter moved out to her own place with her dog since then. Does like a nanny share childcare. 3 children.            2021: lives with  and daughter & her dog.  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      Social Drivers of Health           Financial Resource Strain: Low Risk  (3/18/2025)     Financial Resource Strain      Within the past 12 months, have you or your family members you live with been unable to get utilities (heat, electricity) when it was really needed?: No   Food Insecurity: Low Risk  (3/18/2025)     Food Insecurity      Within the past 12 months, did you worry that your food would run out before you got money to buy more?: No      Within the past 12 months, did the food you bought just not last and you didn t have money to get more?: No   Transportation Needs: Low Risk  (3/18/2025)     Transportation Needs      Within the past 12 months, has lack of transportation kept you from medical appointments, getting your medicines, non-medical meetings or appointments, work, or from getting things that you need?: No   Physical Activity: Insufficiently Active (3/15/2024)      Exercise Vital Sign      Days of Exercise per Week: 3 days      Minutes of Exercise per Session: 30 min   Stress: No Stress Concern Present (3/15/2024)     [employer] - Occupational Stress Questionnaire      Feeling of Stress : Only a little   Social Connections: Unknown (3/15/2024)     Social Connection and Isolation Panel [NHANES]      Frequency of Social Gatherings with Friends and Family: Twice a week   Interpersonal Safety: Low Risk  (3/18/2025)     Interpersonal Safety      Do you feel physically and emotionally safe where you currently live?: Yes      Within the past 12 months, have you been hit, slapped, kicked or otherwise physically hurt by someone?: No      Within the past 12 months, have you been humiliated or emotionally abused in other ways by your partner or ex-partner?: No   Housing Stability: Low Risk  (3/18/2025)     Housing Stability      Do you have housing? : Yes      Are you worried about losing your housing?: No         family history includes Breast Cancer in an other family member; Cancer in her mother; Coronary Artery Disease in her father, mother, sister, and sister; Depression in her sister and sister; Diabetes in her brother, mother, sister, and sister; Heart Disease in her father and mother; Heart Failure in her sister; Hyperlipidemia in her father; Hypertension in her father; Obesity in her brother, mother, sister, sister, and sister; Substance Abuse in her son; Thyroid Cancer in her sister.                Physical Exam   LMP  (LMP Unknown)   Constitutional: Oriented to person, place, and time. Appears well-developed and well-nourished. Cooperative. No distress.   HENT: Head normocephalic and atraumatic.   Neurological: alert and oriented to person, place, and time. CN II-XII grossly  intact  Vision:  peripheral vision intact bilaterally, EOMs intact         STRENGTH LEFT RIGHT   Deltoid 5 5   Bicep 5 5   Wrist Extensor 5 5   Tricep 5 5   Finger flexion 5  5   Finger abduction 5 5    5 5         Hip Flexion       5       5   Knee Extension 5 5   Ankle Dorsiflexion 5 5   Extensor Hallucis Longus 5 5   Plantar Flexion 5 5   Foot eversion 5 5   Foot inversion 5 5        Skin: Skin is warm, dry and intact.   Psychiatric: Normal mood and affect. Speech is normal and behavior is normal.        IMAGING:  Personally reviewed head CT dated 5/8/2025   Expected evolution of right occipital lobe intraparenchymal hemorrhage with reduced density of hemorrhage compared to prior and developing volume loss in the right occipital lobe. No new acute intracranial finding.     ASSESSMENT:  Azucena Guo is a 64 year old female with PMH of hypertension, hyperlipidemia, type 2 diabetes  who presented on 3/17/2025 with evidence of right occipital-temporal IPH, felt to be secondary to hypertension     PLAN:  Do not do any heavy lifting (greater than 10 pounds). Avoid any activities that could result in trauma. Given visual symptoms, do not drive until cleared by Neurology.      If you should sustain new trauma or injury or note increased headache, weakness, speech or vision issues, confusion, or other neurologic changes please call 911 or present to your nearest emergency room for further evaluation.      Please continue to abstain from medications that may thin your blood.      Please follow-up with Ashwini Soto for Stroke follow-up in July 2025.     Follow-up with Ophthalmology on 5/23/2025     Continue to monitor blood pressure and follow-up with your PCP as recommended.     Will have patient return to Neurosurgery clinic in 4 weeks with repeat imaging.                      I spent 35 minutes in patient care with greater than 50% spent in counseling and/or coordination of care.     I performed independent visualization of radiographic imaging and entered my own interpretation, reviewed and/or ordered tests in radiology           JOSE MARIA Paul, CNP  Department of  Neurosurgery

## 2025-05-07 ENCOUNTER — RESULTS FOLLOW-UP (OUTPATIENT)
Dept: FAMILY MEDICINE | Facility: CLINIC | Age: 65
End: 2025-05-07

## 2025-05-08 ENCOUNTER — OFFICE VISIT (OUTPATIENT)
Dept: NEUROSURGERY | Facility: CLINIC | Age: 65
End: 2025-05-08
Payer: COMMERCIAL

## 2025-05-08 ENCOUNTER — ANCILLARY PROCEDURE (OUTPATIENT)
Dept: CT IMAGING | Facility: CLINIC | Age: 65
End: 2025-05-08
Attending: NURSE PRACTITIONER
Payer: COMMERCIAL

## 2025-05-08 VITALS
SYSTOLIC BLOOD PRESSURE: 138 MMHG | HEIGHT: 68 IN | WEIGHT: 272.3 LBS | BODY MASS INDEX: 41.27 KG/M2 | DIASTOLIC BLOOD PRESSURE: 75 MMHG | HEART RATE: 73 BPM | OXYGEN SATURATION: 94 %

## 2025-05-08 DIAGNOSIS — I62.9 INTRACRANIAL HEMORRHAGE (H): ICD-10-CM

## 2025-05-08 DIAGNOSIS — I62.9 INTRACRANIAL HEMORRHAGE (H): Primary | ICD-10-CM

## 2025-05-08 PROCEDURE — 70450 CT HEAD/BRAIN W/O DYE: CPT | Mod: GC | Performed by: STUDENT IN AN ORGANIZED HEALTH CARE EDUCATION/TRAINING PROGRAM

## 2025-05-08 ASSESSMENT — PAIN SCALES - GENERAL: PAINLEVEL_OUTOF10: NO PAIN (0)

## 2025-05-08 NOTE — PATIENT INSTRUCTIONS
Do not do any heavy lifting (greater than 10 pounds). Avoid any activities that could result in trauma. Given visual symptoms, do not drive until cleared by Neurology.      If you should sustain new trauma or injury or note increased headache, weakness, speech or vision issues, confusion, or other neurologic changes please call 911 or present to your nearest emergency room for further evaluation.      Please continue to abstain from medications that may thin your blood.      Please follow-up with Ashwini Soto for Stroke follow-up in July 2025.     Follow-up with Ophthalmology on 5/23/2025     Continue to monitor blood pressure and follow-up with your PCP as recommended.     Will have patient return to Neurosurgery clinic in 4 weeks with repeat imaging.

## 2025-05-08 NOTE — LETTER
5/8/2025       RE: Azucena Guo  [address]  [facility] [address]     Dear Colleague,    Thank you for referring your patient, Azucena Guo, to the [facility] [place] at [facility]. Please see a copy of my visit note below.           Neurosurgery Clinic Note     Chief Complaint: hospital follow-up      DATE OF VISIT: 5/8/2025      HPI: Azucena Guo is a(n) 64 year old female with PMH of hypertension, hyperlipidemia, type 2 diabetes  who presented on 3/17/2025 with acute onset headache and left hemianopia. CT showed a right occipital-temporal IPH with subdural hematoma along the right parietooccipital convexity, along the cerebellar convexities, and the posterior falx and right tentorium that evolved with expected stability on subsequent scans.  No tumors identified on MRI, CTA no clear AVM, questionable vessel abnormality on the right parietal cortical area,this may represent just superimposed vessels vs vessel anomaly.   Diagnostic angiogram was negative for vessel malformation.   Patient was hypertensive upon admission, however blood pressure normalized throughout her course.  Given her history of intracranial hemorrhage, she presents today for follow-up.       Currently, states continues to left temporal headaches that tend to be worse when she stands.  She states she does not always experience a headache when standing and cannot predict when they will occur.  She states the headaches only last about 3 minutes, she has not required medication when this occurs.  She states she has been checking her BP when she experiences the headaches and her BP has not been elevated.  Is following with her PCP for BP management and recently had her lisinopril increased.   States her left sided hemianopsia has resolved, she states her peripheral vision is now intact.  Denies weakness, nausea, vomiting, dizziness, or confusion.  Gait has  "improved, states she does not feel as unsteady as she did while hospitalized.  States she has noted peripheral visual \"disturbances\" which occur daily, states she did not experience these episodes during her hospitalization.  States she remains alert during the episodes and remembers the event.  No history of seizures.   Had been taking ASA 81 mg prior to hospitalization and this has since been stopped.  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     Since our last visit, patient underwent cerebral angiogram on 5/5/2025 with Dr Hurley, no intracranial aneurysm, arteriovenous malformation or fistula seen. Normal diagnostic cerebral angiogram. Right internal carotid artery cervical segment atherosclerosis with less than 50% stenosis per NASCET criteria.        States headaches have resolved.  Left sided hemianopsia has resolved.  Has been monitoring her blood pressure and states it has been controlled.  States gait has improved, denies feeling unsteady on her feet.  Has returned to work in  and has been tolerating.          Review of Systems    Negative except in HPI     Past Medical History        Past Medical History:   Diagnosis Date     Arthritis       Benign essential hypertension 12/06/2019     Diabetes mellitus (H)       History of 2019 novel coronavirus disease (COVID-19) 03/14/2022     positive end of dec 2021 ( breakthrough covid, had a headache 1 day & fatigue 3 days)      Nail discoloration 12/11/2023            Past Surgical History         Past Surgical History:   Procedure Laterality Date     ABDOMEN SURGERY   1994     lap cholecystomy     BIOPSY         CHOLECYSTECTOMY         1994     COLONOSCOPY   2011     ANW: sigmoid polyp removed     COLONOSCOPY   2016     no report, abstracted information stated, normal recheck in 5 yr given hx of polyps     IR CAROTID CEREBRAL ANGIOGRAM BILATERAL   3/21/2025            Social History            Socioeconomic History     Marital status: "    Tobacco Use     Smoking status: Former       Current packs/day: 0.00       Average packs/day: 1 pack/day for 15.0 years (15.0 ttl pk-yrs)       Types: Cigarettes       Start date: 1978       Quit date: 1991       Years since quittin.2     Smokeless tobacco: Never   Vaping Use     Vaping status: Never Used   Substance and Sexual Activity     Alcohol use: Not Currently       Comment: occasionally     Drug use: No     Sexual activity: Not Currently       Partners: Male       Birth control/protection: Post-menopausal       Comment: post menopause - same partner for 40 years   Other Topics Concern     Parent/sibling w/ CABG, MI or angioplasty before 65F 55M? Yes       Comment: both parents, now    Social History Narrative     2024:  had a major tree injury in march, busy taking care of 3 kids under 2 in am and looking after  in evening. 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 3 grown kids close by            Dec 2023: lives with  & 2 cats. Got to visit [place]           3/2023: lives with  and two cats, daughter moved out to her own place with her dog since then. Does like a nanny share childcare. 3 children.            2021: lives with  and daughter & her dog.  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      Social Drivers of Health           Financial Resource Strain: Low Risk  (3/18/2025)     Financial Resource Strain       Within the past 12 months, have you or your family members you live with been unable to get utilities (heat, electricity) when it was really needed?: No   Food Insecurity: Low Risk  (3/18/2025)     Food Insecurity       Within the past 12 months, did you worry that your food would run out before you got money to buy more?: No       Within the past 12 months, did the food you bought just not last and you didn t have money to get  more?: No   Transportation Needs: Low Risk  (3/18/2025)     Transportation Needs       Within the past 12 months, has lack of transportation kept you from medical appointments, getting your medicines, non-medical meetings or appointments, work, or from getting things that you need?: No   Physical Activity: Insufficiently Active (3/15/2024)     Exercise Vital Sign       Days of Exercise per Week: 3 days       Minutes of Exercise per Session: 30 min   Stress: No Stress Concern Present (3/15/2024)     [employer] - Occupational Stress Questionnaire       Feeling of Stress : Only a little   Social Connections: Unknown (3/15/2024)     Social Connection and Isolation Panel [NHANES]       Frequency of Social Gatherings with Friends and Family: Twice a week   Interpersonal Safety: Low Risk  (3/18/2025)     Interpersonal Safety       Do you feel physically and emotionally safe where you currently live?: Yes       Within the past 12 months, have you been hit, slapped, kicked or otherwise physically hurt by someone?: No       Within the past 12 months, have you been humiliated or emotionally abused in other ways by your partner or ex-partner?: No   Housing Stability: Low Risk  (3/18/2025)     Housing Stability       Do you have housing? : Yes       Are you worried about losing your housing?: No         family history includes Breast Cancer in an other family member; Cancer in her mother; Coronary Artery Disease in her father, mother, sister, and sister; Depression in her sister and sister; Diabetes in her brother, mother, sister, and sister; Heart Disease in her father and mother; Heart Failure in her sister; Hyperlipidemia in her father; Hypertension in her father; Obesity in her brother, mother, sister, sister, and sister; Substance Abuse in her son; Thyroid Cancer in her sister.                Physical Exam   LMP  (LMP Unknown)   Constitutional: Oriented to person, place, and time. Appears  well-developed and well-nourished. Cooperative. No distress.   HENT: Head normocephalic and atraumatic.   Neurological: alert and oriented to person, place, and time. CN II-XII grossly  intact  Vision:  peripheral vision intact bilaterally, EOMs intact         STRENGTH LEFT RIGHT   Deltoid 5 5   Bicep 5 5   Wrist Extensor 5 5   Tricep 5 5   Finger flexion 5 5   Finger abduction 5 5    5 5         Hip Flexion       5       5   Knee Extension 5 5   Ankle Dorsiflexion 5 5   Extensor Hallucis Longus 5 5   Plantar Flexion 5 5   Foot eversion 5 5   Foot inversion 5 5        Skin: Skin is warm, dry and intact.   Psychiatric: Normal mood and affect. Speech is normal and behavior is normal.        IMAGING:  Personally reviewed head CT dated 5/8/2025   Expected evolution of right occipital lobe intraparenchymal hemorrhage with reduced density of hemorrhage compared to prior and developing volume loss in the right occipital lobe. No new acute intracranial finding.     ASSESSMENT:  Azucena Guo is a 64 year old female with PMH of hypertension, hyperlipidemia, type 2 diabetes  who presented on 3/17/2025 with evidence of right occipital-temporal IPH, felt to be secondary to hypertension     PLAN:  Do not do any heavy lifting (greater than 10 pounds). Avoid any activities that could result in trauma. Given visual symptoms, do not drive until cleared by Neurology.      If you should sustain new trauma or injury or note increased headache, weakness, speech or vision issues, confusion, or other neurologic changes please call 911 or present to your nearest emergency room for further evaluation.      Please continue to abstain from medications that may thin your blood.      Please follow-up with Ashwini Soto for Stroke follow-up in July 2025.     Follow-up with Ophthalmology on 5/23/2025     Continue to monitor blood pressure and follow-up with your PCP as recommended.     Will have patient return to Neurosurgery clinic in 4  weeks with repeat imaging.                      I spent 35 minutes in patient care with greater than 50% spent in counseling and/or coordination of care.     I performed independent visualization of radiographic imaging and entered my own interpretation, reviewed and/or ordered tests in radiology           JOSE MARIA Paul, CNP  Department of Neurosurgery    Again, thank you for allowing me to participate in the care of your patient.      Sincerely,    JOSE MARIA Bella CNP

## 2025-05-08 NOTE — NURSING NOTE
Chief Complaint   Patient presents with    RECHECK     Neurosurgery      Vitals were taken and medications were reconciled.    Nazario Finch, EMT  9:01 AM

## 2025-05-09 ENCOUNTER — THERAPY VISIT (OUTPATIENT)
Dept: OCCUPATIONAL THERAPY | Facility: CLINIC | Age: 65
End: 2025-05-09
Payer: COMMERCIAL

## 2025-05-09 DIAGNOSIS — I62.9 INTRACRANIAL HEMORRHAGE (H): Primary | ICD-10-CM

## 2025-05-09 DIAGNOSIS — H53.9 VISION CHANGES: ICD-10-CM

## 2025-05-09 PROCEDURE — 97537 COMMUNITY/WORK REINTEGRATION: CPT | Mod: GO

## 2025-05-23 ENCOUNTER — OFFICE VISIT (OUTPATIENT)
Dept: OPHTHALMOLOGY | Facility: CLINIC | Age: 65
End: 2025-05-23
Attending: OPHTHALMOLOGY
Payer: COMMERCIAL

## 2025-05-23 DIAGNOSIS — H53.462 LEFT HOMONYMOUS HEMIANOPSIA DUE TO RECENT CEREBRAL INFARCTION: ICD-10-CM

## 2025-05-23 DIAGNOSIS — H53.40 VISUAL FIELD DEFECT: Primary | ICD-10-CM

## 2025-05-23 DIAGNOSIS — I69.398 LEFT HOMONYMOUS HEMIANOPSIA DUE TO RECENT CEREBRAL INFARCTION: ICD-10-CM

## 2025-05-23 DIAGNOSIS — H25.813 COMBINED FORMS OF AGE-RELATED CATARACT OF BOTH EYES: ICD-10-CM

## 2025-05-23 DIAGNOSIS — I62.9 INTRACRANIAL HEMORRHAGE (H): ICD-10-CM

## 2025-05-23 PROCEDURE — 92133 CPTRZD OPH DX IMG PST SGM ON: CPT | Performed by: OPHTHALMOLOGY

## 2025-05-23 PROCEDURE — 99204 OFFICE O/P NEW MOD 45 MIN: CPT | Mod: GC | Performed by: OPHTHALMOLOGY

## 2025-05-23 PROCEDURE — 99214 OFFICE O/P EST MOD 30 MIN: CPT | Performed by: OPHTHALMOLOGY

## 2025-05-23 PROCEDURE — 92083 EXTENDED VISUAL FIELD XM: CPT | Performed by: OPHTHALMOLOGY

## 2025-05-23 ASSESSMENT — REFRACTION_WEARINGRX
OD_SPHERE: +0.75
OS_SPHERE: +0.25
OD_CYLINDER: +2.75
OS_CYLINDER: +2.50
SPECS_TYPE: PAL
OD_ADD: +2.50
OS_ADD: +2.50
OD_AXIS: 167
OS_AXIS: 002

## 2025-05-23 ASSESSMENT — SLIT LAMP EXAM - LIDS: COMMENTS: DERMATOCHALASIS

## 2025-05-23 ASSESSMENT — TONOMETRY
IOP_METHOD: ICARE
OD_IOP_MMHG: 18
OS_IOP_MMHG: 16

## 2025-05-23 ASSESSMENT — VISUAL ACUITY
OS_CC+: -2
CORRECTION_TYPE: GLASSES
OD_PH_CC+: -2
OD_CC: 20/40
METHOD: SNELLEN - LINEAR
OD_PH_CC: 20/30
OS_CC: 20/30
OS_PH_CC: 20/30
OS_PH_CC+: +3

## 2025-05-23 ASSESSMENT — CONF VISUAL FIELD
OD_SUPERIOR_NASAL_RESTRICTION: 3
OS_SUPERIOR_TEMPORAL_RESTRICTION: 3
OD_INFERIOR_NASAL_RESTRICTION: 1
METHOD: COUNTING FINGERS
OS_INFERIOR_TEMPORAL_RESTRICTION: 1

## 2025-05-23 ASSESSMENT — CUP TO DISC RATIO
OD_RATIO: 0.3
OS_RATIO: 0.3

## 2025-05-23 NOTE — PROGRESS NOTES
"   1.  Right intraparenchymal right occipital lobe hemorrhagic stroke -admitted from March 17 - 22 in [facility]     Patient has corresponding incomplete left homonymous hemianopia that had been subjectively improving. Objective visual fields today corroborate an incomplete left homonymous hemianopia.     2.  Left homonymous hemianopia due to 1    3.  Skin tag -right upper lid- patient interested in excision. May consider referral to oculoplastics in the future    4.  Combined age-related cataracts in both eyes - appears visually significant to me and patient complaining of \"foggy vision\" that I don't believe is stroke related. Referral to Dr. Vargas in 6 months as patient just had central nervous system stroke.    5. Diabetes type 2 - no significant background diabetic retinopathy today in either eye.    This patient is a 64-year-old female with a history of diabetes type 2 (recent hemoglobin A1c 7.3 March 2025) and carotid stenosis who developed a right occipital temporal intraparenchymal hemorrhage associated with subdural hematoma.  Patient noticed left field of vision loss in both eyes.  Objective visual fields today corroborate an incomplete left homonymous hemianopia.     Plan:  See Dr. Vargas in 6 months.   DMV visual field (next available)-if patient clearly passes then she will be cleared to drive.  If she fails then I would recommend repeating the visual field test once she has more than 6 months out from her stroke.  If her results are borderline for driving then I would recommend an on the road driving evaluation at [facility]    Follow-up as needed with neuro-ophthalmology     Azucena Guo is a pleasant 64 year old White female who presents to my neuro-ophthalmology clinic today having been referred by Ashwini Soto NP  for evaluation of visual field and intracranial hemorrhage    HPI:    This patient is a 64-year-old female with a history diabetes type 2 (since 2004), right carotid stenosis. "     On 3/17/2025, patient developed a sharp pain in the back of her RIGHT eye.  She noticed that at the same time she must have her field of vision on the left side.     Patient was then admitted at [facility] from March 17 - 22 2025, after presenting with headaches and visual field loss.     Her CT head revealed an intraparenchymal hemorrhage in the right occipital lobe and a subdural hematoma.  CT angiography did not reveal any flow-limiting stenosis.  Her MRI revealed the same findings on the next day with some edema.  MRV revealed patent venous sinuses.     There was concern for a dural AV fistula due to the intraparenchymal temporo-occipital hemorrhage, patient underwent cerebral angiogram on 3/21/2025.  Due to the amount of blood and limited visibility, this was repeated again on 5/5/2025 which fortunately to 20/30 in the reveal any structural lesions and the interventional radiologist plan is no need for follow-up angiogram.     Today, patient tells me that she feels her visual field has slowly improved and she is seeing more on her left side up to a week ago.  She denies any flashing or floaters.  She also denies having any headaches now.    When I ask her about previous vision changes, patient describes experiencing episodes of oily distorted vision with light rippling on the left side a few years ago, had ultrasound of the carotids done which were remarkable for stenosis more on the right side.  Her episodes only recurred in the first year for a few times and then stopped.  Time it would go away after 2 minutes.  Was diagnosed with ocular migraines. During the past 2 to 3 years she has not had these episodes again.    Patient started Mounjaro about 3 weeks ago because of her sleep study shows sleep apnea.     Lab Results   Component Value Date    A1C 7.3 03/18/2025    A1C 7.6 09/26/2024    A1C 7.4 03/18/2024    A1C 6.9 12/11/2023    A1C 6.4 03/16/2023    A1C 5.5 06/09/2021    A1C 5.5 11/24/2020    A1C 5.3  07/23/2020    A1C 7.9 12/06/2019    A1C 7.2 07/30/2019       Endovascular Surgical Neuroradiology Post-Procedure Note   Procedure:   Diagnostic cervicocerebral angiography     Reason for delay:  Not applicable     Findings: No intracranial aneurysm, arteriovenous malformation or fistula seen. Normal diagnostic cerebral angiogram. Right internal carotid artery cervical segment atherosclerosis with less than 50% stenosis per NASCET criteria.     Ocular history: None    Drops: None    Independent historians:  Patient    Review of outside testing:    IR carotid cerebral angiogram 5/5/2025  Impression:     Normal diagnostic cerebral angiogram without early venous drainage,  arteriovenous malformation or fistulas. Right dorsal ophthalmic artery  consistent with a normal anatomic variant.  Right internal carotid artery atherosclerotic plaque distal to the  bifurcation with stenosis resulting in approximately 30% per NASCET  criteria.      Plan:  No follow-up angiogram needed.     Carotid angiography 3/21/2025    Impression:  No evidence of vascular malformation, dural AV fistula or aneurysm to  explain the etiology of hemorrhage.   R internal carotid cervical segment stenosis, measuring approximately  30% per NASCET criteria.     Plan:  Bedrest 2 hours  Repeat diagnostic cerebral angiogram in 1 month  Continue inpatient care    MRI brain and MRV 3/18/2025  IMPRESSION:  1. Similar right occipital and posterior temporal lobe  intraparenchymal hemorrhages with surrounding edema and 8 mm of  leftward midline shift. No underlying enhancing lesion or vascular  abnormality.  2. Thin subdural hematoma along the right parietooccipital convexity,  along the cerebellar convexities, and the posterior falx and right  tentorium.  3. MRV demonstrates patent dural venous sinuses and major deep  intracranial veins. No thrombosis identified.    CT angiography and CT 3/17/2025  IMPRESSION:   HEAD CT:  1.  There is a 3.9 x 5.9 x 4.5 cm acute  intraparenchymal hematoma involving the right occipital lobe.  2.  There is an adjacent right temporo-occipital convexity subdural hematoma measuring up to 0.4 cm in thickness as well as subdural blood layering along the right tentorial leaflet measuring 0.5 cm and a right parafalcine component measuring 0.2 cm in   thickness.  3.  There is 0.7 cm leftward shift of the midline structures.     HEAD CTA:  1.  Patent intracranial arterial vasculature without flow-limiting stenosis.  2.  No vascular lesion underlying the right occipital hemorrhage. No active contrast extravasation.     NECK CTA:  Patent cervical arterial vasculature without flow-limiting stenosis.    My interpretation performed today of outside testing:  I have independently reviewed MRI performed 3/18/2025.  Patient has a large intra parenchymal hemorrhage in the occipital parietal lobe of the right side associated with a subdural hematoma.  This is confirmed on DWI and ADC images.     There is no structural abnormality in the anterior visual pathway.     Review of outside clinical notes:    4/18/2025-- Visit with Ashwini Soto NP    Assessment and Plan      1. Right Occipital Lobe Intraparenchymal Hemorrhage w/ associated edema and midline shift, likely secondary to hypertension   2. Right temporo-occipital SDH   3. Hypertension   4. Left homonymous hemianopsia   - long term blood pressure goal < 130/80  - follow up DSA scheduled for 4/23  - neuro ophthalmology referral   - no driving until cleared by OT and/or ophthalmology   - occupational therapy   - neurosurgery follow up with repeat imaging in May   - could consider repeat MRI in the future (July/aug, with SWI sequence) if DSA and follow up CT are normal to verify no underlying lesion      5. TIKA, newly diagnosed   - follow up with sleep medicine     6. Right carotid stenosis   7. Hyperlipidemia   - simvastatin 40 mg daily; LDL goal <100  - normotension      - Return in about 3 months (around  7/18/2025).     Stroke Education provided.  She will call us with any questions.  For any acute neurologic deficits she was advised to  go directly to the hospital rather than call the clinic.  Past medical history:    Patient Active Problem List   Diagnosis    Mixed hyperlipidemia    Type 2 diabetes mellitus with diabetic microalbuminuria, without long-term current use of insulin (H)    Obesity, Class III, BMI 40-49.9 (morbid obesity) (H)    DDD (degenerative disc disease), cervical    Benign essential hypertension    Varicose veins of both lower extremities with pain    Hair thinning    History of adenomatous polyp of colon    Cystocele, midline    Carotid stenosis, right    Ocular migraine    Nail dystrophy    Primary osteoarthritis of right shoulder    Bilateral osteoarthritis of finger    Intermittent low back pain    Intracranial hemorrhage (H)     Patients medication history:  Current Outpatient Medications   Medication Sig Dispense Refill    empagliflozin (JARDIANCE) 10 MG TABS tablet TAKE 1 TABLET(10 MG) BY MOUTH DAILY 90 tablet 0    lisinopril (ZESTRIL) 10 MG tablet Take 1 tablet (10 mg) by mouth daily. 90 tablet 3    metFORMIN (GLUCOPHAGE) 500 MG tablet Take 2 tablets (1,000 mg) by mouth 2 times daily (with meals). 360 tablet 0    Multiple Vitamin (MULTI-VITAMIN PO) Take 1 tablet by mouth daily.      tirzepatide (MOUNJARO) 5 MG/0.5ML SOAJ auto-injector pen Inject 0.5 mLs (5 mg) subcutaneously once a week. 2 mL 1    vitamin D3 (CHOLECALCIFEROL) 50 mcg (2000 units) tablet Take 1 tablet by mouth daily.      acetaminophen (TYLENOL) 500 MG tablet Take 500-1,000 mg by mouth 2 times daily as needed for mild pain (arthritis).      minoxidil (LONITEN) 2.5 MG tablet 1/4 tablet daily 60 tablet 3    simvastatin (ZOCOR) 40 MG tablet TAKE 1 TABLET(40 MG) BY MOUTH AT BEDTIME 90 tablet 2    tirzepatide (MOUNJARO) 2.5 MG/0.5ML SOAJ auto-injector pen Inject 0.5 mLs (2.5 mg) subcutaneously once a week. 2 mL 0     No current  facility-administered medications for this visit.   Simvastatin    Family history / social history:  Patient's family history includes Breast Cancer in an other family member; Cancer in her mother; Coronary Artery Disease in her father, mother, sister, and sister; Depression in her sister and sister; Diabetes in her brother, mother, sister, and sister; Heart Disease in her father and mother; Heart Failure in her sister; Hyperlipidemia in her father; Hypertension in her father; Obesity in her brother, mother, sister, sister, and sister; Substance Abuse in her son; Thyroid Cancer in her sister.     Patient  reports that she quit smoking about 34 years ago. Her smoking use included cigarettes. She started smoking about 47 years ago. She has a 15 pack-year smoking history. She has never used smokeless tobacco. She reports that she does not currently use alcohol. She reports that she does not use drugs.     Job:      Exam:  Visual acuity with pin hole 20/30-2 right eye 20/30+3 left eye.  Color vision is full in both eyes.  Pupils: no APD, round and reactive.  Intraocular pressure 18 right eye and 16 left eye.  Anterior segment exam revealed moderate combined age-related nuclear sclerosis and cortical cataracts in the left eye and the same in the right eye with a component also of posterior subcapsular cataract.  Fundus exam showed round pink optic nerves with cup-to-disc ratio of 0.3 in both eyes    Tests ordered and interpreted today:    OCT RNFL:  Right eye: Poor quality, mean thickness is 96  m  Left eye: Poor quality, mean thickness is 96  m    Automated 24-2 static perimetry:   In the right eye: Reliable, left homonymous hemianopia, more dense inferiorly  In the left eye: Reliable, left homonymous hemianopia, more dense inferiorly         Carmelo Barker MD   Fellow, Neuro-Ophthalmology    45 minutes were spent on the date of the encounter by me doing chart review, history and exam, documentation, and  further activities as noted above    Complete documentation of historical and exam elements from today's encounter can be found in the full encounter summary report (not reduplicated in this progress note).  I personally obtained the chief complaint(s) and history of present illness.  I confirmed and edited as necessary the review of systems, past medical/surgical history, family history, social history, and examination findings as documented by others; and I examined the patient myself.  I personally reviewed the relevant tests, images, and reports as documented above.  I formulated and edited as necessary the assessment and plan and discussed the findings and management plan with the patient and family.  I personally reviewed the ophthalmic test(s) associated with this encounter, agree with the interpretation(s) as documented by the resident/fellow, and have edited the corresponding report(s) as necessary.     Chon Muñoz MD

## 2025-05-23 NOTE — NURSING NOTE
Chief Complaint(s) and History of Present Illness(es)       Visual Field Defect Evaluation    In both eyes.  Since onset it is stable.  Associated symptoms include Negative for double vision, eye pain and headache.  Pain was noted as 0/10.             Comments    Azucena Guo is a 64 year old female sent for consultation by Ashwini Soto NP for visual field loss and intracranial hemorrhage. Patient was admitted at [facility] from 03/17-03/22 for intracranial hemorrhage.  The VF loss has improved since her hospital admission.  She reports the headaches have improved.  There was some visual hallucinations have also resolved.  No eye pain or discomfort.  Feels like vision is not as crisp and clear - wonders if due to brain bleed, cataracts, etc.   NGHIA Mcclure 5/23/2025 8:52 AM

## 2025-05-23 NOTE — LETTER
"May 28, 2025    RE: Azucena Guo  : 1960  MRN: [id number]    Dear GISELLA Soto    Thank you for referring your patient, Azucena Guo, to my neuro-ophthalmology clinic recently.  After a thorough neuro-ophthalmic history and examination, I came to the following conclusions:     1.  Right intraparenchymal right occipital lobe hemorrhagic stroke -admitted from  -  in [facility]     Patient has corresponding incomplete left homonymous hemianopia that had been subjectively improving. Objective visual fields today corroborate an incomplete left homonymous hemianopia.     2.  Left homonymous hemianopia due to 1    3.  Skin tag -right upper lid- patient interested in excision. May consider referral to oculoplastics in the future    4.  Combined age-related cataracts in both eyes - appears visually significant to me and patient complaining of \"foggy vision\" that I don't believe is stroke related. Referral to Dr. Vargas in 6 months as patient just had central nervous system stroke.    5. Diabetes type 2 - no significant background diabetic retinopathy today in either eye.    This patient is a 64-year-old female with a history of diabetes type 2 (recent hemoglobin A1c 7.3 2025) and carotid stenosis who developed a right occipital temporal intraparenchymal hemorrhage associated with subdural hematoma.  Patient noticed left field of vision loss in both eyes.  Objective visual fields today corroborate an incomplete left homonymous hemianopia.     Plan:  See Dr. Vargas in 6 months.   DMV visual field (next available)-if patient clearly passes then she will be cleared to drive.  If she fails then I would recommend repeating the visual field test once she has more than 6 months out from her stroke.  If her results are borderline for driving then I would recommend an on the road driving evaluation at [facility]    Follow-up as needed with neuro-ophthalmology     Azucena Guo is a pleasant 64 year old " White female who presents to my neuro-ophthalmology clinic today having been referred by Ashwini Soto NP  for evaluation of visual field and intracranial hemorrhage    HPI:    This patient is a 64-year-old female with a history diabetes type 2 (since 2004), right carotid stenosis.     On 3/17/2025, patient developed a sharp pain in the back of her RIGHT eye.  She noticed that at the same time she must have her field of vision on the left side.     Patient was then admitted at [facility] from March 17 - 22 2025, after presenting with headaches and visual field loss.     Her CT head revealed an intraparenchymal hemorrhage in the right occipital lobe and a subdural hematoma.  CT angiography did not reveal any flow-limiting stenosis.  Her MRI revealed the same findings on the next day with some edema.  MRV revealed patent venous sinuses.     There was concern for a dural AV fistula due to the intraparenchymal temporo-occipital hemorrhage, patient underwent cerebral angiogram on 3/21/2025.  Due to the amount of blood and limited visibility, this was repeated again on 5/5/2025 which fortunately to 20/30 in the reveal any structural lesions and the interventional radiologist plan is no need for follow-up angiogram.     Today, patient tells me that she feels her visual field has slowly improved and she is seeing more on her left side up to a week ago.  She denies any flashing or floaters.  She also denies having any headaches now.    When I ask her about previous vision changes, patient describes experiencing episodes of oily distorted vision with light rippling on the left side a few years ago, had ultrasound of the carotids done which were remarkable for stenosis more on the right side.  Her episodes only recurred in the first year for a few times and then stopped.  Time it would go away after 2 minutes.  Was diagnosed with ocular migraines. During the past 2 to 3 years she has not had these episodes again.    Patient  started Mounjaro about 3 weeks ago because of her sleep study shows sleep apnea.     Lab Results   Component Value Date    A1C 7.3 03/18/2025    A1C 7.6 09/26/2024    A1C 7.4 03/18/2024    A1C 6.9 12/11/2023    A1C 6.4 03/16/2023    A1C 5.5 06/09/2021    A1C 5.5 11/24/2020    A1C 5.3 07/23/2020    A1C 7.9 12/06/2019    A1C 7.2 07/30/2019       Endovascular Surgical Neuroradiology Post-Procedure Note   Procedure:   Diagnostic cervicocerebral angiography     Reason for delay:  Not applicable     Findings: No intracranial aneurysm, arteriovenous malformation or fistula seen. Normal diagnostic cerebral angiogram. Right internal carotid artery cervical segment atherosclerosis with less than 50% stenosis per NASCET criteria.     Ocular history: None    Drops: None    Independent historians:  Patient    Review of outside testing:    IR carotid cerebral angiogram 5/5/2025  Impression:     Normal diagnostic cerebral angiogram without early venous drainage,  arteriovenous malformation or fistulas. Right dorsal ophthalmic artery  consistent with a normal anatomic variant.  Right internal carotid artery atherosclerotic plaque distal to the  bifurcation with stenosis resulting in approximately 30% per NASCET  criteria.      Plan:  No follow-up angiogram needed.     Carotid angiography 3/21/2025    Impression:  No evidence of vascular malformation, dural AV fistula or aneurysm to  explain the etiology of hemorrhage.   R internal carotid cervical segment stenosis, measuring approximately  30% per NASCET criteria.     Plan:  Bedrest 2 hours  Repeat diagnostic cerebral angiogram in 1 month  Continue inpatient care    MRI brain and MRV 3/18/2025  IMPRESSION:  1. Similar right occipital and posterior temporal lobe  intraparenchymal hemorrhages with surrounding edema and 8 mm of  leftward midline shift. No underlying enhancing lesion or vascular  abnormality.  2. Thin subdural hematoma along the right parietooccipital convexity,  along  the cerebellar convexities, and the posterior falx and right  tentorium.  3. MRV demonstrates patent dural venous sinuses and major deep  intracranial veins. No thrombosis identified.    CT angiography and CT 3/17/2025  IMPRESSION:   HEAD CT:  1.  There is a 3.9 x 5.9 x 4.5 cm acute intraparenchymal hematoma involving the right occipital lobe.  2.  There is an adjacent right temporo-occipital convexity subdural hematoma measuring up to 0.4 cm in thickness as well as subdural blood layering along the right tentorial leaflet measuring 0.5 cm and a right parafalcine component measuring 0.2 cm in   thickness.  3.  There is 0.7 cm leftward shift of the midline structures.     HEAD CTA:  1.  Patent intracranial arterial vasculature without flow-limiting stenosis.  2.  No vascular lesion underlying the right occipital hemorrhage. No active contrast extravasation.     NECK CTA:  Patent cervical arterial vasculature without flow-limiting stenosis.    My interpretation performed today of outside testing:  I have independently reviewed MRI performed 3/18/2025.  Patient has a large intra parenchymal hemorrhage in the occipital parietal lobe of the right side associated with a subdural hematoma.  This is confirmed on DWI and ADC images.     There is no structural abnormality in the anterior visual pathway.     Review of outside clinical notes:    4/18/2025-- Visit with Ashwini Soto NP    Assessment and Plan      1. Right Occipital Lobe Intraparenchymal Hemorrhage w/ associated edema and midline shift, likely secondary to hypertension   2. Right temporo-occipital SDH   3. Hypertension   4. Left homonymous hemianopsia   - long term blood pressure goal < 130/80  - follow up DSA scheduled for 4/23  - neuro ophthalmology referral   - no driving until cleared by OT and/or ophthalmology   - occupational therapy   - neurosurgery follow up with repeat imaging in May   - could consider repeat MRI in the future (July/aug, with SWI  sequence) if DSA and follow up CT are normal to verify no underlying lesion      5. TIKA, newly diagnosed   - follow up with sleep medicine     6. Right carotid stenosis   7. Hyperlipidemia   - simvastatin 40 mg daily; LDL goal <100  - normotension      - Return in about 3 months (around 7/18/2025).     Stroke Education provided.  She will call us with any questions.  For any acute neurologic deficits she was advised to  go directly to the hospital rather than call the clinic.  Past medical history:    Patient Active Problem List   Diagnosis    Mixed hyperlipidemia    Type 2 diabetes mellitus with diabetic microalbuminuria, without long-term current use of insulin (H)    Obesity, Class III, BMI 40-49.9 (morbid obesity) (H)    DDD (degenerative disc disease), cervical    Benign essential hypertension    Varicose veins of both lower extremities with pain    Hair thinning    History of adenomatous polyp of colon    Cystocele, midline    Carotid stenosis, right    Ocular migraine    Nail dystrophy    Primary osteoarthritis of right shoulder    Bilateral osteoarthritis of finger    Intermittent low back pain    Intracranial hemorrhage (H)     Patients medication history:  Current Outpatient Medications   Medication Sig Dispense Refill    empagliflozin (JARDIANCE) 10 MG TABS tablet TAKE 1 TABLET(10 MG) BY MOUTH DAILY 90 tablet 0    lisinopril (ZESTRIL) 10 MG tablet Take 1 tablet (10 mg) by mouth daily. 90 tablet 3    metFORMIN (GLUCOPHAGE) 500 MG tablet Take 2 tablets (1,000 mg) by mouth 2 times daily (with meals). 360 tablet 0    Multiple Vitamin (MULTI-VITAMIN PO) Take 1 tablet by mouth daily.      tirzepatide (MOUNJARO) 5 MG/0.5ML SOAJ auto-injector pen Inject 0.5 mLs (5 mg) subcutaneously once a week. 2 mL 1    vitamin D3 (CHOLECALCIFEROL) 50 mcg (2000 units) tablet Take 1 tablet by mouth daily.      acetaminophen (TYLENOL) 500 MG tablet Take 500-1,000 mg by mouth 2 times daily as needed for mild pain (arthritis).       minoxidil (LONITEN) 2.5 MG tablet 1/4 tablet daily 60 tablet 3    simvastatin (ZOCOR) 40 MG tablet TAKE 1 TABLET(40 MG) BY MOUTH AT BEDTIME 90 tablet 2    tirzepatide (MOUNJARO) 2.5 MG/0.5ML SOAJ auto-injector pen Inject 0.5 mLs (2.5 mg) subcutaneously once a week. 2 mL 0     No current facility-administered medications for this visit.   Simvastatin    Family history / social history:  Patient's family history includes Breast Cancer in an other family member; Cancer in her mother; Coronary Artery Disease in her father, mother, sister, and sister; Depression in her sister and sister; Diabetes in her brother, mother, sister, and sister; Heart Disease in her father and mother; Heart Failure in her sister; Hyperlipidemia in her father; Hypertension in her father; Obesity in her brother, mother, sister, sister, and sister; Substance Abuse in her son; Thyroid Cancer in her sister.     Patient  reports that she quit smoking about 34 years ago. Her smoking use included cigarettes. She started smoking about 47 years ago. She has a 15 pack-year smoking history. She has never used smokeless tobacco. She reports that she does not currently use alcohol. She reports that she does not use drugs.     Job: [employer]     Exam:  Visual acuity with pin hole 20/30-2 right eye 20/30+3 left eye.  Color vision is full in both eyes.  Pupils: no APD, round and reactive.  Intraocular pressure 18 right eye and 16 left eye.  Anterior segment exam revealed moderate combined age-related nuclear sclerosis and cortical cataracts in the left eye and the same in the right eye with a component also of posterior subcapsular cataract.  Fundus exam showed round pink optic nerves with cup-to-disc ratio of 0.3 in both eyes    Tests ordered and interpreted today:    OCT RNFL:  Right eye: Poor quality, mean thickness is 96  m  Left eye: Poor quality, mean thickness is 96  m    Automated 24-2 static perimetry:   In the right eye: Reliable, left homonymous  hemianopia, more dense inferiorly  In the left eye: Reliable, left homonymous hemianopia, more dense inferiorly    Again, thank you for trusting me with the care of your patient.  For further exam details, please feel free to contact our office for additional records.  If you wish to contact me regarding this patient please email me at [email or web address] or give my clinic a call to arrange a phone conversation.    Sincerely,    Chon Muñoz MD  , Neuro-Ophthalmology and Adult Strabismus Surgery  The Neelam Huitron Chair in Neuro-Ophthalmology  Department of Ophthalmology and Visual Neurosciences  [facility]    DX:  Left homonymous hemianopia

## 2025-05-28 ENCOUNTER — THERAPY VISIT (OUTPATIENT)
Dept: OCCUPATIONAL THERAPY | Facility: CLINIC | Age: 65
End: 2025-05-28
Payer: COMMERCIAL

## 2025-05-28 DIAGNOSIS — I62.9 INTRACRANIAL HEMORRHAGE (H): Primary | ICD-10-CM

## 2025-05-28 DIAGNOSIS — H53.9 VISION CHANGES: ICD-10-CM

## 2025-05-28 PROCEDURE — 97530 THERAPEUTIC ACTIVITIES: CPT | Mod: GO

## 2025-05-28 NOTE — PROGRESS NOTES
05/28/25 0500   Appointment Info   Treating Provider Nina Bennett OTR/L   Visits Used 6   Medical Diagnosis Intracranial hemorrhage (H) (I62.9)   OT Tx Diagnosis vision changes   Progress Note/Certification   Onset of Illness/Injury or Date of Surgery 03/21/25   Therapy Frequency 1x/week   Predicted Duration 8 weeks   OT Goal 1   Goal Identifier Vision - Peripersonal and Extrapersonal Space   Goal Description Patient will demonstrate and/or verbalize 2 environmentally-based ADL modifications to improve visual-based ADL/IADL activities and complete visual scanning tasks (peripersonal and extrapersonal space) with at least 90% accuracy using compensatory strategies prn, for increased independence with functional mobility, navigating new environements, reading, etc)   Goal Progress Patient implements compensatory strategies including active head turns towards left visual field with peripersonal and extrapersonal space tasks. She notes left visual field has improved and visual distrubances have resolved. She is walking with more confidence and does not seek outside support. She is using her phone, reading, managing household tasks etc. She notices difficulty with busy pages and uses a page to block out viusal noise. 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 She was seen by opthalmology and will follow up tomorrow for DMV visual field testing for clearance to drive. She is understandably frustrated with limited independence not driving. It was adviced if she clearly passes, she will cleared to drive. If not, it is recommended to wait 6 months to see if visual field improves   Target Date 06/06/25   Date Met 05/28/25   Subjective Report   Subjective Report Pt is a 64 year old female who sustained a right intraparenchymal right occipital lobe hemorrhagic stroke -admitted from March 17 - 22 in [facility]      Patient has corresponding left homonymous hemianopia that had been subjectively improving.   She  reports visual disturbances have resolved. 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 She was seen by opthalmology 5/23/25 and will be seen tomorrow for DMV visual field testing. It was adviced if she clearly passes, she will cleared to drive. If not, it is recommended to wait 6 months to see if visual field improves   Objective Measures   Objective Measures Objective Measure 1;Objective Measure 2;Objective Measure 3;Objective Measure 4;Objective Measure 5;Objective Measure 6;Objective Measure 7;Objective Measure 8;Objective Measure 9   Objective Measure 1   Objective Measure Dynavision Mode A   Details As of 5/28/25: 48, 55 As of 5/9/25: 51, 53  4/25/25: 63; 4/11/25: 48, 47; LUQ 8, LLQ 16; RUQ 15, RLQ 12) 4/4/25: 39, 48 (LUQ 6, LLQ 14; RUQ 11, RLQ 8 (previous: 30) (52+ is considered a safe )   Objective Measure 2   Objective Measure SDMT   Details As of 5/9/25: 34 (previous: 20) (<25 indicates impairment)   Objective Measure 3   Objective Measure BiVABA Scan Course   Details 7/10   Objective Measure 4   Objective Measure Dynavision Mode B Divided   Details As of 5/28/25: 25 7/10 As of 4/11/24: 18/63 (35+ is considered a safe )   Objective Measure 5   Objective Measure Dynavision Mode B   Details As of 5/28/25: 37 As of 5/9/25: 40; As of 4/25/25: 35; As of 4/11/24: 17, 28 (42+ is considered safe driving)   Objective Measure 6   Objective Measure Trail Making Part B   Details As of 5/9/25: 1 min 54 seconds   Objective Measure 7   Objective Measure Central Visual Scan   Details As of 5/9/25: 10 (WNL)   Objective Measure 8   Objective Measure Road Sign Recognition   Details As of 5/9/25: 12/12 (WNL)   Objective Measure 9   Objective Measure Right Foot Tap Measure   Details As of 5/9/25: 3.68 seconds (5-6 seconds is considered normal)   Therapeutic Activity   Therapeutic Activity Minutes ([id number]) 40   Ther Act 1 - Details Skilled follow up after  opthalmology appointment and pt understandably frustrated needing to go back for DMV field of vision testing as she was not able to complete during her appointment 5/23/25 as they dialated her eyes. She is understandably sad during session as she reflects on her recovery, noting improvements and progressing, however, feels like her vision recovery has plateued but feels hopeful it may impove over 6 months. She hopes she will pass her field of vision testing tomorrow but prepared if she does not pass (considering public transportation, will return for re-testing in 6 months as recommended by opthalmologist).  Skilled progression of visual scanning/awareness skills and compensatory strategies to promote IND with ADLs/IADLs and return to community mobility including: Dynavision Mode A and B and Mode B Divided - see objective measures for results. Discussed progress in therapy and plan going forward including going to field of vision testing tomorrow and following recommendations. She will return in 6 months to retest if she does not pass or consider courage bhavna as recommended by opthalmologist. Patient appreciative of services and will continue to implement recommended strategies. No further outpatient occupational therapy indicated at this time   Patient Response/Progress Pt understandably frustrated with limitations related to driving due to vision but hopeful   Education   Learner/Method Patient;Listening;Demonstration   Plan   Updates to plan of care Discharge Summary: Pt is a 64 year old female who sustained a right intraparenchymal right occipital lobe hemorrhagic stroke -admitted from March 17 - 22 in [facility] Patient has corresponding left homonymous hemianopia that had been subjectively improving. She has participated in 6 outpatient occupational therapy session focused on visual scanning strategies and return to driving. She reports visual disturbances have resolved but feels like vision recovery has plateued.  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 She was seen by opthalmology 5/23/25 and will be seen tomorrow for DMV visual field testing. It was adviced if she clearly passes, she will cleared to drive. If not, it is recommended to wait 6 months to see if visual field improves and consider sinan bhavna driving evaluation. Patient appreciative of services and will continue to implement recommended strategies. No further outpatient occupational therapy indicated at this time   Total Session Time   Timed Code Treatment Minutes 40   Total Treatment Time (sum of timed and untimed services) 40         DISCHARGE  Reason for Discharge: Patient has met all goals.    Equipment Issued: n/a    Discharge Plan: complete field of vision testing with ophthalmology and follow recommendations for return to driving    Referring Provider:  Yogesh Talavera

## 2025-05-29 ENCOUNTER — ALLIED HEALTH/NURSE VISIT (OUTPATIENT)
Dept: OPHTHALMOLOGY | Facility: CLINIC | Age: 65
End: 2025-05-29
Attending: OPHTHALMOLOGY
Payer: COMMERCIAL

## 2025-05-29 DIAGNOSIS — H53.462 LEFT HOMONYMOUS HEMIANOPSIA DUE TO RECENT CEREBRAL INFARCTION: ICD-10-CM

## 2025-05-29 DIAGNOSIS — I69.398 LEFT HOMONYMOUS HEMIANOPSIA DUE TO RECENT CEREBRAL INFARCTION: ICD-10-CM

## 2025-05-29 PROCEDURE — 999N000103 HC STATISTIC NO CHARGE FACILITY FEE

## 2025-05-29 PROCEDURE — 92081 LIMITED VISUAL FIELD XM: CPT

## 2025-05-29 ASSESSMENT — VISUAL ACUITY
OS_CC+: -1
OD_CC+: -1
OD_CC: 20/40
OD_PH_CC+: -2
CORRECTION_TYPE: GLASSES
OS_CC: 20/25
OD_PH_CC: 20/30
METHOD: SNELLEN - LINEAR

## 2025-05-29 ASSESSMENT — REFRACTION_WEARINGRX
OD_ADD: +2.50
OS_AXIS: 002
OD_AXIS: 167
OS_CYLINDER: +2.50
SPECS_TYPE: PAL
OS_ADD: +2.50
OD_SPHERE: +0.75
OS_SPHERE: +0.25
OD_CYLINDER: +2.75

## 2025-05-29 NOTE — Clinical Note
Patient was here for DMV visual field and is expecting to hear back on results. Has an appointment scheduled as recommended on 11/26/2025.  Eli Oh on 5/29/2025 at 10:22 AM

## 2025-05-29 NOTE — PROGRESS NOTES
Patient came today for a tech only visit to complete a DMV protocol visual field.  Please see my visual field report separately.    DMV protocol visual field 5/29/25  Right eye: 85 degrees of continuous intact temporal visual field +20 degrees of continuous intact nasal visual field with a resultant continuous intact horizontal visual field of 105 degrees  Left eye: 60 degrees of intact continuous nasal visual field and 75 degrees of intact continuous temporal visual field for a total of 135 degrees of intact continuous horizontal visual field    Under binocular conditions the patient has 135 degrees of continuous intact visual field horizontally along the midline thus she meets legal visual field criteria for driving in the [facility].      Based upon her visual acuity being 2040 in the right eye with correction in 2025 in the left eye with correction and the results from her DMV visual field testing today she definitively meets the minimum vision requirement for driving in the [facility].  I will call her and notify her.  I will of course encouraged her to exercise caution when reinitiation of driving because meeting legal criteria does not equate to being safe necessarily.

## 2025-06-10 NOTE — PROGRESS NOTES
"     Neurosurgery Clinic Note     Chief Complaint: hospital follow-up      DATE OF VISIT: 6/12/2025      HPI: Azucena Guo is a(n) 64 year old female with PMH of hypertension, hyperlipidemia, type 2 diabetes  who presented on 3/17/2025 with acute onset headache and left hemianopia. CT showed a right occipital-temporal IPH with subdural hematoma along the right parietooccipital convexity, along the cerebellar convexities, and the posterior falx and right tentorium that evolved with expected stability on subsequent scans.  No tumors identified on MRI, CTA no clear AVM, questionable vessel abnormality on the right parietal cortical area,this may represent just superimposed vessels vs vessel anomaly.   Diagnostic angiogram was negative for vessel malformation.   Patient was hypertensive upon admission, however blood pressure normalized throughout her course.  Given her history of intracranial hemorrhage, she presents today for follow-up.       Since our last visit, she states the headaches have resolved.  Met with the neuro-ophthalmologist who cleared her to drive.    She continues to check her blood pressure daily and states this has been well controlled.  Denies weakness, nausea, vomiting, dizziness, or confusion.  States she remains alert during the episodes and remembers the event.  No history of seizures.   Had been taking ASA 81 mg prior to hospitalization and this has since been stopped.  Works in childcare, and has returned to work.   States she is tolerating this without issue.   States she has recently noticed left hip pain when walking, she has noted fatigue in her leg after walking 5 minutes.  She also notes \"burning\" in the hip.  No back pain or radicular symptoms.  No new trauma or injury.      Patient underwent cerebral angiogram on 5/5/2025 with Dr Hurley, no intracranial aneurysm, arteriovenous malformation or fistula seen. Normal diagnostic cerebral angiogram. Right internal carotid artery cervical " segment atherosclerosis with less than 50% stenosis per NASCET criteria.       .          Review of Systems    Negative except in HPI     Past Medical History           Past Medical History:   Diagnosis Date    Arthritis      Benign essential hypertension 2019    Diabetes mellitus (H)      History of 2019 novel coronavirus disease (COVID-19) 2022     positive end of dec 2021 ( breakthrough covid, had a headache 1 day & fatigue 3 days)     Nail discoloration 2023            Past Surgical History             Past Surgical History:   Procedure Laterality Date    ABDOMEN SURGERY        lap cholecystomy    BIOPSY        CHOLECYSTECTOMY             COLONOSCOPY        ANW: sigmoid polyp removed    COLONOSCOPY        no report, abstracted information stated, normal recheck in 5 yr given hx of polyps    IR CAROTID CEREBRAL ANGIOGRAM BILATERAL   3/21/2025            Social History                Socioeconomic History    Marital status:    Tobacco Use    Smoking status: Former       Current packs/day: 0.00       Average packs/day: 1 pack/day for 15.0 years (15.0 ttl pk-yrs)       Types: Cigarettes       Start date: 1978       Quit date: 1991       Years since quittin.2    Smokeless tobacco: Never   Vaping Use    Vaping status: Never Used   Substance and Sexual Activity    Alcohol use: Not Currently       Comment: occasionally    Drug use: No    Sexual activity: Not Currently       Partners: Male       Birth control/protection: Post-menopausal       Comment: post menopause - same partner for 40 years   Other Topics Concern    Parent/sibling w/ CABG, MI or angioplasty before 65F 55M? Yes       Comment: both parents, now    Social History Narrative     2024:  had a major tree injury in march, busy taking care of 3 kids under 2 in am and looking after  in evening. Daughter engaged to be  in spring            Mar 2024: lives with  and 2  cats. 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 3 grown kids close by            Dec 2023: lives with  & 2 cats. Got to visit [place]           3/2023: lives with  and two cats, daughter moved out to her own place with her dog since then. Does like a nanny share childcare. 3 children.            6/2021: lives with  and daughter & her dog.  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      Social Drivers of Health              Financial Resource Strain: Low Risk  (3/18/2025)     Financial Resource Strain      Within the past 12 months, have you or your family members you live with been unable to get utilities (heat, electricity) when it was really needed?: No   Food Insecurity: Low Risk  (3/18/2025)     Food Insecurity      Within the past 12 months, did you worry that your food would run out before you got money to buy more?: No      Within the past 12 months, did the food you bought just not last and you didn t have money to get more?: No   Transportation Needs: Low Risk  (3/18/2025)     Transportation Needs      Within the past 12 months, has lack of transportation kept you from medical appointments, getting your medicines, non-medical meetings or appointments, work, or from getting things that you need?: No   Physical Activity: Insufficiently Active (3/15/2024)     Exercise Vital Sign      Days of Exercise per Week: 3 days      Minutes of Exercise per Session: 30 min   Stress: No Stress Concern Present (3/15/2024)     [employer] - Occupational Stress Questionnaire      Feeling of Stress : Only a little   Social Connections: Unknown (3/15/2024)     Social Connection and Isolation Panel [NHANES]      Frequency of Social Gatherings with Friends and Family: Twice a week   Interpersonal Safety: Low Risk  (3/18/2025)     Interpersonal Safety      Do you feel physically and emotionally safe where you currently live?: Yes      Within the past 12 months, have you been  hit, slapped, kicked or otherwise physically hurt by someone?: No      Within the past 12 months, have you been humiliated or emotionally abused in other ways by your partner or ex-partner?: No   Housing Stability: Low Risk  (3/18/2025)     Housing Stability      Do you have housing? : Yes      Are you worried about losing your housing?: No         family history includes Breast Cancer in an other family member; Cancer in her mother; Coronary Artery Disease in her father, mother, sister, and sister; Depression in her sister and sister; Diabetes in her brother, mother, sister, and sister; Heart Disease in her father and mother; Heart Failure in her sister; Hyperlipidemia in her father; Hypertension in her father; Obesity in her brother, mother, sister, sister, and sister; Substance Abuse in her son; Thyroid Cancer in her sister.                Physical Exam   LMP  (LMP Unknown)   Constitutional: Oriented to person, place, and time. Appears well-developed and well-nourished. Cooperative. No distress.   HENT: Head normocephalic and atraumatic.   Neurological: alert and oriented to person, place, and time. CN II-XII grossly  intact  Vision:  peripheral vision intact bilaterally, EOMs intact         STRENGTH LEFT RIGHT   Deltoid 5 5   Bicep 5 5   Wrist Extensor 5 5   Tricep 5 5   Finger flexion 5 5   Finger abduction 5 5    5 5         Hip Flexion       5       5   Knee Extension 5 5   Ankle Dorsiflexion 5 5   Extensor Hallucis Longus 5 5   Plantar Flexion 5 5   Foot eversion 5 5   Foot inversion 5 5        Skin: Skin is warm, dry and intact.   Psychiatric: Normal mood and affect. Speech is normal and behavior is normal.        IMAGING:  Personally reviewed head CT dated 6/12/2025:   Expected evolution of right occipital lobe intraparenchymal hemorrhage with decreased density and volume of the hemorrhage. Ex vacuo dilatation of the adjacent lateral ventricle. No new acute intracranial  findings.      ASSESSMENT:  Azucena Guo is a 64 year old female with PMH of hypertension, hyperlipidemia, type 2 diabetes who presented on 3/17/2025 with evidence of right occipital-temporal IPH, felt to be secondary to hypertension     PLAN:  Do not do any heavy lifting (greater than 10 pounds). Avoid any activities that could result in trauma. Given visual symptoms, do not drive until cleared by Neurology.      If you should sustain new trauma or injury or note increased headache, weakness, speech or vision issues, confusion, or other neurologic changes please call 911 or present to your nearest emergency room for further evaluation.      Please continue to abstain from medications that may thin your blood.      Please follow-up with Ashwini Soto for Stroke follow-up in August 2025.      Continue to monitor blood pressure and follow-up with your PCP as recommended.      Will have patient imaging in 6 weeks, will follow-up via telephone visit.                     I spent 35 minutes in patient care with greater than 50% spent in counseling and/or coordination of care.     I performed independent visualization of radiographic imaging and entered my own interpretation, reviewed and/or ordered tests in radiology           JOSE MARIA Paul, CNP  Department of Neurosurgery

## 2025-06-12 ENCOUNTER — ANCILLARY PROCEDURE (OUTPATIENT)
Dept: CT IMAGING | Facility: CLINIC | Age: 65
End: 2025-06-12
Attending: NURSE PRACTITIONER
Payer: COMMERCIAL

## 2025-06-12 ENCOUNTER — OFFICE VISIT (OUTPATIENT)
Dept: NEUROSURGERY | Facility: CLINIC | Age: 65
End: 2025-06-12
Payer: COMMERCIAL

## 2025-06-12 VITALS
HEART RATE: 68 BPM | SYSTOLIC BLOOD PRESSURE: 113 MMHG | RESPIRATION RATE: 20 BRPM | OXYGEN SATURATION: 97 % | DIASTOLIC BLOOD PRESSURE: 69 MMHG

## 2025-06-12 DIAGNOSIS — I62.9 INTRACRANIAL HEMORRHAGE (H): ICD-10-CM

## 2025-06-12 DIAGNOSIS — I62.9 INTRACRANIAL HEMORRHAGE (H): Primary | ICD-10-CM

## 2025-06-12 DIAGNOSIS — Z86.0101 HISTORY OF ADENOMATOUS POLYP OF COLON: ICD-10-CM

## 2025-06-12 PROCEDURE — 70450 CT HEAD/BRAIN W/O DYE: CPT | Performed by: RADIOLOGY

## 2025-06-12 ASSESSMENT — PAIN SCALES - GENERAL: PAINLEVEL_OUTOF10: NO PAIN (0)

## 2025-06-12 NOTE — PATIENT INSTRUCTIONS
Do not do any heavy lifting (greater than 10 pounds). Avoid any activities that could result in trauma. Given visual symptoms, do not drive until cleared by Neurology.      If you should sustain new trauma or injury or note increased headache, weakness, speech or vision issues, confusion, or other neurologic changes please call 911 or present to your nearest emergency room for further evaluation.      Please continue to abstain from medications that may thin your blood.      Please follow-up with Ashwini Soto for Stroke follow-up in August 2025.      Continue to monitor blood pressure and follow-up with your PCP as recommended.      Will have patient imaging in 6 weeks, will follow-up via telephone visit.

## 2025-06-12 NOTE — NURSING NOTE
Chief Complaint   Patient presents with    RECHECK     Return Adult Neurosurg     /69 (BP Location: Right arm, Patient Position: Sitting, Cuff Size: Adult Large)   Pulse 68   Resp 20   LMP  (LMP Unknown)   SpO2 97%     Amanda Rodriguez

## 2025-06-12 NOTE — LETTER
6/12/2025       RE: Azucena Guo  [address]  [facility] [address]     Dear Colleague,    Thank you for referring your patient, Azucena Guo, to the [facility] [place] [facility]. Please see a copy of my visit note below.         Neurosurgery Clinic Note     Chief Complaint: hospital follow-up      DATE OF VISIT: 6/12/2025      HPI: Azucena Guo is a(n) 64 year old female with PMH of hypertension, hyperlipidemia, type 2 diabetes  who presented on 3/17/2025 with acute onset headache and left hemianopia. CT showed a right occipital-temporal IPH with subdural hematoma along the right parietooccipital convexity, along the cerebellar convexities, and the posterior falx and right tentorium that evolved with expected stability on subsequent scans.  No tumors identified on MRI, CTA no clear AVM, questionable vessel abnormality on the right parietal cortical area,this may represent just superimposed vessels vs vessel anomaly.   Diagnostic angiogram was negative for vessel malformation.   Patient was hypertensive upon admission, however blood pressure normalized throughout her course.  Given her history of intracranial hemorrhage, she presents today for follow-up.       Since our last visit, she states the headaches have resolved.  Met with the neuro-ophthalmologist who cleared her to drive.    She continues to check her blood pressure daily and states this has been well controlled.  Denies weakness, nausea, vomiting, dizziness, or confusion.  States she remains alert during the episodes and remembers the event.  No history of seizures.   Had been taking ASA 81 mg prior to hospitalization and this has since been stopped.  Works in childcare, and has returned to work.   States she is tolerating this without issue.   States she has recently noticed left hip pain when walking, she has noted fatigue in her leg after walking 5 minutes.   "She also notes \"burning\" in the hip.  No back pain or radicular symptoms.  No new trauma or injury.      Patient underwent cerebral angiogram on 2025 with Dr Hurley, no intracranial aneurysm, arteriovenous malformation or fistula seen. Normal diagnostic cerebral angiogram. Right internal carotid artery cervical segment atherosclerosis with less than 50% stenosis per NASCET criteria.       .          Review of Systems    Negative except in HPI     Past Medical History           Past Medical History:   Diagnosis Date     Arthritis       Benign essential hypertension 2019     Diabetes mellitus (H)       History of 2019 novel coronavirus disease (COVID-19) 2022     positive end of dec 2021 ( breakthrough covid, had a headache 1 day & fatigue 3 days)      Nail discoloration 2023            Past Surgical History             Past Surgical History:   Procedure Laterality Date     ABDOMEN SURGERY        lap cholecystomy     BIOPSY         CHOLECYSTECTOMY              COLONOSCOPY        ANW: sigmoid polyp removed     COLONOSCOPY        no report, abstracted information stated, normal recheck in 5 yr given hx of polyps     IR CAROTID CEREBRAL ANGIOGRAM BILATERAL   3/21/2025            Social History                Socioeconomic History     Marital status:    Tobacco Use     Smoking status: Former       Current packs/day: 0.00       Average packs/day: 1 pack/day for 15.0 years (15.0 ttl pk-yrs)       Types: Cigarettes       Start date: 1978       Quit date: 1991       Years since quittin.2     Smokeless tobacco: Never   Vaping Use     Vaping status: Never Used   Substance and Sexual Activity     Alcohol use: Not Currently       Comment: occasionally     Drug use: No     Sexual activity: Not Currently       Partners: Male       Birth control/protection: Post-menopausal       Comment: post menopause - same partner for 40 years   Other Topics Concern     Parent/sibling " w/ CABG, MI or angioplasty before 65F 55M? Yes       Comment: both parents, now    Social History Narrative     2024:  had a major tree injury in march, busy taking care of 3 kids under 2 in am and looking after  in evening. 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 3 grown kids close by            Dec 2023: lives with  & 2 cats. Got to visit [place]           3/2023: lives with  and two cats, daughter moved out to her own place with her dog since then. Does like a nanny share childcare. 3 children.            2021: lives with  and daughter & her dog.  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      Social Drivers of Health              Financial Resource Strain: Low Risk  (3/18/2025)     Financial Resource Strain       Within the past 12 months, have you or your family members you live with been unable to get utilities (heat, electricity) when it was really needed?: No   Food Insecurity: Low Risk  (3/18/2025)     Food Insecurity       Within the past 12 months, did you worry that your food would run out before you got money to buy more?: No       Within the past 12 months, did the food you bought just not last and you didn t have money to get more?: No   Transportation Needs: Low Risk  (3/18/2025)     Transportation Needs       Within the past 12 months, has lack of transportation kept you from medical appointments, getting your medicines, non-medical meetings or appointments, work, or from getting things that you need?: No   Physical Activity: Insufficiently Active (3/15/2024)     Exercise Vital Sign       Days of Exercise per Week: 3 days       Minutes of Exercise per Session: 30 min   Stress: No Stress Concern Present (3/15/2024)     [employer] - Occupational Stress Questionnaire       Feeling of Stress : Only a little   Social  Connections: Unknown (3/15/2024)     Social Connection and Isolation Panel [NHANES]       Frequency of Social Gatherings with Friends and Family: Twice a week   Interpersonal Safety: Low Risk  (3/18/2025)     Interpersonal Safety       Do you feel physically and emotionally safe where you currently live?: Yes       Within the past 12 months, have you been hit, slapped, kicked or otherwise physically hurt by someone?: No       Within the past 12 months, have you been humiliated or emotionally abused in other ways by your partner or ex-partner?: No   Housing Stability: Low Risk  (3/18/2025)     Housing Stability       Do you have housing? : Yes       Are you worried about losing your housing?: No         family history includes Breast Cancer in an other family member; Cancer in her mother; Coronary Artery Disease in her father, mother, sister, and sister; Depression in her sister and sister; Diabetes in her brother, mother, sister, and sister; Heart Disease in her father and mother; Heart Failure in her sister; Hyperlipidemia in her father; Hypertension in her father; Obesity in her brother, mother, sister, sister, and sister; Substance Abuse in her son; Thyroid Cancer in her sister.                Physical Exam   LMP  (LMP Unknown)   Constitutional: Oriented to person, place, and time. Appears well-developed and well-nourished. Cooperative. No distress.   HENT: Head normocephalic and atraumatic.   Neurological: alert and oriented to person, place, and time. CN II-XII grossly  intact  Vision:  peripheral vision intact bilaterally, EOMs intact         STRENGTH LEFT RIGHT   Deltoid 5 5   Bicep 5 5   Wrist Extensor 5 5   Tricep 5 5   Finger flexion 5 5   Finger abduction 5 5    5 5         Hip Flexion       5       5   Knee Extension 5 5   Ankle Dorsiflexion 5 5   Extensor Hallucis Longus 5 5   Plantar Flexion 5 5   Foot eversion 5 5   Foot inversion 5 5        Skin: Skin is warm, dry and intact.   Psychiatric:  Normal mood and affect. Speech is normal and behavior is normal.        IMAGING:  Personally reviewed head CT dated 6/12/2025:   Expected evolution of right occipital lobe intraparenchymal hemorrhage with decreased density and volume of the hemorrhage. Ex vacuo dilatation of the adjacent lateral ventricle. No new acute intracranial findings.      ASSESSMENT:  Azucena Guo is a 64 year old female with PMH of hypertension, hyperlipidemia, type 2 diabetes who presented on 3/17/2025 with evidence of right occipital-temporal IPH, felt to be secondary to hypertension     PLAN:  Do not do any heavy lifting (greater than 10 pounds). Avoid any activities that could result in trauma. Given visual symptoms, do not drive until cleared by Neurology.      If you should sustain new trauma or injury or note increased headache, weakness, speech or vision issues, confusion, or other neurologic changes please call 911 or present to your nearest emergency room for further evaluation.      Please continue to abstain from medications that may thin your blood.      Please follow-up with Ashwini Soto for Stroke follow-up in August 2025.      Continue to monitor blood pressure and follow-up with your PCP as recommended.      Will have patient imaging in 6 weeks, will follow-up via telephone visit.                     I spent 35 minutes in patient care with greater than 50% spent in counseling and/or coordination of care.     I performed independent visualization of radiographic imaging and entered my own interpretation, reviewed and/or ordered tests in radiology           JOSE MARIA Paul, CNP  Department of Neurosurgery       Again, thank you for allowing me to participate in the care of your patient.      Sincerely,    JOSE MARIA Bella CNP

## 2025-06-17 ENCOUNTER — MYC MEDICAL ADVICE (OUTPATIENT)
Dept: FAMILY MEDICINE | Facility: CLINIC | Age: 65
End: 2025-06-17
Payer: COMMERCIAL

## 2025-06-19 NOTE — TELEPHONE ENCOUNTER
Writer replied to patient via [employer].  JULIEN Peralta BSN, PHN, AMB-BC (she/her)  [facility] RN

## 2025-07-03 DIAGNOSIS — R80.9 TYPE 2 DIABETES MELLITUS WITH DIABETIC MICROALBUMINURIA, WITHOUT LONG-TERM CURRENT USE OF INSULIN (H): ICD-10-CM

## 2025-07-03 DIAGNOSIS — E11.29 TYPE 2 DIABETES MELLITUS WITH DIABETIC MICROALBUMINURIA, WITHOUT LONG-TERM CURRENT USE OF INSULIN (H): ICD-10-CM

## 2025-07-03 SDOH — HEALTH STABILITY: PHYSICAL HEALTH: ON AVERAGE, HOW MANY DAYS PER WEEK DO YOU ENGAGE IN MODERATE TO STRENUOUS EXERCISE (LIKE A BRISK WALK)?: 3 DAYS

## 2025-07-03 SDOH — HEALTH STABILITY: PHYSICAL HEALTH: ON AVERAGE, HOW MANY MINUTES DO YOU ENGAGE IN EXERCISE AT THIS LEVEL?: 30 MIN

## 2025-07-03 ASSESSMENT — SOCIAL DETERMINANTS OF HEALTH (SDOH): HOW OFTEN DO YOU GET TOGETHER WITH FRIENDS OR RELATIVES?: ONCE A WEEK

## 2025-07-08 ENCOUNTER — OFFICE VISIT (OUTPATIENT)
Dept: FAMILY MEDICINE | Facility: CLINIC | Age: 65
End: 2025-07-08
Payer: COMMERCIAL

## 2025-07-08 VITALS
WEIGHT: 263.3 LBS | SYSTOLIC BLOOD PRESSURE: 122 MMHG | OXYGEN SATURATION: 97 % | RESPIRATION RATE: 10 BRPM | HEART RATE: 68 BPM | BODY MASS INDEX: 39.9 KG/M2 | TEMPERATURE: 97.2 F | HEIGHT: 68 IN | DIASTOLIC BLOOD PRESSURE: 80 MMHG

## 2025-07-08 DIAGNOSIS — R80.9 TYPE 2 DIABETES MELLITUS WITH DIABETIC MICROALBUMINURIA, WITHOUT LONG-TERM CURRENT USE OF INSULIN (H): ICD-10-CM

## 2025-07-08 DIAGNOSIS — Z00.00 ROUTINE GENERAL MEDICAL EXAMINATION AT A HEALTH CARE FACILITY: Primary | ICD-10-CM

## 2025-07-08 DIAGNOSIS — E11.29 TYPE 2 DIABETES MELLITUS WITH DIABETIC MICROALBUMINURIA, WITHOUT LONG-TERM CURRENT USE OF INSULIN (H): ICD-10-CM

## 2025-07-08 DIAGNOSIS — E66.813 OBESITY, CLASS III, BMI 40-49.9 (MORBID OBESITY) (H): ICD-10-CM

## 2025-07-08 DIAGNOSIS — E78.2 MIXED HYPERLIPIDEMIA: ICD-10-CM

## 2025-07-08 DIAGNOSIS — M54.32 SCIATICA OF LEFT SIDE: ICD-10-CM

## 2025-07-08 DIAGNOSIS — Z13.820 SCREENING FOR OSTEOPOROSIS: ICD-10-CM

## 2025-07-08 LAB
CREAT UR-MCNC: 39.8 MG/DL
EST. AVERAGE GLUCOSE BLD GHB EST-MCNC: 131 MG/DL
HBA1C MFR BLD: 6.2 % (ref 0–5.6)
MICROALBUMIN UR-MCNC: <12 MG/L
MICROALBUMIN/CREAT UR: NORMAL MG/G{CREAT}

## 2025-07-08 PROCEDURE — 3044F HG A1C LEVEL LT 7.0%: CPT | Performed by: NURSE PRACTITIONER

## 2025-07-08 PROCEDURE — 82043 UR ALBUMIN QUANTITATIVE: CPT | Performed by: NURSE PRACTITIONER

## 2025-07-08 PROCEDURE — 3079F DIAST BP 80-89 MM HG: CPT | Performed by: NURSE PRACTITIONER

## 2025-07-08 PROCEDURE — G2211 COMPLEX E/M VISIT ADD ON: HCPCS | Performed by: NURSE PRACTITIONER

## 2025-07-08 PROCEDURE — 82570 ASSAY OF URINE CREATININE: CPT | Performed by: NURSE PRACTITIONER

## 2025-07-08 PROCEDURE — 3074F SYST BP LT 130 MM HG: CPT | Performed by: NURSE PRACTITIONER

## 2025-07-08 PROCEDURE — 83036 HEMOGLOBIN GLYCOSYLATED A1C: CPT | Performed by: NURSE PRACTITIONER

## 2025-07-08 PROCEDURE — 1125F AMNT PAIN NOTED PAIN PRSNT: CPT | Performed by: NURSE PRACTITIONER

## 2025-07-08 PROCEDURE — 36415 COLL VENOUS BLD VENIPUNCTURE: CPT | Performed by: NURSE PRACTITIONER

## 2025-07-08 PROCEDURE — 99396 PREV VISIT EST AGE 40-64: CPT | Mod: 25 | Performed by: NURSE PRACTITIONER

## 2025-07-08 PROCEDURE — 99214 OFFICE O/P EST MOD 30 MIN: CPT | Mod: 25 | Performed by: NURSE PRACTITIONER

## 2025-07-08 RX ORDER — SIMVASTATIN 40 MG
40 TABLET ORAL AT BEDTIME
Qty: 90 TABLET | Refills: 3 | Status: SHIPPED | OUTPATIENT
Start: 2025-07-08

## 2025-07-08 ASSESSMENT — PAIN SCALES - GENERAL: PAINLEVEL_OUTOF10: MODERATE PAIN (4)

## 2025-07-08 NOTE — PATIENT INSTRUCTIONS
Patient Education   Preventive Care Advice   This is general advice given by our system to help you stay healthy. However, your care team may have specific advice just for you. Please talk to your care team about your preventive care needs.  Nutrition  Eat 5 or more servings of fruits and vegetables each day.  Try wheat bread, brown rice and whole grain pasta (instead of white bread, rice, and pasta).  Get enough calcium and vitamin D. Check the label on foods and aim for 100% of the RDA (recommended daily allowance).  Lifestyle  Exercise at least 150 minutes each week  (30 minutes a day, 5 days a week).  Do muscle strengthening activities 2 days a week. These help control your weight and prevent disease.  No smoking.  Wear sunscreen to prevent skin cancer.  Have a dental exam and cleaning every 6 months.  Yearly exams  See your health care team every year to talk about:  Any changes in your health.  Any medicines your care team has prescribed.  Preventive care, family planning, and ways to prevent chronic diseases.  Shots (vaccines)   HPV shots (up to age 26), if you've never had them before.  Hepatitis B shots (up to age 59), if you've never had them before.  COVID-19 shot: Get this shot when it's due.  Flu shot: Get a flu shot every year.  Tetanus shot: Get a tetanus shot every 10 years.  Pneumococcal, hepatitis A, and RSV shots: Ask your care team if you need these based on your risk.  Shingles shot (for age 50 and up)  General health tests  Diabetes screening:  Starting at age 35, Get screened for diabetes at least every 3 years.  If you are younger than age 35, ask your care team if you should be screened for diabetes.  Cholesterol test: At age 39, start having a cholesterol test every 5 years, or more often if advised.  Bone density scan (DEXA): At age 50, ask your care team if you should have this scan for osteoporosis (brittle bones).  Hepatitis C: Get tested at least once in your life.  STIs (sexually  transmitted infections)  Before age 24: Ask your care team if you should be screened for STIs.  After age 24: Get screened for STIs if you're at risk. You are at risk for STIs (including HIV) if:  You are sexually active with more than one person.  You don't use condoms every time.  You or a partner was diagnosed with a sexually transmitted infection.  If you are at risk for HIV, ask about PrEP medicine to prevent HIV.  Get tested for HIV at least once in your life, whether you are at risk for HIV or not.  Cancer screening tests  Cervical cancer screening: If you have a cervix, begin getting regular cervical cancer screening tests starting at age 21.  Breast cancer scan (mammogram): If you've ever had breasts, begin having regular mammograms starting at age 40. This is a scan to check for breast cancer.  Colon cancer screening: It is important to start screening for colon cancer at age 45.  Have a colonoscopy test every 10 years (or more often if you're at risk) Or, ask your provider about stool tests like a FIT test every year or Cologuard test every 3 years.  To learn more about your testing options, visit:   .  For help making a decision, visit:   https://bit.ly/bj10322.  Prostate cancer screening test: If you have a prostate, ask your care team if a prostate cancer screening test (PSA) at age 55 is right for you.  Lung cancer screening: If you are a current or former smoker ages 50 to 80, ask your care team if ongoing lung cancer screenings are right for you.  For informational purposes only. Not to replace the advice of your health care provider. Copyright   2023 [facility]. All rights reserved. Clinically reviewed by the [facility]. 24h00 [id number] - REV 01/24.  Preventing Falls: Care Instructions  Injuries and health problems such as trouble walking or poor eyesight can increase your risk of falling. So can some medicines. But there are things you can do to help  "prevent falls. You can exercise to get stronger. You can also arrange your home to make it safer.    Talk to your doctor about the medicines you take. Ask if any of them increase the risk of falls and whether they can be changed or stopped.   Try to exercise regularly. It can help improve your strength and balance. This can help lower your risk of falling.         Practice fall safety and prevention.   Wear low-heeled shoes that fit well and give your feet good support. Talk to your doctor if you have foot problems that make this hard.  Carry a cellphone or wear a medical alert device that you can use to call for help.  Use stepladders instead of chairs to reach high objects. Don't climb if you're at risk for falls. Ask for help, if needed.  Wear the correct eyeglasses, if you need them.        Make your home safer.   Remove rugs, cords, clutter, and furniture from walkways.  Keep your house well lit. Use night-lights in hallways and bathrooms.  Install and use sturdy handrails on stairways.  Wear nonskid footwear, even inside. Don't walk barefoot or in socks without shoes.        Be safe outside.   Use handrails, curb cuts, and ramps whenever possible.  Keep your hands free by using a shoulder bag or backpack.  Try to walk in well-lit areas. Watch out for uneven ground, changes in pavement, and debris.  Be careful in the winter. Walk on the grass or gravel when sidewalks are slippery. Use de-icer on steps and walkways. Add non-slip devices to shoes.    Put grab bars and nonskid mats in your shower or tub and near the toilet. Try to use a shower chair or bath bench when bathing.   Get into a tub or shower by putting in your weaker leg first. Get out with your strong side first. Have a phone or medical alert device in the bathroom with you.   Where can you learn more?  Go to https://www.Intalewise.net/patiented  Enter G117 in the search box to learn more about \"Preventing Falls: Care Instructions.\"  Current as of: " July 31, 2024  Content Version: 14.5    4741-1388 [employer].   Care instructions adapted under license by your healthcare professional. If you have questions about a medical condition or this instruction, always ask your healthcare professional. [employer] disclaims any warranty or liability for your use of this information.    Substance Use Disorder: Care Instructions  Overview     You can improve your life and health by stopping your use of alcohol or drugs. When you don't drink or use drugs, you may feel and sleep better. You may get along better with your family, friends, and coworkers. There are medicines and programs that can help with substance use disorder.  How can you care for yourself at home?  Here are some ways to help you stay sober and prevent relapse.  If you have been given medicine to help keep you sober or reduce your cravings, be sure to take it exactly as prescribed.  Talk to your doctor about programs that can help you stop using drugs or drinking alcohol.  Do not keep alcohol or drugs in your home.  Plan ahead. Think about what you'll say if other people ask you to drink or use drugs. Try not to spend time with people who drink or use drugs.  Use the time and money spent on drinking or drugs to do something that's important to you.  Preventing a relapse  Have a plan to deal with relapse. Learn to recognize changes in your thinking that lead you to drink or use drugs. Get help before you start to drink or use drugs again.  Try to stay away from situations, friends, or places that may lead you to drink or use drugs.  If you feel the need to drink alcohol or use drugs again, seek help right away. Call a trusted friend or family member. Some people get support from organizations such as Narcotics Anonymous or [employer] or from treatment facilities.  If you relapse, get help as soon as you can. Some people make a plan with another person that outlines what they want  that person to do for them if they relapse. The plan usually includes how to handle the relapse and who to notify in case of relapse.  Don't give up. Remember that a relapse doesn't mean that you have failed. Use the experience to learn the triggers that lead you to drink or use drugs. Then quit again. Recovery is a lifelong process. Many people have several relapses before they are able to quit for good.  Follow-up care is a key part of your treatment and safety. Be sure to make and go to all appointments, and call your doctor if you are having problems. It's also a good idea to know your test results and keep a list of the medicines you take.  When should you call for help?   Call 911  anytime you think you may need emergency care. For example, call if you or someone else:    Has overdosed or has withdrawal signs. Be sure to tell the emergency workers that you are or someone else is using or trying to quit using drugs. Overdose or withdrawal signs may include:  Losing consciousness.  Seizure.  Seeing or hearing things that aren't there (hallucinations).     Is thinking or talking about suicide or harming others.   Where to get help 24 hours a day, 7 days a week   If you or someone you know talks about suicide, self-harm, a mental health crisis, a substance use crisis, or any other kind of emotional distress, get help right away. You can:    Call the Suicide and Crisis Lifeline at 980.     Call 3-700-329-[facility] (1-312.433.5992).     Text HOME to 563426 to access the Crisis Text Line.   Consider saving these numbers in your phone.  Go to [employer]org for more information or to chat online.  Call your doctor now or seek immediate medical care if:    You are having withdrawal symptoms. These may include nausea or vomiting, sweating, shakiness, and anxiety.   Watch closely for changes in your health, and be sure to contact your doctor if:    You have a relapse.     You need more help or support to stop.   Where can  "you learn more?  Go to https://www.MysteryD.net/patiented  Enter H573 in the search box to learn more about \"Substance Use Disorder: Care Instructions.\"  Current as of: August 20, 2024  Content Version: 14.5 2024-2025 [employer].   Care instructions adapted under license by your healthcare professional. If you have questions about a medical condition or this instruction, always ask your healthcare professional. [employer] disclaims any warranty or liability for your use of this information.       "

## 2025-07-08 NOTE — PROGRESS NOTES
Preventive Care Visit  [facility]  Bina Huitron, NP, Nurse Practitioner - Family  Jul 8, 2025      Assessment & Plan     (Z00.00) Routine general medical examination at a health care facility  (primary encounter diagnosis)  Comment:   Plan:     (M54.32) Sciatica of left side  Comment:   Plan: Physical Therapy  Referral        Will refer to PT.  If symptoms are not improving, will get a lumbar MRI.    (E11.29,  R80.9) Type 2 diabetes mellitus with diabetic microalbuminuria, without long-term current use of insulin (H)  Comment: at goal  Plan: empagliflozin (JARDIANCE) 10 MG TABS tablet,         tirzepatide-Weight Management (ZEPBOUND) 7.5         MG/0.5ML prefilled pen, metFORMIN (GLUCOPHAGE)         500 MG tablet, blood glucose (NO BRAND         SPECIFIED) test strip, Hemoglobin A1c, Albumin         Random Urine Quantitative with Creat Ratio        Will increase Mounjaro after she finishes her current supply to 7.5 mg to see if this better helps with weight loss.  Follow up in 6 months.     (E78.2) Mixed hyperlipidemia  Comment: stable  Plan: simvastatin (ZOCOR) 40 MG tablet        The current medical regimen is effective;  continue present plan and medications.     (Z13.820) Screening for osteoporosis  Comment:   Plan: DX Bone Density            (E66.813) Obesity, Class III, BMI 40-49.9 (morbid obesity) (H)  Comment:   Plan: Will  increase dose of Mounjaro to 7.5 mg (see above).       The longitudinal plan of care for the diagnosis(es)/condition(s) as documented were addressed during this visit. Due to the added complexity in care, I will continue to support Megan in the subsequent management and with ongoing continuity of care.    Counseling  Appropriate preventive services were addressed with this patient via screening, questionnaire, or discussion as appropriate for fall prevention, nutrition, physical activity, Tobacco-use cessation, social engagement, weight loss and  "cognition.  Checklist reviewing preventive services available has been given to the patient.  Reviewed patient's diet, addressing concerns and/or questions.   She is at risk for lack of exercise and has been provided with information to increase physical activity for the benefit of her well-being.   Reviewed preventive health counseling, as reflected in patient instructions        Ayah Guzman is a 64 year old, presenting for the following:  Annual Visit (Pt has concerns about arthritis. Pt nails are rippled and peeling.)        7/8/2025     7:13 AM   Additional Questions   Roomed by Ebony JUAREZ CMA   Accompanied by Self         7/8/2025     7:13 AM   Patient Reported Additional Medications   Patient reports taking the following new medications No          HPI  She has left hip pain after standing for more than 10 minutes, pain will radiate down the front of her leg and she will have some numbness.  Her leg will \"buckle\" at times, causing her to fall.  She denies any back pain.    Her glucose levels are around 113-132 in the morning.  She just started her second month of 5 mg of Mounjaro and is tolerating it well.                     Advance Care Planning    Discussed advance care planning with patient; however, patient declined at this time.        7/3/2025   General Health   How would you rate your overall physical health? (!) FAIR   Feel stress (tense, anxious, or unable to sleep) Only a little   (!) STRESS CONCERN      7/3/2025   Nutrition   Three or more servings of calcium each day? Yes   Diet: Low salt    Diabetic   How many servings of fruit and vegetables per day? 4 or more   How many sweetened beverages each day? 0-1       Multiple values from one day are sorted in reverse-chronological order         7/3/2025   Exercise   Days per week of moderate/strenous exercise 3 days   Average minutes spent exercising at this level 30 min         7/3/2025   Social Factors   Frequency of gathering with friends or " relatives Once a week   Worry food won't last until get money to buy more No   Food not last or not have enough money for food? No   Do you have housing? (Housing is defined as stable permanent housing and does not include staying outside in a car, in a tent, in an abandoned building, in an overnight shelter, or couch-surfing.) Yes   Are you worried about losing your housing? No   Lack of transportation? No   Unable to get utilities (heat,electricity)? No         2025   Fall Risk   Gait Speed Test (Document in seconds) 4.05   Gait Speed Test Interpretation Less than or equal to 5.00 seconds - PASS          7/3/2025   Dental   Dentist two times every year? Yes               7/3/2025   Substance Use   Alcohol more than 3/day or more than 7/wk No   Do you use any other substances recreationally? (!) CANNABIS PRODUCTS     Social History     Tobacco Use    Smoking status: Former     Current packs/day: 0.00     Average packs/day: 1 pack/day for 15.0 years (15.0 ttl pk-yrs)     Types: Cigarettes     Start date: 1978     Quit date: 1991     Years since quittin.5    Smokeless tobacco: Never   Vaping Use    Vaping status: Never Used   Substance Use Topics    Alcohol use: Not Currently     Comment: occasionally    Drug use: No           2024   LAST FHS-7 RESULTS   1st degree relative breast or ovarian cancer No   Any relative bilateral breast cancer No   Any male have breast cancer No   Any ONE woman have BOTH breast AND ovarian cancer No   Any woman with breast cancer before 50yrs No   2 or more relatives with breast AND/OR ovarian cancer No   2 or more relatives with breast AND/OR bowel cancer No                7/3/2025   STI Screening   New sexual partner(s) since last STI/HIV test? No     History of abnormal Pap smear: No - age 30- 64 PAP with HPV every 5 years recommended        Latest Ref Rng & Units 3/14/2022     3:56 PM 2015     8:25 AM 2015    12:00 AM   PAP / HPV   PAP  Negative for  "Intraepithelial Lesion or Malignancy (NILM)      PAP (Historical)    NIL    HPV 16 DNA Negative Negative  Negative     HPV 18 DNA Negative Negative  Negative     Other HR HPV Negative Negative  Negative       ASCVD Risk   The 10-year ASCVD risk score (Sarbjit LOZANO, et al., 2019) is: 10.7%    Values used to calculate the score:      Age: 64 years      Sex: Female      Is Non- : No      Diabetic: Yes      Tobacco smoker: No      Systolic Blood Pressure: 122 mmHg      Is BP treated: Yes      HDL Cholesterol: 46 mg/dL      Total Cholesterol: 152 mg/dL           Reviewed and updated as needed this visit by Provider                             Objective    Exam  /80 (BP Location: Right arm, Patient Position: Sitting, Cuff Size: Adult Large)   Pulse 68   Temp 97.2  F (36.2  C) (Temporal)   Resp 10   Ht 1.721 m (5' 7.75\")   Wt 119.4 kg (263 lb 4.8 oz)   LMP  (LMP Unknown)   SpO2 97%   BMI 40.33 kg/m     Estimated body mass index is 40.33 kg/m  as calculated from the following:    Height as of this encounter: 1.721 m (5' 7.75\").    Weight as of this encounter: 119.4 kg (263 lb 4.8 oz).    Physical Exam  GENERAL: alert and no distress  EYES: Eyes grossly normal to inspection, PERRL and conjunctivae and sclerae normal  HENT: ear canals and TM's normal, nose and mouth without ulcers or lesions  NECK: no adenopathy, no asymmetry, masses, or scars  RESP: lungs clear to auscultation - no rales, rhonchi or wheezes  CV: regular rate and rhythm, normal S1 S2, no S3 or S4, no murmur, click or rub, no peripheral edema  ABDOMEN: soft, nontender, no hepatosplenomegaly, no masses and bowel sounds normal  MS: no gross musculoskeletal defects noted, no edema  SKIN: no suspicious lesions or rashes  NEURO: Normal strength and tone, mentation intact and speech normal  PSYCH: mentation appears normal, affect normal/bright  Diabetic foot exam: normal DP and PT pulses, no trophic changes or ulcerative " lesions, and normal sensory exam  Gait and balance assessed per Gait Speed Test.  Result as above.        Signed Electronically by: Bina Huitron NP

## 2025-07-11 ENCOUNTER — TELEPHONE (OUTPATIENT)
Dept: FAMILY MEDICINE | Facility: CLINIC | Age: 65
End: 2025-07-11

## 2025-07-11 DIAGNOSIS — E11.29 TYPE 2 DIABETES MELLITUS WITH DIABETIC MICROALBUMINURIA, WITHOUT LONG-TERM CURRENT USE OF INSULIN (H): ICD-10-CM

## 2025-07-11 DIAGNOSIS — R80.9 TYPE 2 DIABETES MELLITUS WITH DIABETIC MICROALBUMINURIA, WITHOUT LONG-TERM CURRENT USE OF INSULIN (H): ICD-10-CM

## 2025-07-11 PROBLEM — M54.50 LEFT-SIDED LOW BACK PAIN WITHOUT SCIATICA: Status: ACTIVE | Noted: 2025-07-11

## 2025-07-11 PROBLEM — M25.552 HIP PAIN, LEFT: Status: ACTIVE | Noted: 2025-07-11

## 2025-07-11 NOTE — TELEPHONE ENCOUNTER
PRIOR AUTHORIZATION DENIED    Medication: tirzepatide-Weight Management (ZEPBOUND) 7.5 MG/0.5ML prefilled pen-EPA DENIED    Denial Date: 7/11/2025    Denial Rational: INSURANCE STATES PATIENT DOES NOT HAVE AN FDA APPROVED INDICATION FOR COVERAGE.              Appeal Information:  IF YOU WOULD LIKE TO APPEAL PLEASE SUPPLY PA TEAM WITH A LETTER OF MEDICAL NECESSITY WITH CLINICAL REASON.

## 2025-07-11 NOTE — TELEPHONE ENCOUNTER
Cheryl -- PA was denied. Would you like to send a letter of appeal? Alternative prescription?    Ynes Hung RN  [facility]

## 2025-07-14 ENCOUNTER — TELEPHONE (OUTPATIENT)
Dept: FAMILY MEDICINE | Facility: CLINIC | Age: 65
End: 2025-07-14
Payer: COMMERCIAL

## 2025-07-14 NOTE — TELEPHONE ENCOUNTER
Prior Authorization Retail Medication Request    Medication/Dose: tirzepatide-Weight Management (ZEPBOUND) 7.5 MG/0.5ML   Diagnosis and ICD code (if different than what is on RX):  Type 2 diabetes mellitus with diabetic microalbuminuria, without long-term current use of insulin (H) [E11.29, R80.9]     Insurance   Primary: [facility] OUT OF STATE   Insurance ID:  [id number]     Pharmacy Information (if different than what is on RX)  Name:    [id number] [address] AT [address]     Phone:  [phone]   Fax: [phone]

## 2025-07-14 NOTE — TELEPHONE ENCOUNTER
Looks like I accidentally sent it for Zepbound instead of Mounjaro.  Can we do a PA for Mounjaro please?

## 2025-07-15 ENCOUNTER — HOSPITAL ENCOUNTER (OUTPATIENT)
Dept: BONE DENSITY | Facility: CLINIC | Age: 65
Discharge: HOME | End: 2025-07-15
Attending: NURSE PRACTITIONER
Payer: COMMERCIAL

## 2025-07-15 DIAGNOSIS — Z13.820 SCREENING FOR OSTEOPOROSIS: ICD-10-CM

## 2025-07-15 PROCEDURE — 77080 DXA BONE DENSITY AXIAL: CPT

## 2025-07-15 NOTE — TELEPHONE ENCOUNTER
PA for this medication was denied in encounter dated 07/11/2025. Notes from that encounter state medication was supposed to be Mounjaro. EPA was approved for Mounjaro that is good until July 15, 2026.

## 2025-07-28 ENCOUNTER — ANCILLARY PROCEDURE (OUTPATIENT)
Dept: CT IMAGING | Facility: CLINIC | Age: 65
End: 2025-07-28
Attending: NURSE PRACTITIONER
Payer: COMMERCIAL

## 2025-07-28 DIAGNOSIS — I62.9 INTRACRANIAL HEMORRHAGE (H): ICD-10-CM

## 2025-07-28 PROCEDURE — 70450 CT HEAD/BRAIN W/O DYE: CPT | Performed by: RADIOLOGY

## 2025-08-05 ENCOUNTER — OFFICE VISIT (OUTPATIENT)
Dept: NEUROLOGY | Facility: CLINIC | Age: 65
End: 2025-08-05
Payer: COMMERCIAL

## 2025-08-05 VITALS
DIASTOLIC BLOOD PRESSURE: 67 MMHG | WEIGHT: 263 LBS | SYSTOLIC BLOOD PRESSURE: 111 MMHG | HEART RATE: 71 BPM | BODY MASS INDEX: 40.28 KG/M2

## 2025-08-05 DIAGNOSIS — I62.9 INTRACRANIAL HEMORRHAGE (H): Primary | ICD-10-CM

## 2025-08-05 DIAGNOSIS — E78.2 MIXED HYPERLIPIDEMIA: ICD-10-CM

## 2025-08-05 DIAGNOSIS — I10 BENIGN ESSENTIAL HYPERTENSION: ICD-10-CM

## 2025-08-05 DIAGNOSIS — R80.9 TYPE 2 DIABETES MELLITUS WITH DIABETIC MICROALBUMINURIA, WITHOUT LONG-TERM CURRENT USE OF INSULIN (H): ICD-10-CM

## 2025-08-05 DIAGNOSIS — E11.29 TYPE 2 DIABETES MELLITUS WITH DIABETIC MICROALBUMINURIA, WITHOUT LONG-TERM CURRENT USE OF INSULIN (H): ICD-10-CM

## 2025-08-05 PROCEDURE — 99215 OFFICE O/P EST HI 40 MIN: CPT | Performed by: NURSE PRACTITIONER

## 2025-08-05 PROCEDURE — 3074F SYST BP LT 130 MM HG: CPT | Performed by: NURSE PRACTITIONER

## 2025-08-05 PROCEDURE — 3078F DIAST BP <80 MM HG: CPT | Performed by: NURSE PRACTITIONER

## 2025-08-22 PROBLEM — G47.33 OBSTRUCTIVE SLEEP APNEA: Status: ACTIVE | Noted: 2025-08-22

## 2025-09-02 ENCOUNTER — DOCUMENTATION ONLY (OUTPATIENT)
Dept: SLEEP MEDICINE | Facility: CLINIC | Age: 65
End: 2025-09-02
Payer: COMMERCIAL

## 2025-09-02 DIAGNOSIS — G47.33 OSA (OBSTRUCTIVE SLEEP APNEA): Primary | ICD-10-CM

## (undated) RX ORDER — NITROGLYCERIN 5 MG/ML
VIAL (ML) INTRAVENOUS
Status: DISPENSED
Start: 2025-03-21

## (undated) RX ORDER — HEPARIN SODIUM 1000 [USP'U]/ML
INJECTION, SOLUTION INTRAVENOUS; SUBCUTANEOUS
Status: DISPENSED
Start: 2025-03-21

## (undated) RX ORDER — LIDOCAINE HYDROCHLORIDE 10 MG/ML
INJECTION, SOLUTION EPIDURAL; INFILTRATION; INTRACAUDAL; PERINEURAL
Status: DISPENSED
Start: 2025-05-05

## (undated) RX ORDER — LIDOCAINE HYDROCHLORIDE 10 MG/ML
INJECTION, SOLUTION EPIDURAL; INFILTRATION; INTRACAUDAL; PERINEURAL
Status: DISPENSED
Start: 2025-03-21

## (undated) RX ORDER — ONDANSETRON 2 MG/ML
INJECTION INTRAMUSCULAR; INTRAVENOUS
Status: DISPENSED
Start: 2025-03-21

## (undated) RX ORDER — SODIUM CHLORIDE 9 MG/ML
INJECTION, SOLUTION INTRAVENOUS
Status: DISPENSED
Start: 2025-05-05

## (undated) RX ORDER — VERAPAMIL HYDROCHLORIDE 2.5 MG/ML
INJECTION, SOLUTION INTRAVENOUS
Status: DISPENSED
Start: 2025-03-21

## (undated) RX ORDER — FENTANYL CITRATE 50 UG/ML
INJECTION, SOLUTION INTRAMUSCULAR; INTRAVENOUS
Status: DISPENSED
Start: 2025-05-05

## (undated) RX ORDER — FENTANYL CITRATE 50 UG/ML
INJECTION, SOLUTION INTRAMUSCULAR; INTRAVENOUS
Status: DISPENSED
Start: 2025-03-21